# Patient Record
Sex: MALE | Race: BLACK OR AFRICAN AMERICAN | Employment: OTHER | URBAN - METROPOLITAN AREA
[De-identification: names, ages, dates, MRNs, and addresses within clinical notes are randomized per-mention and may not be internally consistent; named-entity substitution may affect disease eponyms.]

---

## 2013-06-10 LAB — LEFT VENTRICULAR EJECTION FRACTION, EXTERNAL: 55

## 2017-01-05 ENCOUNTER — TELEPHONE (OUTPATIENT)
Dept: ORTHOPEDIC SURGERY | Age: 62
End: 2017-01-05

## 2017-01-05 NOTE — TELEPHONE ENCOUNTER
Please see Ceci's message below and advise. The request for pain medication has been made one week early. He received a prescription for Norco on 12/16/2016 that was intended to last 30 days.      Last Visit: 11/18/2016 with MD Aditi Chang    Next Appointment: noted to f/u with Manan Giang regarding surgery

## 2017-01-05 NOTE — TELEPHONE ENCOUNTER
Patient states he fell over the [place] holiday but is supposed to be having hip replacement. He states his foot was swollen and he is having pain. He is requesting pain medication to  at [facility]. Please advise.

## 2017-01-10 DIAGNOSIS — Z01.818 PREOPERATIVE TESTING: Primary | ICD-10-CM

## 2017-01-10 DIAGNOSIS — M16.11 PRIMARY OSTEOARTHRITIS OF RIGHT HIP: ICD-10-CM

## 2017-01-16 ENCOUNTER — HOSPITAL ENCOUNTER (OUTPATIENT)
Dept: GENERAL RADIOLOGY | Age: 62
Discharge: HOME | End: 2017-01-16
Payer: MEDICARE

## 2017-01-16 ENCOUNTER — HOSPITAL ENCOUNTER (OUTPATIENT)
Dept: PREADMISSION TESTING | Age: 62
Discharge: HOME | End: 2017-01-16
Payer: MEDICARE

## 2017-01-16 DIAGNOSIS — Z01.818 PREOPERATIVE TESTING: ICD-10-CM

## 2017-01-16 DIAGNOSIS — M16.11 PRIMARY OSTEOARTHRITIS OF RIGHT HIP: ICD-10-CM

## 2017-01-16 LAB
ALBUMIN SERPL BCP-MCNC: 3.4 G/DL (ref 3.4–5)
ANION GAP BLD CALC-SCNC: 7 MMOL/L (ref 3–18)
APPEARANCE UR: CLEAR
APTT PPP: 24.9 SEC (ref 23–36.4)
ATRIAL RATE: 66 BPM
BASOPHILS # BLD AUTO: 0 K/UL (ref 0–0.06)
BASOPHILS # BLD: 0 % (ref 0–2)
BILIRUB UR QL: NEGATIVE
BUN SERPL-MCNC: 20 MG/DL (ref 7–18)
BUN/CREAT SERPL: 13 (ref 12–20)
CALCIUM SERPL-MCNC: 9.4 MG/DL (ref 8.5–10.1)
CALCULATED P AXIS, ECG09: 63 DEGREES
CALCULATED R AXIS, ECG10: 75 DEGREES
CALCULATED T AXIS, ECG11: 93 DEGREES
CHLORIDE SERPL-SCNC: 106 MMOL/L (ref 100–108)
CO2 SERPL-SCNC: 25 MMOL/L (ref 21–32)
COLOR UR: YELLOW
CREAT SERPL-MCNC: 1.51 MG/DL (ref 0.6–1.3)
DIAGNOSIS, 93000: NORMAL
DIFFERENTIAL METHOD BLD: ABNORMAL
EOSINOPHIL # BLD: 0.1 K/UL (ref 0–0.4)
EOSINOPHIL NFR BLD: 3 % (ref 0–5)
ERYTHROCYTE [DISTWIDTH] IN BLOOD BY AUTOMATED COUNT: 12.4 % (ref 11.6–14.5)
EST. AVERAGE GLUCOSE BLD GHB EST-MCNC: NORMAL MG/DL
GLUCOSE SERPL-MCNC: 71 MG/DL (ref 74–99)
GLUCOSE UR STRIP.AUTO-MCNC: NEGATIVE MG/DL
HBA1C MFR BLD: 4.4 % (ref 4.2–5.6)
HCT VFR BLD AUTO: 41.3 % (ref 36–48)
HGB BLD-MCNC: 13.7 G/DL (ref 13–16)
HGB UR QL STRIP: NEGATIVE
INR PPP: 0.9 (ref 0.8–1.2)
KETONES UR QL STRIP.AUTO: NEGATIVE MG/DL
LEUKOCYTE ESTERASE UR QL STRIP.AUTO: NEGATIVE
LYMPHOCYTES # BLD AUTO: 41 % (ref 21–52)
LYMPHOCYTES # BLD: 1.8 K/UL (ref 0.9–3.6)
MCH RBC QN AUTO: 32.9 PG (ref 24–34)
MCHC RBC AUTO-ENTMCNC: 33.2 G/DL (ref 31–37)
MCV RBC AUTO: 99.3 FL (ref 74–97)
MONOCYTES # BLD: 0.4 K/UL (ref 0.05–1.2)
MONOCYTES NFR BLD AUTO: 8 % (ref 3–10)
NEUTS SEG # BLD: 2.1 K/UL (ref 1.8–8)
NEUTS SEG NFR BLD AUTO: 48 % (ref 40–73)
NITRITE UR QL STRIP.AUTO: NEGATIVE
P-R INTERVAL, ECG05: 190 MS
PH UR STRIP: 5 [PH] (ref 5–8)
PLATELET # BLD AUTO: 206 K/UL (ref 135–420)
PMV BLD AUTO: 10.8 FL (ref 9.2–11.8)
POTASSIUM SERPL-SCNC: 4.3 MMOL/L (ref 3.5–5.5)
PROT UR STRIP-MCNC: NEGATIVE MG/DL
PROTHROMBIN TIME: 12.2 SEC (ref 11.5–15.2)
Q-T INTERVAL, ECG07: 398 MS
QRS DURATION, ECG06: 102 MS
QTC CALCULATION (BEZET), ECG08: 417 MS
RBC # BLD AUTO: 4.16 M/UL (ref 4.7–5.5)
SODIUM SERPL-SCNC: 138 MMOL/L (ref 136–145)
SP GR UR REFRACTOMETRY: 1.01 (ref 1–1.03)
UROBILINOGEN UR QL STRIP.AUTO: 0.2 EU/DL (ref 0.2–1)
VENTRICULAR RATE, ECG03: 66 BPM
WBC # BLD AUTO: 4.3 K/UL (ref 4.6–13.2)

## 2017-01-16 PROCEDURE — 71020 XR CHEST PA LAT: CPT

## 2017-01-16 PROCEDURE — 83036 HEMOGLOBIN GLYCOSYLATED A1C: CPT | Performed by: PHYSICIAN ASSISTANT

## 2017-01-16 PROCEDURE — 82040 ASSAY OF SERUM ALBUMIN: CPT | Performed by: PHYSICIAN ASSISTANT

## 2017-01-16 PROCEDURE — 87086 URINE CULTURE/COLONY COUNT: CPT | Performed by: PHYSICIAN ASSISTANT

## 2017-01-16 PROCEDURE — 36415 COLL VENOUS BLD VENIPUNCTURE: CPT | Performed by: PHYSICIAN ASSISTANT

## 2017-01-16 PROCEDURE — 86900 BLOOD TYPING SEROLOGIC ABO: CPT | Performed by: PHYSICIAN ASSISTANT

## 2017-01-16 PROCEDURE — 80048 BASIC METABOLIC PNL TOTAL CA: CPT | Performed by: PHYSICIAN ASSISTANT

## 2017-01-16 PROCEDURE — 85610 PROTHROMBIN TIME: CPT | Performed by: PHYSICIAN ASSISTANT

## 2017-01-16 PROCEDURE — 93005 ELECTROCARDIOGRAM TRACING: CPT

## 2017-01-16 PROCEDURE — 81003 URINALYSIS AUTO W/O SCOPE: CPT | Performed by: PHYSICIAN ASSISTANT

## 2017-01-16 PROCEDURE — 85730 THROMBOPLASTIN TIME PARTIAL: CPT | Performed by: PHYSICIAN ASSISTANT

## 2017-01-16 PROCEDURE — 85025 COMPLETE CBC W/AUTO DIFF WBC: CPT | Performed by: PHYSICIAN ASSISTANT

## 2017-01-17 LAB
ABO + RH BLD: NORMAL
BACTERIA SPEC CULT: NORMAL
BLOOD GROUP ANTIBODIES SERPL: NORMAL
SERVICE CMNT-IMP: NORMAL
SPECIMEN EXP DATE BLD: NORMAL

## 2017-01-18 ENCOUNTER — TELEPHONE (OUTPATIENT)
Dept: ORTHOPEDIC SURGERY | Facility: CLINIC | Age: 62
End: 2017-01-18

## 2017-01-18 ENCOUNTER — OFFICE VISIT (OUTPATIENT)
Dept: ORTHOPEDIC SURGERY | Facility: CLINIC | Age: 62
End: 2017-01-18

## 2017-01-18 VITALS
HEIGHT: 73 IN | HEART RATE: 73 BPM | SYSTOLIC BLOOD PRESSURE: 114 MMHG | BODY MASS INDEX: 32.87 KG/M2 | TEMPERATURE: 98.7 F | DIASTOLIC BLOOD PRESSURE: 79 MMHG | WEIGHT: 248 LBS

## 2017-01-18 DIAGNOSIS — M16.11 PRIMARY OSTEOARTHRITIS OF RIGHT HIP: Primary | ICD-10-CM

## 2017-01-18 RX ORDER — PREGABALIN 25 MG/1
75 CAPSULE ORAL ONCE
Status: CANCELLED | OUTPATIENT
Start: 2017-01-18 | End: 2017-01-18

## 2017-01-18 RX ORDER — OXYCODONE HCL 10 MG/1
20 TABLET, FILM COATED, EXTENDED RELEASE ORAL EVERY 12 HOURS
Status: CANCELLED | OUTPATIENT
Start: 2017-01-18

## 2017-01-18 RX ORDER — WARFARIN 1 MG/1
10 TABLET ORAL ONCE
Status: CANCELLED | OUTPATIENT
Start: 2017-01-18 | End: 2017-01-18

## 2017-01-18 RX ORDER — CELECOXIB 100 MG/1
400 CAPSULE ORAL ONCE
Status: CANCELLED | OUTPATIENT
Start: 2017-01-18 | End: 2017-01-18

## 2017-01-18 RX ORDER — ACETAMINOPHEN 325 MG/1
1000 TABLET ORAL ONCE
Status: CANCELLED | OUTPATIENT
Start: 2017-01-18 | End: 2017-01-18

## 2017-01-18 NOTE — H&P
[address] [place] [address]  [phone]           HISTORY & PHYSICAL      Patient: Elsa Crowe                MRN: [id number]       SSN: [id number]  YOB: 1955        AGE: 59 y.o. SEX: male  Body mass index is 32.72 kg/(m^2). PCP: Abhijeet Mejia MD  01/18/17      CC: right hip end stage OA  Problem List Items Addressed This Visit     None      Visit Diagnoses     Primary osteoarthritis of right hip    -  Primary    Relevant Orders    AMB POC X-RAY RADEX HIP UNI WITH PELVIS 2-3 VIEWS (Completed)            HPI:  The patient is a pleasant 59 y.o. whom has end stage OA of their Right hip and has failed conservative treatment including but not limited to NSAIDS, cortisone injections, viscosupplementation, PT, and pain medicine. Due to the current findings and affected activities of daily living, surgical intervention is indicated. The alternatives, risks, complications, as well as expected outcome were discussed. These include but are not limited to infection, blood loss, need for blood transfusion, neurovascular damage, DVT, PE,  post-op stiffness and pain, leg length discrepancy, dislocation, anesthetic complications, prothesis longevity, need for more surgery, MI, stroke, and even death. The patient understands and wishes to proceed with surgery. Past Medical History   Diagnosis Date    Arthritis     CAD (coronary artery disease)     Essential hypertension     Low back pain     Lumbar postlaminectomy syndrome     Lumbar spondylosis     Myocardial infarction ([facility] [address]      lad stent for stemi,12/2012    Pancreatic cyst          Current Outpatient Prescriptions:     HYDROcodone-acetaminophen (NORCO)  mg tablet, Take 1 Tab by mouth every twelve (12) hours as needed for Pain.  Max Daily Amount: 2 Tabs., Disp: 60 Tab, Rfl: 0    ammonium lactate (LAC-HYDRIN) 12 % topical cream, Apply  to affected area two (2) times a day. rub in to affected area well, Disp: 280 g, Rfl: 0    HYDROcodone-acetaminophen (NORCO) 7.5-325 mg per tablet, 1-2 po q6h prn pain, Disp: 50 Tab, Rfl: 0    traMADol (ULTRAM) 50 mg tablet, Take 1 Tab by mouth two (2) times daily as needed for Pain. Max Daily Amount: 100 mg., Disp: 60 Tab, Rfl: 1    tadalafil (CIALIS, ADCIRCA) 20 mg tablet, Take 1 Tab by mouth daily as needed. , Disp: 6 Tab, Rfl: 12    gabapentin (NEURONTIN) 300 mg capsule, Take 1 Cap by mouth three (3) times daily. , Disp: 90 Cap, Rfl: 1    doxycycline (ADOXA) 100 mg tablet, Take 1 Tab by mouth two (2) times a day., Disp: 20 Tab, Rfl: 0    gabapentin (NEURONTIN) 300 mg capsule, , Disp: , Rfl:     isoniazid (NYDRAZID) 300 mg tablet, Take 300 mg by mouth daily. , Disp: , Rfl:     nitroglycerin (NITROSTAT) 0.4 mg SL tablet, by SubLINGual route every five (5) minutes as needed. , Disp: , Rfl:     carvedilol (COREG) 25 mg tablet, Take 25 mg by mouth two (2) times daily (with meals). , Disp: , Rfl:     hydrochlorothiazide (HYDRODIURIL) 25 mg tablet, Take 25 mg by mouth daily. , Disp: , Rfl:     pyridoxine 500 mg tablet, Take 500 mg by mouth daily. , Disp: , Rfl:     isosorbide mononitrate ER (IMDUR) 60 mg CR tablet, Take 1 Tab by mouth every morning., Disp: 30 Tab, Rfl: 6    clopidogrel (PLAVIX) 75 mg tablet, Take  by mouth daily. , Disp: , Rfl:     lisinopril (PRINIVIL, ZESTRIL) 20 mg tablet, Take  by mouth daily. , Disp: , Rfl:     aspirin 81 mg tablet, Take 81 mg by mouth., Disp: , Rfl:     atorvastatin (LIPITOR) 80 mg tablet, Take 80 mg by mouth daily. , Disp: , Rfl:     traMADol (ULTRAM) 50 mg tablet, Take 50 mg by mouth every six (6) hours as needed. , Disp: , Rfl:     Allergies   Allergen Reactions    Vicodin [Hydrocodone-Acetaminophen] Other (comments)     denies       Social History     Social History    Marital status: SINGLE     Spouse name: N/A    Number of children: N/A    Years of education: N/A     Occupational History    Not on file. Social History Main Topics    Smoking status: Former Smoker     Packs/day: 0.10     Types: Cigarettes     Quit date: 11/1/2012    Smokeless tobacco: Not on file    Alcohol use Yes      Comment: occasional    Drug use: Yes     Special: Cocaine      Comment: +Cocaine Screen 3/7/16 (see [address] [place] [address])    Sexual activity: Not on file      Comment: stopped using     Other Topics Concern    Not on file     Social History Narrative       Past Surgical History   Procedure Laterality Date    Hx orthopaedic       lumbar spine x 5    Hx lumbar fusion      Hx heart catheterization      Hx ptca         PE:  Visit Vitals    /79 (BP 1 Location: Right arm, BP Patient Position: Sitting)    Pulse 73    Temp 98.7 °F (37.1 °C) (Oral)    Ht 6' 1\" (1.854 m)    Wt 248 lb (112.5 kg)    BMI 32.72 kg/m2     A&O X3, NAD, well develop, well nourished  Heart: S1-S2, rrr  Lungs: CTA bilat  Abd: soft, nt, nt, + bs in all quadrants  Ext:  Pos distal pulses to DP, PT  Leg lengths shows the right LE to be slightly shorter grossly sitting in the chair    X-ray: right hip shows end stage OA    Labs: labs were reviewed and wnl.  ua neg    A:  Right  hip end stage OA    P:  At this point we will move forward with surgery. Again, the alternatives, risks, complications, as well as expected outcome were discussed and the patient wishes to proceed with surgery. Pt has been instructed to stop aspirin, nsaids, rheumatologic medications and blood thinners. They have also been instructed to continue on any heart and bp meds and to take them the morning of surgery with sips of water. Lateral approach.          Cheo Allred

## 2017-01-18 NOTE — PATIENT INSTRUCTIONS
Hip Replacement Surgery: What to Expect at the Hospital  Your Recovery  After hip replacement surgery, you will be taken to the recovery room. In a few hours, you will go to your hospital room. You may see a metal triangle called a trapeze over your bed. You can use this to help move yourself around in bed. You will be very tired and will want to rest. Your nurse may also help turn you as you rest.  You will probably still have a tube that drains urine from your bladder (urinary catheter), and you will probably get fluids through a tube in your vein called an IV. You may also have a drain near the cut (incision) on your hip. You may not feel hungry. You may feel sick to your stomach or constipated for a couple of days. This is normal. Your nurse may give you stool softeners or laxatives to help with constipation. You may have stockings that put pressure on your legs to prevent blood clots. Your nurse may also give you medicines and exercise instructions to help prevent clots. Most people get out of bed with help on the day of surgery or the next day. You will probably spend 2 to 7 days in the hospital after your surgery. Having surgery can be stressful. The information below tells you what to expect. Each person has a different experience and recovers at a different pace. What will happen in the hospital?  Pain and pain medicine  · You will receive medicine to help control pain. Some pain medicines are given through an IV and some are taken by mouth. · Take it as needed, but remember that it is easier to prevent pain before it starts than to stop it once it has started. · If you still have a lot of pain after you take your medicine, tell your nurse. You may need new medicine or to get the medicine in a different form. · You may also have some mild pain in your back. Changing your position in bed may help this. Tell your nurse you want to turn, and he or she will help you.   Other medicines  · Your doctor will probably give you blood thinners to prevent blood clots in your leg. You take this medicine during your hospital stay, and you may also take it after you go home. · Your doctor may give you antibiotics for about 24 hours after surgery. Rehabilitation  · Your rehabilitation (rehab) will probably begin the day after your surgery. Your physical therapist will get you started. It may be painful to exercise at first, but your nurse will give you pain medicine if you need it. · Over the next few days, your physical therapist will help you walk, go up and down stairs, and get in and out of bed and chairs. He or she will help improve the range of motion and strength in your hip. Your physical therapist will teach you positions and motions that will help keep your hip from popping out of the socket (dislocating). This is a very important part of your therapy. · How quickly you regain strength and motion and do things on your own depends on how well you follow your physical therapy. Your physical therapist will teach you the exercises, but you must do them yourself. · An occupational therapist will work with you. He or she will teach you how to bathe, dress, and do daily activities. You may need tools to help with everyday activities. Tools include shower stools, shoehorns, and long-handled sponges. Diet  · You will get liquids at first, but you can begin to eat your normal diet when you feel better. If your stomach is upset, your nurse will probably bring you bland, low-fat foods like plain rice, broiled chicken, toast, and yogurt. · You may have more fiber added to your meals to prevent constipation. Incision care  · You will have a bandage over your incision. Your nurse will care for this. Other instructions  · Your nurse or respiratory therapist will have you do breathing and coughing exercises to prevent problems such as pneumonia.  Breathe in deeply through your nose, and slowly breathe out through your mouth. Do this 3 times, and then cough 2 times. · You may have a device that you suck air through to help keep your lungs healthy (incentive spirometer). Use this as your nurse or therapist tells you to. When should you call for help? · You have trouble breathing. · You have a cough, shortness of breath, or chest pain. · You are sick to your stomach or cannot keep fluids down. · You have signs of a blood clot, such as:  ¨ Pain in your calf, back of the knee, thigh, or groin. ¨ Redness and swelling in your leg or groin. · You are in pain, or your pain does not get better after you take pain medicine. · You have loose stitches, or your incision comes open. · You have signs of infection, such as:  ¨ Increased pain, swelling, warmth, or redness. ¨ Red streaks leading from the incision. ¨ Pus draining from the incision. ¨ A fever. Where can you learn more? Go to http://suzanne-renetta.info/. Enter K179 in the search box to learn more about \"Hip Replacement Surgery: What to Expect at the Hospital.\"  Current as of: May 23, 2016  Content Version: 11.1  © 1164-3332 [employer]. Care instructions adapted under license by [employer] (which disclaims liability or warranty for this information). If you have questions about a medical condition or this instruction, always ask your healthcare professional. [place] [address] any warranty or liability for your use of this information. Hip Replacement: Before Your Surgery  What is hip replacement surgery? Hip replacement surgery uses metal, ceramic, or plastic parts to replace the ball at the top of the thighbone (femur). In a total hip replacement, the doctor also replaces the hip socket. In a partial hip replacement, the socket is not replaced. For traditional surgery, your doctor will make a 6- to 10-inch cut (incision) on the side or the back of your hip.  The surgery can also be done with one or two smaller cuts. This is called minimally invasive surgery. Another type of surgery is done through a small cut on the front (anterior) of the hip. Your doctor will talk with you which type of surgery might be best for you. You will likely stay in the hospital for 1 to 7 days after your surgery. Your physical therapy will start during this time. You may need physical therapy for several weeks after you leave the hospital. At home you'll keep doing the exercises you learned. It usually takes 3 to 6 months to get back to full activity. Your doctor will tell you when you can go back to work. This depends on the kind of surgery and the type of job you have. After you recover, you likely will have much less pain than before the surgery. You should be able to return to most of your normal activities. Your doctor may suggest that you avoid strenuous activities. These include running, tennis, and any type of skiing. You may have to take antibiotics before you have dental work or a medical procedure. This helps reduce the chance that your new hip will become infected. Always tell your caregivers that you have an artificial hip. Follow-up care is a key part of your treatment and safety. Be sure to make and go to all appointments, and call your doctor if you are having problems. It's also a good idea to know your test results and keep a list of the medicines you take. What happens before surgery? Surgery can be stressful. This information will help you understand what you can expect. And it will help you safely prepare for surgery. Preparing for surgery  · Understand exactly what surgery is planned, along with the risks, benefits, and other options. · Tell your doctors ALL the medicines, vitamins, supplements, and herbal remedies you take. Some of these can increase the risk of bleeding or interact with anesthesia. · If you take aspirin or some other blood thinner, be sure to talk to your doctor.  He or she will tell you if you should stop taking it before your surgery. Make sure that you understand exactly what your doctor wants you to do. · Your doctor will tell you which medicines to take or stop before your surgery. You may need to stop taking certain medicines a week or more before surgery. So talk to your doctor as soon as you can. · If you have an advance directive, let your doctor know. It may include a living will and a durable power of  for health care. Bring a copy to the hospital. If you don't have one, you may want to prepare one. It lets your doctor and loved ones know your health care wishes. Doctors advise that everyone prepare these papers before any type of surgery or procedure. What happens on the day of surgery? · Follow the instructions exactly about when to stop eating and drinking. If you don't, your surgery may be canceled. If your doctor told you to take your medicines on the day of surgery, take them with only a sip of water. · Take a bath or shower before you come in for your surgery. Do not apply lotions, perfumes, deodorants, or nail polish. · Do not shave the surgical site yourself. · Take off all jewelry and piercings. And take out contact lenses, if you wear them. At the hospital or surgery center  · Bring a picture ID. · The area for surgery is often marked to make sure there are no errors. · You will get antibiotics through the IV tube before surgery. This lowers the risk of an infection. · You will be kept comfortable and safe by your anesthesia provider. The anesthesia may make you sleep. Or it may just numb the area being worked on. · The surgery usually takes 1 to 3 hours. Going home  · Be sure you have someone to drive you home. Anesthesia and pain medicine make it unsafe for you to drive. · At first, you will need someone who can help you day and night. You can go home from the hospital if you have help at home and your recovery is going well.  You can go to a rehabilitation center if you don't have help at home or if you need extra care. · You will be given more specific instructions about recovering from your surgery. They will cover things like diet, wound care, follow-up care, driving, and getting back to your normal routine. When should you call your doctor? · You have questions or concerns. · You don't understand how to prepare for your surgery. · You become ill before the surgery (such as fever, flu, or a cold). · You need to reschedule or have changed your mind about having the surgery. Where can you learn more? Go to http://suzanne-renetta.info/. Enter 99 [id number] in the search box to learn more about \"Hip Replacement: Before Your Surgery. \"  Current as of: May 23, 2016  Content Version: 11.1  © 6661-9192 [employer]. Care instructions adapted under license by [employer] (which disclaims liability or warranty for this information). If you have questions about a medical condition or this instruction, always ask your healthcare professional. [address] any warranty or liability for your use of this information.

## 2017-01-19 RX ORDER — OXYCODONE AND ACETAMINOPHEN 5; 325 MG/1; MG/1
1 TABLET ORAL
Qty: 40 TAB | Refills: 0 | Status: SHIPPED | OUTPATIENT
Start: 2017-01-19 | End: 2017-01-27

## 2017-01-19 RX ORDER — OXYCODONE AND ACETAMINOPHEN 5; 325 MG/1; MG/1
1 TABLET ORAL
Qty: 40 TAB | Refills: 0 | Status: SHIPPED | OUTPATIENT
Start: 2017-01-19 | End: 2017-01-19

## 2017-01-20 NOTE — PROGRESS NOTES
Omer Anne attended the Joint Replacement Pre-Operative class on 1/16/17. Topics discussed included surgery preparation, what to expect the day of surgery, medications, physical and occupational therapy, and discharge planning. He was given a hip patient education notebook to take home. Opportunity was given to ask questions and phone number of  was given for any questions or concerns that may arise later.

## 2017-01-23 ENCOUNTER — ANESTHESIA EVENT (OUTPATIENT)
Dept: SURGERY | Age: 62
DRG: 470 | End: 2017-01-23
Payer: MEDICARE

## 2017-01-23 ENCOUNTER — HOSPITAL ENCOUNTER (OUTPATIENT)
Dept: PREADMISSION TESTING | Age: 62
Discharge: HOME | DRG: 470 | End: 2017-01-23
Payer: MEDICARE

## 2017-01-23 DIAGNOSIS — F14.10 COCAINE ABUSE, EPISODIC USE (HCC): ICD-10-CM

## 2017-01-23 DIAGNOSIS — Z01.818 PREOPERATIVE TESTING: Primary | ICD-10-CM

## 2017-01-23 DIAGNOSIS — M16.11 PRIMARY OSTEOARTHRITIS OF RIGHT HIP: ICD-10-CM

## 2017-01-23 DIAGNOSIS — Z01.818 PREOPERATIVE TESTING: ICD-10-CM

## 2017-01-23 LAB
AMPHET UR QL SCN: NEGATIVE
BARBITURATES UR QL SCN: NEGATIVE
BENZODIAZ UR QL: NEGATIVE
CANNABINOIDS UR QL SCN: NEGATIVE
COCAINE UR QL SCN: NEGATIVE
HDSCOM,HDSCOM: NORMAL
METHADONE UR QL: NEGATIVE
OPIATES UR QL: NEGATIVE
PCP UR QL: NEGATIVE

## 2017-01-24 ENCOUNTER — HOSPITAL ENCOUNTER (INPATIENT)
Age: 62
LOS: 3 days | Discharge: NURSING, REHAB OR LONG-TERM CARE | DRG: 470 | End: 2017-01-27
Attending: ORTHOPAEDIC SURGERY | Admitting: ORTHOPAEDIC SURGERY
Payer: MEDICARE

## 2017-01-24 ENCOUNTER — ANESTHESIA (OUTPATIENT)
Dept: SURGERY | Age: 62
DRG: 470 | End: 2017-01-24
Payer: MEDICARE

## 2017-01-24 ENCOUNTER — APPOINTMENT (OUTPATIENT)
Dept: GENERAL RADIOLOGY | Age: 62
DRG: 470 | End: 2017-01-24
Attending: PHYSICIAN ASSISTANT
Payer: MEDICARE

## 2017-01-24 ENCOUNTER — SURGERY (OUTPATIENT)
Age: 62
End: 2017-01-24

## 2017-01-24 DIAGNOSIS — M16.10 HIP ARTHRITIS: Primary | ICD-10-CM

## 2017-01-24 PROCEDURE — C9290 INJ, BUPIVACAINE LIPOSOME: HCPCS | Performed by: PHYSICIAN ASSISTANT

## 2017-01-24 PROCEDURE — 74011250636 HC RX REV CODE- 250/636

## 2017-01-24 PROCEDURE — 74011250636 HC RX REV CODE- 250/636: Performed by: ORTHOPAEDIC SURGERY

## 2017-01-24 PROCEDURE — 64445 NJX AA&/STRD SCIATIC NRV IMG: CPT | Performed by: ANESTHESIOLOGY

## 2017-01-24 PROCEDURE — 76060000035 HC ANESTHESIA 2 TO 2.5 HR: Performed by: ORTHOPAEDIC SURGERY

## 2017-01-24 PROCEDURE — 88311 DECALCIFY TISSUE: CPT | Performed by: ORTHOPAEDIC SURGERY

## 2017-01-24 PROCEDURE — 74011250636 HC RX REV CODE- 250/636: Performed by: NURSE ANESTHETIST, CERTIFIED REGISTERED

## 2017-01-24 PROCEDURE — 77030013708 HC HNDPC SUC IRR PULS STRY –B: Performed by: ORTHOPAEDIC SURGERY

## 2017-01-24 PROCEDURE — 77030002933 HC SUT MCRYL J&J -A: Performed by: ORTHOPAEDIC SURGERY

## 2017-01-24 PROCEDURE — 76010000131 HC OR TIME 2 TO 2.5 HR: Performed by: ORTHOPAEDIC SURGERY

## 2017-01-24 PROCEDURE — 76210000016 HC OR PH I REC 1 TO 1.5 HR: Performed by: ORTHOPAEDIC SURGERY

## 2017-01-24 PROCEDURE — 74011250637 HC RX REV CODE- 250/637: Performed by: PHYSICIAN ASSISTANT

## 2017-01-24 PROCEDURE — 74011250636 HC RX REV CODE- 250/636: Performed by: PHYSICIAN ASSISTANT

## 2017-01-24 PROCEDURE — 73502 X-RAY EXAM HIP UNI 2-3 VIEWS: CPT

## 2017-01-24 PROCEDURE — 77030019557 HC ELECTRD VES SEAL MEDT -F: Performed by: ORTHOPAEDIC SURGERY

## 2017-01-24 PROCEDURE — 77030008467 HC STPLR SKN COVD -B: Performed by: ORTHOPAEDIC SURGERY

## 2017-01-24 PROCEDURE — 74011000258 HC RX REV CODE- 258: Performed by: ORTHOPAEDIC SURGERY

## 2017-01-24 PROCEDURE — 74011250637 HC RX REV CODE- 250/637: Performed by: ORTHOPAEDIC SURGERY

## 2017-01-24 PROCEDURE — 77030031139 HC SUT VCRL2 J&J -A: Performed by: ORTHOPAEDIC SURGERY

## 2017-01-24 PROCEDURE — 65270000029 HC RM PRIVATE

## 2017-01-24 PROCEDURE — 77030027138 HC INCENT SPIROMETER -A

## 2017-01-24 PROCEDURE — 77030011265 HC ELECTRD BLD HEX COVD -A: Performed by: ORTHOPAEDIC SURGERY

## 2017-01-24 PROCEDURE — 77030020294 HC ABD PLLW HIP DERY -B: Performed by: ORTHOPAEDIC SURGERY

## 2017-01-24 PROCEDURE — 77030020782 HC GWN BAIR PAWS FLX 3M -B: Performed by: ORTHOPAEDIC SURGERY

## 2017-01-24 PROCEDURE — C1776 JOINT DEVICE (IMPLANTABLE): HCPCS | Performed by: ORTHOPAEDIC SURGERY

## 2017-01-24 PROCEDURE — 74011000258 HC RX REV CODE- 258: Performed by: PHYSICIAN ASSISTANT

## 2017-01-24 PROCEDURE — 77030018836 HC SOL IRR NACL ICUM -A: Performed by: ORTHOPAEDIC SURGERY

## 2017-01-24 PROCEDURE — 74011000250 HC RX REV CODE- 250: Performed by: NURSE ANESTHETIST, CERTIFIED REGISTERED

## 2017-01-24 PROCEDURE — 77030008683 HC TU ET CUF COVD -A: Performed by: ANESTHESIOLOGY

## 2017-01-24 PROCEDURE — 74011250637 HC RX REV CODE- 250/637: Performed by: NURSE ANESTHETIST, CERTIFIED REGISTERED

## 2017-01-24 PROCEDURE — 97161 PT EVAL LOW COMPLEX 20 MIN: CPT

## 2017-01-24 PROCEDURE — 77030003029 HC SUT VCRL J&J -B: Performed by: ORTHOPAEDIC SURGERY

## 2017-01-24 PROCEDURE — 0SR90JZ REPLACEMENT OF RIGHT HIP JOINT WITH SYNTHETIC SUBSTITUTE, OPEN APPROACH: ICD-10-PCS | Performed by: ORTHOPAEDIC SURGERY

## 2017-01-24 PROCEDURE — 77030003602 HC NDL NRV BLK BBMI -B: Performed by: ORTHOPAEDIC SURGERY

## 2017-01-24 PROCEDURE — 77030018835 HC SOL IRR LR ICUM -A: Performed by: ORTHOPAEDIC SURGERY

## 2017-01-24 PROCEDURE — 88304 TISSUE EXAM BY PATHOLOGIST: CPT | Performed by: ORTHOPAEDIC SURGERY

## 2017-01-24 PROCEDURE — 74011000250 HC RX REV CODE- 250: Performed by: ORTHOPAEDIC SURGERY

## 2017-01-24 PROCEDURE — 74011000250 HC RX REV CODE- 250: Performed by: PHYSICIAN ASSISTANT

## 2017-01-24 PROCEDURE — 77030011640 HC PAD GRND REM COVD -A: Performed by: ORTHOPAEDIC SURGERY

## 2017-01-24 PROCEDURE — 74011000250 HC RX REV CODE- 250

## 2017-01-24 PROCEDURE — 77030018883 HC BLD SAW SAG4 STRY -B: Performed by: ORTHOPAEDIC SURGERY

## 2017-01-24 DEVICE — COMPONENT HIP PRSS FT MTL ON CERM POLYETH X3: Type: IMPLANTABLE DEVICE | Site: HIP | Status: FUNCTIONAL

## 2017-01-24 DEVICE — STEM FEM SZ 6 127D 35X111MM -- ACCOLADE II V40: Type: IMPLANTABLE DEVICE | Site: HIP | Status: FUNCTIONAL

## 2017-01-24 DEVICE — SCREW BNE L20MM DIA6.5MM STD CANC HIP TI HMSPHR THRD DRVR: Type: IMPLANTABLE DEVICE | Site: HIP | Status: FUNCTIONAL

## 2017-01-24 DEVICE — LINER ACET SZ F ID36MM THK7.9MM 0DEG HIP X3 LOK RNG FOR: Type: IMPLANTABLE DEVICE | Site: HIP | Status: FUNCTIONAL

## 2017-01-24 DEVICE — SCREW BNE L25MM DIA6.5MM STD CANC HIP TI ST CANN NONLOCKING: Type: IMPLANTABLE DEVICE | Site: HIP | Status: FUNCTIONAL

## 2017-01-24 DEVICE — PLUG BNE BK TI HA HMSPHR SLD FOR TRIDENT ACET SHELL DOME H: Type: IMPLANTABLE DEVICE | Site: HIP | Status: FUNCTIONAL

## 2017-01-24 DEVICE — HEAD FEM DELT V40 -2.5MM 36MM -- V40 BIOLOX: Type: IMPLANTABLE DEVICE | Site: HIP | Status: FUNCTIONAL

## 2017-01-24 DEVICE — SHELL ACET SZ F DIA58MM HIP X3 TRITANIUM HMSPHR CLUS H MOD: Type: IMPLANTABLE DEVICE | Site: HIP | Status: FUNCTIONAL

## 2017-01-24 RX ORDER — CELECOXIB 400 MG/1
400 CAPSULE ORAL ONCE
Status: COMPLETED | OUTPATIENT
Start: 2017-01-24 | End: 2017-01-24

## 2017-01-24 RX ORDER — OXYCODONE AND ACETAMINOPHEN 10; 325 MG/1; MG/1
1-2 TABLET ORAL
Status: DISCONTINUED | OUTPATIENT
Start: 2017-01-24 | End: 2017-01-24

## 2017-01-24 RX ORDER — MIDAZOLAM HYDROCHLORIDE 1 MG/ML
2 INJECTION, SOLUTION INTRAMUSCULAR; INTRAVENOUS ONCE
Status: COMPLETED | OUTPATIENT
Start: 2017-01-24 | End: 2017-01-24

## 2017-01-24 RX ORDER — LANOLIN ALCOHOL/MO/W.PET/CERES
1 CREAM (GRAM) TOPICAL 2 TIMES DAILY WITH MEALS
Status: DISCONTINUED | OUTPATIENT
Start: 2017-01-24 | End: 2017-01-27 | Stop reason: HOSPADM

## 2017-01-24 RX ORDER — ROCURONIUM BROMIDE 10 MG/ML
INJECTION, SOLUTION INTRAVENOUS AS NEEDED
Status: DISCONTINUED | OUTPATIENT
Start: 2017-01-24 | End: 2017-01-24 | Stop reason: HOSPADM

## 2017-01-24 RX ORDER — FENTANYL CITRATE 50 UG/ML
100 INJECTION, SOLUTION INTRAMUSCULAR; INTRAVENOUS ONCE
Status: COMPLETED | OUTPATIENT
Start: 2017-01-24 | End: 2017-01-24

## 2017-01-24 RX ORDER — FENTANYL CITRATE 50 UG/ML
INJECTION, SOLUTION INTRAMUSCULAR; INTRAVENOUS AS NEEDED
Status: DISCONTINUED | OUTPATIENT
Start: 2017-01-24 | End: 2017-01-24 | Stop reason: HOSPADM

## 2017-01-24 RX ORDER — VANCOMYCIN HYDROCHLORIDE 1 G/20ML
INJECTION, POWDER, LYOPHILIZED, FOR SOLUTION INTRAVENOUS AS NEEDED
Status: DISCONTINUED | OUTPATIENT
Start: 2017-01-24 | End: 2017-01-24 | Stop reason: HOSPADM

## 2017-01-24 RX ORDER — CELECOXIB 100 MG/1
200 CAPSULE ORAL EVERY 12 HOURS
Status: DISCONTINUED | OUTPATIENT
Start: 2017-01-24 | End: 2017-01-27 | Stop reason: HOSPADM

## 2017-01-24 RX ORDER — EPINEPHRINE 1 MG/ML
INJECTION, SOLUTION, CONCENTRATE INTRAVENOUS AS NEEDED
Status: DISCONTINUED | OUTPATIENT
Start: 2017-01-24 | End: 2017-01-24 | Stop reason: HOSPADM

## 2017-01-24 RX ORDER — ADHESIVE BANDAGE
30 BANDAGE TOPICAL DAILY PRN
Status: DISCONTINUED | OUTPATIENT
Start: 2017-01-24 | End: 2017-01-27 | Stop reason: HOSPADM

## 2017-01-24 RX ORDER — OXYCODONE HCL 20 MG/1
20 TABLET, FILM COATED, EXTENDED RELEASE ORAL EVERY 12 HOURS
Status: DISCONTINUED | OUTPATIENT
Start: 2017-01-24 | End: 2017-01-27 | Stop reason: HOSPADM

## 2017-01-24 RX ORDER — PROPOFOL 10 MG/ML
INJECTION, EMULSION INTRAVENOUS AS NEEDED
Status: DISCONTINUED | OUTPATIENT
Start: 2017-01-24 | End: 2017-01-24 | Stop reason: HOSPADM

## 2017-01-24 RX ORDER — KETOROLAC TROMETHAMINE 30 MG/ML
INJECTION, SOLUTION INTRAMUSCULAR; INTRAVENOUS AS NEEDED
Status: DISCONTINUED | OUTPATIENT
Start: 2017-01-24 | End: 2017-01-24 | Stop reason: HOSPADM

## 2017-01-24 RX ORDER — NALOXONE HYDROCHLORIDE 0.4 MG/ML
0.4 INJECTION, SOLUTION INTRAMUSCULAR; INTRAVENOUS; SUBCUTANEOUS AS NEEDED
Status: DISCONTINUED | OUTPATIENT
Start: 2017-01-24 | End: 2017-01-27 | Stop reason: HOSPADM

## 2017-01-24 RX ORDER — ROPIVACAINE HYDROCHLORIDE 2 MG/ML
30 INJECTION, SOLUTION EPIDURAL; INFILTRATION; PERINEURAL
Status: COMPLETED | OUTPATIENT
Start: 2017-01-24 | End: 2017-01-24

## 2017-01-24 RX ORDER — EPHEDRINE SULFATE/0.9% NACL/PF 25 MG/5 ML
SYRINGE (ML) INTRAVENOUS AS NEEDED
Status: DISCONTINUED | OUTPATIENT
Start: 2017-01-24 | End: 2017-01-24 | Stop reason: HOSPADM

## 2017-01-24 RX ORDER — LISINOPRIL 20 MG/1
20 TABLET ORAL DAILY
Status: DISCONTINUED | OUTPATIENT
Start: 2017-01-25 | End: 2017-01-27 | Stop reason: HOSPADM

## 2017-01-24 RX ORDER — PREGABALIN 75 MG/1
75 CAPSULE ORAL ONCE
Status: COMPLETED | OUTPATIENT
Start: 2017-01-24 | End: 2017-01-24

## 2017-01-24 RX ORDER — HYDROMORPHONE HYDROCHLORIDE 2 MG/ML
0.5 INJECTION, SOLUTION INTRAMUSCULAR; INTRAVENOUS; SUBCUTANEOUS
Status: COMPLETED | OUTPATIENT
Start: 2017-01-24 | End: 2017-01-24

## 2017-01-24 RX ORDER — SODIUM CHLORIDE 0.9 % (FLUSH) 0.9 %
5-10 SYRINGE (ML) INJECTION EVERY 8 HOURS
Status: DISCONTINUED | OUTPATIENT
Start: 2017-01-24 | End: 2017-01-24 | Stop reason: HOSPADM

## 2017-01-24 RX ORDER — BUPIVACAINE HYDROCHLORIDE 5 MG/ML
INJECTION, SOLUTION EPIDURAL; INTRACAUDAL AS NEEDED
Status: DISCONTINUED | OUTPATIENT
Start: 2017-01-24 | End: 2017-01-24 | Stop reason: HOSPADM

## 2017-01-24 RX ORDER — DIPHENHYDRAMINE HCL 25 MG
25 CAPSULE ORAL
Status: DISCONTINUED | OUTPATIENT
Start: 2017-01-24 | End: 2017-01-27 | Stop reason: HOSPADM

## 2017-01-24 RX ORDER — ACETAMINOPHEN 500 MG
1000 TABLET ORAL ONCE
Status: COMPLETED | OUTPATIENT
Start: 2017-01-24 | End: 2017-01-24

## 2017-01-24 RX ORDER — SODIUM CHLORIDE 0.9 % (FLUSH) 0.9 %
5-10 SYRINGE (ML) INJECTION AS NEEDED
Status: DISCONTINUED | OUTPATIENT
Start: 2017-01-24 | End: 2017-01-27 | Stop reason: HOSPADM

## 2017-01-24 RX ORDER — SODIUM CHLORIDE 0.9 % (FLUSH) 0.9 %
5-10 SYRINGE (ML) INJECTION AS NEEDED
Status: DISCONTINUED | OUTPATIENT
Start: 2017-01-24 | End: 2017-01-24 | Stop reason: HOSPADM

## 2017-01-24 RX ORDER — FAMOTIDINE 20 MG/1
20 TABLET, FILM COATED ORAL ONCE
Status: COMPLETED | OUTPATIENT
Start: 2017-01-24 | End: 2017-01-24

## 2017-01-24 RX ORDER — ISOSORBIDE MONONITRATE 30 MG/1
60 TABLET, EXTENDED RELEASE ORAL
Status: DISCONTINUED | OUTPATIENT
Start: 2017-01-25 | End: 2017-01-27 | Stop reason: HOSPADM

## 2017-01-24 RX ORDER — CARVEDILOL 25 MG/1
25 TABLET ORAL 2 TIMES DAILY WITH MEALS
Status: DISCONTINUED | OUTPATIENT
Start: 2017-01-24 | End: 2017-01-27 | Stop reason: HOSPADM

## 2017-01-24 RX ORDER — WARFARIN 10 MG/1
10 TABLET ORAL ONCE
Status: COMPLETED | OUTPATIENT
Start: 2017-01-24 | End: 2017-01-24

## 2017-01-24 RX ORDER — HYDROMORPHONE HYDROCHLORIDE 2 MG/ML
INJECTION, SOLUTION INTRAMUSCULAR; INTRAVENOUS; SUBCUTANEOUS
Status: COMPLETED
Start: 2017-01-24 | End: 2017-01-24

## 2017-01-24 RX ORDER — DIPHENHYDRAMINE HYDROCHLORIDE 50 MG/ML
25 INJECTION, SOLUTION INTRAMUSCULAR; INTRAVENOUS ONCE
Status: COMPLETED | OUTPATIENT
Start: 2017-01-24 | End: 2017-01-24

## 2017-01-24 RX ORDER — OXYCODONE HCL 20 MG/1
20 TABLET, FILM COATED, EXTENDED RELEASE ORAL ONCE
Status: COMPLETED | OUTPATIENT
Start: 2017-01-24 | End: 2017-01-24

## 2017-01-24 RX ORDER — HYDROMORPHONE HYDROCHLORIDE 2 MG/1
1-2 TABLET ORAL
Status: DISCONTINUED | OUTPATIENT
Start: 2017-01-24 | End: 2017-01-26

## 2017-01-24 RX ORDER — SUCCINYLCHOLINE CHLORIDE 20 MG/ML
INJECTION INTRAMUSCULAR; INTRAVENOUS AS NEEDED
Status: DISCONTINUED | OUTPATIENT
Start: 2017-01-24 | End: 2017-01-24 | Stop reason: HOSPADM

## 2017-01-24 RX ORDER — CEFAZOLIN SODIUM 2 G/50ML
2 SOLUTION INTRAVENOUS
Status: COMPLETED | OUTPATIENT
Start: 2017-01-24 | End: 2017-01-24

## 2017-01-24 RX ORDER — ATORVASTATIN CALCIUM 40 MG/1
80 TABLET, FILM COATED ORAL DAILY
Status: DISCONTINUED | OUTPATIENT
Start: 2017-01-25 | End: 2017-01-27 | Stop reason: HOSPADM

## 2017-01-24 RX ORDER — POLYMYXIN B 500000 [USP'U]/1
INJECTION, POWDER, LYOPHILIZED, FOR SOLUTION INTRAMUSCULAR; INTRATHECAL; INTRAVENOUS; OPHTHALMIC AS NEEDED
Status: DISCONTINUED | OUTPATIENT
Start: 2017-01-24 | End: 2017-01-24 | Stop reason: HOSPADM

## 2017-01-24 RX ORDER — NITROGLYCERIN 0.4 MG/1
0.4 TABLET SUBLINGUAL
Status: DISCONTINUED | OUTPATIENT
Start: 2017-01-24 | End: 2017-01-27 | Stop reason: HOSPADM

## 2017-01-24 RX ORDER — LIDOCAINE HYDROCHLORIDE 10 MG/ML
3 INJECTION, SOLUTION EPIDURAL; INFILTRATION; INTRACAUDAL; PERINEURAL ONCE
Status: COMPLETED | OUTPATIENT
Start: 2017-01-24 | End: 2017-01-24

## 2017-01-24 RX ORDER — DEXTROSE MONOHYDRATE AND SODIUM CHLORIDE 5; .45 G/100ML; G/100ML
125 INJECTION, SOLUTION INTRAVENOUS CONTINUOUS
Status: DISPENSED | OUTPATIENT
Start: 2017-01-24 | End: 2017-01-25

## 2017-01-24 RX ORDER — HYDROCHLOROTHIAZIDE 25 MG/1
25 TABLET ORAL DAILY
Status: DISCONTINUED | OUTPATIENT
Start: 2017-01-25 | End: 2017-01-27 | Stop reason: HOSPADM

## 2017-01-24 RX ORDER — SODIUM CHLORIDE, SODIUM LACTATE, POTASSIUM CHLORIDE, CALCIUM CHLORIDE 600; 310; 30; 20 MG/100ML; MG/100ML; MG/100ML; MG/100ML
75 INJECTION, SOLUTION INTRAVENOUS CONTINUOUS
Status: DISCONTINUED | OUTPATIENT
Start: 2017-01-24 | End: 2017-01-24 | Stop reason: HOSPADM

## 2017-01-24 RX ORDER — LIDOCAINE HYDROCHLORIDE 20 MG/ML
INJECTION, SOLUTION EPIDURAL; INFILTRATION; INTRACAUDAL; PERINEURAL AS NEEDED
Status: DISCONTINUED | OUTPATIENT
Start: 2017-01-24 | End: 2017-01-24 | Stop reason: HOSPADM

## 2017-01-24 RX ORDER — SODIUM CHLORIDE 0.9 % (FLUSH) 0.9 %
5-10 SYRINGE (ML) INJECTION EVERY 8 HOURS
Status: DISCONTINUED | OUTPATIENT
Start: 2017-01-24 | End: 2017-01-27 | Stop reason: HOSPADM

## 2017-01-24 RX ORDER — DIPHENHYDRAMINE HYDROCHLORIDE 50 MG/ML
INJECTION, SOLUTION INTRAMUSCULAR; INTRAVENOUS
Status: COMPLETED
Start: 2017-01-24 | End: 2017-01-24

## 2017-01-24 RX ORDER — PREGABALIN 75 MG/1
75 CAPSULE ORAL EVERY 12 HOURS
Status: DISCONTINUED | OUTPATIENT
Start: 2017-01-24 | End: 2017-01-27 | Stop reason: HOSPADM

## 2017-01-24 RX ORDER — ONDANSETRON 2 MG/ML
4 INJECTION INTRAMUSCULAR; INTRAVENOUS
Status: DISCONTINUED | OUTPATIENT
Start: 2017-01-24 | End: 2017-01-27 | Stop reason: HOSPADM

## 2017-01-24 RX ORDER — ZOLPIDEM TARTRATE 5 MG/1
5 TABLET ORAL
Status: DISCONTINUED | OUTPATIENT
Start: 2017-01-24 | End: 2017-01-27 | Stop reason: HOSPADM

## 2017-01-24 RX ORDER — ONDANSETRON 2 MG/ML
4 INJECTION INTRAMUSCULAR; INTRAVENOUS ONCE
Status: COMPLETED | OUTPATIENT
Start: 2017-01-24 | End: 2017-01-24

## 2017-01-24 RX ADMIN — Medication 10 ML: at 21:46

## 2017-01-24 RX ADMIN — FENTANYL CITRATE 25 MCG: 50 INJECTION, SOLUTION INTRAMUSCULAR; INTRAVENOUS at 13:04

## 2017-01-24 RX ADMIN — HYDROMORPHONE HYDROCHLORIDE 0.5 MG: 2 INJECTION, SOLUTION INTRAMUSCULAR; INTRAVENOUS; SUBCUTANEOUS at 13:21

## 2017-01-24 RX ADMIN — ROPIVACAINE HYDROCHLORIDE 60 MG: 2 INJECTION, SOLUTION EPIDURAL; INFILTRATION at 10:52

## 2017-01-24 RX ADMIN — EPINEPHRINE 0.5 MG: 1 INJECTION, SOLUTION INTRAMUSCULAR; SUBCUTANEOUS at 11:40

## 2017-01-24 RX ADMIN — VANCOMYCIN HYDROCHLORIDE 3 G: 1 INJECTION, POWDER, LYOPHILIZED, FOR SOLUTION INTRAVENOUS at 11:38

## 2017-01-24 RX ADMIN — CEFAZOLIN SODIUM 2 G: 2 SOLUTION INTRAVENOUS at 11:11

## 2017-01-24 RX ADMIN — ONDANSETRON 4 MG: 2 INJECTION INTRAMUSCULAR; INTRAVENOUS at 14:21

## 2017-01-24 RX ADMIN — OXYCODONE HYDROCHLORIDE 20 MG: 20 TABLET, FILM COATED, EXTENDED RELEASE ORAL at 21:46

## 2017-01-24 RX ADMIN — SUCCINYLCHOLINE CHLORIDE 120 MG: 20 INJECTION INTRAMUSCULAR; INTRAVENOUS at 11:05

## 2017-01-24 RX ADMIN — Medication 10 MG: at 12:39

## 2017-01-24 RX ADMIN — DIPHENHYDRAMINE HYDROCHLORIDE 25 MG: 25 CAPSULE ORAL at 17:01

## 2017-01-24 RX ADMIN — PREGABALIN 75 MG: 75 CAPSULE ORAL at 21:46

## 2017-01-24 RX ADMIN — Medication 10 ML: at 16:00

## 2017-01-24 RX ADMIN — TRANEXAMIC ACID 1 G: 100 INJECTION, SOLUTION INTRAVENOUS at 11:15

## 2017-01-24 RX ADMIN — KETOROLAC TROMETHAMINE 30 MG: 30 INJECTION, SOLUTION INTRAMUSCULAR; INTRAVENOUS at 11:41

## 2017-01-24 RX ADMIN — DIPHENHYDRAMINE HYDROCHLORIDE 25 MG: 50 INJECTION, SOLUTION INTRAMUSCULAR; INTRAVENOUS at 14:27

## 2017-01-24 RX ADMIN — BUPIVACAINE 266 MG: 13.3 INJECTION, SUSPENSION, LIPOSOMAL INFILTRATION at 12:06

## 2017-01-24 RX ADMIN — WARFARIN SODIUM 10 MG: 10 TABLET ORAL at 10:22

## 2017-01-24 RX ADMIN — CARVEDILOL 25 MG: 25 TABLET, FILM COATED ORAL at 17:01

## 2017-01-24 RX ADMIN — CELECOXIB 400 MG: 400 CAPSULE ORAL at 10:22

## 2017-01-24 RX ADMIN — ROCURONIUM BROMIDE 5 MG: 10 INJECTION, SOLUTION INTRAVENOUS at 11:05

## 2017-01-24 RX ADMIN — SODIUM CHLORIDE, SODIUM LACTATE, POTASSIUM CHLORIDE, AND CALCIUM CHLORIDE 75 ML/HR: 600; 310; 30; 20 INJECTION, SOLUTION INTRAVENOUS at 10:24

## 2017-01-24 RX ADMIN — ROCURONIUM BROMIDE 35 MG: 10 INJECTION, SOLUTION INTRAVENOUS at 11:11

## 2017-01-24 RX ADMIN — Medication 10 MG: at 12:46

## 2017-01-24 RX ADMIN — Medication 10 MG: at 11:34

## 2017-01-24 RX ADMIN — POLYMYXIN B SULFATE 750000 UNITS: 500000 INJECTION, POWDER, LYOPHILIZED, FOR SOLUTION INTRAMUSCULAR; INTRATHECAL; INTRAVENOUS; OPHTHALMIC at 11:38

## 2017-01-24 RX ADMIN — OXYCODONE HYDROCHLORIDE 20 MG: 20 TABLET, FILM COATED, EXTENDED RELEASE ORAL at 10:22

## 2017-01-24 RX ADMIN — Medication 10 MG: at 11:37

## 2017-01-24 RX ADMIN — Medication 5 MG: at 12:01

## 2017-01-24 RX ADMIN — ROCURONIUM BROMIDE 10 MG: 10 INJECTION, SOLUTION INTRAVENOUS at 11:34

## 2017-01-24 RX ADMIN — HYDROMORPHONE HYDROCHLORIDE 0.5 MG: 2 INJECTION, SOLUTION INTRAMUSCULAR; INTRAVENOUS; SUBCUTANEOUS at 14:01

## 2017-01-24 RX ADMIN — HYDROMORPHONE HYDROCHLORIDE 2 MG: 2 TABLET ORAL at 17:01

## 2017-01-24 RX ADMIN — SODIUM CHLORIDE 250 ML: 9 INJECTION, SOLUTION INTRAVENOUS at 11:42

## 2017-01-24 RX ADMIN — MIDAZOLAM HYDROCHLORIDE 2 MG: 1 INJECTION, SOLUTION INTRAMUSCULAR; INTRAVENOUS at 10:36

## 2017-01-24 RX ADMIN — DEXTROSE MONOHYDRATE AND SODIUM CHLORIDE 125 ML/HR: 5; .45 INJECTION, SOLUTION INTRAVENOUS at 16:00

## 2017-01-24 RX ADMIN — PROPOFOL 150 MG: 10 INJECTION, EMULSION INTRAVENOUS at 11:05

## 2017-01-24 RX ADMIN — LIDOCAINE HYDROCHLORIDE 3 ML: 10 INJECTION, SOLUTION EPIDURAL; INFILTRATION; INTRACAUDAL; PERINEURAL at 10:48

## 2017-01-24 RX ADMIN — HYDROMORPHONE HYDROCHLORIDE 0.5 MG: 2 INJECTION, SOLUTION INTRAMUSCULAR; INTRAVENOUS; SUBCUTANEOUS at 13:41

## 2017-01-24 RX ADMIN — Medication 10 MG: at 11:25

## 2017-01-24 RX ADMIN — ACETAMINOPHEN 1000 MG: 500 TABLET, FILM COATED ORAL at 10:22

## 2017-01-24 RX ADMIN — FENTANYL CITRATE 100 MCG: 50 INJECTION INTRAMUSCULAR; INTRAVENOUS at 10:36

## 2017-01-24 RX ADMIN — BUPIVACAINE HYDROCHLORIDE 25 ML: 5 INJECTION, SOLUTION EPIDURAL; INTRACAUDAL at 11:40

## 2017-01-24 RX ADMIN — LIDOCAINE HYDROCHLORIDE 60 MG: 20 INJECTION, SOLUTION EPIDURAL; INFILTRATION; INTRACAUDAL; PERINEURAL at 11:05

## 2017-01-24 RX ADMIN — DIPHENHYDRAMINE HYDROCHLORIDE 25 MG: 50 INJECTION INTRAMUSCULAR; INTRAVENOUS at 14:27

## 2017-01-24 RX ADMIN — FENTANYL CITRATE 50 MCG: 50 INJECTION, SOLUTION INTRAMUSCULAR; INTRAVENOUS at 11:05

## 2017-01-24 RX ADMIN — PREGABALIN 75 MG: 75 CAPSULE ORAL at 10:22

## 2017-01-24 RX ADMIN — Medication 5 MG: at 11:58

## 2017-01-24 RX ADMIN — SODIUM CHLORIDE 50 ML: 9 INJECTION, SOLUTION INTRAVENOUS at 11:39

## 2017-01-24 RX ADMIN — Medication 10 MG: at 12:49

## 2017-01-24 RX ADMIN — FENTANYL CITRATE 25 MCG: 50 INJECTION, SOLUTION INTRAMUSCULAR; INTRAVENOUS at 11:29

## 2017-01-24 RX ADMIN — CELECOXIB 200 MG: 100 CAPSULE ORAL at 21:45

## 2017-01-24 RX ADMIN — Medication 325 MG: at 17:01

## 2017-01-24 RX ADMIN — Medication 10 MG: at 11:31

## 2017-01-24 RX ADMIN — HYDROMORPHONE HYDROCHLORIDE 0.5 MG: 2 INJECTION, SOLUTION INTRAMUSCULAR; INTRAVENOUS; SUBCUTANEOUS at 13:31

## 2017-01-24 RX ADMIN — FAMOTIDINE 20 MG: 20 TABLET ORAL at 10:22

## 2017-01-24 NOTE — IP AVS SNAPSHOT
Current Discharge Medication List  
  
Take these medications at their scheduled times Dose & Instructions Dispensing Information Comments Morning Noon Evening Bedtime  
 ammonium lactate 12 % topical cream  
Commonly known as:  LAC-HYDRIN Your next dose is: Today, Tomorrow Other:  ____________ Apply  to affected area two (2) times a day. rub in to affected area well Quantity:  280 g Refills:  0  
     
   
   
   
  
 aspirin 325 mg tablet Commonly known as:  ASPIRIN Your next dose is: Today, Tomorrow Other:  ____________ Dose:  325 mg Take 1 Tab by mouth two (2) times a day. Quantity:  60 Tab Refills:  0  
     
   
   
   
  
 carvedilol 25 mg tablet Commonly known as:  Willa Ana Luisa Your next dose is: Today, Tomorrow Other:  ____________ Dose:  25 mg Take 25 mg by mouth two (2) times daily (with meals). Refills:  0  
     
   
   
   
  
 celecoxib 200 mg capsule Commonly known as:  CELEBREX Your next dose is: Today, Tomorrow Other:  ____________ Dose:  200 mg Take 1 Cap by mouth every twelve (12) hours every twelve (12) hours for 90 days. Quantity:  60 Cap Refills:  2  
     
   
   
   
  
 doxycycline 100 mg tablet Commonly known as:  ADOXA Your next dose is: Today, Tomorrow Other:  ____________ Dose:  100 mg Take 1 Tab by mouth two (2) times a day. Quantity:  20 Tab Refills:  0  
     
   
   
   
  
 ferrous sulfate 325 mg (65 mg iron) tablet Your next dose is: Today, Tomorrow Other:  ____________ Dose:  325 mg Take 1 Tab by mouth two (2) times daily (with meals). Quantity:  60 Tab Refills:  2  
     
   
   
   
  
 * gabapentin 300 mg capsule Commonly known as:  NEURONTIN Your next dose is: Today, Tomorrow Other:  ____________  Dose:  300 mg  
 Take 1 Cap by mouth three (3) times daily. Quantity:  90 Cap Refills:  1  
     
   
   
   
  
 hydroCHLOROthiazide 25 mg tablet Commonly known as:  HYDRODIURIL Your next dose is: Today, Tomorrow Other:  ____________ Dose:  25 mg Take 25 mg by mouth daily. Refills:  0  
     
   
   
   
  
 isoniazid 300 mg tablet Commonly known as:  NYDRAZID Your next dose is: Today, Tomorrow Other:  ____________ Dose:  300 mg Take 300 mg by mouth daily. Refills:  0  
     
   
   
   
  
 isosorbide mononitrate ER 60 mg CR tablet Commonly known as:  IMDUR Your next dose is: Today, Tomorrow Other:  ____________ Dose:  60 mg Take 1 Tab by mouth every morning. Quantity:  30 Tab Refills:  6 LIPITOR 80 mg tablet Generic drug:  atorvastatin Your next dose is: Today, Tomorrow Other:  ____________ Dose:  80 mg Take 80 mg by mouth daily. Refills:  0  
     
   
   
   
  
 lisinopril 20 mg tablet Commonly known as:  Delsie Commander Your next dose is: Today, Tomorrow Other:  ____________ Take  by mouth daily. Refills:  0  
     
   
   
   
  
 polyethylene glycol 17 gram packet Commonly known as:  Hermelinda Mati Your next dose is: Today, Tomorrow Other:  ____________ Dose:  17 g Take 1 Packet by mouth daily. Indications: Constipation Quantity:  30 Packet Refills:  1  
     
   
   
   
  
 * Notice: This list has 1 medication(s) that are the same as other medications prescribed for you. Read the directions carefully, and ask your doctor or other care provider to review them with you. Take these medications as needed Dose & Instructions Dispensing Information Comments Morning Noon Evening Bedtime HYDROmorphone 4 mg tablet Commonly known as:  DILAUDID Your next dose is: Today, Tomorrow Other:  ____________ Dose:  4 mg Take 1 Tab by mouth every four (4) hours as needed. Max Daily Amount: 24 mg. Indications: PAIN Quantity:  64 Tab Refills:  0  
     
   
   
   
  
 NITROSTAT 0.4 mg SL tablet Generic drug:  nitroglycerin Your next dose is: Today, Tomorrow Other:  ____________  
   
   
 by SubLINGual route every five (5) minutes as needed. Refills:  0 Take these medications as directed Dose & Instructions Dispensing Information Comments Morning Noon Evening Bedtime * gabapentin 300 mg capsule Commonly known as:  NEURONTIN Your next dose is: Today, Tomorrow Other:  ____________ Refills:  0  
     
   
   
   
  
 * Notice: This list has 1 medication(s) that are the same as other medications prescribed for you. Read the directions carefully, and ask your doctor or other care provider to review them with you. Where to Get Your Medications Information about where to get these medications is not yet available ! Ask your nurse or doctor about these medications  
  aspirin 325 mg tablet  
 celecoxib 200 mg capsule  
 ferrous sulfate 325 mg (65 mg iron) tablet HYDROmorphone 4 mg tablet  
 polyethylene glycol 17 gram packet

## 2017-01-24 NOTE — INTERVAL H&P NOTE
H&P Update:  Chanda Garduno was seen and examined. History and physical has been reviewed. The patient has been examined.  There have been no significant clinical changes since the completion of the originally dated History and Physical.    Signed By: Yehuda Woodson MD     January 24, 2017 10:33 AM

## 2017-01-24 NOTE — PROGRESS NOTES
1520 Received verbal report from [place] [address] [address] Patient arrived to unit. 1536 96.5; 72; 18; 100%; 150/89. Patient rating pain level a 10 on a scale of 0 to 10. Pt using ICS to level 250. Call bell placed within reach.

## 2017-01-24 NOTE — PROGRESS NOTES
Problem: Mobility Impaired (Adult and Pediatric)  Goal: *Acute Goals and Plan of Care (Insert Text)  Physical Therapy Goals  Initiated 1/24/2017 and to be accomplished within 7 day(s)  1. Patient will move from supine to sit and sit to supine in bed with modified independence. 2. Patient will transfer from bed to chair and chair to bed with modified independence using the least restrictive device. 3. Patient will perform sit to stand with modified independence. 4. Patient will ambulate with modified independence for >150 feet with the least restrictive device. 5. Patient will ascend/descend 4 stairs with 1 handrail(s) with modified independence. 6. Patient to perform HEP independently in order to increase strength for carryover into functional tasks. PHYSICAL THERAPY EVALUATION     Patient: Norris Robertson (5 y.o. male)  Date: 1/24/2017  Primary Diagnosis: Osteoarthritis of right hip, unspecified osteoarthritis type [M16.11]  Procedure(s) (LRB):  RIGHT TOTAL HIP ARTHROPLASTY LATERAL APPROACH (Right) Day of Surgery   Precautions: Fall, WBAT R JB         ASSESSMENT :  Pt is 59yo M admitted to hospital for R JB and is WBAT; educated on hip precuations. Pt presents groggy but oriented x4 and was agreeable to therapy. Pt was able to transfer from sup to sit with ModA but was unable to SLR. Pt then sat EOB for several mins waiting for dizziness to janae. Pt block in RLE still active, limiting his ability to ambulate this session. [place] [facility] [place] [address] [place] and succeeded to come to full stand on 2nd attempt. Pt then took several side steps with Marla x1 for RLE and Standby of 2nd person (CAR Gaviria) as pt was unsteady. Pt then transferred stand to sit with Min/ModA and then sit to sup with ModA. ABD Pillow donned and call bell left beside pt. Pt currently demonstrates decreased strength, mobility, and endurance and will benefit from skilled intervention to address the above impairments.   Patients rehabilitation potential is considered to be Fair  Factors which may influence rehabilitation potential include:   [ ]         None noted  [ ]         Mental ability/status  [X]         Medical condition  [X]         Home/family situation and support systems  [X]         Safety awareness  [X]         Pain tolerance/management  [ ]         Other:        PLAN :  Recommendations and Planned Interventions:  [X]           Bed Mobility Training             [X]    Neuromuscular Re-Education  [X]           Transfer Training                   [ ]    Orthotic/Prosthetic Training  [X]           Gait Training                          [ ]    Modalities  [X]           Therapeutic Exercises          [ ]    Edema Management/Control  [X]           Therapeutic Activities            [X]    Patient and Family Training/Education  [ ]           Other (comment):     Frequency/Duration: Patient will be followed by physical therapy 1-2 times per day/4-7 days per week to address goals. Discharge Recommendations: Home Health  Further Equipment Recommendations for Discharge: rolling walker       G-CODES      Mobility  Current  CL= 60-79%   Goal  CI= 1-19%. The severity rating is based on the Level of Assistance required for Functional Mobility and ADLs.            G-CODES      Eval Complexity: History: MEDIUM  Complexity : 1-2 comorbidities / personal factors will impact the outcome/ POC Exam:MEDIUM Complexity : 3 Standardized tests and measures addressing body structure, function, activity limitation and / or participation in recreation  Presentation: LOW Complexity : Stable, uncomplicated  Clinical Decision Making:Low Complexity x Overall Complexity:LOW       SUBJECTIVE:   Patient stated I want to try to stand.       OBJECTIVE DATA SUMMARY:       Past Medical History   Diagnosis Date    Arthritis      CAD (coronary artery disease)      Essential hypertension      Low back pain      Lumbar postlaminectomy syndrome      Lumbar spondylosis  [facility]ocardial infarction [facility])         lad stent for stemi,12/2012    Pancreatic cyst       Past Surgical History   Procedure Laterality Date    Hx orthopaedic           lumbar spine x 5    Hx lumbar fusion        Hx heart catheterization        Hx ptca         Prior Level of Function/Home Situation: Pt lives in 1 story home with 0STE and lives alone. Pt was using SPC for mobility prior to admit and has no other AD/DME. Home Situation  One/Two Story Residence: One story  Living Alone: Yes  Support Systems: Inpatient rehab  Critical Behavior:   Cooperative, groggy, oriented x4   Strength:    Strength: Generally decreased, functional (RLE 2/5 throughout 2/2 nn block)   Tone & Sensation:   Tone: Normal   Sensation: Intact   Range Of Motion:  AROM: Generally decreased, functional (BLE)    Functional Mobility:  Bed Mobility:   Supine to Sit: Moderate assistance  Sit to Supine: Moderate assistance   Transfers:  Sit to Stand: Moderate assistance  Stand to Sit: Moderate assistance      Balance:   Sitting: Impaired; With support  Sitting - Static: Good (unsupported)  Sitting - Dynamic: Fair (occasional)  Standing: Impaired; With support  Standing - Static: Fair  Standing - Dynamic : Poor  Ambulation/Gait Training:   Pt took several side steps towards HOB, maintain precautions and with Marla from PT to advance RLE. Pt groggy and balance was compromised, limiting pt ability to ambulate further this session. Pain:  Pt reports 0/10 pain or discomfort prior to treatment. Pt reports 0/10 pain or discomfort post treatment. Activity Tolerance:   Pt reported minimal dizziness with sitting EOB but sx abated after sitting EOB several minutes. Please refer to the flowsheet for vital signs taken during this treatment.   After treatment:   [ ]         Patient left in no apparent distress sitting up in chair  [X]         Patient left in no apparent distress in bed  [X]         Call bell left within reach  [X] Nursing notified  [ ]         Caregiver present  [ ]         Bed alarm activated      COMMUNICATION/EDUCATION:   [X]         Fall prevention education was provided and the patient/caregiver indicated understanding. [X]         Patient/family have participated as able in goal setting and plan of care. [X]         Patient/family agree to work toward stated goals and plan of care. [ ]         Patient understands intent and goals of therapy, but is neutral about his/her participation. [ ]         Patient is unable to participate in goal setting and plan of care.      Thank you for this referral.  Agnieszka Baptiste, PT   Time Calculation: 29 mins

## 2017-01-24 NOTE — OP NOTES
[address]    Name:  Stacie Camarillo  MR#:  [id number]  :  1955  Account #:  [de-identified]  Date of Adm:  2017  Date of Surgery:  2017      PREOPERATIVE DIAGNOSES:  1. End-stage arthritis, right hip, with severe ankylosis. 2. Also fixed flexion deformities of the hips bilaterally of approximately  25 degrees. 3. End-stage arthritis of the right hip. POSTOPERATIVE DIAGNOSES:  1. End-stage arthritis, right hip, with severe ankylosis. 2. Also fixed flexion deformities of the hips bilaterally of approximately  25 degrees. 3. End-stage arthritis of the right hip. PROCEDURES PERFORMED: Right total hip replacement using the  Accolade 2 system with a size 6 high offset femoral component, a 58  Tritanium cup with neutral liner 36 and screw augmentation, and a 36 -  2.5 ceramic femoral head. COMPLICATIONS: None. ESTIMATED BLOOD LOSS: 300 mL. SPECIMENS REMOVED: Femoral head. SURGEON: Jonna Jones MD.    FIRST ASSISTANT: Edmar Green. SECOND ASSISTANT: Malissa Burgos. ANESTHESIOLOGIST:  .    ANESTHESIA: Preoperative lumbar plexus block with general.    CLINICAL NOTE: The patient has bilateral fixed flexion deformities of  his hips of 25-30 degrees. All risks and benefits were described, and  again repeated with the family present. They understood the leg would be made a little longer in anticipation of  the other hip requiring surgery. All risks, benefits described and the  patient and family elected to proceed. Antibiotics confirmed given. DESCRIPTION OF PROCEDURE: Appropriate time-out was  performed. A lateral approach to the hip performed. His IT band was  quite scarred in and we took a few extra minutes to free this up. IT  band delineated and incised sharply and the sciatic nerve identified,  protected and avoided, and the Charnley retractor introduced. Bursa  released using capsular scissors.  His abductor complex was quite a bit  attenuated due to disuse. Minimus capsule divided directly over femoral neck, carrying back to  the tip of the greater trochanter anteriorly along down the vastus  lateralis. Whole cuff of tissue was taken in one contiguous layer. We  spent a few extra minutes releasing the capsule as well. Placed a pin  in the superior iliac crest for offset leg length measurement and able to  dislocate the hip uneventfully after partial capsulectomy. Using appropriate fingerbreadth above the lesser trochanter, protecting  the soft tissue structures. Femoral neck divided and posterior capsule  reflected using the Arevalo elevator. We instrumented around the  acetabulum, protecting neurovascular structures, identified the medial  wall through the foveal notch and debrided the cup in the usual  fashion. Medialized appropriately in appropriate inclination and  anteversion reamed up to accommodate the cup, trialed this, I was  very pleased with it. Implanted definitive shell into very good bleeding  bone and augmented with a couple of screws. We then in a horseshoe  type pattern removed a very extensive osteophytosis and used the  [place]quam[place] and Exparel cocktail. We initially just placed a trial liner,  but go back and we put the definitive neutral liner in and made sure it  was fully seated, protected with a Ray-Dimple gauze which would later be  accounted for. With the leg in a sterile leg bag, had a little subtroch narrowing. We got  through this very nicely with anatomic alignment and broached our way  up to the size 6 calcar planer and it was between the -5 and -2.5 ball.  I  felt the -2.5 would be perfect for him with excellent restoration of offset  leg lengths and it should be noted that we did place a pin in the  superior iliac crest for offset leg length measurement prior to  dislocation and we referred back to this at this point and we had  perhaps lengthened the leg a mm or 2 in anticipation of the opposite  hip requiring surgery. I was delighted with the results of the case. The  patient awoke and taken to recovery room in stable condition without  complication.         Matthieu Ferguson MD AM / Edson  D:  01/24/2017   13:07  T:  01/24/2017   15:01  Job #:  [id number]

## 2017-01-24 NOTE — PERIOP NOTES
TRANSFER - OUT REPORT:    Verbal report given to [facility] RK RN(name) on Norris Robertson  being transferred to [address] [place] [address](unit) for routine post - op       Report consisted of patients Situation, Background, Assessment and   Recommendations(SBAR). Information from the following report(s) SBAR, OR Summary, Procedure Summary, Intake/Output and MAR was reviewed with the receiving nurse. Lines:   Peripheral IV 01/24/17 Left Hand (Active)   Site Assessment Clean, dry, & intact 1/24/2017  1:14 PM   Phlebitis Assessment 0 1/24/2017  1:14 PM   Infiltration Assessment 0 1/24/2017  1:14 PM   Dressing Status Clean, dry, & intact 1/24/2017  1:14 PM   Dressing Type Transparent;Tape 1/24/2017  1:14 PM   Hub Color/Line Status Pink; Infusing 1/24/2017  1:14 PM        Opportunity for questions and clarification was provided.       Patient transported with:   O2 @ 3 liters  Registered Nurse

## 2017-01-24 NOTE — ANESTHESIA PREPROCEDURE EVALUATION
Anesthetic History   No history of anesthetic complications            Review of Systems / Medical History  Patient summary reviewed and pertinent labs reviewed    Pulmonary  Within defined limits                 Neuro/Psych   Within defined limits           Cardiovascular    Hypertension          Past MI, CAD, CABG and hyperlipidemia    Exercise tolerance: <4 METS  Comments: CABG 2013   GI/Hepatic/Renal  Within defined limits              Endo/Other        Obesity     Other Findings   Comments: Non smoker  Pt refused flu shot           Physical Exam    Airway  Mallampati: II  TM Distance: 4 - 6 cm  Neck ROM: normal range of motion   Mouth opening: Normal     Cardiovascular  Regular rate and rhythm,  S1 and S2 normal,  no murmur, click, rub, or gallop             Dental    Dentition: Poor dentition     Pulmonary  Breath sounds clear to auscultation               Abdominal  GI exam deferred       Other Findings            Anesthetic Plan    ASA: 4  Anesthesia type: general      Post-op pain plan if not by surgeon: peripheral nerve block single    Induction: Intravenous  Anesthetic plan and risks discussed with: Patient

## 2017-01-24 NOTE — H&P (VIEW-ONLY)
[address] [place] [address]  [phone]           HISTORY & PHYSICAL      Patient: Aleksandr Torres                MRN: [id number]       SSN: [id number]  YOB: 1955        AGE: 59 y.o. SEX: male  Body mass index is 32.72 kg/(m^2). PCP: Mehdi Cadena MD  01/18/17      CC: right hip end stage OA  Problem List Items Addressed This Visit     None      Visit Diagnoses     Primary osteoarthritis of right hip    -  Primary    Relevant Orders    AMB POC X-RAY RADEX HIP UNI WITH PELVIS 2-3 VIEWS (Completed)            HPI:  The patient is a pleasant 59 y.o. whom has end stage OA of their Right hip and has failed conservative treatment including but not limited to NSAIDS, cortisone injections, viscosupplementation, PT, and pain medicine. Due to the current findings and affected activities of daily living, surgical intervention is indicated. The alternatives, risks, complications, as well as expected outcome were discussed. These include but are not limited to infection, blood loss, need for blood transfusion, neurovascular damage, DVT, PE,  post-op stiffness and pain, leg length discrepancy, dislocation, anesthetic complications, prothesis longevity, need for more surgery, MI, stroke, and even death. The patient understands and wishes to proceed with surgery. Past Medical History   Diagnosis Date    Arthritis     CAD (coronary artery disease)     Essential hypertension     Low back pain     Lumbar postlaminectomy syndrome     Lumbar spondylosis     Myocardial infarction ([facility] [address]      lad stent for stemi,12/2012    Pancreatic cyst          Current Outpatient Prescriptions:     HYDROcodone-acetaminophen (NORCO)  mg tablet, Take 1 Tab by mouth every twelve (12) hours as needed for Pain.  Max Daily Amount: 2 Tabs., Disp: 60 Tab, Rfl: 0    ammonium lactate (LAC-HYDRIN) 12 % topical cream, Apply  to affected area two (2) times a day. rub in to affected area well, Disp: 280 g, Rfl: 0    HYDROcodone-acetaminophen (NORCO) 7.5-325 mg per tablet, 1-2 po q6h prn pain, Disp: 50 Tab, Rfl: 0    traMADol (ULTRAM) 50 mg tablet, Take 1 Tab by mouth two (2) times daily as needed for Pain. Max Daily Amount: 100 mg., Disp: 60 Tab, Rfl: 1    tadalafil (CIALIS, ADCIRCA) 20 mg tablet, Take 1 Tab by mouth daily as needed. , Disp: 6 Tab, Rfl: 12    gabapentin (NEURONTIN) 300 mg capsule, Take 1 Cap by mouth three (3) times daily. , Disp: 90 Cap, Rfl: 1    doxycycline (ADOXA) 100 mg tablet, Take 1 Tab by mouth two (2) times a day., Disp: 20 Tab, Rfl: 0    gabapentin (NEURONTIN) 300 mg capsule, , Disp: , Rfl:     isoniazid (NYDRAZID) 300 mg tablet, Take 300 mg by mouth daily. , Disp: , Rfl:     nitroglycerin (NITROSTAT) 0.4 mg SL tablet, by SubLINGual route every five (5) minutes as needed. , Disp: , Rfl:     carvedilol (COREG) 25 mg tablet, Take 25 mg by mouth two (2) times daily (with meals). , Disp: , Rfl:     hydrochlorothiazide (HYDRODIURIL) 25 mg tablet, Take 25 mg by mouth daily. , Disp: , Rfl:     pyridoxine 500 mg tablet, Take 500 mg by mouth daily. , Disp: , Rfl:     isosorbide mononitrate ER (IMDUR) 60 mg CR tablet, Take 1 Tab by mouth every morning., Disp: 30 Tab, Rfl: 6    clopidogrel (PLAVIX) 75 mg tablet, Take  by mouth daily. , Disp: , Rfl:     lisinopril (PRINIVIL, ZESTRIL) 20 mg tablet, Take  by mouth daily. , Disp: , Rfl:     aspirin 81 mg tablet, Take 81 mg by mouth., Disp: , Rfl:     atorvastatin (LIPITOR) 80 mg tablet, Take 80 mg by mouth daily. , Disp: , Rfl:     traMADol (ULTRAM) 50 mg tablet, Take 50 mg by mouth every six (6) hours as needed. , Disp: , Rfl:     Allergies   Allergen Reactions    Vicodin [Hydrocodone-Acetaminophen] Other (comments)     denies       Social History     Social History    Marital status: SINGLE     Spouse name: N/A    Number of children: N/A    Years of education: N/A     Occupational History    Not on file. Social History Main Topics    Smoking status: Former Smoker     Packs/day: 0.10     Types: Cigarettes     Quit date: 11/1/2012    Smokeless tobacco: Not on file    Alcohol use Yes      Comment: occasional    Drug use: Yes     Special: Cocaine      Comment: +Cocaine Screen 3/7/16 (see [address] [place] [address])    Sexual activity: Not on file      Comment: stopped using     Other Topics Concern    Not on file     Social History Narrative       Past Surgical History   Procedure Laterality Date    Hx orthopaedic       lumbar spine x 5    Hx lumbar fusion      Hx heart catheterization      Hx ptca         PE:  Visit Vitals    /79 (BP 1 Location: Right arm, BP Patient Position: Sitting)    Pulse 73    Temp 98.7 °F (37.1 °C) (Oral)    Ht 6' 1\" (1.854 m)    Wt 248 lb (112.5 kg)    BMI 32.72 kg/m2     A&O X3, NAD, well develop, well nourished  Heart: S1-S2, rrr  Lungs: CTA bilat  Abd: soft, nt, nt, + bs in all quadrants  Ext:  Pos distal pulses to DP, PT  Leg lengths shows the right LE to be slightly shorter grossly sitting in the chair    X-ray: right hip shows end stage OA    Labs: labs were reviewed and wnl.  ua neg    A:  Right  hip end stage OA    P:  At this point we will move forward with surgery. Again, the alternatives, risks, complications, as well as expected outcome were discussed and the patient wishes to proceed with surgery. Pt has been instructed to stop aspirin, nsaids, rheumatologic medications and blood thinners. They have also been instructed to continue on any heart and bp meds and to take them the morning of surgery with sips of water. Lateral approach.          Lashae Coughlin

## 2017-01-24 NOTE — BRIEF OP NOTE
BRIEF OPERATIVE NOTE    Date of Procedure: 1/24/2017   Preoperative Diagnosis: Osteoarthritis of right hip, unspecified osteoarthritis type [M16.11] with ankylosis  Postoperative Diagnosis: Osteoarthritis of right hip, unspecified osteoarthritis type [M16.11]    Procedure(s):  RIGHT TOTAL HIP ARTHROPLASTY LATERAL APPROACH  Surgeon(s) and Role:     * Mary Donis MD - Primary       Physician Assistant: Chon Loo PA-C    Surgical Staff:  Circ-1: Arnulfo Lees RN  Physician Assistant: Chon Loo PA-C  Scrub Tech-1: Zengracea Lambing  Surg Asst-1: Hector Mckoy  Event Time In   Incision Start 1127   Incision Close      Anesthesia: General   Estimated Blood Loss: 300ml  Specimens:   ID Type Source Tests Collected by Time Destination   1 : right femorl head Preservative Hip  Mary Donis MD 1/24/2017 1133 Pathology      Findings: same   Complications: none  Implants:   Implant Name Type Inv.  Item Serial No.  Lot No. LRB No. Used Action   SHELL TRITAN CLAIRE CLSTR H 62 F --  - [id number]  SHELL TRITAN CLAIRE CLSTR H 62 F --   [employer] HOW [id number] Right 1 Implanted   SCR BNE ACET CANC TRIDENT --  - [id number]  SCR BNE ACET CANC TRIDENT --   [employer] HOW [id number] Right 1 Implanted   PLUG APCL H ACET SHLL --  - [id number]  PLUG APCL H ACET SHLL --   [employer] HOW [id number] Right 1 Implanted   SCR BNE ACET TRIDENT 6.5X20MM --  - [id number]  SCR BNE ACET TRIDENT 6.5X20MM --   [employer] HOW [id number] Right 1 Implanted   STEM FEM SZ 6 127D [id number] -- ACCOLADE II V40 - [id number]  STEM FEM SZ 6 127D [id number] -- ACCOLADE II V40  [employer] HOW [id number] Right 1 Implanted   INSERT ACET 0DEG 36MM F X3 -- TRIDENT - [id number]  INSERT ACET 0DEG 36MM F X3 -- TRIDENT  [employer] HOW [id number] Right 1 Implanted   HEAD FEM DELT V40 -2.5MM 36MM -- V40 BIOLOX - [id number]   HEAD FEM DELT V40 -2.5MM 36MM -- V40 BIOLOX   [employer] HOW [id number] Right 1 Implanted

## 2017-01-24 NOTE — IP AVS SNAPSHOT
[place] 
 
 
 [address] Patient: Nila Hardy MRN: [id number] :1955 You are allergic to the following Allergen Reactions Vicodin (Hydrocodone-Acetaminophen) Other (comments) denies Recent Documentation Height Weight BMI Smoking Status 1.854 m 107.1 kg 31.16 kg/m2 Former Smoker Emergency Contacts Name Discharge Info Relation Home Work Mobile Alonzo Nicole DISCHARGE CAREGIVER [3] Son [22] [phone] Jessica Nicole DISCHARGE CAREGIVER [3] Daughter [21]   [phone] About your hospitalization You were admitted on:  2017 You last received care in the:  [facility] [address] You were discharged on:  2017 Unit phone number:  [phone] Why you were hospitalized Your primary diagnosis was:  Not on File Your diagnoses also included: Hip Arthritis Providers Seen During Your Hospitalizations Provider Role Specialty Primary office phone Baylee Priest MD Attending Provider Orthopedic Surgery [phone] Your Primary Care Physician (PCP) Primary Care Physician Office Phone Office Fax Sean Shah [phone] Follow-up Information Follow up With Details Comments Contact Info Brayan Quinn PA-C On 2017 Appointment at 8:45am [address] [place] [address] [phone] My Parrish MD On 2017 Febuary 2017 @ 2:20 pm with Dr. Clarke More. [address] [facility] [address] [place] [address] 
[phone] Your Appointments   8:45 AM EST  
POST OP with Brayan Quinn PA-C  
[facility] [address] [place] [address] 1 [facility] [address] [phone] Current Discharge Medication List  
  
 START taking these medications Dose & Instructions Dispensing Information Comments Morning Noon Evening Bedtime  
 celecoxib 200 mg capsule Commonly known as:  CELEBREX Your next dose is: Today, Tomorrow Other:  _________ Dose:  200 mg Take 1 Cap by mouth every twelve (12) hours every twelve (12) hours for 90 days. Quantity:  60 Cap Refills:  2  
     
   
   
   
  
 ferrous sulfate 325 mg (65 mg iron) tablet Your next dose is: Today, Tomorrow Other:  _________ Dose:  325 mg Take 1 Tab by mouth two (2) times daily (with meals). Quantity:  60 Tab Refills:  2 HYDROmorphone 4 mg tablet Commonly known as:  DILAUDID Your next dose is: Today, Tomorrow Other:  _________ Dose:  4 mg Take 1 Tab by mouth every four (4) hours as needed. Max Daily Amount: 24 mg. Indications: PAIN Quantity:  64 Tab Refills:  0  
     
   
   
   
  
 polyethylene glycol 17 gram packet Commonly known as:  Duane Portland Your next dose is: Today, Tomorrow Other:  _________ Dose:  17 g Take 1 Packet by mouth daily. Indications: Constipation Quantity:  30 Packet Refills:  1 CONTINUE these medications which have CHANGED Dose & Instructions Dispensing Information Comments Morning Noon Evening Bedtime  
 aspirin 325 mg tablet Commonly known as:  ASPIRIN What changed:   
- medication strength 
- how much to take - when to take this Your next dose is: Today, Tomorrow Other:  _________ Dose:  325 mg Take 1 Tab by mouth two (2) times a day. Quantity:  60 Tab Refills:  0 CONTINUE these medications which have NOT CHANGED Dose & Instructions Dispensing Information Comments  Morning Noon Evening Bedtime  
 ammonium lactate 12 % topical cream  
Commonly known as:  LAC-HYDRIN  
   
 Your next dose is: Today, Tomorrow Other:  _________ Apply  to affected area two (2) times a day. rub in to affected area well Quantity:  280 g Refills:  0  
     
   
   
   
  
 carvedilol 25 mg tablet Commonly known as:  Layman Huerta Your next dose is: Today, Tomorrow Other:  _________ Dose:  25 mg Take 25 mg by mouth two (2) times daily (with meals). Refills:  0  
     
   
   
   
  
 doxycycline 100 mg tablet Commonly known as:  ADOXA Your next dose is: Today, Tomorrow Other:  _________ Dose:  100 mg Take 1 Tab by mouth two (2) times a day. Quantity:  20 Tab Refills:  0  
     
   
   
   
  
 * gabapentin 300 mg capsule Commonly known as:  NEURONTIN Your next dose is: Today, Tomorrow Other:  _________ Refills:  0  
     
   
   
   
  
 * gabapentin 300 mg capsule Commonly known as:  NEURONTIN Your next dose is: Today, Tomorrow Other:  _________ Dose:  300 mg Take 1 Cap by mouth three (3) times daily. Quantity:  90 Cap Refills:  1  
     
   
   
   
  
 hydroCHLOROthiazide 25 mg tablet Commonly known as:  HYDRODIURIL Your next dose is: Today, Tomorrow Other:  _________ Dose:  25 mg Take 25 mg by mouth daily. Refills:  0  
     
   
   
   
  
 isoniazid 300 mg tablet Commonly known as:  NYDRAZID Your next dose is: Today, Tomorrow Other:  _________ Dose:  300 mg Take 300 mg by mouth daily. Refills:  0  
     
   
   
   
  
 isosorbide mononitrate ER 60 mg CR tablet Commonly known as:  IMDUR Your next dose is: Today, Tomorrow Other:  _________ Dose:  60 mg Take 1 Tab by mouth every morning. Quantity:  30 Tab Refills:  6 LIPITOR 80 mg tablet Generic drug:  atorvastatin Your next dose is: Today, Tomorrow Other:  _________ Dose:  80 mg Take 80 mg by mouth daily. Refills:  0  
     
   
   
   
  
 lisinopril 20 mg tablet Commonly known as:  Dianna Needy Your next dose is: Today, Tomorrow Other:  _________ Take  by mouth daily. Refills:  0  
     
   
   
   
  
 NITROSTAT 0.4 mg SL tablet Generic drug:  nitroglycerin Your next dose is: Today, Tomorrow Other:  _________  
   
   
 by SubLINGual route every five (5) minutes as needed. Refills:  0  
     
   
   
   
  
 * Notice: This list has 2 medication(s) that are the same as other medications prescribed for you. Read the directions carefully, and ask your doctor or other care provider to review them with you. STOP taking these medications HYDROcodone-acetaminophen  mg tablet Commonly known as:  Benetta Pock HYDROcodone-acetaminophen 7.5-325 mg per tablet Commonly known as:  NORCO  
   
  
 oxyCODONE-acetaminophen 5-325 mg per tablet Commonly known as:  PERCOCET  
   
  
 PLAVIX 75 mg Tab Generic drug:  clopidogrel  
   
  
 pyridoxine (vitamin B6) 500 mg tablet  
   
  
 tadalafil 20 mg tablet Commonly known as:  CIALIS  
   
  
 tapentadol 75 mg tablet Commonly known as:  NUCYNTA  
   
  
 traMADol 50 mg tablet Commonly known as:  ULTRAM  
   
  
  
  
Where to Get Your Medications Information on where to get these meds will be given to you by the nurse or doctor. ! Ask your nurse or doctor about these medications  
  aspirin 325 mg tablet  
 celecoxib 200 mg capsule  
 ferrous sulfate 325 mg (65 mg iron) tablet HYDROmorphone 4 mg tablet  
 polyethylene glycol 17 gram packet Discharge Instructions DISCHARGE SUMMARY from Nurse The following personal items are in your possession at time of discharge: 
 
Dental Appliances: None Visual Aid: None Home Medications: None Jewelry: None Clothing: Pants, Shirt, Footwear, Socks, Undergarments, Prema 
 Other Valuables: Mariana Bennett PATIENT INSTRUCTIONS: 
 
 
F-face looks uneven A-arms unable to move or move unevenly S-speech slurred or non-existent T-time-call 911 as soon as signs and symptoms begin-DO NOT go Back to bed or wait to see if you get better-TIME IS BRAIN. Warning Signs of HEART ATTACK Call 911 if you have these symptoms: 
? Chest discomfort. Most heart attacks involve discomfort in the center of the chest that lasts more than a few minutes, or that goes away and comes back. It can feel like uncomfortable pressure, squeezing, fullness, or pain. ? Discomfort in other areas of the upper body. Symptoms can include pain or discomfort in one or both arms, the back, neck, jaw, or stomach. ? Shortness of breath with or without chest discomfort. ? Other signs may include breaking out in a cold sweat, nausea, or lightheadedness. Don't wait more than five minutes to call [address]! Fast action can save your life. Calling 911 is almost always the fastest way to get lifesaving treatment. Emergency Medical Services staff can begin treatment when they arrive  up to an hour sooner than if someone gets to the hospital by car. The discharge information has been reviewed with the patient. The patient verbalized understanding. Discharge medications reviewed with the patient and appropriate educational materials and side effects teaching were provided. Patient armband removed and shredded MyChart Activation Thank you for requesting access to [employer]. Please follow the instructions below to securely access and download your online medical record. [employer] allows you to send messages to your doctor, view your test results, renew your prescriptions, schedule appointments, and more. How Do I Sign Up? 1. In your internet browser, go to [email or web address] 
2. Click on the First Time User? Click Here link in the Sign In box. You will be redirect to the New Member Sign Up page. 3. Enter your [employer] Access Code exactly as it appears below. You will not need to use this code after youve completed the sign-up process. If you do not sign up before the expiration date, you must request a new code. [employer] Access Code: [email or web address] Expires: 2017 11:20 AM (This is the date your [employer] access code will ) 4. Enter the last four digits of your Social Security Number (xxxx) and Date of Birth (mm/dd/yyyy) as indicated and click Submit. You will be taken to the next sign-up page. 5. Create a [employer] ID. This will be your [employer] login ID and cannot be changed, so think of one that is secure and easy to remember. 6. Create a [employer] password. You can change your password at any time. 7. Enter your Password Reset Question and Answer. This can be used at a later time if you forget your password. 8. Enter your e-mail address. You will receive e-mail notification when new information is available in [address] 9. Click Sign Up. You can now view and download portions of your medical record. 10. Click the Download Summary menu link to download a portable copy of your medical information. Additional Information If you have questions, please visit the Frequently Asked Questions section of the [employer] website at [email or web address] Remember, [employer] is NOT to be used for urgent needs. For medical emergencies, dial 911. Discharge Orders Procedure Order Date Status Priority Quantity Spec Type Associated Dx WALKER STANDARD 17 Normal Routine 1  Hip arthritis [0389986] ELEVATED TOILET SEAT 17 Normal Routine 1  Hip arthritis [[id number]] COMMODE CHAIR 17 Normal Routine 1  Hip arthritis [[id number]] SHOWER CHAIR 01/25/17 0830 Normal Routine 1  Hip arthritis [[id number]] REFERRAL TO HOME HEALTH 01/25/17 0830 Normal Routine 1  Hip arthritis [[id number]] Comments: Total hip protocol, wbat Aspirin therapy 
aquacel ag dressing pod 7 and prn MyChart Announcement We are excited to announce that we are making your provider's discharge notes available to you in [employer]. You will see these notes when they are completed and signed by the physician that discharged you from your recent hospital stay. If you have any questions or concerns about any information you see in [employer], please call the Health Information Department where you were seen or reach out to your Primary Care Provider for more information about your plan of care. Introducing [facility]! Annie Joy introduces [employer] patient portal. Now you can access parts of your medical record, email your doctor's office, and request medication refills online. 1. In your internet browser, go to [email or web address] 2. Click on the First Time User? Click Here link in the Sign In box. You will see the New Member Sign Up page. 3. Enter your [employer] Access Code exactly as it appears below. You will not need to use this code after youve completed the sign-up process. If you do not sign up before the expiration date, you must request a new code. · [employer] Access Code: [email or web address] Expires: 4/16/2017 11:20 AM 
 
4. Enter the last four digits of your Social Security Number (xxxx) and Date of Birth (mm/dd/yyyy) as indicated and click Submit. You will be taken to the next sign-up page. 5. Create a [employer] ID. This will be your [employer] login ID and cannot be changed, so think of one that is secure and easy to remember. 6. Create a [employer] password. You can change your password at any time. 7. Enter your Password Reset Question and Answer. This can be used at a later time if you forget your password. 8. Enter your e-mail address. You will receive e-mail notification when new information is available in [address] 9. Click Sign Up. You can now view and download portions of your medical record. 10. Click the Download Summary menu link to download a portable copy of your medical information. If you have questions, please visit the Frequently Asked Questions section of the [employer] website. Remember, [employer] is NOT to be used for urgent needs. For medical emergencies, dial 911. Now available from your iPhone and Android! General Information Please provide this summary of care documentation to your next provider. Patient Signature:  ____________________________________________________________ Date:  ____________________________________________________________  
  
Ignacio Arriola Provider Signature:  ____________________________________________________________ Date:  ____________________________________________________________

## 2017-01-24 NOTE — ANESTHESIA PROCEDURE NOTES
Peripheral Block    Start time: 1/24/2017 10:40 AM  End time: 1/24/2017 10:55 AM  Performed by: Shun Bhandari  Authorized by: Shun Bhandari       Pre-procedure: Indications: at surgeon's request, post-op pain management and procedure for pain    Preanesthetic Checklist: patient identified, risks and benefits discussed, site marked, timeout performed, anesthesia consent given and patient being monitored    Timeout Time: 10:40          Block Type:   Block Type:  Lumbar plexus  Laterality:  Right  Monitoring:  Standard ASA monitoring, heart rate, continuous pulse ox, frequent vital sign checks, oxygen and responsive to questions  Injection Technique:  Single shot  Procedures: nerve stimulator    Patient Position: left lateral decubitus  Prep: chlorhexidine    Location:  Sacral area  Needle Type:  Stimuplex  Needle Gauge:  21 G  Needle Localization:  Anatomical landmarks and nerve stimulator  Medication Injected:  0.2%  ropivacaine  Volume (mL):  30  Add'l Medication Injected:  1.0%  lidocaine  Volume (mL):  3    Assessment:  Number of attempts:  3  Injection Assessment:  Incremental injection every 5 mL, negative aspiration for blood, no intravascular symptoms, no paresthesia and negative aspiration for CSF  Patient tolerance:  Patient tolerated the procedure well with no immediate complications  Location:  PREOP HOLDING    Patient given 2 mg IV Versed and 100 mcg IV Fentanyl for sedation.     1/24/2017     10:54 AM     Migue Ontiveros MD

## 2017-01-24 NOTE — ANESTHESIA POSTPROCEDURE EVALUATION
Post-Anesthesia Evaluation and Assessment    Patient: Cheo Gonzalez MRN: [id number]  SSN: [id number]    YOB: 1955  Age: 59 y.o. Sex: male       Cardiovascular Function/Vital Signs  Visit Vitals    /89 (BP 1 Location: Right arm, BP Patient Position: At rest;Supine)    Pulse 72    Temp 35.8 °C (96.5 °F)    Resp 12    Ht 6' 1\" (1.854 m)    Wt 107.1 kg (236 lb 3.2 oz)    SpO2 100%    BMI 31.16 kg/m2       Patient is status post general anesthesia for Procedure(s):  RIGHT TOTAL HIP ARTHROPLASTY LATERAL APPROACH. Nausea/Vomiting: None    Postoperative hydration reviewed and adequate. Pain:  Pain Scale 1: Numeric (0 - 10) (01/24/17 1413)  Pain Intensity 1: 4 (01/24/17 1413)   Managed    Neurological Status:   Neuro (WDL): Within Defined Limits (01/24/17 1314)   At baseline    Mental Status and Level of Consciousness: Arousable    Pulmonary Status:   O2 Device: Nasal cannula (01/24/17 1413)   Adequate oxygenation and airway patent    Complications related to anesthesia: None    Post-anesthesia assessment completed.  No concerns    Signed By: Francoise Osler, MD     January 24, 2017

## 2017-01-24 NOTE — CONSULTS
[facility]  Pulmonary, Critical Care, and Sleep Medicine    Initial Patient Consult    Name: Lisa Washburn MRN: [id number]   : 1955 Hospital: [address] [place] [address] Dr   Date: 2017  Room: [facility] [place]     Subjective: This patient has been seen and evaluated at the request of Dr. Kierra Lo for Medical Management. Patient is a 59 y.o. male with pmhx of end-stage OA, CAD s/p CABG, HTN, BPH who presented for a scheduled right hip arthroplasty due to end-stage osteoarthritis that is resistant to treatment. The surgery went as planned with no complications, 658 ml estimated blood loss. Pt has received several doses of IV dilaudid 0.5 mg and oxycodone ER 20 mg. Pt is sitting in bed and reports post-op pain in his hip area. He states that he is very sleepy, dizzy, and is itching all over. He reports that he has been very itchy after taking Vicodin in the past, but has no other drug allergies. He denies current SOB, wheezing, sputum production, CP, palpitations, peripheral edema, SOB, fevers/chills, n/v/d, rashes, HA, paraesthesias, weakness, or pain elsewhere. Pt denies hx of asthma, COPD, ELEN, and tobacco abuse, current or past. He reports occasional alcohol use, 1-2 beverages per month. Pt has history of cocaine abuse, but says that he has not used since April. Daughter is at bedside.        Past Medical History   Diagnosis Date    Arthritis     CAD (coronary artery disease)     Essential hypertension     Low back pain     Lumbar postlaminectomy syndrome     Lumbar spondylosis     Myocardial infarction ([facility] [address]      lad stent for stemi,2012    Pancreatic cyst       Past Surgical History   Procedure Laterality Date    Hx orthopaedic       lumbar spine x 5    Hx lumbar fusion      Hx heart catheterization      Hx ptca        Current Facility-Administered Medications   Medication Dose Route Frequency    WARFARIN INFORMATION NOTE (COUMADIN)   Other Q24H    [START ON 2017] atorvastatin (LIPITOR) tablet 80 mg  80 mg Oral DAILY    carvedilol (COREG) tablet 25 mg  25 mg Oral BID WITH MEALS    [START ON 1/25/2017] hydroCHLOROthiazide (HYDRODIURIL) tablet 25 mg  25 mg Oral DAILY    [START ON 1/25/2017] isosorbide mononitrate ER (IMDUR) tablet 60 mg  60 mg Oral 7am    [START ON 1/25/2017] lisinopril (PRINIVIL, ZESTRIL) tablet 20 mg  20 mg Oral DAILY    nitroglycerin (NITROSTAT) tablet 0.4 mg  0.4 mg SubLINGual Q5MIN PRN    dextrose 5 % - 0.45% NaCl infusion  125 mL/hr IntraVENous CONTINUOUS    sodium chloride (NS) flush 5-10 mL  5-10 mL IntraVENous Q8H    sodium chloride (NS) flush 5-10 mL  5-10 mL IntraVENous PRN    naloxone (NARCAN) injection 0.4 mg  0.4 mg IntraVENous PRN    ceFAZolin (ANCEF) 3 g in 0.9%  ml IVPB  3 g IntraVENous Q8H    ferrous sulfate tablet 325 mg  1 Tab Oral BID WITH MEALS    diphenhydrAMINE (BENADRYL) capsule 25 mg  25 mg Oral Q6H PRN    zolpidem (AMBIEN) tablet 5 mg  5 mg Oral QHS PRN    celecoxib (CELEBREX) capsule 200 mg  200 mg Oral Q12H    oxyCODONE-acetaminophen (PERCOCET 10)  mg per tablet 1-2 Tab  1-2 Tab Oral Q4H PRN    ondansetron (ZOFRAN) injection 4 mg  4 mg IntraVENous Q4H PRN    magnesium hydroxide (MILK OF MAGNESIA) 400 mg/5 mL oral suspension 30 mL  30 mL Oral DAILY PRN    pregabalin (LYRICA) capsule 75 mg  75 mg Oral Q12H    oxyCODONE ER (OxyCONTIN) tablet 20 mg  20 mg Oral Q12H     Prior to Admission medications    Medication Sig Start Date End Date Taking? Authorizing Provider   HYDROcodone-acetaminophen (NORCO)  mg tablet Take 1 Tab by mouth every twelve (12) hours as needed for Pain. Max Daily Amount: 2 Tabs. 12/16/16  Yes Melecio Roca PA-C   HYDROcodone-acetaminophen [facility]) 7.5-325 mg per tablet 1-2 po q6h prn pain 10/21/16  Yes Gabbi Parnell MD   isoniazid (NYDRAZID) 300 mg tablet Take 300 mg by mouth daily.    Yes Historical Provider   hydrochlorothiazide (HYDRODIURIL) 25 mg tablet Take 25 mg by mouth daily. Yes Historical Provider   isosorbide mononitrate ER (IMDUR) 60 mg CR tablet Take 1 Tab by mouth every morning. 6/10/13  Yes Davide Bucio MD   lisinopril (PRINIVIL, ZESTRIL) 20 mg tablet Take  by mouth daily. Yes Historical Provider   oxyCODONE-acetaminophen (PERCOCET) 5-325 mg per tablet Take 1 Tab by mouth every eight (8) hours as needed for Pain. Max Daily Amount: 3 Tabs. 1/19/17   Wendy Portillo PA-C   tapentadol (NUCYNTA) 75 mg tablet Take 75 mg by mouth every eight (8) hours. Max Daily Amount: 225 mg. 1/18/17   Wendy Portillo PA-C   ammonium lactate (LAC-HYDRIN) 12 % topical cream Apply  to affected area two (2) times a day. rub in to affected area well 10/25/16   Irvin Allen MD   traMADol (ULTRAM) 50 mg tablet Take 1 Tab by mouth two (2) times daily as needed for Pain. Max Daily Amount: 100 mg. 9/28/16   Irvin Allen MD   tadalafil (CIALIS, ADCIRCA) 20 mg tablet Take 1 Tab by mouth daily as needed. 4/12/16   Wilmer Wall MD   gabapentin (NEURONTIN) 300 mg capsule Take 1 Cap by mouth three (3) times daily. 4/1/16   Nitza Mcdermott MD   doxycycline (ADOXA) 100 mg tablet Take 1 Tab by mouth two (2) times a day. 3/30/16   Wilmer Wall MD   gabapentin (NEURONTIN) 300 mg capsule  2/9/16   Historical Provider   nitroglycerin (NITROSTAT) 0.4 mg SL tablet by SubLINGual route every five (5) minutes as needed. Historical Provider   carvedilol (COREG) 25 mg tablet Take 25 mg by mouth two (2) times daily (with meals). Historical Provider   pyridoxine 500 mg tablet Take 500 mg by mouth daily. Historical Provider   clopidogrel (PLAVIX) 75 mg tablet Take  by mouth daily. Historical Provider   aspirin 81 mg tablet Take 81 mg by mouth. Historical Provider   atorvastatin (LIPITOR) 80 mg tablet Take 80 mg by mouth daily. Historical Provider   traMADol (ULTRAM) 50 mg tablet Take 50 mg by mouth every six (6) hours as needed.     Historical Provider     Allergies   Allergen Reactions    Vicodin [Hydrocodone-Acetaminophen] Other (comments)     denies      Social History   Substance Use Topics    Smoking status: Former Smoker     Packs/day: 0.10     Types: Cigarettes     Quit date: 2012    Smokeless tobacco: Not on file    Alcohol use Yes      Comment: occasional      Family History   Problem Relation Age of Onset    Heart Attack Neg Hx     Heart Surgery Neg Hx         Review of Systems:  HEENT: No epistaxis, no nasal drainage, no difficulty in swallowing  Respiratory: No cough, SOB, sputum production   Cardiovascular: no chest pain, no palpitations, no chronic leg edema, no syncope  Gastrointestinal: no abd pain, no vomiting, no diarrhea, no bleeding symptoms  Genitourinary: No urinary symptoms  Integument/breast: +itching all over. Musculoskeletal:  +pain in right hip area  Neurological: No focal weakness, no seizures, no headaches  Behvioral/Psych: No anxiety, no depression   Constitutional: No fever, no chills, no weight loss, no night sweats  SLEEP: No snoring, no witnessed apneas, no daytime somnolence, no morning headaches. Objective:   Vital Signs:    Visit Vitals    /89 (BP 1 Location: Right arm, BP Patient Position: At rest;Supine)    Pulse 72    Temp 96.5 °F (35.8 °C)    Resp 12    Ht 6' 1\" (1.854 m)    Wt 107.1 kg (236 lb 3.2 oz)    SpO2 100%    BMI 31.16 kg/m2       O2 Device: Nasal cannula   O2 Flow Rate (L/min): 3 l/min   Temp (24hrs), Av.2 °F (36.2 °C), Min:96.5 °F (35.8 °C), Max:97.8 °F (36.6 °C)       Intake/Output:     Intake/Output Summary (Last 24 hours) at 17 1610  Last data filed at 17 1250   Gross per 24 hour   Intake             1000 ml   Output                0 ml   Net             1000 ml         Physical Exam:   General: Pt is in and out of sleep, when he awakes he is itching all over and in pain. A&O x 4 in mild distress.    HEENT: OP normal, no thrush, cornea normal, no lacrimal gland enlargement  Neck: No adenopathy or thyroid swelling  CVS: S1S2 no murmurs  RS: Mod AE bilaterally, decreased BS with crackles at bases  Abd: soft, non tender, no hepatosplenomegaly  Neuro: non focal, awake, alert  Extrm: no leg edema, pulses 2+ bilaterally on UE and LE bilaterally. Skin: no rash visible, clean dry, dressing in place on right hip extending down lateral femur. Psych: sleepy mood    Data review:   Labs:  No results found for this or any previous visit (from the past 12 hour(s)). Imaging:  I have personally reviewed the patients radiographs and have reviewed the reports:    Right Hip X-ray 01/24/17 : Frontal and lateral views of the right hip obtained. Interval total  right hip arthroplasty with overlying soft tissue emphysema and surgical  staples. No obvious hardware complication or acute fracture. CXR 01/16/17:   Status post CABG. Normal cardiac size without cardiac decompensation. Clear lungs bilaterally without definable acute processes     IMPRESSION:   · End stage Osteoarthritis s/p right hip arthroplasty on 01/24/17   · Pruritis- may be secondary to percocet, allergy to Vicodin   · HTN- well controlled  · CAD s/p CABG x 2, Echo 03/16 EF normal, with moderate diastolic dysfxn  · Mild Chronic Renal Insufficiency   · Hx of BPH and Epididymitis   · Hx of Cocaine Abuse       RECOMMENDATIONS:   · Start Coreg tonight, restart lisinopril, HCTZ, and Imdur post-op day 1 pending that there is no significant bump it pt's Cr. Start Statin post-op day 1. Hold aspirin and plavix until Okayed by Ortho. PRN Nitro for angina. · Continue Celebrex and Coumadin per surgeon with Daily INR. · Continue ABX, Ancef, per surgeon. · May give PRN PO benadryl for itching, switch PO percocet to PO dilaudid alleviate pruritis. · Check POC Blood Glucose, Continue D51/2 NS @ ml/hr. · Regular Diet   · CBC and BMP in AM.  · DVT(SCDs), GI Prophylaxis not needed.         Discussed with patient, nurse, Dr. Latonia Schroeder, and patient's daughter.    Thank you for the consultation     Mariely Dickey PA-C

## 2017-01-25 LAB
ANION GAP BLD CALC-SCNC: 7 MMOL/L (ref 3–18)
BUN SERPL-MCNC: 19 MG/DL (ref 7–18)
BUN/CREAT SERPL: 10 (ref 12–20)
CALCIUM SERPL-MCNC: 8.2 MG/DL (ref 8.5–10.1)
CHLORIDE SERPL-SCNC: 106 MMOL/L (ref 100–108)
CO2 SERPL-SCNC: 25 MMOL/L (ref 21–32)
CREAT SERPL-MCNC: 1.81 MG/DL (ref 0.6–1.3)
ERYTHROCYTE [DISTWIDTH] IN BLOOD BY AUTOMATED COUNT: 12.4 % (ref 11.6–14.5)
GLUCOSE BLD STRIP.AUTO-MCNC: 146 MG/DL (ref 70–110)
GLUCOSE BLD STRIP.AUTO-MCNC: 90 MG/DL (ref 70–110)
GLUCOSE BLD STRIP.AUTO-MCNC: 98 MG/DL (ref 70–110)
GLUCOSE SERPL-MCNC: 94 MG/DL (ref 74–99)
HCT VFR BLD AUTO: 35.5 % (ref 36–48)
HGB BLD-MCNC: 11.5 G/DL (ref 13–16)
INR PPP: 1.2 (ref 0.8–1.2)
MCH RBC QN AUTO: 32.3 PG (ref 24–34)
MCHC RBC AUTO-ENTMCNC: 32.4 G/DL (ref 31–37)
MCV RBC AUTO: 99.7 FL (ref 74–97)
PLATELET # BLD AUTO: 162 K/UL (ref 135–420)
PMV BLD AUTO: 11.3 FL (ref 9.2–11.8)
POTASSIUM SERPL-SCNC: 4.6 MMOL/L (ref 3.5–5.5)
PROTHROMBIN TIME: 14.5 SEC (ref 11.5–15.2)
RBC # BLD AUTO: 3.56 M/UL (ref 4.7–5.5)
SODIUM SERPL-SCNC: 138 MMOL/L (ref 136–145)
WBC # BLD AUTO: 9.9 K/UL (ref 4.6–13.2)

## 2017-01-25 PROCEDURE — 97116 GAIT TRAINING THERAPY: CPT

## 2017-01-25 PROCEDURE — 51798 US URINE CAPACITY MEASURE: CPT

## 2017-01-25 PROCEDURE — 85610 PROTHROMBIN TIME: CPT | Performed by: ORTHOPAEDIC SURGERY

## 2017-01-25 PROCEDURE — 80048 BASIC METABOLIC PNL TOTAL CA: CPT | Performed by: ORTHOPAEDIC SURGERY

## 2017-01-25 PROCEDURE — 36415 COLL VENOUS BLD VENIPUNCTURE: CPT | Performed by: ORTHOPAEDIC SURGERY

## 2017-01-25 PROCEDURE — 77030005538 HC CATH URETH FOL44 BARD -B

## 2017-01-25 PROCEDURE — 65270000029 HC RM PRIVATE

## 2017-01-25 PROCEDURE — 77030012935 HC DRSG AQUACEL BMS -B

## 2017-01-25 PROCEDURE — 74011250636 HC RX REV CODE- 250/636: Performed by: PHYSICIAN ASSISTANT

## 2017-01-25 PROCEDURE — 74011250637 HC RX REV CODE- 250/637: Performed by: PHYSICIAN ASSISTANT

## 2017-01-25 PROCEDURE — 74011250636 HC RX REV CODE- 250/636: Performed by: ORTHOPAEDIC SURGERY

## 2017-01-25 PROCEDURE — 97165 OT EVAL LOW COMPLEX 30 MIN: CPT

## 2017-01-25 PROCEDURE — 74011000258 HC RX REV CODE- 258: Performed by: ORTHOPAEDIC SURGERY

## 2017-01-25 PROCEDURE — 74011250637 HC RX REV CODE- 250/637: Performed by: ORTHOPAEDIC SURGERY

## 2017-01-25 PROCEDURE — 74011250637 HC RX REV CODE- 250/637: Performed by: INTERNAL MEDICINE

## 2017-01-25 PROCEDURE — 97110 THERAPEUTIC EXERCISES: CPT

## 2017-01-25 PROCEDURE — 85027 COMPLETE CBC AUTOMATED: CPT | Performed by: ORTHOPAEDIC SURGERY

## 2017-01-25 PROCEDURE — 97535 SELF CARE MNGMENT TRAINING: CPT

## 2017-01-25 PROCEDURE — 82962 GLUCOSE BLOOD TEST: CPT

## 2017-01-25 RX ORDER — HYDROMORPHONE HYDROCHLORIDE 2 MG/1
1-2 TABLET ORAL
Qty: 60 TAB | Refills: 0 | Status: SHIPPED | OUTPATIENT
Start: 2017-01-25 | End: 2017-01-27

## 2017-01-25 RX ORDER — TAMSULOSIN HYDROCHLORIDE 0.4 MG/1
0.4 CAPSULE ORAL DAILY
Status: DISCONTINUED | OUTPATIENT
Start: 2017-01-25 | End: 2017-01-27 | Stop reason: HOSPADM

## 2017-01-25 RX ORDER — ASPIRIN 325 MG
325 TABLET ORAL 2 TIMES DAILY
Qty: 60 TAB | Refills: 0 | Status: SHIPPED | OUTPATIENT
Start: 2017-01-25 | End: 2019-05-14

## 2017-01-25 RX ORDER — CELECOXIB 200 MG/1
200 CAPSULE ORAL EVERY 12 HOURS
Qty: 60 CAP | Refills: 2 | Status: SHIPPED | OUTPATIENT
Start: 2017-01-25 | End: 2017-04-25

## 2017-01-25 RX ORDER — LANOLIN ALCOHOL/MO/W.PET/CERES
325 CREAM (GRAM) TOPICAL 2 TIMES DAILY WITH MEALS
Qty: 60 TAB | Refills: 2 | Status: ON HOLD | OUTPATIENT
Start: 2017-01-25 | End: 2019-05-14 | Stop reason: SDUPTHER

## 2017-01-25 RX ORDER — SODIUM CHLORIDE 9 MG/ML
500 INJECTION, SOLUTION INTRAVENOUS ONCE
Status: COMPLETED | OUTPATIENT
Start: 2017-01-25 | End: 2017-01-25

## 2017-01-25 RX ORDER — WARFARIN 7.5 MG/1
7.5 TABLET ORAL EVERY EVENING
Status: COMPLETED | OUTPATIENT
Start: 2017-01-25 | End: 2017-01-25

## 2017-01-25 RX ORDER — HYDROMORPHONE HYDROCHLORIDE 1 MG/ML
1-2 INJECTION, SOLUTION INTRAMUSCULAR; INTRAVENOUS; SUBCUTANEOUS
Status: DISCONTINUED | OUTPATIENT
Start: 2017-01-25 | End: 2017-01-27 | Stop reason: HOSPADM

## 2017-01-25 RX ADMIN — Medication 325 MG: at 09:30

## 2017-01-25 RX ADMIN — Medication 3 G: at 03:19

## 2017-01-25 RX ADMIN — DEXTROSE MONOHYDRATE AND SODIUM CHLORIDE 125 ML/HR: 5; .45 INJECTION, SOLUTION INTRAVENOUS at 05:02

## 2017-01-25 RX ADMIN — ATORVASTATIN CALCIUM 80 MG: 40 TABLET, FILM COATED ORAL at 09:30

## 2017-01-25 RX ADMIN — Medication 10 ML: at 14:00

## 2017-01-25 RX ADMIN — HYDROMORPHONE HYDROCHLORIDE 2 MG: 2 TABLET ORAL at 18:12

## 2017-01-25 RX ADMIN — TAMSULOSIN HYDROCHLORIDE 0.4 MG: 0.4 CAPSULE ORAL at 09:31

## 2017-01-25 RX ADMIN — Medication 10 ML: at 05:04

## 2017-01-25 RX ADMIN — OXYCODONE HYDROCHLORIDE 20 MG: 20 TABLET, FILM COATED, EXTENDED RELEASE ORAL at 11:42

## 2017-01-25 RX ADMIN — WARFARIN SODIUM 7.5 MG: 7.5 TABLET ORAL at 18:12

## 2017-01-25 RX ADMIN — CELECOXIB 200 MG: 100 CAPSULE ORAL at 23:09

## 2017-01-25 RX ADMIN — OXYCODONE HYDROCHLORIDE 20 MG: 20 TABLET, FILM COATED, EXTENDED RELEASE ORAL at 23:09

## 2017-01-25 RX ADMIN — Medication 10 ML: at 05:02

## 2017-01-25 RX ADMIN — Medication 10 ML: at 23:09

## 2017-01-25 RX ADMIN — HYDROMORPHONE HYDROCHLORIDE 2 MG: 2 TABLET ORAL at 03:47

## 2017-01-25 RX ADMIN — SODIUM CHLORIDE 500 ML: 900 INJECTION, SOLUTION INTRAVENOUS at 09:00

## 2017-01-25 RX ADMIN — PREGABALIN 75 MG: 75 CAPSULE ORAL at 09:31

## 2017-01-25 RX ADMIN — CELECOXIB 200 MG: 100 CAPSULE ORAL at 09:01

## 2017-01-25 RX ADMIN — Medication 325 MG: at 18:12

## 2017-01-25 RX ADMIN — Medication 10 ML: at 06:09

## 2017-01-25 RX ADMIN — HYDROMORPHONE HYDROCHLORIDE 1 MG: 2 TABLET ORAL at 23:07

## 2017-01-25 RX ADMIN — Medication 3 G: at 11:59

## 2017-01-25 RX ADMIN — PREGABALIN 75 MG: 75 CAPSULE ORAL at 23:09

## 2017-01-25 RX ADMIN — CARVEDILOL 25 MG: 25 TABLET, FILM COATED ORAL at 18:12

## 2017-01-25 NOTE — PROGRESS NOTES
Ortho    Pt. Seen and evaluated. Doing well, pain well controlled  Denies cp, sob, abd pain    Visit Vitals    /64 (BP 1 Location: Left arm, BP Patient Position: At rest)    Pulse (!) 57    Temp 97 °F (36.1 °C)    Resp 16    Ht 6' 1\" (1.854 m)    Wt 236 lb 3.2 oz (107.1 kg)    SpO2 100%    BMI 31.16 kg/m2       right total hip replacement  right hip Woundclean, dry, no drainage  Sensory intact to LT  Motor intact  nv intact  Neg calf tenderness. Labs. CBC  @  CBC:   Lab Results   Component Value Date/Time    WBC 9.9 01/25/2017 03:29 AM    RBC 3.56 01/25/2017 03:29 AM    HGB 11.5 01/25/2017 03:29 AM    HCT 35.5 01/25/2017 03:29 AM    PLATELET 593 91/90/1413 03:29 AM    and BMP:   Lab Results   Component Value Date/Time    Glucose 94 01/25/2017 03:29 AM    Sodium 138 01/25/2017 03:29 AM    Potassium 4.6 01/25/2017 03:29 AM    Chloride 106 01/25/2017 03:29 AM    CO2 25 01/25/2017 03:29 AM    BUN 19 01/25/2017 03:29 AM    Creatinine 1.81 01/25/2017 03:29 AM    Calcium 8.2 01/25/2017 03:29 AM   @  Coagulation  Lab Results   Component Value Date    INR 1.2 01/25/2017    APTT 24.9 01/16/2017      Basic Metabolic Profile  Lab Results   Component Value Date     01/25/2017    CO2 25 01/25/2017    BUN 19 (H) 01/25/2017         Assesment:right Orthopedic / Rheumatologic: Total Hip Replacement    Plan:  Coumadin, PT, flomax, IV fluids, if unable to void insert soria cath. Plan for dc tomorrow.

## 2017-01-25 NOTE — PROGRESS NOTES
Problem: Mobility Impaired (Adult and Pediatric)  Goal: *Acute Goals and Plan of Care (Insert Text)  Physical Therapy Goals  Initiated 1/24/2017 and to be accomplished within 7 day(s)  1. Patient will move from supine to sit and sit to supine in bed with modified independence. 2. Patient will transfer from bed to chair and chair to bed with modified independence using the least restrictive device. 3. Patient will perform sit to stand with modified independence. 4. Patient will ambulate with modified independence for >150 feet with the least restrictive device. 5. Patient will ascend/descend 4 stairs with 1 handrail(s) with modified independence. 6. Patient to perform HEP independently in order to increase strength for carryover into functional tasks. Outcome: Progressing Towards Goal  PHYSICAL THERAPY TREATMENT     Patient: Kay Vargas (23 y.o. male)  Date: 1/25/2017  Diagnosis: Osteoarthritis of right hip, unspecified osteoarthritis type [M16.11] <principal problem not specified>  Procedure(s) (LRB):  RIGHT TOTAL HIP ARTHROPLASTY LATERAL APPROACH (Right) 1 Day Post-Op  Precautions:   total hip  Chart, physical therapy assessment, plan of care and goals were reviewed. ASSESSMENT:  Pt limited by pain this am, educated on safe use of RW with verbal cues and visual demonstrations, pt verbalized understanding. Pt requires frequent cues for compliance with precautions during transfers, gait and bed mobility. Instructed in exercises for improved functional mobility. Progression toward goals:  [X]      Improving appropriately and progressing toward goals  [ ]      Improving slowly and progressing toward goals  [ ]      Not making progress toward goals and plan of care will be adjusted       PLAN:  Patient continues to benefit from skilled intervention to address the above impairments. Continue treatment per established plan of care. Discharge Recommendations:   To Be Determined  Further Equipment Recommendations for Discharge:  rolling walker       SUBJECTIVE:   Patient stated I'm ready to get into bed.       OBJECTIVE DATA SUMMARY:   Critical Behavior:  Neurologic State: Alert  Orientation Level: Appropriate for age  Cognition: Appropriate decision making  Safety/Judgement: Fall prevention  Functional Mobility Training:  Bed Mobility:  Rolling: Minimum assistance  Supine to Sit: Minimum assistance  Sit to Supine: Minimum assistance (assist with R LE into bed)  Scooting: Contact guard assistance  Transfers:  Sit to Stand: Supervision  Stand to Sit: Supervision  Balance:  Sitting: Impaired  Sitting - Static: Good (unsupported)  Sitting - Dynamic: Fair (occasional)  Standing: Impaired  Standing - Static: Fair  Standing - Dynamic : Fair  Ambulation/Gait Training:  Distance (ft): 18 Feet (ft)  Assistive Device: Walker, rolling  Ambulation - Level of Assistance: Contact guard assistance  Gait Abnormalities: Antalgic;Decreased step clearance; Step to gait  Speed/Marian: Slow  Step Length: Left shortened;Right shortened  Therapeutic Exercises: Ankle pumps, quad set, glute squeeze, hip abd  Pain:  Pt pain was reported as  10 pre-treatment. Pt pain was reported as 9 post-treatment. Intervention: ice pack     Activity Tolerance:   Fair   Please refer to the flowsheet for vital signs taken during this treatment.   After treatment:   [ ] Patient left in no apparent distress sitting up in chair  [X] Patient left in no apparent distress in bed  [X] Call bell left within reach  [ ] Nursing notified  [ ] Caregiver present  [ ] Bed alarm activated      Darrell Alvarez PTA   Time Calculation: 23 mins

## 2017-01-25 NOTE — PROGRESS NOTES
Care Management Interventions  Transition of Care Consult (CM Consult): SNF (wants short term SNF for rehab before returning home )  Discharge Durable Medical Equipment: No  Physical Therapy Consult: Yes  Occupational Therapy Consult: Yes  Speech Therapy Consult: No  Current Support Network: Lives Alone  Confirm Follow Up Transport:  (TBD)  Plan discussed with Pt/Family/Caregiver: Yes (KAI pt who appears AAO x3 )  Discharge Location  Discharge Placement: Skilled nursing facility (wants [facility] or [facility] for rehab short term)   POA - none   DME at home - walker   Son - Jodi  - [phone]  Daughter Javed Maier  [phone]    Reviewed chart . Met with pt who is AAOx3. States he lives alone & his children work . He wants short term rehab after dc . States usually PTA he is independent & ambulatory with walker. He gave consent to fax his info out to [facility] ( where he states his daughter works) and [facility] . Consent placed in chart . Sent SNF short form in [place] to NP & [facility] to look for a short term bed. Pt expressed concern that \"I am not going anywhere until I can pee\". CM will follow .      Time spent est = 25 min

## 2017-01-25 NOTE — PROGRESS NOTES
Progress Note  Pulmonary, Critical Care, and Sleep Medicine    Name: Kenneth Bond MRN: [id number]   : 1955 Hospital: [facility]   Date: 2017        IMPRESSION:   · End stage Osteoarthritis s/p right hip arthroplasty on 17   · Pruritis- may be secondary to percocet, allergy to Vicodin   · HTN- well controlled  · CAD s/p CABG x 2, Echo  EF normal, with moderate diastolic dysfxn, on appropriate medical therapy  · Mild Chronic Renal Insufficiency   · Hx of BPH and Epididymitis  · Urinary retention due to above  · Hypotension multifactorial   · Hx of Cocaine Abuse        PLAN:   · Anti HTN medications held for hypotension  · Agree with IVF bolus but would also hold diuretic today  · D/c Tadalafil for concerns re: interaction with nitrates  · Mobilize per PT  · Discussed with nursing     Subjective/Interval History:   I have reviewed the flowsheet and previous days notes. Reviewed interval history. Discussed management with nursing staff. Complains of post op pain. Hypotension noted      ROS:Pertinent items are noted in HPI. Orders reviewed including medications. Changes made if indicated. Telemetry monitor reviewed at the bedside. Objective:   Vital Signs:    Visit Vitals    /62 (BP 1 Location: Right arm, BP Patient Position: At rest)    Pulse 60    Temp 98.3 °F (36.8 °C)    Resp 18    Ht 6' 1\" (1.854 m)    Wt 107.1 kg (236 lb 3.2 oz)    SpO2 100%    BMI 31.16 kg/m2       O2 Device: Nasal cannula   O2 Flow Rate (L/min): 3 l/min   Temp (24hrs), Av.6 °F (36.4 °C), Min:96.5 °F (35.8 °C), Max:98.5 °F (36.9 °C)       Intake/Output:   Last shift:       07 - 1900  In: 480 [P.O.:480]  Out: -   Last 3 shifts: 1901 -  0700  In: 2115 [P.O.:240;  I.V.:1875]  Out: 1375 [Urine:1375]    Intake/Output Summary (Last 24 hours) at 17 1142  Last data filed at 17 0951   Gross per 24 hour   Intake             1995 ml   Output             1375 ml Net              620 ml        Physical Exam:    General:  Alert, cooperative, in no distress, appears stated age. Head:  Normocephalic, without obvious abnormality, atraumatic. Eyes:  ANicteric, PERRL,   Nose: Nares normal.  Mucosa normal. No drainage or sinus tenderness. Throat: Lips, mucosa, and tongue normal. NO Thrush   Neck: Supple, symmetrical, trachea midline, no adenopathy, thyroid: no enlargment/tenderness/nodules    Back:   Symmetric    Lungs:   Bilateral auscultation no rales or wheezes   Chest wall:  No tenderness or deformity. NO intercostal retractions   Heart:  Regular rate and rhythm, S1, S2 normal, no murmur, click, rub or gallop. Abdomen:   Soft, non-tender. Bowel sounds normal. No masses,  No organomegaly. NO paradoxical motion   Extremities: Right hip post op, atraumatic, no cyanosis or edema. Pulses: 2+ and symmetric all extremities. Skin: Skin color, texture, turgor normal. No rashes or lesions. NO clubbing   Lymph nodes: Cervical  nodes normal.   Neurologic: Grossly nonfocal      :        Devices:             Drips:    DATA:  Labs:  Recent Labs      01/25/17   0329   WBC  9.9   HGB  11.5*   HCT  35.5*   PLT  162     Recent Labs      01/25/17   0329   NA  138   K  4.6   CL  106   CO2  25   GLU  94   BUN  19*   CREA  1.81*   CA  8.2*   INR  1.2     No results for input(s): PH, PCO2, PO2, HCO3, FIO2 in the last 72 hours. Imaging:  []        I have personally reviewed the patients radiographs and reports  []         []        No change from prior, tubes and lines in adequate position  []        Improved   []        Worsening  High complexity decision making was performed during this consultation and evaluation.       Alberto Hagan MD

## 2017-01-25 NOTE — PROGRESS NOTES
Spoke with MD Urias concerning patients inability to produce urine; orders received to straight cath to relieve bladder then remove catheter. Bladder scan revealed >439ml urine. Straight care done clamped after 600ml linda urine expelled. After 30 minuetes 350 more expelled after unclamping. Tolerated well. Cathter removed per order.

## 2017-01-25 NOTE — PROGRESS NOTES
Problem: Self Care Deficits Care Plan (Adult)  Goal: *Acute Goals and Plan of Care (Insert Text)  Occupational Therapy Goals  Initiated 1/25/2017 within 7 day(s). 1. Patient will perform grooming with supervision/set-up   2. Patient will perform lower body dressing with supervision/set-up with AE  3. Patient will perform lower body bathing with supervision/set-up with AE  4. Patient will perform toilet transfers with supervision/set-up. 5. Patient will perform all aspects of toileting with supervision/set-up. Outcome: Progressing Towards Goal  OCCUPATIONAL THERAPY EVALUATION     Patient: Jeanna Parker (13 y.o. male)  Date: 1/25/2017  Primary Diagnosis: Osteoarthritis of right hip, unspecified osteoarthritis type [M16.11]  Procedure(s) (LRB):  RIGHT TOTAL HIP ARTHROPLASTY LATERAL APPROACH (Right) 1 Day Post-Op   Precautions:   Fall, WBAT, Total hip      ASSESSMENT :  Based on the objective data described below, the patient needed min A during bed mobility. Educated patient on hip precautions as they relate to self care tasks; patient needed min A to implement them during functional tasks. Patient has WFL BUE AROM and decreased but functional strength. Patient needed min A during toilet transfer with rolling walker with additional time. Patient has one loss of balance noted with R knee buckling. Patient was not able to urinate at this time. Patient assisted back to bed via wheelchair post toilet transfer. Patients' nurse, Franco Rubio, notified of loss of balance and lack of urination. Patient will benefit from skilled intervention to address the above impairments.   Patients rehabilitation potential is considered to be Good  Factors which may influence rehabilitation potential include:   [ ]             None noted  [ ]             Mental ability/status  [X]             Medical condition  [ ]             Home/family situation and support systems  [ ]             Safety awareness  [ ]             Pain tolerance/management  [ ]             Other:        PLAN :  Recommendations and Planned Interventions:  [X]               Self Care Training                  [X]        Therapeutic Activities  [X]               Functional Mobility Training    [ ]        Cognitive Retraining  [X]               Therapeutic Exercises           [X]        Endurance Activities  [X]               Balance Training                   [ ]        Neuromuscular Re-Education  [ ]               Visual/Perceptual Training     [X]   Home Safety Training  [X]               Patient Education                 [ ]        Family Training/Education  [ ]               Other (comment):     Frequency/Duration: Patient will be followed by occupational therapy 1-2 times per day/4-7 days per week to address goals. Discharge Recommendations: Eris Siddiqi  Further Equipment Recommendations for Discharge: shower chair       PATIENT COMPLEXITY      Eval Complexity: History: LOW Complexity : Brief history review ; Examination: MEDIUM Complexity : 3-5 performance deficits relating to physical, cognitive , or psychosocial skils that result in activity limitations and / or participation restrictions; Decision Making:MEDIUM Complexity : Patient may present with comorbidities that affect occupational performnce. Miniml to moderate modification of tasks or assistance (eg, physical or verbal ) with assesment(s) is necessary to enable patient to complete evaluation  Assessment: Low Complexity       G-CODES:      Self Care  Current  CK= 40-59%   Goal  CI= 1-19%. The severity rating is based on the Level of Assistance required for Functional Mobility and ADLs. SUBJECTIVE:   Patient stated I want to try to go into the bathroom to try and go.       OBJECTIVE DATA SUMMARY:       Past Medical History   Diagnosis Date    Arthritis      CAD (coronary artery disease)      Essential hypertension      Low back pain      Lumbar postlaminectomy syndrome      Lumbar spondylosis      Myocardial infarction [facility])         lad stent for stemi,12/2012    Pancreatic cyst       Past Surgical History   Procedure Laterality Date    Hx orthopaedic           lumbar spine x 5    Hx lumbar fusion        Hx heart catheterization        Hx ptca         Prior Level of Function/Home Situation: Patient reported he was modified independent in basic self care tasks and functional mobility PTA. Home Situation  Home Environment: Apartment  # Steps to Enter: 2  One/Two Story Residence: One story  Living Alone: Yes  Support Systems: Child(nelly)  Patient Expects to be Discharged to[de-identified] Rehabilitation facility  Current DME Used/Available at Home: Walker, rolling, Cane, straight  Tub or Shower Type: Tub/Shower combination  [X]  Right hand dominant          [ ]  Left hand dominant  Cognitive/Behavioral Status:  Neurologic State: Alert  Orientation Level: Oriented X4  Cognition: Follows commands  Safety/Judgement: Fall prevention      Skin: No skin changes noted     Edema: No edema noted     Vision/Perceptual:       Acuity: Within Defined Limits       Coordination:  Coordination: Within functional limits (BUEs)     Balance:  Sitting: Intact  Standing: Impaired; With support (rolling walker )  Standing - Static: Fair  Standing - Dynamic : Fair      Strength:  Strength: Generally decreased, functional (BUEs 4/5)      Tone & Sensation:  Tone: Normal (BUEs)  Sensation: Intact (BUEs)      Range of Motion:  AROM: Within functional limits (BUEs)      Functional Mobility and Transfers for ADLs:  Bed Mobility:  Rolling: Minimum assistance  Supine to Sit: Minimum assistance  Sit to Supine: Minimum assistance  Scooting: Minimum assistance  Transfers:  Sit to Stand: Contact guard assistance              Toilet Transfer : Minimum assistance                 ADL Assessment:(clincial judgement based on functional mobility)  Feeding: Independent     Oral Facial Hygiene/Grooming: Contact guard assistance     Bathing: Maximum assistance (LE's not assessed, secondary to hip precautions)     Upper Body Dressing: Supervision     Lower Body Dressing: Maximum assistance (LE's not assessed, secondary to hip precautions)     Toileting: Contact guard assistance     Pain:  Pain Scale 1: Numeric (0 - 10)  Pain Intensity 1: 10      Activity Tolerance:   Fair     Please refer to the flowsheet for vital signs taken during this treatment. After treatment:   [ ] Patient left in no apparent distress sitting up in chair  [X] Patient left in no apparent distress in bed  [X] Call bell left within reach  [X] Nursing notified  [ ] Caregiver present  [ ] Bed alarm activated      COMMUNICATION/EDUCATION:   [ ] Home safety education was provided and the patient/caregiver indicated understanding. [X] Patient/family have participated as able in goal setting and plan of care. [X] Patient/family agree to work toward stated goals and plan of care. [ ] Patient understands intent and goals of therapy, but is neutral about his/her participation. [ ] Patient is unable to participate in goal setting and plan of care.      Thank you for this referral.  De Rojo OTR/L  Time Calculation: 40 mins

## 2017-01-25 NOTE — PROGRESS NOTES
7937  Received report. No c/o pain or discomfort. Abduction pillow in place between legs. Dressing to right hip clean,dry and intact. Positive PPP present. Call light within reach. 0800  Vitals;temp 98.1,P 62,R 20,B/P 90/58. No acute distress noted. MD aware. Orders given. 0900  ml bolus given,B/P meds and diuretics held per MD order. 0958  Vitals; temp 97,P 64,R 20,98% on 3L NC,B/P 101/60.    1120  Patient OOB in recliner with wheels locked and call light within reach. 1500  No acute distress noted. Call light within reach. Bedside and Verbal shift change report given to CAR BRITT (oncoming nurse) by Sha Zapata (offgoing nurse). Report included the following information SBAR and Kardex.

## 2017-01-25 NOTE — PROGRESS NOTES
Problem: Mobility Impaired (Adult and Pediatric)  Goal: *Acute Goals and Plan of Care (Insert Text)  Physical Therapy Goals  Initiated 1/24/2017 and to be accomplished within 7 day(s)  1. Patient will move from supine to sit and sit to supine in bed with modified independence. 2. Patient will transfer from bed to chair and chair to bed with modified independence using the least restrictive device. 3. Patient will perform sit to stand with modified independence. 4. Patient will ambulate with modified independence for >150 feet with the least restrictive device. 5. Patient will ascend/descend 4 stairs with 1 handrail(s) with modified independence. 6. Patient to perform HEP independently in order to increase strength for carryover into functional tasks. Outcome: Progressing Towards Goal  PHYSICAL THERAPY TREATMENT     Patient: Linwood Young (23 y.o. male)  Date: 1/25/2017  Diagnosis: Osteoarthritis of right hip, unspecified osteoarthritis type [M16.11] <principal problem not specified>  Procedure(s) (LRB):  RIGHT TOTAL HIP ARTHROPLASTY LATERAL APPROACH (Right) 1 Day Post-Op  Precautions:  total hip  Chart, physical therapy assessment, plan of care and goals were reviewed. ASSESSMENT:  Pt limited by pain, making slow but steady progress with am, required min a for R foot advancement during gait training with RW, frequent verbal and tactile cues for safety and sequencing. Progression toward goals:  [X]      Improving appropriately and progressing toward goals  [ ]      Improving slowly and progressing toward goals  [ ]      Not making progress toward goals and plan of care will be adjusted       PLAN:  Patient continues to benefit from skilled intervention to address the above impairments. Continue treatment per established plan of care. Discharge Recommendations:   To Be Determined  Further Equipment Recommendations for Discharge:  rolling walker       SUBJECTIVE:   Patient stated She's thumpin' now.  Mobility [id number] Current  CJ= 20-39%   Goal  CI= 1-19%. The severity rating is based on the Other Level of assist required for functional mobility      OBJECTIVE DATA SUMMARY:   Critical Behavior:  Neurologic State: Alert  Orientation Level: Appropriate for age  Cognition: Appropriate decision making  Safety/Judgement: Fall prevention  Functional Mobility Training:  Bed Mobility:  Rolling: Minimum assistance  Supine to Sit: Minimum assistance  Scooting: Contact guard assistance  Transfers:  Sit to Stand: Contact guard assistance  Stand to Sit: Contact guard assistance  Balance:  Sitting: Impaired  Sitting - Static: Good (unsupported)  Sitting - Dynamic: Fair (occasional)  Standing: Impaired  Standing - Static: Fair  Standing - Dynamic : Fair  Ambulation/Gait Training:  Distance (ft): 18 Feet (ft)  Assistive Device: Walker, rolling  Ambulation - Level of Assistance: Contact guard assistance  Gait Abnormalities: Antalgic;Decreased step clearance; Step to gait  Speed/Marian: Slow  Step Length: Left shortened;Right shortened  Therapeutic Exercises: Ankle pumps, quad set, glute squeeze  Pain:  Pt pain was reported as  10 pre-treatment. Pt pain was reported as 10 post-treatment. Intervention: ice pack, nurse notified     Activity Tolerance:   Fair   Please refer to the flowsheet for vital signs taken during this treatment.   After treatment:   [X] Patient left in no apparent distress sitting up in chair  [ ] Patient left in no apparent distress in bed  [X] Call bell left within reach  [X] Nursing notified  [ ] Caregiver present  [ ] Bed alarm activated      Osker Mail, PTA   Time Calculation: 26 mins

## 2017-01-25 NOTE — PROGRESS NOTES
Right hip dressing changed. Staples intact. No drainage or redness noted. Aquacel dressing applied. Tolerated well by patient.

## 2017-01-25 NOTE — DISCHARGE SUMMARY
1/24/2017  9:38 AM    1/26/2017, 8:30 AM    Primary Dx:right Orthopedic / Rheumatologic: Total Hip Replacement  Secondary Dx: Etiological Diagnoses: urinary retention    HPI:  Pt has end stage OA and had failed conservative treatment. Due to the current findings and affected activity of daily living surgical intervention is indicated.   The alternatives, risks, complications as well as expected outcome were discussed, the patient understands and wishes to proceed with surgery    Past Medical History   Diagnosis Date    Arthritis     CAD (coronary artery disease)     Essential hypertension     Low back pain     Lumbar postlaminectomy syndrome     Lumbar spondylosis     Myocardial infarction ([facility] [address]      lad stent for stemi,12/2012    Pancreatic cyst          Current Facility-Administered Medications:     warfarin (COUMADIN) tablet 7.5 mg, 7.5 mg, Oral, QPM, Yenni Parks PA-C    tamsulosin (FLOMAX) capsule 0.4 mg, 0.4 mg, Oral, DAILY, Yenni Parks PA-C    0.9% sodium chloride infusion 500 mL, 500 mL, IntraVENous, ONCE, Yenni Parks PA-C    WARFARIN INFORMATION NOTE (COUMADIN), , Other, Q24H, Yenni Parks PA-C    atorvastatin (LIPITOR) tablet 80 mg, 80 mg, Oral, DAILY, Arnulfo Tavarez MD    carvedilol (COREG) tablet 25 mg, 25 mg, Oral, BID WITH MEALS, Arnulfo Tavarez MD, 25 mg at 01/24/17 1701    hydroCHLOROthiazide (HYDRODIURIL) tablet 25 mg, 25 mg, Oral, DAILY, Arnulfo Tavarez MD    isosorbide mononitrate ER (IMDUR) tablet 60 mg, 60 mg, Oral, 7am, Arnulfo Tavarez MD    lisinopril (PRINIVIL, ZESTRIL) tablet 20 mg, 20 mg, Oral, DAILY, Arnulfo Tavarez MD    nitroglycerin (NITROSTAT) tablet 0.4 mg, 0.4 mg, SubLINGual, Q5MIN PRN, Arnulfo Tavarez MD    dextrose 5 % - 0.45% NaCl infusion, 125 mL/hr, IntraVENous, CONTINUOUS, Arnulfo Tavarez MD, Last Rate: 125 mL/hr at 01/25/17 0502, 125 mL/hr at 01/25/17 0502    sodium chloride (NS) flush 5-10 mL, 5-10 mL, IntraVENous, Q8H, Arnulfo Tavarez MD, 10 mL at 01/25/17 0609    sodium chloride (NS) flush 5-10 mL, 5-10 mL, IntraVENous, PRN, Shae Brooks MD    naloxone [facility]) injection 0.4 mg, 0.4 mg, IntraVENous, PRN, Shae Brooks MD    ceFAZolin (ANCEF) 3 g in 0.9%  ml IVPB, 3 g, IntraVENous, Q8H, Shae Brooks MD, 3 g at 01/25/17 0319    ferrous sulfate tablet 325 mg, 1 Tab, Oral, BID WITH MEALS, Shae Brooks MD, 325 mg at 01/24/17 1701    diphenhydrAMINE (BENADRYL) capsule 25 mg, 25 mg, Oral, Q6H PRN, Shae Brooks MD, 25 mg at 01/24/17 1701    zolpidem (AMBIEN) tablet 5 mg, 5 mg, Oral, QHS PRN, Shae Brooks MD    celecoxib (CELEBREX) capsule 200 mg, 200 mg, Oral, Q12H, Shae Brooks MD, 200 mg at 01/24/17 2145    ondansetron (ZOFRAN) injection 4 mg, 4 mg, IntraVENous, Q4H PRN, Shae Brooks MD    magnesium hydroxide (MILK OF MAGNESIA) 400 mg/5 mL oral suspension 30 mL, 30 mL, Oral, DAILY PRN, Shae Brooks MD    pregabalin (LYRICA) capsule 75 mg, 75 mg, Oral, Q12H, Shae Brooks MD, 75 mg at 01/24/17 2146    oxyCODONE ER (OxyCONTIN) tablet 20 mg, 20 mg, Oral, Q12H, Shae Brooks MD, 20 mg at 01/24/17 2146    HYDROmorphone (DILAUDID) tablet 1-2 mg, 1-2 mg, Oral, Q4H PRN, Valentin Cruz PA-C, 2 mg at 01/25/17 0347      Vicodin [hydrocodone-acetaminophen]    Physical Exam:  General A&O x3 NAD, well developed, well nourished, normal affect  Heart: S1-S2, RRR  Lungs: CTA Bilat  Abd: soft NT, ND  Ext: n/v intact    Hospital Course:    Pt. Had rightOrthopedic / Rheumatologic: Total Hip Replacement    Post -op Course: The patient tolerated the procedure well. They were followed by internal medicine for help with medical management. Pt. Was place on Abx pre and post-op for prophylaxis against infection as well as coumadin pre and post-op for prophylaxis against DVT. Vitals signs remained stable, remained af. The wound wasclean, dry, no drainage. Pain was well controlled.   Pt. Had negative calf tenderness or swelling, no evidence for DVT. Patient had PT/OT consult for evaluation and treatment. CBC  Lab Results   Component Value Date/Time    WBC 9.9 01/25/2017 03:29 AM    RBC 3.56 (L) 01/25/2017 03:29 AM    HCT 35.5 (L) 01/25/2017 03:29 AM    MCV 99.7 (H) 01/25/2017 03:29 AM    MCH 32.3 01/25/2017 03:29 AM    MCHC 32.4 01/25/2017 03:29 AM    RDW 12.4 01/25/2017 03:29 AM     Coagulation  Lab Results   Component Value Date    INR 1.2 01/25/2017    APTT 24.9 01/16/2017      Basic Metabolic Profile  Lab Results   Component Value Date     01/25/2017    CO2 25 01/25/2017    BUN 19 (H) 01/25/2017       Discharge Plan:  The patient will be d/c'd to home, total hip protocol, }WBAT. [facility] and nursing. Total joint protocol. Pt safe for homebound transfer, sp Total joint replacement. A walker, bedside commode, and shower chair will be utilized for ADL's. Follow up with Dr. Bijal Swan in 10-12 days. Call with any questions or concerns.

## 2017-01-25 NOTE — PERIOP NOTES
Name:  Chanda May  1/25/2017  1:59 PM                                       Anesthesia Peripheral Nerve Block- Single Shot  Post-Op Rounds Note       Patient status post Procedure(s):  RIGHT TOTAL HIP ARTHROPLASTY LATERAL APPROACH on 1/24/2017    POST OP Day # 1    Visit Vitals    /62 (BP 1 Location: Right arm, BP Patient Position: At rest)    Pulse 60    Temp 36.8 °C (98.3 °F)    Resp 18    Ht 6' 1\" (1.854 m)    Wt 107.1 kg (236 lb 3.2 oz)    SpO2 100%    BMI 31.16 kg/m2       Patient rates pain 2 at rest and 3 with movement. Pain is subjectively rated by patient as mild. Adequate analgesia until block worn off.   No complications    Sharyle Jock, MD  [unfilled]  .1:59 PM

## 2017-01-25 NOTE — PROGRESS NOTES
conducted an initial consultation and Spiritual Assessment for Kay Vargas, who is a 59 y.o.,male. Patients Primary Language is: [place]. According to the patients EMR Sabianist Affiliation is: [facility].     The reason the Patient came to the hospital is:   Patient Active Problem List    Diagnosis Date Noted    Hip arthritis 01/24/2017    Lumbar spinal stenosis 04/01/2016    Lumbar neuritis 04/01/2016    Postlaminectomy syndrome, lumbar region 04/01/2016    Coronary atherosclerosis of native coronary artery 04/01/2013    Old myocardial infarction 04/01/2013    Essential hypertension, benign 04/01/2013    Other and unspecified hyperlipidemia 04/01/2013        The  provided the following Interventions:  Initiated a relationship of care and support. Explored issues of rex, belief, spirituality and Scientologist/ritual needs while hospitalized. Listened empathically. Provided chaplaincy education. Provided information about Spiritual Care Services. Offered prayer and assurance of continued prayers on patient's behalf. Chart reviewed. The following outcomes where achieved:  Patient shared limited information about both their medical narrative and spiritual journey/beliefs.  confirmed Patient's Sabianist Affiliation. Patient processed feeling about current hospitalization. Patient expressed gratitude for 's visit. Assessment:  Patient does not have any Scientologist/cultural needs that will affect patients preferences in health care. There are no spiritual or Scientologist issues which require intervention at this time. Plan:  Chaplains will continue to follow and will provide pastoral care on an as needed/requested basis.  recommends bedside caregivers page  on duty if patient shows signs of acute spiritual or emotional distress. Chaplain Jossie STYLES  [address] [place] [address]  [phone]

## 2017-01-25 NOTE — PROGRESS NOTES
Received report from Ollie at [phone], patient is alert and oriented x 4, denies any acute distress, no apparent distress noted, able to wiggle toes on command, offered fluids, pedal pulses present and palpable, patient has not voided yet, will bladder scan. At 1700 attempt  Voiding trial with patient, patient voided 380 ml, call bell within reach, will continue to monitor.

## 2017-01-25 NOTE — PROGRESS NOTES
Bedside and Verbal shift change report given to [facility] RAVINDRA [facility] (oncoming nurse) by Jason Cardenas RN (offgoing nurse). Report included the following information SBAR, Kardex, MAR and Recent Results.     SITUATION:    Code Status: No Order   Reason for Admission: Osteoarthritis of right hip, unspecified osteoarthritis type [address] day: 0   Problem List:       Hospital Problems  Date Reviewed: 1/23/2017          Codes Class Noted POA    Hip arthritis ICD-10-CM: M19.90  ICD-9-CM: 716.95  1/24/2017 Unknown              BACKGROUND:    Past Medical History:   Past Medical History   Diagnosis Date    Arthritis     CAD (coronary artery disease)     Essential hypertension     Low back pain     Lumbar postlaminectomy syndrome     Lumbar spondylosis     Myocardial infarction ([facility] [address]      lad stent for stemi,12/2012    Pancreatic cyst          Patient taking anticoagulants no     ASSESSMENT:    Changes in Assessment Throughout Shift:      Patient has Central Line: no Reasons if yes:    Patient has [facility] Cath: no Reasons if yes:       Last Vitals:     Vitals:    01/24/17 1401 01/24/17 1409 01/24/17 1419 01/24/17 1536   BP: 119/75 132/79 135/79 150/89   Pulse: 68 69 69 72   Resp: 13 14 12 18   Temp:    96.5 °F (35.8 °C)   SpO2:  98% 97% 100%   Weight:       Height:            IV and DRAINS (will only show if present)   Peripheral IV 01/24/17 Left Hand-Site Assessment: Clean, dry, & intact     WOUND (if present)   Wound Type:  surgical   Dressing present  yes   Wound Concerns/Notes:  none     PAIN    Pain Assessment    Pain Intensity 1: 0 (01/24/17 1830)    Pain Location 1: Hip    Pain Intervention(s) 1: Medication (see MAR)    Patient Stated Pain Goal: 0  o Interventions for Pain:  percocet  o Intervention effective: yes  o Time of last intervention: 1900   o Reassessment Completed: yes      Last 3 Weights:  Last 3 Recorded Weights in this Encounter    01/16/17 1103 01/24/17 1017   Weight: 112.5 kg (248 lb) 107.1 kg (236 lb 3.2 oz)     Weight change:      INTAKE/OUPUT    Current Shift:      Last three shifts: 01/23 0701 - 01/24 1900  In: 1000 [I.V.:1000]  Out: -      LAB RESULTS   No results for input(s): WBC, HGB, HCT, PLT, HGBEXT, HCTEXT, PLTEXT in the last 72 hours. No results for input(s): NA, K, GLU, BUN, CREA, CA, MG, INR in the last 72 hours. No lab exists for component: PT, PTT, INREXT    RECOMMENDATIONS AND DISCHARGE PLANNING     1. Pending tests/procedures/ Plan of Care or Other Needs:      2. Discharge plan for patient and Needs/Barriers:     3. Estimated Discharge Date: TBD Posted on Whiteboard in Patients Room: no      4. The patient's care plan was reviewed with the oncoming nurse. \"HEALS\" SAFETY CHECK      Fall Risk    Total Score: 3    Safety Measures: Safety Measures: Bed/Chair-Wheels locked, Bed in low position, Call light within reach, Fall prevention (comment), Family at bedside, Gripper socks    A safety check occurred in the patient's room between off going nurse and oncoming nurse listed above. The safety check included the below items  Area Items   H  High Alert Medications - Verify all high alert medication drips (heparin, PCA, etc.)   E  Equipment - Suction is set up for ALL patients (with anuker)  - Red plugs utilized for all equipment (IV pumps, etc.)  - WOWs wiped down at end of shift.  - Room stocked with oxygen, suction, and other unit-specific supplies   A  Alarms - Bed alarm is set for fall risk patients  - Ensure chair alarm is in place and activated if patient is up in a chair   L  Lines - Check IV for any infiltration  - [facility] bag is empty if patient has a [facility]   - Tubing and IV bags are labeled   S  Safety   - Room is clean, patient is clean, and equipment is clean. - Hallways are clear from equipment besides carts.    - Fall bracelet on for fall risk patients  - Ensure room is clear and free of clutter  - Suction is set up for ALL patients (with jade)  - Hallways are clear from equipment besides carts.    - Isolation precautions followed, supplies available outside room, sign posted     Apple Ortiz RN

## 2017-01-26 LAB
ANION GAP BLD CALC-SCNC: 9 MMOL/L (ref 3–18)
BUN SERPL-MCNC: 21 MG/DL (ref 7–18)
BUN/CREAT SERPL: 13 (ref 12–20)
CALCIUM SERPL-MCNC: 8.3 MG/DL (ref 8.5–10.1)
CHLORIDE SERPL-SCNC: 104 MMOL/L (ref 100–108)
CO2 SERPL-SCNC: 24 MMOL/L (ref 21–32)
CREAT SERPL-MCNC: 1.65 MG/DL (ref 0.6–1.3)
ERYTHROCYTE [DISTWIDTH] IN BLOOD BY AUTOMATED COUNT: 12.3 % (ref 11.6–14.5)
GLUCOSE SERPL-MCNC: 91 MG/DL (ref 74–99)
HCT VFR BLD AUTO: 35.3 % (ref 36–48)
HGB BLD-MCNC: 11.6 G/DL (ref 13–16)
INR PPP: 1.2 (ref 0.8–1.2)
MCH RBC QN AUTO: 32.7 PG (ref 24–34)
MCHC RBC AUTO-ENTMCNC: 32.9 G/DL (ref 31–37)
MCV RBC AUTO: 99.4 FL (ref 74–97)
PLATELET # BLD AUTO: 152 K/UL (ref 135–420)
PMV BLD AUTO: 11.6 FL (ref 9.2–11.8)
POTASSIUM SERPL-SCNC: 4.5 MMOL/L (ref 3.5–5.5)
PROTHROMBIN TIME: 14.8 SEC (ref 11.5–15.2)
RBC # BLD AUTO: 3.55 M/UL (ref 4.7–5.5)
SODIUM SERPL-SCNC: 137 MMOL/L (ref 136–145)
WBC # BLD AUTO: 7.6 K/UL (ref 4.6–13.2)

## 2017-01-26 PROCEDURE — 74011250637 HC RX REV CODE- 250/637: Performed by: INTERNAL MEDICINE

## 2017-01-26 PROCEDURE — 80048 BASIC METABOLIC PNL TOTAL CA: CPT | Performed by: ORTHOPAEDIC SURGERY

## 2017-01-26 PROCEDURE — 85027 COMPLETE CBC AUTOMATED: CPT | Performed by: ORTHOPAEDIC SURGERY

## 2017-01-26 PROCEDURE — 74011250637 HC RX REV CODE- 250/637: Performed by: PHYSICIAN ASSISTANT

## 2017-01-26 PROCEDURE — 74011250637 HC RX REV CODE- 250/637: Performed by: ORTHOPAEDIC SURGERY

## 2017-01-26 PROCEDURE — 97116 GAIT TRAINING THERAPY: CPT

## 2017-01-26 PROCEDURE — 77030012935 HC DRSG AQUACEL BMS -B

## 2017-01-26 PROCEDURE — 36415 COLL VENOUS BLD VENIPUNCTURE: CPT | Performed by: ORTHOPAEDIC SURGERY

## 2017-01-26 PROCEDURE — 85610 PROTHROMBIN TIME: CPT | Performed by: ORTHOPAEDIC SURGERY

## 2017-01-26 PROCEDURE — 65270000029 HC RM PRIVATE

## 2017-01-26 PROCEDURE — 97535 SELF CARE MNGMENT TRAINING: CPT

## 2017-01-26 PROCEDURE — 74011250636 HC RX REV CODE- 250/636: Performed by: ORTHOPAEDIC SURGERY

## 2017-01-26 PROCEDURE — 97110 THERAPEUTIC EXERCISES: CPT

## 2017-01-26 RX ORDER — HYDROMORPHONE HYDROCHLORIDE 4 MG/1
4 TABLET ORAL
Qty: 64 TAB | Refills: 0 | Status: SHIPPED | OUTPATIENT
Start: 2017-01-26 | End: 2019-05-14

## 2017-01-26 RX ORDER — POLYETHYLENE GLYCOL 3350 17 G/17G
17 POWDER, FOR SOLUTION ORAL DAILY
Qty: 30 PACKET | Refills: 1 | Status: SHIPPED | OUTPATIENT
Start: 2017-01-26 | End: 2020-01-20 | Stop reason: SDUPTHER

## 2017-01-26 RX ORDER — HYDROMORPHONE HYDROCHLORIDE 4 MG/1
4 TABLET ORAL
Status: DISCONTINUED | OUTPATIENT
Start: 2017-01-26 | End: 2017-01-27 | Stop reason: HOSPADM

## 2017-01-26 RX ADMIN — HYDROMORPHONE HYDROCHLORIDE 2 MG: 2 TABLET ORAL at 04:43

## 2017-01-26 RX ADMIN — CARVEDILOL 25 MG: 25 TABLET, FILM COATED ORAL at 17:22

## 2017-01-26 RX ADMIN — Medication 325 MG: at 10:15

## 2017-01-26 RX ADMIN — PREGABALIN 75 MG: 75 CAPSULE ORAL at 10:15

## 2017-01-26 RX ADMIN — ISOSORBIDE MONONITRATE 60 MG: 30 TABLET ORAL at 06:00

## 2017-01-26 RX ADMIN — OXYCODONE HYDROCHLORIDE 20 MG: 20 TABLET, FILM COATED, EXTENDED RELEASE ORAL at 10:14

## 2017-01-26 RX ADMIN — OXYCODONE HYDROCHLORIDE 20 MG: 20 TABLET, FILM COATED, EXTENDED RELEASE ORAL at 21:47

## 2017-01-26 RX ADMIN — PREGABALIN 75 MG: 75 CAPSULE ORAL at 21:47

## 2017-01-26 RX ADMIN — TAMSULOSIN HYDROCHLORIDE 0.4 MG: 0.4 CAPSULE ORAL at 10:15

## 2017-01-26 RX ADMIN — HYDROCHLOROTHIAZIDE 25 MG: 25 TABLET ORAL at 10:15

## 2017-01-26 RX ADMIN — CELECOXIB 200 MG: 100 CAPSULE ORAL at 10:14

## 2017-01-26 RX ADMIN — ATORVASTATIN CALCIUM 80 MG: 40 TABLET, FILM COATED ORAL at 10:14

## 2017-01-26 RX ADMIN — HYDROMORPHONE HYDROCHLORIDE 4 MG: 4 TABLET ORAL at 13:38

## 2017-01-26 RX ADMIN — CELECOXIB 200 MG: 100 CAPSULE ORAL at 21:47

## 2017-01-26 RX ADMIN — WARFARIN SODIUM 9 MG: 5 TABLET ORAL at 17:22

## 2017-01-26 RX ADMIN — Medication 325 MG: at 17:22

## 2017-01-26 RX ADMIN — HYDROMORPHONE HYDROCHLORIDE 1 MG: 1 INJECTION, SOLUTION INTRAMUSCULAR; INTRAVENOUS; SUBCUTANEOUS at 02:43

## 2017-01-26 NOTE — PROGRESS NOTES
Problem: Self Care Deficits Care Plan (Adult)  Goal: *Acute Goals and Plan of Care (Insert Text)  Occupational Therapy Goals  Initiated 1/25/2017 within 7 day(s). 1. Patient will perform grooming with supervision/set-up   2. Patient will perform lower body dressing with supervision/set-up with AE  3. Patient will perform lower body bathing with supervision/set-up with AE  4. Patient will perform toilet transfers with supervision/set-up. 5. Patient will perform all aspects of toileting with supervision/set-up. OCCUPATIONAL THERAPY TREATMENT     Patient: Chanda Garduno (88 y.o. male)  Date: 1/26/2017  Diagnosis: Osteoarthritis of right hip, unspecified osteoarthritis type [M16.11] <principal problem not specified>  Procedure(s) (LRB):  RIGHT TOTAL HIP ARTHROPLASTY LATERAL APPROACH (Right) 2 Days Post-Op  Precautions: Fall, WBAT, Total hip  Chart, occupational therapy assessment, plan of care, and goals were reviewed. ASSESSMENT:  Pt progressing toward goals, however, pt is grossly mod A for LB dressing using AE. See below for details. Progression toward goals:  [X]          Improving appropriately and progressing toward goals  [ ]          Improving slowly and progressing toward goals  [ ]          Not making progress toward goals and plan of care will be adjusted       PLAN:  Patient continues to benefit from skilled intervention to address the above impairments. Continue treatment per established plan of care. Discharge Recommendations:  SNF  Further Equipment Recommendations for Discharge: TBD       SUBJECTIVE:   Patient stated I can't .       OBJECTIVE DATA SUMMARY:   Cognitive/Behavioral Status:  Functional Mobility and Transfers for ADLs:                          Transfers:  Sit to stand : CGA with verbal and manual cues for anterior and up wt shift    Balance:  Sitting: Intact  Standing: Impaired; With support  Standing - Static: Fair  Standing - Dynamic : Fair  ADL Intervention: Grooming  Grooming Assistance: Supervision/set up (seated edge of chair )  Lower Body Dressing Assistance  Dressing Assistance: Moderate assistance  Pants With Elastic Waist: Moderate assistance (assist with AE to thread R LE & to pull up over hips )  Socks: Moderate assistance (seated edge of chair with AE)  Position Performed: Standing;Seated in chair  Adaptive Equipment Used: Walker;Sock aid;Reacher   Activity Tolerance:    Fair      Please refer to the flowsheet for vital signs taken during this treatment.   After treatment:   [X]  Patient left in no apparent distress sitting up in chair  [ ]  Patient left in no apparent distress in bed  [X]  Call bell left within reach  [ ]  Nursing notified  [ ]  Caregiver present  [ ]  Bed alarm activated     YOU Chen  Time Calculation: 23 mins

## 2017-01-26 NOTE — PROGRESS NOTES
Problem: Mobility Impaired (Adult and Pediatric)  Goal: *Acute Goals and Plan of Care (Insert Text)  Physical Therapy Goals  Initiated 1/24/2017 and to be accomplished within 7 day(s)  1. Patient will move from supine to sit and sit to supine in bed with modified independence. 2. Patient will transfer from bed to chair and chair to bed with modified independence using the least restrictive device. 3. Patient will perform sit to stand with modified independence. 4. Patient will ambulate with modified independence for >150 feet with the least restrictive device. 5. Patient will ascend/descend 4 stairs with 1 handrail(s) with modified independence. 6. Patient to perform HEP independently in order to increase strength for carryover into functional tasks. Outcome: Progressing Towards Goal  PHYSICAL THERAPY TREATMENT     Patient: Juan Couch (36 y.o. male)  Date: 1/26/2017  Diagnosis: Osteoarthritis of right hip, unspecified osteoarthritis type [M16.11] <principal problem not specified>  Procedure(s) (LRB):  RIGHT TOTAL HIP ARTHROPLASTY LATERAL APPROACH (Right) 2 Days Post-Op  Precautions: Fall, WBAT, Total hip  Chart, physical therapy assessment, plan of care and goals were reviewed. ASSESSMENT:  Pt unable to recite hip precautions, re-educated on precautions and importance of compliance, required frequent verbal and tactile cues throughout to maintain compliance. Pt minimally weight bearing on R with knee flexed and flexed posture; instructed with verbal and tactile cues for posture and quad contraction/ knee extension. Progression toward goals:  [ ]      Improving appropriately and progressing toward goals  [X]      Improving slowly and progressing toward goals  [ ]      Not making progress toward goals and plan of care will be adjusted       PLAN:  Patient continues to benefit from skilled intervention to address the above impairments. Continue treatment per established plan of care.   Discharge Recommendations:  Home Health and [place]  Further Equipment Recommendations for Discharge:  rolling walker       SUBJECTIVE:   Patient stated Priyank Zavala is it still swollen? Krystyna Car  Mobility [id number] Current  CJ= 20-39%  [id number] Goal  CI= 1-19%. The severity rating is based on the Other Level of assist required for functional mobility      OBJECTIVE DATA SUMMARY:   Critical Behavior:  Neurologic State: Alert  Orientation Level: Oriented X4  Cognition: Follows commands, Appropriate decision making  Safety/Judgement: Fall prevention  Functional Mobility Training:  Bed Mobility:  Rolling: Minimum assistance  Supine to Sit: Minimum assistance  Scooting: Stand-by asssistance  Transfers:  Sit to Stand: Stand-by asssistance  Stand to Sit: Stand-by asssistance  Balance:  Sitting: Intact  Standing: Impaired; With support  Standing - Static: Fair  Standing - Dynamic : Fair  Ambulation/Gait Training:  Distance (ft): 23 Feet (ft)  Assistive Device: Walker, rolling  Ambulation - Level of Assistance: Stand-by asssistance  Gait Abnormalities: Antalgic;Decreased step clearance;Shuffling gait; Step to gait  Speed/Marian: Pace decreased (<100 feet/min)  Step Length: Right shortened;Left shortened  Therapeutic Exercises: Ankle pumps, quad set, glute squeeze,  Pain:  Pt pain was reported as  8 pre-treatment. Pt pain was reported as 8 post-treatment. Intervention: ice pack     Activity Tolerance:   Fair   Please refer to the flowsheet for vital signs taken during this treatment.   After treatment:   [X] Patient left in no apparent distress sitting up in chair  [ ] Patient left in no apparent distress in bed  [X] Call bell left within reach  [ ] Nursing notified  [ ] Caregiver present  [ ] Bed alarm activated      Lisa Wooten PTA   Time Calculation: 23 mins

## 2017-01-26 NOTE — PROGRESS NOTES
Progress Note  Pulmonary, Critical Care, and Sleep Medicine    Name: Harleen Granado MRN: [id number]   : 1955 Hospital: [facility]   Date: 2017        IMPRESSION:   · End stage Osteoarthritis s/p right hip arthroplasty on 17   · Pruritis- may be secondary to percocet, allergy to Vicodin   · HTN  · CAD s/p CABG x 2, Echo  EF normal, with moderate diastolic dysfxn, on appropriate medical therapy  · Mild Chronic Renal Insufficiency   · Hx of BPH and Epididymitis  · Urinary retention due to above  · Hypotension multifactorial   · Hx of Cocaine Abuse        PLAN:   · Anti HTN medications held for hypotension- which is better now. Continue to hold for now. · D/c Tadalafil for concerns re: interaction with nitrates. Patient is not sure why he is on Tadalafil ?!  · Mobilize per PT  · Discussed with nursing  · DVT prophylaxis per othro   · D/c planning per ortho     Subjective/Interval History:   I have reviewed the flowsheet and previous days notes. Reviewed interval history. Discussed management with nursing staff. Complains of post op pain. No more hypotension  On 3 lit NC  Afebrile   No other overnight issues      ROS:Pertinent items are noted in HPI.     Current Facility-Administered Medications   Medication Dose Route Frequency Provider Last Rate Last Dose    tamsulosin (FLOMAX) capsule 0.4 mg  0.4 mg Oral DAILY Audrey De Oliveira PA-C   0.4 mg at 17 1015    HYDROmorphone (PF) (DILAUDID) injection 1-2 mg  1-2 mg IntraVENous Q2H PRN Jaylin Sepulveda MD   1 mg at 17 0243    WARFARIN INFORMATION NOTE (COUMADIN)   Other Q24H Audrey De Oliveira PA-C        atorvastatin (LIPITOR) tablet 80 mg  80 mg Oral DAILY Denis Strickland MD   80 mg at 17 1014    carvedilol (COREG) tablet 25 mg  25 mg Oral BID WITH MEALS Mirta Hernandez MD   Stopped at 17 1016    hydroCHLOROthiazide (HYDRODIURIL) tablet 25 mg  25 mg Oral DAILY Mirta Hernandez MD   25 mg at 17 1015  isosorbide mononitrate ER (IMDUR) tablet 60 mg  60 mg Oral 7am Praveen Vo MD   60 mg at 17 0600    lisinopril (PRINIVIL, ZESTRIL) tablet 20 mg  20 mg Oral DAILY Praveen Vo MD   Stopped at 17 0900    nitroglycerin (NITROSTAT) tablet 0.4 mg  0.4 mg SubLINGual Q5MIN PRN Gabbi Parnell MD        sodium chloride (NS) flush 5-10 mL  5-10 mL IntraVENous Q8H Gabbi Parnell MD   10 mL at 17 2309    sodium chloride (NS) flush 5-10 mL  5-10 mL IntraVENous PRN Gabbi Parnell MD        naloxone [facility]) injection 0.4 mg  0.4 mg IntraVENous PRN Gabbi Parnell MD        ferrous sulfate tablet 325 mg  1 Tab Oral BID WITH MEALS Gabbi Parnell MD   325 mg at 17 1015    diphenhydrAMINE (BENADRYL) capsule 25 mg  25 mg Oral Q6H PRN Gabbi Parnell MD   25 mg at 17 1701    zolpidem (AMBIEN) tablet 5 mg  5 mg Oral QHS PRN Gabbi Parnell MD        celecoxib (CELEBREX) capsule 200 mg  200 mg Oral Q12H Gabbi Parnell MD   200 mg at 17 1014    ondansetron (ZOFRAN) injection 4 mg  4 mg IntraVENous Q4H PRN Gabbi Parnell MD        magnesium hydroxide (MILK OF MAGNESIA) 400 mg/5 mL oral suspension 30 mL  30 mL Oral DAILY PRN Gabbi Parnell MD        pregabalin (LYRICA) capsule 75 mg  75 mg Oral Q12H Gabbi Parnell MD   75 mg at 17 1015    oxyCODONE ER (OxyCONTIN) tablet 20 mg  20 mg Oral Q12H Gabbi Parnell MD   20 mg at 17 1014    HYDROmorphone (DILAUDID) tablet 1-2 mg  1-2 mg Oral Q4H PRN Fany Elmore PA-C   2 mg at 17 0443       Objective:   Vital Signs:    Visit Vitals    /59 (BP 1 Location: Left arm, BP Patient Position: At rest)    Pulse 72    Temp 98.7 °F (37.1 °C)    Resp 18    Ht 6' 1\" (1.854 m)    Wt 107.1 kg (236 lb 3.2 oz)    SpO2 94%    BMI 31.16 kg/m2       O2 Device: Nasal cannula   O2 Flow Rate (L/min): 3 l/min   Temp (24hrs), Av.1 °F (36.7 °C), Min:97 °F (36.1 °C), Max:98.8 °F (37.1 °C)       Intake/Output:   Last shift:         Last 3 shifts: 01/24 1901 - 01/26 0700  In: 2035 [P.O.:1160; I.V.:875]  Out: 2975 [Urine:2975]    Intake/Output Summary (Last 24 hours) at 01/26/17 0930  Last data filed at 01/26/17 0348   Gross per 24 hour   Intake              920 ml   Output             1600 ml   Net             -680 ml        Physical Exam:    General:  Alert, cooperative, in no distress, appears stated age. Head:  Normocephalic, without obvious abnormality, atraumatic. Eyes:  ANicteric   Nose: Nares normal.  Mucosa normal. No drainage    Throat: Lips, mucosa, and tongue normal. NO Thrush   Neck: Supple, symmetrical, trachea midline, no adenopathy   Back:   Symmetric    Lungs:   Bilateral auscultation no rales or wheezes   Chest wall:  No tenderness or deformity. NO intercostal retractions   Heart:  Regular rate and rhythm, S1, S2 normal, no murmur   Abdomen:   Soft, non-tender. Bowel sounds normal.    Extremities: Right hip post op, atraumatic, no cyanosis or edema. Pulses: 2+ and symmetric all extremities. Skin: Skin color, texture, turgor normal. No rashes or lesions. NO clubbing       Neurologic: Grossly nonfocal     DATA:  Labs:  Recent Labs      01/26/17   0449  01/25/17   0329   WBC  7.6  9.9   HGB  11.6*  11.5*   HCT  35.3*  35.5*   PLT  152  162     Recent Labs      01/26/17   0449  01/25/17   0329   NA  137  138   K  4.5  4.6   CL  104  106   CO2  24  25   GLU  91  94   BUN  21*  19*   CREA  1.65*  1.81*   CA  8.3*  8.2*   INR  1.2  1.2     No results for input(s): PH, PCO2, PO2, HCO3, FIO2 in the last 72 hours. Imaging:  []        I have personally reviewed the patients radiographs and reports  []         []        No change from prior, tubes and lines in adequate position  []        Improved   []        Worsening  High complexity decision making was performed during this consultation and evaluation.       River Potter MD  1/26/2017

## 2017-01-26 NOTE — PROGRESS NOTES
Ortho    Pt. Seen and evaluated sitting up in chair  Doing well, pain poorly  controlled  Denies cp, sob, abd pain    Visit Vitals    /59 (BP 1 Location: Left arm, BP Patient Position: At rest)    Pulse 72    Temp 98.7 °F (37.1 °C)    Resp 18    Ht 6' 1\" (1.854 m)    Wt 236 lb 3.2 oz (107.1 kg)    SpO2 94%    BMI 31.16 kg/m2       right total hip replacement  right hip sx site intact covered with AquaCel  Sensory intact to LT Femoral nerve (+)  Motor intact Quad set  nv intact  Neg calf tenderness. Labs. CBC  @  CBC:   Lab Results   Component Value Date/Time    WBC 7.6 01/26/2017 04:49 AM    RBC 3.55 01/26/2017 04:49 AM    HGB 11.6 01/26/2017 04:49 AM    HCT 35.3 01/26/2017 04:49 AM    PLATELET 833 87/15/0912 04:49 AM    and BMP:   Lab Results   Component Value Date/Time    Glucose 91 01/26/2017 04:49 AM    Sodium 137 01/26/2017 04:49 AM    Potassium 4.5 01/26/2017 04:49 AM    Chloride 104 01/26/2017 04:49 AM    CO2 24 01/26/2017 04:49 AM    BUN 21 01/26/2017 04:49 AM    Creatinine 1.65 01/26/2017 04:49 AM    Calcium 8.3 01/26/2017 04:49 AM   @  Coagulation  Lab Results   Component Value Date    INR 1.2 01/26/2017    APTT 24.9 01/16/2017      Basic Metabolic Profile  Lab Results   Component Value Date     01/26/2017    CO2 24 01/26/2017    BUN 21 (H) 01/26/2017         Assesment:right Orthopedic / Rheumatologic: Total Hip Replacement    Plan:  Coumadin, PT, flomax, IV fluids, if unable to void insert soria cath. Plan for dc tomorrow.

## 2017-01-26 NOTE — PROGRESS NOTES
Problem: Mobility Impaired (Adult and Pediatric)  Goal: *Acute Goals and Plan of Care (Insert Text)  Physical Therapy Goals  Initiated 1/24/2017 and to be accomplished within 7 day(s)  1. Patient will move from supine to sit and sit to supine in bed with modified independence. 2. Patient will transfer from bed to chair and chair to bed with modified independence using the least restrictive device. 3. Patient will perform sit to stand with modified independence. 4. Patient will ambulate with modified independence for >150 feet with the least restrictive device. 5. Patient will ascend/descend 4 stairs with 1 handrail(s) with modified independence. 6. Patient to perform HEP independently in order to increase strength for carryover into functional tasks. Outcome: Progressing Towards Goal  PHYSICAL THERAPY TREATMENT     Patient: Omer Anne (70 y.o. male)  Date: 1/26/2017  Diagnosis: Osteoarthritis of right hip, unspecified osteoarthritis type [M16.11] <principal problem not specified>  Procedure(s) (LRB):  RIGHT TOTAL HIP ARTHROPLASTY LATERAL APPROACH (Right) 2 Days Post-Op  Precautions: Fall, WBAT, Total hip  Chart, physical therapy assessment, plan of care and goals were reviewed. ASSESSMENT:  Pt progressing with gait training this afternoon, continues to require frequent verbal cues for posture, walker management and sequencing. Progression toward goals:  [X]      Improving appropriately and progressing toward goals  [ ]      Improving slowly and progressing toward goals  [ ]      Not making progress toward goals and plan of care will be adjusted       PLAN:  Patient continues to benefit from skilled intervention to address the above impairments. Continue treatment per established plan of care.   Discharge Recommendations:  Home Health and [place]  Further Equipment Recommendations for Discharge:  rolling walker       SUBJECTIVE:   Patient stated The cloudy weather makes me feel bed.      OBJECTIVE DATA SUMMARY:   Critical Behavior:  Neurologic State: Alert  Orientation Level: Oriented X4  Cognition: Follows commands, Appropriate decision making  Safety/Judgement: Fall prevention  Functional Mobility Training:  Bed Mobility:  Rolling: Minimum assistance  Supine to Sit: Minimum assistance  Scooting: Stand-by asssistance  Transfers:  Sit to Stand: Stand-by asssistance  Stand to Sit: Stand-by asssistance  Balance:  Sitting: Intact  Standing: Impaired; With support  Standing - Static: Fair  Standing - Dynamic : Fair  Ambulation/Gait Training:  Distance (ft): 52 Feet (ft)  Assistive Device: Walker, rolling  Ambulation - Level of Assistance: Stand-by asssistance  Gait Abnormalities: Antalgic;Decreased step clearance;Shuffling gait; Step to gait  Speed/Marian: Pace decreased (<100 feet/min)  Step Length: Right shortened;Left shortened  Therapeutic Exercises: Ankle pumps, quad set, glute squeeze  Pain:  Pt pain was reported as  8 pre-treatment. Pt pain was reported as 9 post-treatment. Intervention: ice pack  Activity Tolerance:   Fair   Please refer to the flowsheet for vital signs taken during this treatment.   After treatment:   [X] Patient left in no apparent distress sitting up in chair  [ ] Patient left in no apparent distress in bed  [X] Call bell left within reach  [ ] Nursing notified  [ ] Caregiver present  [ ] Bed alarm activated      Ange Kapoor PTA   Time Calculation: 12 mins

## 2017-01-26 NOTE — PROGRESS NOTES
Assessment completed. Patient alert and oriented x 4. Patient resting with eyes open. Patient denies SOB and chest pain. No respiratory distress noted. Bowel sounds present, but hypoactive in all four quadrants. Incision to right hip. Dressing is clean, dry and intact. Call bell and phone within reach.   No further concerns at this time

## 2017-01-26 NOTE — PROGRESS NOTES
Care Management Interventions  Transition of Care Consult (CM Consult): SNF (wants short term SNF for rehab before returning home )  Discharge Durable Medical Equipment: No  Physical Therapy Consult: Yes  Occupational Therapy Consult: Yes  Speech Therapy Consult: No  Current Support Network: Lives Alone  Confirm Follow Up Transport:  (TBD)  Plan discussed with Pt/Family/Caregiver: Yes (KAI pt who appears AAO x3 )  Freedom of Choice Offered: Yes (For SNF)  Discharge Location  Discharge Placement: Skilled nursing facility (wants [place] or [facility] short term)     CM met in room with pt who is a 59year old male admitted for osteoarthritis of right hip. Pt is alert and oriented and alone in room. Pt confirms his preference of going to a SNF vs home with [facility]. Pt chose [facility]. Pt's nurse is aware of pt's plan.

## 2017-01-26 NOTE — PROGRESS NOTES
Bedside and Verbal shift change report given to [address] [place] [address] (oncoming nurse) by Castillo No RN (offgoing nurse). Report included the following information SBAR, Kardex, MAR and Recent Results.     SITUATION:    Code Status: No Order   Reason for Admission: Osteoarthritis of right hip, unspecified osteoarthritis type [address] [facility] day: 1   Problem List:       Hospital Problems  Date Reviewed: 1/23/2017          Codes Class Noted POA    Hip arthritis ICD-10-CM: M19.90  ICD-9-CM: 716.95  1/24/2017 Unknown              BACKGROUND:    Past Medical History:   Past Medical History   Diagnosis Date    Arthritis     CAD (coronary artery disease)     Essential hypertension     Low back pain     Lumbar postlaminectomy syndrome     Lumbar spondylosis     Myocardial infarction ([facility] [address]      lad stent for stemi,12/2012    Pancreatic cyst          Patient taking anticoagulants yes     ASSESSMENT:    Changes in Assessment Throughout Shift: NO     Patient has Central Line: no Reasons if yes: NO   Patient has [facility] Cath: no Reasons if yes: NO      Last Vitals:     Vitals:    01/25/17 1137 01/25/17 1555 01/25/17 1812 01/25/17 2006   BP: 100/62 112/62 112/62 111/66   Pulse: 60 63 63 67   Resp: 18 18  18   Temp: 98.3 °F (36.8 °C) 98.4 °F (36.9 °C)  97.2 °F (36.2 °C)   SpO2: 100% 98%  99%   Weight:       Height:            IV and DRAINS (will only show if present)   Peripheral IV 01/24/17 Left Hand-Site Assessment: Clean, dry, & intact     WOUND (if present)   Wound Type:  none, SURGICAL INCISION   Dressing present     Wound Concerns/Notes:  none     PAIN    Pain Assessment    Pain Intensity 1: 10 (01/25/17 1816)    Pain Location 1: Hip    Pain Intervention(s) 1:  (medication given.)    Patient Stated Pain Goal: 0  o Interventions for Pain:  none, MEDS  o Intervention effective: yes  o Time of last intervention: SEE MAR   o Reassessment Completed: yes      Last 3 Weights:  Last 3 Recorded Weights in this Encounter    01/16/17 1103 01/24/17 1017   Weight: 112.5 kg (248 lb) 107.1 kg (236 lb 3.2 oz)     Weight change:      INTAKE/OUPUT    Current Shift: 01/25 1901 - 01/26 0700  In: -   Out: 320 [Urine:320]    Last three shifts: 01/24 0701 - 01/25 1900  In: 4139 [P.O.:1160; I.V.:1875]  Out: 1755 [Urine:1755]     LAB RESULTS     Recent Labs      01/25/17   0329   WBC  9.9   HGB  11.5*   HCT  35.5*   PLT  162        Recent Labs      01/25/17   0329   NA  138   K  4.6   GLU  94   BUN  19*   CREA  1.81*   CA  8.2*   INR  1.2       RECOMMENDATIONS AND DISCHARGE PLANNING     1. Pending tests/procedures/ Plan of Care or Other Needs: LABS     2. Discharge plan for patient and Needs/Barriers: [address]    3. Estimated Discharge Date: 1/27/17 Posted on White[facility] in [facility]: yes      4. The patient's care plan was reviewed with the oncoming nurse. \"HEALS\" SAFETY CHECK      Fall Risk    Total Score: 3    Safety Measures: Safety Measures: Bed/Chair alarm on, Bed/Chair-Wheels locked, Bed in low position, Call light within reach, Side rails X 3    A safety check occurred in the patient's room between off going nurse and oncoming nurse listed above. The safety check included the below items  Area Items   H  High Alert Medications - Verify all high alert medication drips (heparin, PCA, etc.)   E  Equipment - Suction is set up for ALL patients (with yanker)  - Red plugs utilized for all equipment (IV pumps, etc.)  - WOWs wiped down at end of shift.  - Room stocked with oxygen, suction, and other unit-specific supplies   A  Alarms - Bed alarm is set for fall risk patients  - Ensure chair alarm is in place and activated if patient is up in a chair   L  Lines - Check IV for any infiltration  - [facility] bag is empty if patient has a [facility]   - Tubing and IV bags are labeled   S  Safety   - Room is clean, patient is clean, and equipment is clean. - Hallways are clear from equipment besides carts.    - Fall bracelet on for fall risk patients  - Ensure room is clear and free of clutter  - Suction is set up for ALL patients (with jade)  - Hallways are clear from equipment besides carts.    - Isolation precautions followed, supplies available outside room, sign posted     Tiana Moser RN

## 2017-01-26 NOTE — PROGRESS NOTES
Mobility Intervention:       [x] Pt dangled at edge of bed    [] Pt assisted OOB to bedside commode    [] Pt assisted OOB to chair    [] Pt ambulated to bathroom    [] Patient was ambulated in room/hallway    Assistive Device Utilized (check all that apply):       [] Rolling walker   [] Crutches   [] Straight Cane   [] Knee immobilizer   [] IV pole    After Mobilization:     [] Patient left in no apparent distress sitting up in chair  [x] Patient left in no apparent distress in bed  [] Call bell left within reach  Assistive Device:        [] RN notified  [] Caregiver present  [] Bed alarm activated    Comments:

## 2017-01-26 NOTE — PROGRESS NOTES
Bedside and Verbal shift change report given to Geoff Gruber RN(oncoming nurse) by Drew Rios (offgoing nurse). Report included the following information SBAR, Kardex, MAR and Recent Results. SITUATION:    Code Status: No Order   Reason for Admission: Osteoarthritis of right hip, unspecified osteoarthritis type [address] [facility] day: 2   Problem List:       Hospital Problems  Date Reviewed: 1/23/2017          Codes Class Noted POA    Hip arthritis ICD-10-CM: M19.90  ICD-9-CM: 716.95  1/24/2017 Unknown              BACKGROUND:    Past Medical History:   Past Medical History   Diagnosis Date    Arthritis     CAD (coronary artery disease)     Essential hypertension     Low back pain     Lumbar postlaminectomy syndrome     Lumbar spondylosis     Myocardial infarction ([facility] [address]      lad stent for stemi,12/2012    Pancreatic cyst          Patient taking anticoagulants yes     ASSESSMENT:    Changes in Assessment Throughout Shift: no     Patient has Central Line: no Reasons if yes: .  Patient has [facility] Cath: no Reasons if yes: .       Last Vitals:     Vitals:    01/25/17 2006 01/26/17 0400 01/26/17 0758 01/26/17 1606   BP: 111/66 130/66 109/59 123/69   Pulse: 67 72 72 80   Resp: 18 19 18 18   Temp: 97.2 °F (36.2 °C) 98.8 °F (37.1 °C) 98.7 °F (37.1 °C) 98.2 °F (36.8 °C)   SpO2: 99% 95% 94% 99%   Weight:       Height:            IV and DRAINS (will only show if present)   Peripheral IV 01/24/17 Left Hand-Site Assessment: Clean, dry, & intact     WOUND (if present)   Wound Type:  none   Dressing present     Wound Concerns/Notes:  none     PAIN    Pain Assessment    Pain Intensity 1: 0 (01/26/17 1405)    Pain Location 1: Hip    Pain Intervention(s) 1: Medication (see MAR)    Patient Stated Pain Goal: 0  o Interventions for Pain:  See MAR  o Intervention effective: yes  o Time of last intervention: See MAR   o Reassessment Completed: yes      Last 3 Weights:  Last 3 Recorded Weights in this Encounter 01/16/17 1103 01/24/17 1017   Weight: 112.5 kg (248 lb) 107.1 kg (236 lb 3.2 oz)     Weight change:      INTAKE/OUPUT    Current Shift: 01/26 0701 - 01/26 1900  In: 480 [P.O.:480]  Out: 300 [Urine:300]    Last three shifts: 01/24 1901 - 01/26 0700  In: 2035 [P.O.:1160; I.V.:875]  Out: 2975 [Urine:2975]     LAB RESULTS     Recent Labs      01/26/17   0449  01/25/17   0329   WBC  7.6  9.9   HGB  11.6*  11.5*   HCT  35.3*  35.5*   PLT  152  162        Recent Labs      01/26/17 0449 01/25/17 0329   NA  137  138   K  4.5  4.6   GLU  91  94   BUN  21*  19*   CREA  1.65*  1.81*   CA  8.3*  8.2*   INR  1.2  1.2       RECOMMENDATIONS AND DISCHARGE PLANNING     1. Pending tests/procedures/ Plan of Care or Other Needs: none     2. Discharge plan for patient and Needs/Barriers: home    3. Estimated Discharge Date: 1-27-17 Posted on Whiteboard in [facility] [place]: yes      4. The patient's care plan was reviewed with the oncoming nurse. \"HEALS\" SAFETY CHECK      Fall Risk    Total Score: 3    Safety Measures: Safety Measures: Bed/Chair-Wheels locked, Bed in low position, Call light within reach    A safety check occurred in the patient's room between off going nurse and oncoming nurse listed above. The safety check included the below items  Area Items   H  High Alert Medications - Verify all high alert medication drips (heparin, PCA, etc.)   E  Equipment - Suction is set up for ALL patients (with yanker)  - Red plugs utilized for all equipment (IV pumps, etc.)  - WOWs wiped down at end of shift.  - Room stocked with oxygen, suction, and other unit-specific supplies   A  Alarms - Bed alarm is set for fall risk patients  - Ensure chair alarm is in place and activated if patient is up in a chair   L  Lines - Check IV for any infiltration  - Kaiser bag is empty if patient has a Kaiser   - Tubing and IV bags are labeled   S  Safety   - Room is clean, patient is clean, and equipment is clean.   - Hallways are clear from equipment besides carts. - Fall bracelet on for fall risk patients  - Ensure room is clear and free of clutter  - Suction is set up for ALL patients (with jade)  - Hallways are clear from equipment besides carts.    - Isolation precautions followed, supplies available outside room, sign posted     Leonarda Brittle

## 2017-01-27 VITALS
TEMPERATURE: 99.1 F | BODY MASS INDEX: 31.3 KG/M2 | HEART RATE: 91 BPM | OXYGEN SATURATION: 95 % | WEIGHT: 236.2 LBS | DIASTOLIC BLOOD PRESSURE: 61 MMHG | RESPIRATION RATE: 16 BRPM | HEIGHT: 73 IN | SYSTOLIC BLOOD PRESSURE: 122 MMHG

## 2017-01-27 LAB
ANION GAP BLD CALC-SCNC: 9 MMOL/L (ref 3–18)
BUN SERPL-MCNC: 25 MG/DL (ref 7–18)
BUN/CREAT SERPL: 14 (ref 12–20)
CALCIUM SERPL-MCNC: 8.7 MG/DL (ref 8.5–10.1)
CHLORIDE SERPL-SCNC: 102 MMOL/L (ref 100–108)
CO2 SERPL-SCNC: 24 MMOL/L (ref 21–32)
CREAT SERPL-MCNC: 1.76 MG/DL (ref 0.6–1.3)
ERYTHROCYTE [DISTWIDTH] IN BLOOD BY AUTOMATED COUNT: 12.2 % (ref 11.6–14.5)
GLUCOSE SERPL-MCNC: 99 MG/DL (ref 74–99)
HCT VFR BLD AUTO: 31.9 % (ref 36–48)
HGB BLD-MCNC: 10.5 G/DL (ref 13–16)
INR PPP: 1.3 (ref 0.8–1.2)
MCH RBC QN AUTO: 32.3 PG (ref 24–34)
MCHC RBC AUTO-ENTMCNC: 32.9 G/DL (ref 31–37)
MCV RBC AUTO: 98.2 FL (ref 74–97)
PLATELET # BLD AUTO: 147 K/UL (ref 135–420)
PMV BLD AUTO: 11.8 FL (ref 9.2–11.8)
POTASSIUM SERPL-SCNC: 4.3 MMOL/L (ref 3.5–5.5)
PROTHROMBIN TIME: 15.7 SEC (ref 11.5–15.2)
RBC # BLD AUTO: 3.25 M/UL (ref 4.7–5.5)
SODIUM SERPL-SCNC: 135 MMOL/L (ref 136–145)
WBC # BLD AUTO: 9.6 K/UL (ref 4.6–13.2)

## 2017-01-27 PROCEDURE — 85027 COMPLETE CBC AUTOMATED: CPT | Performed by: ORTHOPAEDIC SURGERY

## 2017-01-27 PROCEDURE — 97116 GAIT TRAINING THERAPY: CPT

## 2017-01-27 PROCEDURE — 97110 THERAPEUTIC EXERCISES: CPT

## 2017-01-27 PROCEDURE — 80048 BASIC METABOLIC PNL TOTAL CA: CPT | Performed by: ORTHOPAEDIC SURGERY

## 2017-01-27 PROCEDURE — 85610 PROTHROMBIN TIME: CPT | Performed by: ORTHOPAEDIC SURGERY

## 2017-01-27 PROCEDURE — 36415 COLL VENOUS BLD VENIPUNCTURE: CPT | Performed by: ORTHOPAEDIC SURGERY

## 2017-01-27 PROCEDURE — 74011250637 HC RX REV CODE- 250/637: Performed by: PHYSICIAN ASSISTANT

## 2017-01-27 PROCEDURE — 74011250637 HC RX REV CODE- 250/637: Performed by: INTERNAL MEDICINE

## 2017-01-27 PROCEDURE — 74011250637 HC RX REV CODE- 250/637: Performed by: ORTHOPAEDIC SURGERY

## 2017-01-27 RX ADMIN — HYDROMORPHONE HYDROCHLORIDE 4 MG: 4 TABLET ORAL at 06:07

## 2017-01-27 RX ADMIN — Medication 10 ML: at 09:21

## 2017-01-27 RX ADMIN — LISINOPRIL 20 MG: 20 TABLET ORAL at 09:14

## 2017-01-27 RX ADMIN — Medication 325 MG: at 09:14

## 2017-01-27 RX ADMIN — HYDROMORPHONE HYDROCHLORIDE 4 MG: 4 TABLET ORAL at 01:03

## 2017-01-27 RX ADMIN — HYDROCHLOROTHIAZIDE 25 MG: 25 TABLET ORAL at 09:14

## 2017-01-27 RX ADMIN — PREGABALIN 75 MG: 75 CAPSULE ORAL at 09:17

## 2017-01-27 RX ADMIN — ISOSORBIDE MONONITRATE 60 MG: 30 TABLET ORAL at 09:14

## 2017-01-27 RX ADMIN — OXYCODONE HYDROCHLORIDE 20 MG: 20 TABLET, FILM COATED, EXTENDED RELEASE ORAL at 09:17

## 2017-01-27 RX ADMIN — CELECOXIB 200 MG: 100 CAPSULE ORAL at 09:16

## 2017-01-27 RX ADMIN — ATORVASTATIN CALCIUM 80 MG: 40 TABLET, FILM COATED ORAL at 09:14

## 2017-01-27 RX ADMIN — HYDROMORPHONE HYDROCHLORIDE 4 MG: 4 TABLET ORAL at 13:47

## 2017-01-27 RX ADMIN — CARVEDILOL 25 MG: 25 TABLET, FILM COATED ORAL at 09:14

## 2017-01-27 RX ADMIN — TAMSULOSIN HYDROCHLORIDE 0.4 MG: 0.4 CAPSULE ORAL at 09:14

## 2017-01-27 NOTE — PROGRESS NOTES
Patient is alert and oriented x 4; spoke with patient re: snf plans for therapy; patient stated that he selected [facility] for therapy and requested medical transportation from [facility] to [facility]; Doe Dickens will provide transportation; pick-up time is 1:30 pm; patient, unit nurse, and JUHI (NP) [phone] made aware of d/c plans; verbalized understanding; patient stated that he will inform his daughters of discharge plans.

## 2017-01-27 NOTE — PROGRESS NOTES
Progress Note  Pulmonary, Critical Care, and Sleep Medicine    Name: Claude Wolfe MRN: [id number]   : 1955 Hospital: [facility]   Date: 2017        IMPRESSION:   · End stage Osteoarthritis s/p right hip arthroplasty on 17   · Pruritis- may be secondary to percocet, allergy to Vicodin   · HTN  · CAD s/p CABG x 2, Echo  EF normal, with moderate diastolic dysfxn, on appropriate medical therapy  · Mild Chronic Renal Insufficiency   · Hx of BPH and Epididymitis  · Urinary retention due to above  · Hypotension multifactorial , resolved  · Hx of Cocaine Abuse        PLAN:   · Anti HTN medications restarted with controled BP  · D/c'ed Tadalafil for concerns re: interaction with nitrates. Patient is not sure why he is on Tadalafil ?!  · Mobilize per PT  · Discussed with nursing  · DVT prophylaxis per othro   · D/c planning per ortho- rehab today     Subjective/Interval History:   I have reviewed the flowsheet and previous days notes. Reviewed interval history. Discussed management with nursing staff. Complains of post op pain. No more hypotension  On 3 lit NC  Afebrile   No other overnight issues      ROS:Pertinent items are noted in HPI.     Current Facility-Administered Medications   Medication Dose Route Frequency Provider Last Rate Last Dose    HYDROmorphone (DILAUDID) tablet 4 mg  4 mg Oral Q4H PRN Teodoro Onofre PA-C   4 mg at 17 6215    tamsulosin (FLOMAX) capsule 0.4 mg  0.4 mg Oral DAILY Corinna Deras PA-C   0.4 mg at 17 4044    HYDROmorphone (PF) (DILAUDID) injection 1-2 mg  1-2 mg IntraVENous Q2H PRN Miky Glass MD   1 mg at 17 0243    WARFARIN INFORMATION NOTE (COUMADIN)   Other Q24H Corinna Deras PA-C        atorvastatin (LIPITOR) tablet 80 mg  80 mg Oral DAILY Jeff Mckenzie MD   80 mg at 17 0914    carvedilol (COREG) tablet 25 mg  25 mg Oral BID WITH MEALS Antonia Peñaloza MD   25 mg at 17 0914    hydroCHLOROthiazide (HYDRODIURIL) tablet 25 mg  25 mg Oral DAILY Mark Dennis MD   25 mg at 17 0914    isosorbide mononitrate ER (IMDUR) tablet 60 mg  60 mg Oral 7am Mark Dennis MD   60 mg at 17 0914    lisinopril (PRINIVIL, ZESTRIL) tablet 20 mg  20 mg Oral DAILY Mark Dennis MD   20 mg at 17 0914    nitroglycerin (NITROSTAT) tablet 0.4 mg  0.4 mg SubLINGual Q5MIN PRN Aruna Recinos MD        sodium chloride (NS) flush 5-10 mL  5-10 mL IntraVENous Q8H Aruna Recinos MD   10 mL at 17 0921    sodium chloride (NS) flush 5-10 mL  5-10 mL IntraVENous PRN Aruna Recinos MD        naloxone [facility]) injection 0.4 mg  0.4 mg IntraVENous PRN Aruna Recinos MD        ferrous sulfate tablet 325 mg  1 Tab Oral BID WITH MEALS Aruna Recinos MD   325 mg at 17 0914    diphenhydrAMINE (BENADRYL) capsule 25 mg  25 mg Oral Q6H PRN Aruna Recinos MD   25 mg at 17 1701    zolpidem (AMBIEN) tablet 5 mg  5 mg Oral QHS PRN Aruna Recinos MD        celecoxib (CELEBREX) capsule 200 mg  200 mg Oral Q12H Aruna Recinos MD   200 mg at 17 0916    ondansetron (ZOFRAN) injection 4 mg  4 mg IntraVENous Q4H PRN Aruna Recinos MD        magnesium hydroxide (MILK OF MAGNESIA) 400 mg/5 mL oral suspension 30 mL  30 mL Oral DAILY PRN Aruna Recinos MD        pregabalin (LYRICA) capsule 75 mg  75 mg Oral Q12H Aruna Recinos MD   75 mg at 17 1606    oxyCODONE ER (OxyCONTIN) tablet 20 mg  20 mg Oral Q12H Aruna Recinos MD   20 mg at 17 9422       Objective:   Vital Signs:    Visit Vitals    /61 (BP 1 Location: Right arm, BP Patient Position: Sitting)    Pulse 91    Temp 99.1 °F (37.3 °C)    Resp 16    Ht 6' 1\" (1.854 m)    Wt 107.1 kg (236 lb 3.2 oz)    SpO2 95%    BMI 31.16 kg/m2       O2 Device: Nasal cannula   O2 Flow Rate (L/min): 3 l/min   Temp (24hrs), Av.6 °F (37 °C), Min:97.9 °F (36.6 °C), Max:99.3 °F (37.4 °C)       Intake/Output:   Last shift:       0701 - 01/27 1900  In: 240 [P.O.:240]  Out: -   Last 3 shifts: 01/25 1901 - 01/27 0700  In: 480 [P.O.:480]  Out: 1520 [Urine:1520]    Intake/Output Summary (Last 24 hours) at 01/27/17 1013  Last data filed at 01/27/17 1354   Gross per 24 hour   Intake              720 ml   Output              300 ml   Net              420 ml        Physical Exam:    General:  Alert, cooperative, in no distress, appears stated age. Head:  Normocephalic, without obvious abnormality, atraumatic. Eyes:  ANicteric   Nose: Nares normal.  Mucosa normal. No drainage    Throat: Lips, mucosa, and tongue normal. NO Thrush   Neck: Supple, symmetrical, trachea midline, no adenopathy   Back:   Symmetric    Lungs:   Bilateral auscultation no rales or wheezes   Chest wall:  No tenderness or deformity. NO intercostal retractions   Heart:  Regular rate and rhythm, S1, S2 normal, no murmur   Abdomen:   Soft, non-tender. Bowel sounds normal.    Extremities: Right hip post op, atraumatic, no cyanosis or edema. Pulses: 2+ and symmetric all extremities. Skin: Skin color, texture, turgor normal. No rashes or lesions. NO clubbing       Neurologic: Grossly nonfocal     DATA:  Labs:  Recent Labs      01/27/17   0328  01/26/17   0449  01/25/17 0329   WBC  9.6  7.6  9.9   HGB  10.5*  11.6*  11.5*   HCT  31.9*  35.3*  35.5*   PLT  147  152  162     Recent Labs      01/27/17 0328  01/26/17 0449  01/25/17   0329   NA  135*  137  138   K  4.3  4.5  4.6   CL  102  104  106   CO2  24  24  25   GLU  99  91  94   BUN  25*  21*  19*   CREA  1.76*  1.65*  1.81*   CA  8.7  8.3*  8.2*   INR  1.3*  1.2  1.2     No results for input(s): PH, PCO2, PO2, HCO3, FIO2 in the last 72 hours.     Imaging:  []        I have personally reviewed the patients radiographs and reports  []         []        No change from prior, tubes and lines in adequate position  []        Improved   []        Worsening  High complexity decision making was performed during this consultation and evaluation.       Denisse Workman MD  1/27/2017

## 2017-01-27 NOTE — PROGRESS NOTES
Pt alert and oriented times four. Vitals stable and lung clears. Pt denies any numbness or tingling to RLE. Pulses are present. Drsg is dry, clean and intact. Pt is stable.

## 2017-01-27 NOTE — PROGRESS NOTES
Problem: Mobility Impaired (Adult and Pediatric)  Goal: *Acute Goals and Plan of Care (Insert Text)  Physical Therapy Goals  Initiated 1/24/2017 and to be accomplished within 7 day(s)  1. Patient will move from supine to sit and sit to supine in bed with modified independence. 2. Patient will transfer from bed to chair and chair to bed with modified independence using the least restrictive device. 3. Patient will perform sit to stand with modified independence. 4. Patient will ambulate with modified independence for >150 feet with the least restrictive device. 5. Patient will ascend/descend 4 stairs with 1 handrail(s) with modified independence. 6. Patient to perform HEP independently in order to increase strength for carryover into functional tasks. Outcome: Progressing Towards Goal  PHYSICAL THERAPY TREATMENT     Patient: Yulia Walters (62 y.o. male)  Date: 1/27/2017  Diagnosis: Osteoarthritis of right hip, unspecified osteoarthritis type [M16.11] <principal problem not specified>  Procedure(s) (LRB):  RIGHT TOTAL HIP ARTHROPLASTY LATERAL APPROACH (Right) 3 Days Post-Op  Precautions: Fall, WBAT, Total hip  Chart, physical therapy assessment, plan of care and goals were reviewed. ASSESSMENT:  Pt continues to require frequent verbal cues for compliance with hip precautions throughout treatment. Pt progressing slowly with gait training, amb with trunk and knee flexion, instructed with verbal and tactile cues for posture correction, walker management, sequencing and quad contraction for full knee extension on stance leg. Progression toward goals:  [ ]      Improving appropriately and progressing toward goals  [X]      Improving slowly and progressing toward goals  [ ]      Not making progress toward goals and plan of care will be adjusted       PLAN:  Patient continues to benefit from skilled intervention to address the above impairments. Continue treatment per established plan of care.   Discharge Recommendations:  Skilled Nursing Facility  Further Equipment Recommendations for Discharge:  TBD       SUBJECTIVE:   Patient stated Its hard to get my leg going.       OBJECTIVE DATA SUMMARY:   Critical Behavior:  Neurologic State: Alert  Orientation Level: Oriented X4  Cognition: Appropriate decision making  Safety/Judgement: Fall prevention  Functional Mobility Training:  Transfers:  Sit to Stand: Stand-by asssistance  Stand to Sit: Supervision  Balance:  Sitting: Intact  Standing: Impaired; With support  Standing - Static: Fair  Standing - Dynamic : Fair  Ambulation/Gait Training:  Distance (ft): 58 Feet (ft)  Assistive Device: Walker, rolling  Ambulation - Level of Assistance: Stand-by asssistance  Gait Abnormalities: Antalgic;Decreased step clearance; Step to gait  Speed/Marian: Pace decreased (<100 feet/min)  Step Length: Left shortened;Right shortened  Therapeutic Exercises: Ankle pumps, quad set, glute squeeze  Pain:  Pt pain was reported as  9 pre-treatment. Pt pain was reported as 8 post-treatment. Intervention: ice packs     Activity Tolerance:   Fair   Please refer to the flowsheet for vital signs taken during this treatment.   After treatment:   [X] Patient left in no apparent distress sitting up in chair  [ ] Patient left in no apparent distress in bed  [X] Call bell left within reach  [ ] Nursing notified  [ ] Caregiver present  [ ] Bed alarm activated      Norma Riddle PTA   Time Calculation: 23 mins

## 2017-01-27 NOTE — PROGRESS NOTES
Patient seen and examined, found naked sitting in chair attempting to dress. Assisted Flex Brice in helping patient dress. Right hip sx site intact covered with AquaCel    Pain managed better with Dilaudid 4mg,     No CP, No SOB, voiding ok, (+) flatus, No stool    Femoral nerve and quad function full. Distal NVI fully RLE. No evidence DVT    Per Ortho: OK to transfer to SNF, WBAT RLE, with 4 post rolling walker.

## 2017-01-27 NOTE — PROGRESS NOTES
Bedside and Verbal shift change report given to Vik palmer RN(oncoming nurse) by Quintin Bermudez RN (offgoing nurse). Report included the following information SBAR, Kardex, MAR and Recent Results. SITUATION:    Code Status: No Order   Reason for Admission: Osteoarthritis of right hip, unspecified osteoarthritis type [address] day: 3   Problem List:       Hospital Problems  Date Reviewed: 1/23/2017          Codes Class Noted POA    Hip arthritis ICD-10-CM: M19.90  ICD-9-CM: 716.95  1/24/2017 Unknown              BACKGROUND:    Past Medical History:   Past Medical History   Diagnosis Date    Arthritis     CAD (coronary artery disease)     Essential hypertension     Low back pain     Lumbar postlaminectomy syndrome     Lumbar spondylosis     Myocardial infarction ([facility] [address]      lad stent for stemi,12/2012    Pancreatic cyst          Patient taking anticoagulants yes     ASSESSMENT:    Changes in Assessment Throughout Shift: no     Patient has Central Line: no Reasons if yes: .  Patient has [facility] Cath: no Reasons if yes: .       Last Vitals:     Vitals:    01/26/17 0758 01/26/17 1606 01/26/17 2010 01/27/17 0406   BP: 109/59 123/69 105/55 104/63   Pulse: 72 80 73 81   Resp: 18 18 16 16   Temp: 98.7 °F (37.1 °C) 98.2 °F (36.8 °C) 97.9 °F (36.6 °C) 99.3 °F (37.4 °C)   SpO2: 94% 99% 97% 95%   Weight:       Height:            IV and DRAINS (will only show if present)   Peripheral IV 01/24/17 Left Hand-Site Assessment: Clean, dry, & intact     WOUND (if present)   Wound Type:  none   Dressing present     Wound Concerns/Notes:  none     PAIN    Pain Assessment    Pain Intensity 1: 0 (01/26/17 1405)    Pain Location 1: Hip    Pain Intervention(s) 1: Medication (see MAR)    Patient Stated Pain Goal: 0  o Interventions for Pain:  See MAR  o Intervention effective: yes  o Time of last intervention: See MAR   o Reassessment Completed: yes      Last 3 Weights:  Last 3 Recorded Weights in this Encounter 01/16/17 1103 01/24/17 1017   Weight: 112.5 kg (248 lb) 107.1 kg (236 lb 3.2 oz)     Weight change:      INTAKE/OUPUT    Current Shift:      Last three shifts: 01/25 0701 - 01/26 1900  In: 1400 [P.O.:1400]  Out: 1900 [Urine:1900]     LAB RESULTS     Recent Labs      01/27/17   0328  01/26/17   0449  01/25/17   0329   WBC  9.6  7.6  9.9   HGB  10.5*  11.6*  11.5*   HCT  31.9*  35.3*  35.5*   PLT  147  152  162        Recent Labs      01/26/17   0449  01/25/17   0329   NA  137  138   K  4.5  4.6   GLU  91  94   BUN  21*  19*   CREA  1.65*  1.81*   CA  8.3*  8.2*   INR  1.2  1.2       RECOMMENDATIONS AND DISCHARGE PLANNING     1. Pending tests/procedures/ Plan of Care or Other Needs: none     2. Discharge plan for patient and Needs/Barriers: home    3. Estimated Discharge Date: 1-27-17 Posted on Whiteboard in [facility]: yes      4. The patient's care plan was reviewed with the oncoming nurse. \"HEALS\" SAFETY CHECK      Fall Risk    Total Score: 3    Safety Measures: Safety Measures: Bed/Chair-Wheels locked, Bed in low position, Call light within reach    A safety check occurred in the patient's room between off going nurse and oncoming nurse listed above. The safety check included the below items  Area Items   H  High Alert Medications - Verify all high alert medication drips (heparin, PCA, etc.)   E  Equipment - Suction is set up for ALL patients (with yanker)  - Red plugs utilized for all equipment (IV pumps, etc.)  - WOWs wiped down at end of shift.  - Room stocked with oxygen, suction, and other unit-specific supplies   A  Alarms - Bed alarm is set for fall risk patients  - Ensure chair alarm is in place and activated if patient is up in a chair   L  Lines - Check IV for any infiltration  - [facility] bag is empty if patient has a [facility]   - Tubing and IV bags are labeled   S  Safety   - Room is clean, patient is clean, and equipment is clean. - Hallways are clear from equipment besides carts.    - Fall bracelet on for fall risk patients  - Ensure room is clear and free of clutter  - Suction is set up for ALL patients (with jade)  - Hallways are clear from equipment besides carts.    - Isolation precautions followed, supplies available outside room, sign posted     Edmon Severe, RN

## 2017-01-27 NOTE — DISCHARGE INSTRUCTIONS
DISCHARGE SUMMARY from Nurse    The following personal items are in your possession at time of discharge:    Dental Appliances: None  Visual Aid: None     Home Medications: None  Jewelry: None  Clothing: Pants, Shirt, Footwear, Socks, Undergarments, Hat  Other Valuables: Cane, None             PATIENT INSTRUCTIONS:    After general anesthesia or intravenous sedation, for 24 hours or while taking prescription Narcotics:  · Limit your activities  · Do not drive and operate hazardous machinery  · Do not make important personal or business decisions  · Do  not drink alcoholic beverages  · If you have not urinated within 8 hours after discharge, please contact your surgeon on call. Report the following to your surgeon:  · Excessive pain, swelling, redness or odor of or around the surgical area  · Temperature over 100.5  · Nausea and vomiting lasting longer than 4 hours or if unable to take medications  · Any signs of decreased circulation or nerve impairment to extremity: change in color, persistent  numbness, tingling, coldness or increase pain  · Any questions        What to do at Home:  Recommended activity: Activity as tolerated        *  Please give a list of your current medications to your Primary Care Provider. *  Please update this list whenever your medications are discontinued, doses are      changed, or new medications (including over-the-counter products) are added. *  Please carry medication information at all times in case of emergency situations. These are general instructions for a healthy lifestyle:    No smoking/ No tobacco products/ Avoid exposure to second hand smoke    Surgeon General's Warning:  Quitting smoking now greatly reduces serious risk to your health.     Obesity, smoking, and sedentary lifestyle greatly increases your risk for illness    A healthy diet, regular physical exercise & weight monitoring are important for maintaining a healthy lifestyle    You may be retaining fluid if you have a history of heart failure or if you experience any of the following symptoms:  Weight gain of 3 pounds or more overnight or 5 pounds in a week, increased swelling in our hands or feet or shortness of breath while lying flat in bed. Please call your doctor as soon as you notice any of these symptoms; do not wait until your next office visit. Recognize signs and symptoms of STROKE:    F-face looks uneven    A-arms unable to move or move unevenly    S-speech slurred or non-existent    T-time-call 911 as soon as signs and symptoms begin-DO NOT go       Back to bed or wait to see if you get better-TIME IS BRAIN. Warning Signs of HEART ATTACK     Call 911 if you have these symptoms:   Chest discomfort. Most heart attacks involve discomfort in the center of the chest that lasts more than a few minutes, or that goes away and comes back. It can feel like uncomfortable pressure, squeezing, fullness, or pain.  Discomfort in other areas of the upper body. Symptoms can include pain or discomfort in one or both arms, the back, neck, jaw, or stomach.  Shortness of breath with or without chest discomfort.  Other signs may include breaking out in a cold sweat, nausea, or lightheadedness. Don't wait more than five minutes to call 911 - MINUTES MATTER! Fast action can save your life. Calling 911 is almost always the fastest way to get lifesaving treatment. Emergency Medical Services staff can begin treatment when they arrive -- up to an hour sooner than if someone gets to the hospital by car. The discharge information has been reviewed with the patient. The patient verbalized understanding. Discharge medications reviewed with the patient and appropriate educational materials and side effects teaching were provided. Patient armband removed and shredded  MyChart Activation    Thank you for requesting access to [employer].  Please follow the instructions below to securely access and download your online medical record. [facility] allows you to send messages to your doctor, view your test results, renew your prescriptions, schedule appointments, and more. How Do I Sign Up? 1. In your internet browser, go to [email or web address]  2. Click on the First Time User? Click Here link in the Sign In box. You will be redirect to the New Member Sign Up page. 3. Enter your [employer] Access Code exactly as it appears below. You will not need to use this code after youve completed the sign-up process. If you do not sign up before the expiration date, you must request a new code. [employer] Access Code: [email or web address]  Expires: 2017 11:20 AM (This is the date your [employer] access code will )    4. Enter the last four digits of your Social Security Number (xxxx) and Date of Birth (mm/dd/yyyy) as indicated and click Submit. You will be taken to the next sign-up page. 5. Create a [facility] ID. This will be your [facility] login ID and cannot be changed, so think of one that is secure and easy to remember. 6. Create a [employer] password. You can change your password at any time. 7. Enter your Password Reset Question and Answer. This can be used at a later time if you forget your password. 8. Enter your e-mail address. You will receive e-mail notification when new information is available in [address] 9. Click Sign Up. You can now view and download portions of your medical record. 10. Click the Download Summary menu link to download a portable copy of your medical information. Additional Information    If you have questions, please visit the Frequently Asked Questions section of the [employer] website at [email or web address] Remember, [employer] is NOT to be used for urgent needs. For medical emergencies, dial 911.

## 2017-01-27 NOTE — PROGRESS NOTES
Bedside and Verbal shift change report given to Vik palmer RN(oncoming nurse) by Martha Allred RN (offgoing nurse). Report included the following information SBAR, Kardex, MAR and Recent Results. SITUATION:    Code Status: No Order   Reason for Admission: Osteoarthritis of right hip, unspecified osteoarthritis type [address] [facility] day: 3   Problem List:       Hospital Problems  Date Reviewed: 1/23/2017          Codes Class Noted POA    Hip arthritis ICD-10-CM: M19.90  ICD-9-CM: 716.95  1/24/2017 Unknown              BACKGROUND:    Past Medical History:   Past Medical History   Diagnosis Date    Arthritis     CAD (coronary artery disease)     Essential hypertension     Low back pain     Lumbar postlaminectomy syndrome     Lumbar spondylosis     Myocardial infarction ([facility] [address]      lad stent for stemi,12/2012    Pancreatic cyst          Patient taking anticoagulants yes     ASSESSMENT:    Changes in Assessment Throughout Shift: no     Patient has Central Line: no Reasons if yes: .  Patient has [facility] Cath: no Reasons if yes: .       Last Vitals:     Vitals:    01/26/17 0758 01/26/17 1606 01/26/17 2010 01/27/17 0406   BP: 109/59 123/69 105/55 104/63   Pulse: 72 80 73 81   Resp: 18 18 16 16   Temp: 98.7 °F (37.1 °C) 98.2 °F (36.8 °C) 97.9 °F (36.6 °C) 99.3 °F (37.4 °C)   SpO2: 94% 99% 97% 95%   Weight:       Height:            IV and DRAINS (will only show if present)   Peripheral IV 01/24/17 Left Hand-Site Assessment: Clean, dry, & intact     WOUND (if present)   Wound Type:  none   Dressing present     Wound Concerns/Notes:  none     PAIN    Pain Assessment    Pain Intensity 1: 0 (01/26/17 1405)    Pain Location 1: Hip    Pain Intervention(s) 1: Medication (see MAR)    Patient Stated Pain Goal: 0  o Interventions for Pain:  See MAR  o Intervention effective: yes  o Time of last intervention: See MAR   o Reassessment Completed: yes      Last 3 Weights:  Last 3 Recorded Weights in this Encounter 01/16/17 1103 01/24/17 1017   Weight: 112.5 kg (248 lb) 107.1 kg (236 lb 3.2 oz)     Weight change:      INTAKE/OUPUT    Current Shift:      Last three shifts: 01/25 0701 - 01/26 1900  In: 1400 [P.O.:1400]  Out: 1900 [Urine:1900]     LAB RESULTS     Recent Labs      01/27/17   0328  01/26/17   0449  01/25/17   0329   WBC  9.6  7.6  9.9   HGB  10.5*  11.6*  11.5*   HCT  31.9*  35.3*  35.5*   PLT  147  152  162        Recent Labs      01/26/17   0449  01/25/17   0329   NA  137  138   K  4.5  4.6   GLU  91  94   BUN  21*  19*   CREA  1.65*  1.81*   CA  8.3*  8.2*   INR  1.2  1.2       RECOMMENDATIONS AND DISCHARGE PLANNING     1. Pending tests/procedures/ Plan of Care or Other Needs: none     2. Discharge plan for patient and Needs/Barriers: home    3. Estimated Discharge Date: 1-27-17 Posted on Whiteboard in [facility]: yes      4. The patient's care plan was reviewed with the oncoming nurse. \"HEALS\" SAFETY CHECK      Fall Risk    Total Score: 3    Safety Measures: Safety Measures: Bed/Chair-Wheels locked, Bed in low position, Call light within reach    A safety check occurred in the patient's room between off going nurse and oncoming nurse listed above. The safety check included the below items  Area Items   H  High Alert Medications - Verify all high alert medication drips (heparin, PCA, etc.)   E  Equipment - Suction is set up for ALL patients (with yanker)  - Red plugs utilized for all equipment (IV pumps, etc.)  - WOWs wiped down at end of shift.  - Room stocked with oxygen, suction, and other unit-specific supplies   A  Alarms - Bed alarm is set for fall risk patients  - Ensure chair alarm is in place and activated if patient is up in a chair   L  Lines - Check IV for any infiltration  - Kaiser bag is empty if patient has a Kaiser   - Tubing and IV bags are labeled   S  Safety   - Room is clean, patient is clean, and equipment is clean. - Hallways are clear from equipment besides carts.    - Fall bracelet on for fall risk patients  - Ensure room is clear and free of clutter  - Suction is set up for ALL patients (with jade)  - Hallways are clear from equipment besides carts.    - Isolation precautions followed, supplies available outside room, sign posted     Jesse Peter RN

## 2017-01-31 ENCOUNTER — OFFICE VISIT (OUTPATIENT)
Dept: ORTHOPEDIC SURGERY | Facility: CLINIC | Age: 62
End: 2017-01-31

## 2017-01-31 ENCOUNTER — HOSPITAL ENCOUNTER (OUTPATIENT)
Dept: VASCULAR SURGERY | Age: 62
Discharge: HOME | End: 2017-01-31
Attending: ORTHOPAEDIC SURGERY
Payer: MEDICARE

## 2017-01-31 ENCOUNTER — TELEPHONE (OUTPATIENT)
Dept: ORTHOPEDIC SURGERY | Facility: CLINIC | Age: 62
End: 2017-01-31

## 2017-01-31 ENCOUNTER — DOCUMENTATION ONLY (OUTPATIENT)
Dept: ORTHOPEDIC SURGERY | Age: 62
End: 2017-01-31

## 2017-01-31 VITALS
WEIGHT: 236 LBS | SYSTOLIC BLOOD PRESSURE: 89 MMHG | BODY MASS INDEX: 31.28 KG/M2 | TEMPERATURE: 97.2 F | HEIGHT: 73 IN | HEART RATE: 65 BPM | DIASTOLIC BLOOD PRESSURE: 35 MMHG

## 2017-01-31 DIAGNOSIS — M79.89 SWELLING OF LOWER EXTREMITY: ICD-10-CM

## 2017-01-31 DIAGNOSIS — Z96.641 AFTERCARE FOLLOWING RIGHT HIP JOINT REPLACEMENT SURGERY: ICD-10-CM

## 2017-01-31 DIAGNOSIS — M16.11 PRIMARY OSTEOARTHRITIS OF RIGHT HIP: Primary | ICD-10-CM

## 2017-01-31 DIAGNOSIS — Z47.1 AFTERCARE FOLLOWING RIGHT HIP JOINT REPLACEMENT SURGERY: ICD-10-CM

## 2017-01-31 PROCEDURE — 93971 EXTREMITY STUDY: CPT

## 2017-01-31 RX ORDER — HYDROMORPHONE HYDROCHLORIDE 4 MG/1
4 TABLET ORAL
Qty: 60 TAB | Refills: 0 | Status: SHIPPED | OUTPATIENT
Start: 2017-01-31 | End: 2018-03-20 | Stop reason: SDUPTHER

## 2017-01-31 NOTE — PROGRESS NOTES
Angela Christina daughter of Mr. Zheng Guardian called ref Mr. Zheng Guardian rt hip and leg very swollen and warm to touch. Rema Munoz nurse spoke with Jacklyn Cartwright and ask to have the nurse call Dr. Diez Newsoms office asap.

## 2017-01-31 NOTE — PROCEDURES
[facility]str[facility]  *** FINAL REPORT ***    Name: Josseline Murrell  MRN: [id number]    Outpatient  : 19 May 1955  HIS Order #: [id number]  [address] [facility] Visit #: [id number]  Date: 2017    TYPE OF TEST: Peripheral Venous Testing    REASON FOR TEST  Limb swelling    Right Leg:-  Deep venous thrombosis:           No  Superficial venous thrombosis:    No  Deep venous insufficiency:        Not examined  Superficial venous insufficiency: Not examined      INTERPRETATION/FINDINGS  Duplex images were obtained using 2-D gray scale, color flow, and  spectral Doppler analysis. Right leg :  1. No evidence of deep vein thrombosis from the right common femoral  to popliteal vein segments and left common femoral vein. 2. Unable to perform adequate compression analysis of the right calf  vessels secondary to peripheral edema. Patency of vessels demonstrated  by color flow and Doppler signal.Therefore  Cannot rule out non-occlusive thrombus in the right calf veins. ADDITIONAL COMMENTS    I have personally reviewed the data relevant to the interpretation of  this  study.     TECHNOLOGIST: Romario Hitchcock, [facility], RVT/  Signed: 2017 03:03 PM    PHYSICIAN: Domenico Whitt MD  Signed: 2017 03:26 PM

## 2017-01-31 NOTE — TELEPHONE ENCOUNTER
Patient states that he had surgery 1/24/2017, is still in a lot of pain, and that home health is not doing much to help him. Patient is requesting to speak with someone asap, and can be reached at the number on file.

## 2017-01-31 NOTE — TELEPHONE ENCOUNTER
Spoke with patient and he states that he is receiving only one half pill q 7 hours at his facility. He is going to come see Dexter Chang PA-C. Per Robbie's request, spoke to Bentley (assistant director of nursing at [facility] [employer]) [phone] and she is going to fax over a MAR and some notes. The patient will be seen today at 1 pm with Rodolfo Haq for pain in the hip s/p JB by Dr. Kirk Done on 1/24/17.

## 2017-02-09 ENCOUNTER — OFFICE VISIT (OUTPATIENT)
Dept: ORTHOPEDIC SURGERY | Facility: CLINIC | Age: 62
End: 2017-02-09

## 2017-02-09 VITALS
SYSTOLIC BLOOD PRESSURE: 112 MMHG | TEMPERATURE: 98 F | HEART RATE: 60 BPM | BODY MASS INDEX: 31.28 KG/M2 | WEIGHT: 236 LBS | DIASTOLIC BLOOD PRESSURE: 56 MMHG | HEIGHT: 73 IN

## 2017-02-09 DIAGNOSIS — Z96.641 S/P HIP REPLACEMENT, RIGHT: Primary | ICD-10-CM

## 2017-02-09 RX ORDER — OXYCODONE HYDROCHLORIDE 15 MG/1
15-30 TABLET ORAL
Qty: 60 TAB | Refills: 0 | Status: SHIPPED | OUTPATIENT
Start: 2017-02-09 | End: 2017-03-08 | Stop reason: SDUPTHER

## 2017-02-09 NOTE — PROGRESS NOTES
[address], [address] [place] [address]  [phone]           Patient: Anabel Calvo                MRN: [id number]       SSN: [id number]  YOB: 1955        AGE: 59 y.o. SEX: male  Body mass index is 31.14 kg/(m^2). PCP: Ja Bonilla MD  02/09/17      This office note has been dictated. REVIEW OF SYSTEMS:  Constitutional: Negative for fever, chills, weight loss and malaise/fatigue. HENT: Negative. Eyes: Negative. Respiratory: Negative. Cardiovascular: Negative. Gastrointestinal: No bowel incontinence or constipation. Genitourinary: No bladder incontinence or saddle anesthesia. Skin: Negative. Neurological: Negative. Endo/Heme/Allergies: Negative. Psychiatric/Behavioral: Negative. Musculoskeletal: As per HPI above. Past Medical History   Diagnosis Date    Arthritis     CAD (coronary artery disease)     Essential hypertension     Low back pain     Lumbar postlaminectomy syndrome     Lumbar spondylosis     Myocardial infarction ([facility] [address]      lad stent for stemi,12/2012    Pancreatic cyst          Current Outpatient Prescriptions:     HYDROmorphone (DILAUDID) 4 mg tablet, Take 1 Tab by mouth every four (4) hours as needed for Pain. Max Daily Amount: 24 mg. Indications: PAIN, Disp: 60 Tab, Rfl: 0    HYDROmorphone (DILAUDID) 4 mg tablet, Take 1 Tab by mouth every four (4) hours as needed. Max Daily Amount: 24 mg. Indications: PAIN, Disp: 64 Tab, Rfl: 0    polyethylene glycol (MIRALAX) 17 gram packet, Take 1 Packet by mouth daily. Indications: Constipation, Disp: 30 Packet, Rfl: 1    celecoxib (CELEBREX) 200 mg capsule, Take 1 Cap by mouth every twelve (12) hours every twelve (12) hours for 90 days. , Disp: 60 Cap, Rfl: 2    ferrous sulfate 325 mg (65 mg iron) tablet, Take 1 Tab by mouth two (2) times daily (with meals). , Disp: 60 Tab, Rfl: 2    aspirin (ASPIRIN) 325 mg tablet, Take 1 Tab by mouth two (2) times a day., Disp: 60 Tab, Rfl: 0    ammonium lactate (LAC-HYDRIN) 12 % topical cream, Apply  to affected area two (2) times a day. rub in to affected area well, Disp: 280 g, Rfl: 0    gabapentin (NEURONTIN) 300 mg capsule, Take 1 Cap by mouth three (3) times daily. , Disp: 90 Cap, Rfl: 1    doxycycline (ADOXA) 100 mg tablet, Take 1 Tab by mouth two (2) times a day., Disp: 20 Tab, Rfl: 0    gabapentin (NEURONTIN) 300 mg capsule, , Disp: , Rfl:     isoniazid (NYDRAZID) 300 mg tablet, Take 300 mg by mouth daily. , Disp: , Rfl:     nitroglycerin (NITROSTAT) 0.4 mg SL tablet, by SubLINGual route every five (5) minutes as needed. , Disp: , Rfl:     carvedilol (COREG) 25 mg tablet, Take 25 mg by mouth two (2) times daily (with meals). , Disp: , Rfl:     hydrochlorothiazide (HYDRODIURIL) 25 mg tablet, Take 25 mg by mouth daily. , Disp: , Rfl:     isosorbide mononitrate ER (IMDUR) 60 mg CR tablet, Take 1 Tab by mouth every morning., Disp: 30 Tab, Rfl: 6    lisinopril (PRINIVIL, ZESTRIL) 20 mg tablet, Take  by mouth daily. , Disp: , Rfl:     atorvastatin (LIPITOR) 80 mg tablet, Take 80 mg by mouth daily. , Disp: , Rfl:     Allergies   Allergen Reactions    Vicodin [Hydrocodone-Acetaminophen] Other (comments)     denies       Social History     Social History    Marital status: SINGLE     Spouse name: N/A    Number of children: N/A    Years of education: N/A     Occupational History    Not on file.      Social History Main Topics    Smoking status: Former Smoker     Packs/day: 0.10     Types: Cigarettes     Quit date: 11/1/2012    Smokeless tobacco: Not on file    Alcohol use Yes      Comment: occasional    Drug use: Yes     Special: Cocaine      Comment: +Cocaine Screen 3/7/16 (see [address] [place] [address])    Sexual activity: Not on file      Comment: stopped using     Other Topics Concern    Not on file     Social History Narrative       Past Surgical History   Procedure Laterality Date    Hx orthopaedic       lumbar spine x 5    Hx lumbar fusion      Hx heart catheterization      Hx ptca               We did see Mr. Angélica Mix for follow-up in regards to his right hip replacement. The patient is now 16 days status post surgery and progressing well. His pain is moderately controlled, but still with discomfort after receiving his pain medicine. He is at a skilled nursing facility. He has had no troubles with the wound. There have been no recent fevers, chills, systemic changes, or injuries to report. He was seen by Mr. Julia Hagen last week. An ultrasound was ordered, which was negative. The swelling has improved. He denies chest pain or shortness of breath. PHYSICAL EXAMINATION: In general the patient is alert and oriented x 3 and is in no acute distress. The patient is well-developed and well-nourished with a normal affect. The patient is afebrile. Examination of the right hip reveals the skin to be intact. The surgical wound is healing nicely with staples in place. There is no erythema or ecchymosis. There is no warmth or signs for infection or cellulitis. There is minimal swelling. There is no pain with rotation of the hip. Neurovascular status is intact. There is mild edema without calf tenderness. There is negative Homans. There is no evidence of DVT noted. ASSESSMENT: Status post right lateral approach hip replacement. PLAN:  At this point we will modify his medicines. He will continue with physical therapy and occupational therapy for total hip protocol weightbearing as tolerated. The staples were removed today and replaced with Steri-Strips today in the office without complications. We will see him back in the office in two weeks time for reevaluation or sooner if necessary.                   JR Sean COKER, SARA, ATC

## 2017-02-09 NOTE — PATIENT INSTRUCTIONS
Hip Replacement Surgery: What to Expect at Home  Your Recovery  Hip replacement surgery replaces the worn parts of your hip joint. When you leave the hospital, you will probably be walking with crutches or a walker. You may be able to climb a few stairs and get in and out of bed and chairs. But you will need someone to help you at home for the next few weeks or until you have more energy and can move around better. If there is no one to help you at home, you may go to a rehabilitation center or long-term care center. You will go home with a bandage and stitches or staples. You can remove the bandage when your doctor tells you to. Your doctor will remove your stitches or staples 10 days to 3 weeks after your surgery. You may still have some mild pain, and the area may be swollen for 3 to 4 months after surgery. Your doctor will give you medicine for the pain. You will continue the rehabilitation program (rehab) you started in the hospital. The better you do with your rehab exercises, the sooner you will get your strength and movement back. Most people are able to return to work 4 weeks to 4 months after surgery. This care sheet gives you a general idea about how long it will take for you to recover. But each person recovers at a different pace. Follow the steps below to get better as quickly as possible. How can you care for yourself at home? Activity  · Your doctor may not want your affected leg to cross the center of your body toward the other leg. If so, your therapist may suggest these ideas:  ¨ Do not cross your legs. ¨ Be very careful as you get in or out of bed or a car, so your leg does not cross that imaginary line in the middle of your body. · Rest when you feel tired. You may take a nap, but do not stay in bed all day. · Work with your physical therapist to learn the best way to exercise. You may be able to take frequent, short walks using crutches or a walker.  You will probably have to use crutches or a walker for at least 4 to 6 weeks. · Your doctor may advise you to stay away from activities that put stress on the joint. This includes sports such as tennis, football, and jogging. · Do not sit for longer than 30 to 45 minutes at a time. When you sit, use chairs with arms, and do not sit in low chairs. · Do not bend over more than 90 degrees (like the angle in a letter \"L\"). · Sleep on your back with your legs slightly apart or on your side with a pillow between your knees for about 6 weeks or as your doctor tells you. Do not sleep on your stomach or affected leg. · You may need to take sponge baths until your stitches or staples have been removed. You will probably be able to shower 24 hours after they are removed. · Ask your doctor when you can drive again. · Most people are able to return to work 4 weeks to 4 months after surgery. · Ask your doctor when it is okay for you to have sex. Diet  · By the time you leave the hospital, you will probably be eating your normal diet. If your stomach is upset, try bland, low-fat foods like plain rice, broiled chicken, toast, and yogurt. Your doctor may recommend that you take iron and vitamin supplements. · Drink plenty of fluids (unless your doctor tells you not to). · Eat healthy foods, and watch your portion sizes. Try to stay at your ideal weight. Too much weight puts more stress on your new hip joint. · You may notice that your bowel movements are not regular right after your surgery. This is common. Try to avoid constipation and straining with bowel movements. You may want to take a fiber supplement every day. If you have not had a bowel movement after a couple of days, ask your doctor about taking a mild laxative. Medicines  · Your doctor will tell you if and when you can restart your medicines. He or she will also give you instructions about taking any new medicines.   · If you take blood thinners, such as warfarin (Coumadin), clopidogrel (Plavix), or aspirin, be sure to talk to your doctor. He or she will tell you if and when to start taking those medicines again. Make sure that you understand exactly what your doctor wants you to do. · Your doctor may give you a blood-thinning medicine to prevent blood clots. If you take a blood thinner, be sure you get instructions about how to take your medicine safely. Blood thinners can cause serious bleeding problems. This medicine could be in pill form or as a shot (injection). If a shot is necessary, your doctor will tell you how to do this. · Be safe with medicines. Take pain medicines exactly as directed. ¨ If the doctor gave you a prescription medicine for pain, take it as prescribed. ¨ If you are not taking a prescription pain medicine, ask your doctor if you can take an over-the-counter medicine. · If you think your pain medicine is making you sick to your stomach:  ¨ Take your medicine after meals (unless your doctor has told you not to). ¨ Ask your doctor for a different pain medicine. · If your doctor prescribed antibiotics, take them as directed. Do not stop taking them just because you feel better. You need to take the full course of antibiotics. Incision care  · You will have a bandage over the cut (incision) and staples or stitches. Follow your doctor's instructions on when to take the bandage off. Giving the incision air will help it heal.  · Your doctor will remove the staples or stitches 10 days to 3 weeks after the surgery and replace them with strips of tape. Leave the strips on for a week or until they fall off. Exercise  · Your rehab program will include a number of exercises to do. Always do them as your therapist tells you. Ice and elevation  · For pain, put ice or a cold pack on the area for 10 to 20 minutes at a time. Put a thin cloth between the ice and your skin. · Your ankle may swell for about 3 months. Prop up your ankle when you ice it or anytime you sit or lie down. Try to keep it above the level of your heart. This will help reduce swelling. Other instructions  · Continue to wear your support stockings as your doctor says. These help to prevent blood clots. The length of time that you will have to wear them depends on your activity level and the amount of swelling you have. Most people wear these stockings for 4 to 6 weeks after surgery. · Wear medical alert jewelry that says you may need antibiotics before any procedure, including dental work. You can buy this at most [facility]. Preventing falls is also very important. To prevent falls:  · Arrange furniture so that you will not trip on it. · Get rid of throw rugs, and move electrical cords out of the way. · Walk only in areas with plenty of light. · Put grab bars in showers and bathtubs. · Avoid icy or snowy sidewalks. · Wear shoes with sturdy, flat soles. Follow-up care is a key part of your treatment and safety. Be sure to make and go to all appointments, and call your doctor if you are having problems. It's also a good idea to know your test results and keep a list of the medicines you take. When should you call for help? Call 911 anytime you think you may need emergency care. For example, call if:  · You passed out (lost consciousness). · You have severe trouble breathing. · You have sudden chest pain and shortness of breath, or you cough up blood. Call your doctor now or seek immediate medical care if:  · You have signs that your hip may be dislocated, including:  ¨ Severe pain and not being able to stand. ¨ A crooked leg that looks like your hip is out of position. ¨ Not being able to bend or straighten your leg. · Your leg or foot is cool or pale or changes color. · You cannot feel or move your leg. · You have signs of a blood clot, such as:  ¨ Pain in your calf, back of the knee, thigh, or groin. ¨ Redness and swelling in your leg or groin.   · Your incision comes open and begins to bleed, or the bleeding increases. · You feel like your heart is racing or beating irregularly. · You have signs of infection, such as:  ¨ Increased pain, swelling, warmth, or redness. ¨ Red streaks leading from the incision. ¨ Pus draining from the incision. ¨ A fever. Watch closely for changes in your health, and be sure to contact your doctor if:  · You do not have a bowel movement after taking a laxative. · You do not get better as expected. Where can you learn more? Go to http://suzanne-renetta.info/. Enter U052 in the search box to learn more about \"Hip Replacement Surgery: What to Expect at Home. \"  Current as of: May 23, 2016  Content Version: 11.1  © 3455-4845 [employer]. Care instructions adapted under license by [employer] (which disclaims liability or warranty for this information). If you have questions about a medical condition or this instruction, always ask your healthcare professional. [place] [address] any warranty or liability for your use of this information.

## 2017-02-10 ENCOUNTER — APPOINTMENT (OUTPATIENT)
Dept: PHYSICAL THERAPY | Age: 62
End: 2017-02-10
Payer: MEDICARE

## 2017-02-14 ENCOUNTER — TELEPHONE (OUTPATIENT)
Dept: ORTHOPEDIC SURGERY | Facility: CLINIC | Age: 62
End: 2017-02-14

## 2017-02-14 NOTE — TELEPHONE ENCOUNTER
Contacted [facility] to have a prior auth form for Roxicodone faxed to me. Once received, the form will be completed and faxed to  for JR's signature.

## 2017-02-14 NOTE — TELEPHONE ENCOUNTER
Prior Velora Bushy form has been completed and faxed to the [employer] for [facility]. Once signed, please fax form to [phone]. Thanks.

## 2017-02-17 ENCOUNTER — HOSPITAL ENCOUNTER (OUTPATIENT)
Dept: PHYSICAL THERAPY | Age: 62
Discharge: HOME | End: 2017-02-17
Payer: MEDICARE

## 2017-02-17 PROCEDURE — 97140 MANUAL THERAPY 1/> REGIONS: CPT

## 2017-02-17 PROCEDURE — 97162 PT EVAL MOD COMPLEX 30 MIN: CPT

## 2017-02-17 PROCEDURE — 97110 THERAPEUTIC EXERCISES: CPT

## 2017-02-17 PROCEDURE — G8979 MOBILITY GOAL STATUS: HCPCS

## 2017-02-17 PROCEDURE — G8978 MOBILITY CURRENT STATUS: HCPCS

## 2017-02-17 NOTE — PROGRESS NOTES
PT DAILY TREATMENT NOTE - [facility]     Patient Name: Dilshad Messing  Date:2017  : 1955  [x]  Patient  Verified  [place]or: [place] MED[place]ARE / Plan: [place] MEDICARE PART A & B / Product Type: Medicare /    In time:11:10  Out time:11:50  Total Treatment Time (min): 40  Total Timed Codes (min): 23  1:1 Treatment Time ( only): 40   Visit #: 1 of 18    Treatment Area: Presence of right artificial hip joint [Z96.641]    SUBJECTIVE  Pain Level (0-10 scale): 10  Any medication changes, allergies to medications, adverse drug reactions, diagnosis change, or new procedure performed?: [x] No    [] Yes (see summary sheet for update)  Subjective functional status/changes:   [] No changes reported  Pt is a 60 y/o male who c/o R hip pain s/p R THR (post-lateral approach) on 2017. Pt denies any post op complications. Pt reports he had been dealing with hip issues for about 3 years, general wear and tear from Arthritis. Pt also reports he has a plate in his back and LLD which effects the way he walks. Pt reports he is having severe pain in front of hip that feels deep within jt and also has pain that radiates anterior thigh. Pt reports he was D/C from IPR yesterday. Pt reports he does not feel like he should be hurting this bad.      OBJECTIVE    Modality rationale:  to improve the patients ability to    Min Type Additional Details    [] Estim:  []Unatt       []IFC  []Premod                        []Other:  []w/ice   []w/heat  Position:  Location:    [] Estim: []Att    []TENS instruct  []NMES                    []Other:  []w/US   []w/ice   []w/heat  Position:  Location:    []  Traction: [] Cervical       []Lumbar                       [] Prone          []Supine                       []Intermittent   []Continuous Lbs:  [] before manual  [] after manual    []  Ultrasound: []Continuous   [] Pulsed                           []1MHz   []3MHz W/cm2:  Location:    []  Iontophoresis with dexamethasone         Location: [] Take home patch   [] In clinic    []  Ice     []  heat  []  Ice massage  []  Laser   []  Anodyne Position:  Location:    []  Laser with stim  []  Other:  Position:  Location:    []  Vasopneumatic Device Pressure:       [] lo [] med [] hi   Temperature: [] lo [] med [] hi   [] Skin assessment post-treatment:  []intact []redness- no adverse reaction    []redness - adverse reaction:     17 min [x]Eval                  []Re-Eval       13 min Therapeutic Exercise:  [x] See flow sheet : Instructed, demonstrated, and performed HEP   Rationale: increase ROM and increase strength to improve the patients ability to decrease pain and perform ADL's     min Therapeutic Activity:  []  See flow sheet :   Rationale:   to improve the patients ability to       min Neuromuscular Re-education:  []  See flow sheet :   Rationale:   to improve the patients ability to     10 min Manual Therapy:  PROM R hip flex and abd. Circumduction. Rationale: decrease pain, increase ROM and increase tissue extensibility to increase ease with ambulation     min Gait Training:  ___ feet with ___ device on level surfaces with ___ level of assist   Rationale: With   [] TE   [] TA   [] neuro   [] other: Patient Education: [x] Review HEP    [] Progressed/Changed HEP based on:   [] positioning   [] body mechanics   [] transfers   [] heat/ice application    [] other:      Other Objective/Functional Measures: Pt ambulates into clinic with FWW and antalgic gait on R. Pt stands with forward flexed posture at trunk. Pt requires use of UE for sit-to-stand. Pt requires assistance to move R leg on/off bed. Pt's incision is CDI. Pt's R hip AROM flex 75 deg with pain, abd 10 deg with pain. Pt's R LE MMT: hip flex NT, hip abd 3-/5, hip ext NT, knee ext 3-/5, knee flex 3+/5. Pt has an increase in R ant hip pain with knee ext phase of heel slides.      Pain Level (0-10 scale) post treatment: 8    ASSESSMENT/Changes in Function: see POC    Patient will continue to benefit from skilled PT services to modify and progress therapeutic interventions, address functional mobility deficits, address ROM deficits, address strength deficits, analyze and address soft tissue restrictions, analyze and cue movement patterns, assess and modify postural abnormalities, address imbalance/dizziness and instruct in home and community integration to attain remaining goals. [x]  See Plan of Care  []  See progress note/recertification  []  See Discharge Summary         Progress towards goals / Updated goals:  Short term goals: to be accomplished in 2 weeks  1) Pt will report (I) and compliance with HEP for home management of symptoms. At eval: Instructed, demonstrated, and performed HEP  2) Pt will be able to perform heel slide through full range w/o an increase in pain for ease with ambulation. At eval: pt has increase with ant hip pain during knee ext phase of heel slides  Long term goals: to be accomplished in 8 weeks  1) Pt's FOTO score will improve > or = 43 indicating improvements in function. At eval: FOTO = 21  2) Pt will improve R LE MMT > or = 4-/5 for functional strength during ADL's. At eval:  hip flex NT, hip abd 3-/5, hip ext NT, knee ext 3-/5, knee flex 3+/5  3) Pt will improve R hip AROM flex to 90 deg and abd to 30 deg for ease with ambulation. At eval: flex 75 deg with pain, abd 10 deg with pain  4) Pt will perform SLR x10 on R with good form indicating good quad strength. At eval: unable to perform  5) Pt will demo SLS on R > or = 10 sec to decrease risk for falls. At eval: unable to perform  6) Pt will ambulate 250 ft with LRAD and normal gait to improve community ambulation.   At eval: pt ambulates with FWW, antalgic gait on R and forward flexed trunk    PLAN  []  Upgrade activities as tolerated     []  Continue plan of care  []  Update interventions per flow sheet       []  Discharge due to:_  [x]  Other: 3x/week for 6 weeks      Vi Rooney, PT 2/17/2017  12:22 PM    Future Appointments  Date Time Provider Cara Malissa   2/20/2017 12:00 PM Raynald Bienville, [place] [facility]   2/21/2017 10:00 AM Raynald Bienville, PTA [facility]   2/24/2017 8:00 AM Diamond E Laws, PTA [facility]   2/27/2017 2:30 PM Raynald Bienville, PTA [facility]   2/28/2017 12:00 PM Raynald Bien[place], PTA [facility]   3/2/2017 10:30 AM Diamond E Laws, PTA [facility]   3/6/2017 11:30 AM Diamond E Laws, [facility]   3/7/2017 3:00 PM Diamond E Laws, PTA [facility]   3/9/2017 12:30 PM Diamond E Laws, PTA [facility]   3/13/2017 10:30 AM Yoni Green, PT [facility]   3/15/2017 11:00 AM Diamond E Laws, PTA [facility]   3/17/2017 11:00 AM Diamond E Laws, PTA [facility]   3/20/2017 11:00 AM Diamond E Laws, PTA [facility]   3/22/2017 11:00 AM Diamond E Laws, PTA [facility]   3/24/2017 12:00 PM Yoni Green, PT [facility]   3/27/2017 11:00 AM Diamond E Laws, PTA [facility]   3/29/2017 11:00 AM Diamond E Laws, PTA [facility]   3/31/2017 11:00 AM PJ Tao [facility]

## 2017-02-17 NOTE — PROGRESS NOTES
[facility]              [address]        [place], [address]   [phone] fax    Plan of Care/ Statement of Necessity for Physical Therapy Services    Patient name: Johann Ramos Start of Care: 2017   Referral source: Taj Garvin MD : 1955    Medical Diagnosis: Presence of right artificial hip joint [Z96.641]   Onset Date:DOS: 2017    Treatment Diagnosis: s/p R THR   Prior Hospitalization: see medical history Provider#: [id number]   Medications: Verified on Patient summary List    Comorbidities: Arthritis, Back pain, BMI over 30, CHF, HTN, Prior sx, Visual impairment   Prior Level of Function: Pt was walking with SPC prior to sx. Pt was not employed; lives along and (I) with ADL's      The Plan of Care and following information is based on the information from the initial evaluation. Assessment/ key information: Pt is a 60 y/o male who c/o R hip pain s/p R THR (post-lateral approach) on 2017. Pt denies any post op complications. Pt reports he had been dealing with hip issues for about 3 years, general wear and tear from Arthritis. Pt also reports he has a plate in his back and LLD which effects the way he walks. Pt reports he is having severe pain in front of hip that feels deep within jt and also has pain that radiates anterior thigh. Pt reports he was D/C from IPR yesterday. Pt reports he does not feel like he should be hurting this bad. Pt ambulates into clinic with FWW and antalgic gait on R. Pt stands with forward flexed posture at trunk. Pt requires use of UE for sit-to-stand. Pt requires assistance to move R leg on/off bed. Pt's incision is CDI. Pt's R hip AROM flex 75 deg with pain, abd 10 deg with pain. Pt's R LE MMT: hip flex NT, hip abd 3-/5, hip ext NT, knee ext 3-/5, knee flex 3+/5. Pt has an increase in R ant hip pain with knee ext phase of heel slides.  Patient will benefit from skilled PT services to modify and progress therapeutic interventions, address functional mobility deficits, address ROM deficits, address strength deficits, analyze and address soft tissue restrictions, analyze and cue movement patterns, assess and modify postural abnormalities, address imbalance/dizziness and instruct in home and community integration to attain remaining goals. Evaluation Complexity History MEDIUM  Complexity : 1-2 comorbidities / personal factors will impact the outcome/ POC ; Examination MEDIUM Complexity : 3 Standardized tests and measures addressing body structure, function, activity limitation and / or participation in recreation  ;Presentation MEDIUM Complexity : Evolving with changing characteristics  ; Clinical Decision Making HIGH Complexity : FOTO score of 1- 25   Overall Complexity Rating: MEDIUM  Problem List: pain affecting function, decrease ROM, decrease strength, impaired gait/ balance, decrease ADL/ functional abilitiies, decrease activity tolerance, decrease flexibility/ joint mobility and decrease transfer abilities   Treatment Plan may include any combination of the following: Therapeutic exercise, Therapeutic activities, Neuromuscular re-education, Physical agent/modality, Gait/balance training, Manual therapy and Patient education  Patient / Family readiness to learn indicated by: asking questions, trying to perform skills and interest  Persons(s) to be included in education: patient (P)  Barriers to Learning/Limitations: None  Patient Goal (s): Gain more mobility  Patient Self Reported Health Status: fair  Rehabilitation Potential: fair    Short Term Goals: To be accomplished in 2 weeks:  1) Pt will report (I) and compliance with HEP for home management of symptoms. 2) Pt will be able to perform heel slide through full range w/o an increase in pain for ease with ambulation. Long Term Goals:  To be accomplished in 6 weeks:  1) Pt's FOTO score will improve > or = 43 indicating improvements in function. 2) Pt will improve R LE MMT > or = 4-/5 for functional strength during ADL's.  3) Pt will improve R hip AROM flex to 90 deg and abd to 30 deg for ease with ambulation. 4) Pt will perform SLR x10 on R with good form indicating good quad strength. 5) Pt will demo SLS on R > or = 10 sec to decrease risk for falls. 6) Pt will ambulate 250 ft with LRAD and normal gait to improve community ambulation. Frequency / Duration: Patient to be seen 3 times per week for 6 weeks. Patient/ Caregiver education and instruction: Diagnosis, prognosis, exercises   [x]  Plan of care has been reviewed with PTA    G-Codes (GP)  Mobility   Current  CL= 60-79%   Goal  CK= 40-59%    The severity rating is based on clinical judgment and the FOTO score. Certification Period: 2/17/2017-5/15/2017  Vi Rooney, PT 2/17/2017 12:23 PM  ________________________________________________________________________    I certify that the above Therapy Services are being furnished while the patient is under my care. I agree with the treatment plan and certify that this therapy is necessary.     [de-identified] Signature:____________________  Date:____________Time: _________    Please sign and return to [facility] - [facility]              [address], [address]   [phone] fax

## 2017-02-20 ENCOUNTER — HOSPITAL ENCOUNTER (OUTPATIENT)
Dept: PHYSICAL THERAPY | Age: 62
Discharge: HOME | End: 2017-02-20
Payer: MEDICARE

## 2017-02-20 PROCEDURE — 97140 MANUAL THERAPY 1/> REGIONS: CPT

## 2017-02-20 PROCEDURE — 97110 THERAPEUTIC EXERCISES: CPT

## 2017-02-20 NOTE — PROGRESS NOTES
PT DAILY TREATMENT NOTE - [facility]     Patient Name: Cheo Gonzalez  Date:2017  : 1955  [x]  Patient  Verified  Payor: [place] MEDICARE / Plan: [place] MEDICARE PART A & B / Product Type: Medicare /    In time:11:59  Out time:12:55  Total Treatment Time (min): 56  Total Timed Codes (min): 56  1:1 Treatment Time ( [address] only): 64   Visit #: 2 of 18    Treatment Area: Presence of right artificial hip joint [Z96.641]    SUBJECTIVE  Pain Level (0-10 scale): 7  Any medication changes, allergies to medications, adverse drug reactions, diagnosis change, or new procedure performed?: [x] No    [] Yes (see summary sheet for update)  Subjective functional status/changes:   [] No changes reported  Pt reports he has been doing his home exercises and that is why his hip is feeling much better.      OBJECTIVE        Min Type Additional Details    [] Estim:  []Unatt       []IFC  []Premod                        []Other:  []w/ice   []w/heat  Position:  Location:    [] Estim: []Att    []TENS instruct  []NMES                    []Other:  []w/US   []w/ice   []w/heat  Position:  Location:    []  Traction: [] Cervical       []Lumbar                       [] Prone          []Supine                       []Intermittent   []Continuous Lbs:  [] before manual  [] after manual    []  Ultrasound: []Continuous   [] Pulsed                           []1MHz   []3MHz W/cm2:  Location:    []  Iontophoresis with dexamethasone         Location: [] Take home patch   [] In clinic    []  Ice     []  heat  []  Ice massage  []  Laser   []  Anodyne Position:  Location:    []  Laser with stim  []  Other:  Position:  Location:    []  Vasopneumatic Device Pressure:       [] lo [] med [] hi   Temperature: [] lo [] med [] hi   [] Skin assessment post-treatment:  []intact []redness- no adverse reaction    []redness - adverse reaction:       46 min Therapeutic Exercise:  [x] See flow sheet :   Rationale: increase ROM and increase strength to improve the patients ability to increase ease with ADLs. 10 min Manual Therapy:  Per flow sheet   Rationale: decrease pain, increase ROM, increase tissue extensibility and decrease trigger points to increase tolerance to activities. With   [] TE   [] TA   [] neuro   [] other: Patient Education: [x] Review HEP    [] Progressed/Changed HEP based on:   [] positioning   [] body mechanics   [] transfers   [] heat/ice application    [] other:      Other Objective/Functional Measures: Pt reports performing HEP. Pain Level (0-10 scale) post treatment: 5    ASSESSMENT/Changes in Function: Continue per POC. Patient will continue to benefit from skilled PT services to modify and progress therapeutic interventions, address functional mobility deficits, address ROM deficits, address strength deficits and analyze and address soft tissue restrictions to attain remaining goals. []  See Plan of Care  []  See progress note/recertification  []  See Discharge Summary         Progress towards goals / Updated goals:  Short term goals: to be accomplished in 2 weeks  1) Pt will report (I) and compliance with HEP for home management of symptoms. At eval: Instructed, demonstrated, and performed HEP  Current: Goal Met:Pt reports performing HEP. 2/20/17  2) Pt will be able to perform heel slide through full range w/o an increase in pain for ease with ambulation. At eval: pt has increase with ant hip pain during knee ext phase of heel slides  Long term goals: to be accomplished in 8 weeks  1) Pt's FOTO score will improve > or = 43 indicating improvements in function. At eval: FOTO = 21  2) Pt will improve R LE MMT > or = 4-/5 for functional strength during ADL's. At eval: hip flex NT, hip abd 3-/5, hip ext NT, knee ext 3-/5, knee flex 3+/5  3) Pt will improve R hip AROM flex to 90 deg and abd to 30 deg for ease with ambulation.   At eval: flex 75 deg with pain, abd 10 deg with pain  4) Pt will perform SLR x10 on R with good form indicating good quad strength. At eval: unable to perform  5) Pt will demo SLS on R > or = 10 sec to decrease risk for falls. At eval: unable to perform  6) Pt will ambulate 250 ft with LRAD and normal gait to improve community ambulation.   At eval: pt ambulates with FWW, antalgic gait on R and forward flexed trunk    PLAN  []  Upgrade activities as tolerated     [x]  Continue plan of care  []  Update interventions per flow sheet       []  Discharge due to:_  []  Other:_      Breana Paniagua PTA 2/20/2017  12:40 PM    Future Appointments  Date Time Provider Cara Malissa   2/21/2017 10:00 AM Breana Paniagua PTA [facility]   2/24/2017 8:00 AM Diamond E Laws, PTA [facility]   2/27/2017 2:30 PM Breana Paniagua, PTA [facility]   2/28/2017 12:00 PM Breana Paniagua, PTA [facility]   3/2/2017 10:30 AM Diamond E Laws, PTA [facility]   3/6/2017 11:30 AM Diamond E Laws, PTA [facility]   3/7/2017 3:00 PM Diamond E Laws, PTA [facility]   3/9/2017 12:30 PM Diamond E Laws, PTA [facility]   3/13/2017 10:30 AM Roseanna Dan, PT [facility]   3/15/2017 11:00 AM Diamond E Laws, PTA [facility]   3/17/2017 11:00 AM Diamond E Laws, PTA [facility]   3/20/2017 11:00 AM Diamond E Laws, PTA [facility]   3/22/2017 11:00 AM Diamond E Laws, PTA [facility]   3/24/2017 12:00 PM Roseanna Dan, PT [facility]   3/27/2017 11:00 AM Diamond E Laws, PTA [facility]   3/29/2017 11:00 AM Diamond E Laws, PTA [facility]   3/31/2017 11:00 AM Diamond E Laws, PTA [facility]

## 2017-02-21 ENCOUNTER — HOSPITAL ENCOUNTER (OUTPATIENT)
Dept: PHYSICAL THERAPY | Age: 62
End: 2017-02-21
Payer: MEDICARE

## 2017-02-24 ENCOUNTER — APPOINTMENT (OUTPATIENT)
Dept: PHYSICAL THERAPY | Age: 62
End: 2017-02-24
Payer: MEDICARE

## 2017-02-27 ENCOUNTER — HOSPITAL ENCOUNTER (OUTPATIENT)
Dept: PHYSICAL THERAPY | Age: 62
Discharge: HOME | End: 2017-02-27
Payer: MEDICARE

## 2017-02-27 PROCEDURE — 97110 THERAPEUTIC EXERCISES: CPT

## 2017-02-27 PROCEDURE — 97140 MANUAL THERAPY 1/> REGIONS: CPT

## 2017-02-27 NOTE — PROGRESS NOTES
PT DAILY TREATMENT NOTE - [facility] 3-16    Patient Name: Estela Alegre  Date:2017  : 1955  [x]  Patient  Verified  Payor: VA MEDICARE / Plan: VA MEDICARE PART A & B / Product Type: Medicare /    In time:2:20  Out time:3:15  Total Treatment Time (min): 55  Total Timed Codes (min): 55  1:1 Treatment Time ( [address] only): 45   Visit #: 3 of 18    Treatment Area: Presence of right artificial hip joint [Z96.641]    SUBJECTIVE  Pain Level (0-10 scale): 7  Any medication changes, allergies to medications, adverse drug reactions, diagnosis change, or new procedure performed?: [x] No    [] Yes (see summary sheet for update)  Subjective functional status/changes:   [] No changes reported  Pt reports he is feeling very sore. OBJECTIVE        Min Type Additional Details    [] Estim:  []Unatt       []IFC  []Premod                        []Other:  []w/ice   []w/heat  Position:  Location:    [] Estim: []Att    []TENS instruct  []NMES                    []Other:  []w/US   []w/ice   []w/heat  Position:  Location:    []  Traction: [] Cervical       []Lumbar                       [] Prone          []Supine                       []Intermittent   []Continuous Lbs:  [] before manual  [] after manual    []  Ultrasound: []Continuous   [] Pulsed                           []1MHz   []3MHz Location:  W/cm2:    []  Iontophoresis with dexamethasone         Location: [] Take home patch   [] In clinic    []  Ice     []  heat  []  Ice massage  []  Laser   []  Anodyne Position:  Location:    []  Laser with stim  []  Other: Position:  Location:    []  Vasopneumatic Device Pressure:       [] lo [] med [] hi   Temperature: [] lo [] med [] hi   [] Skin assessment post-treatment:  []intact []redness- no adverse reaction    []redness - adverse reaction:       45 min Therapeutic Exercise:  [x] See flow sheet :   Rationale: increase ROM and increase strength to improve the patients ability to increase tolerance to activities.      10 min Manual Therapy:  Per flow sheet   Rationale: decrease pain, increase ROM, increase tissue extensibility and decrease trigger points to increase ease with ADLs. With   [] TE   [] TA   [] neuro   [] other: Patient Education: [x] Review HEP    [] Progressed/Changed HEP based on:   [] positioning   [] body mechanics   [] transfers   [] heat/ice application    [] other:      Other Objective/Functional Measures: pt has minimal increase in pain with heel slides. Pain Level (0-10 scale) post treatment: 0    ASSESSMENT/Changes in Function: BP was taken before treatment: 142/80. Patient will continue to benefit from skilled PT services to modify and progress therapeutic interventions, address functional mobility deficits, address ROM deficits, address strength deficits and analyze and address soft tissue restrictions to attain remaining goals. []  See Plan of Care  []  See progress note/recertification  []  See Discharge Summary         Progress towards goals / Updated goals:  Short term goals: to be accomplished in 2 weeks  1) Pt will report (I) and compliance with HEP for home management of symptoms. At eval: Instructed, demonstrated, and performed HEP  Current: Goal Met:Pt reports performing HEP. 2/20/17  2) Pt will be able to perform heel slide through full range w/o an increase in pain for ease with ambulation. At eval: pt has increase with ant hip pain during knee ext phase of heel slides  Current: Progressing: pt has minimal increase in pain with heel slides. 2/27/17  Long term goals: to be accomplished in 8 weeks  1) Pt's FOTO score will improve > or = 43 indicating improvements in function. At eval: FOTO = 21  2) Pt will improve R LE MMT > or = 4-/5 for functional strength during ADL's. At eval: hip flex NT, hip abd 3-/5, hip ext NT, knee ext 3-/5, knee flex 3+/5  3) Pt will improve R hip AROM flex to 90 deg and abd to 30 deg for ease with ambulation.   At eval: flex 75 deg with pain, abd 10 deg with pain  4) Pt will perform SLR x10 on R with good form indicating good quad strength. At eval: unable to perform  5) Pt will demo SLS on R > or = 10 sec to decrease risk for falls. At eval: unable to perform  6) Pt will ambulate 250 ft with LRAD and normal gait to improve community ambulation.   At eval: pt ambulates with FWW, antalgic gait on R and forward flexed trunk    PLAN  []  Upgrade activities as tolerated     [x]  Continue plan of care  []  Update interventions per flow sheet       []  Discharge due to:_  []  Other:_      Gilbert Kumar, PJ 2/27/2017  2:26 PM

## 2017-03-02 ENCOUNTER — HOSPITAL ENCOUNTER (OUTPATIENT)
Dept: PHYSICAL THERAPY | Age: 62
Discharge: HOME | End: 2017-03-02
Payer: MEDICARE

## 2017-03-02 PROCEDURE — 97140 MANUAL THERAPY 1/> REGIONS: CPT

## 2017-03-02 PROCEDURE — 97110 THERAPEUTIC EXERCISES: CPT

## 2017-03-02 NOTE — PROGRESS NOTES
PT DAILY TREATMENT NOTE - [facility]     Patient Name: Johann Ramos  Date:3/2/2017  : 1955  [x]  Patient  Verified  Payor: VA MEDICARE / Plan: VA MEDICARE PART A & B / Product Type: Medicare /    In time:10:06 Out time: 11:08  Total Treatment Time (min): 62  Total Timed Codes (min): 62  1:1 Treatment Time ( [address] only): 45   Visit #: 4 of 12    Treatment Area: Presence of right artificial hip joint [Z96.641]    SUBJECTIVE  Pain Level (0-10 scale): 7/10  Any medication changes, allergies to medications, adverse drug reactions, diagnosis change, or new procedure performed?: [x] No    [] Yes (see summary sheet for update)  Subjective functional status/changes:   [] No changes reported  Pt states he still has a lot of swelling around in his hip.     OBJECTIVE    Modality rationale:     Min Type Additional Details    [] Estim:  []Unatt       []IFC  []Premod                        []Other:  []w/ice   []w/heat  Position:  Location:    [] Estim: []Att    []TENS instruct  []NMES                    []Other:  []w/US   []w/ice   []w/heat  Position:  Location:    []  Traction: [] Cervical       []Lumbar                       [] Prone          []Supine                       []Intermittent   []Continuous Lbs:  [] before manual  [] after manual    []  Ultrasound: []Continuous   [] Pulsed                           []1MHz   []3MHz W/cm2:  Location:    []  Iontophoresis with dexamethasone         Location: [] Take home patch   [] In clinic    []  Ice     []  heat  []  Ice massage  []  Laser   []  Anodyne Position:  Location:    []  Laser with stim  []  Other:  Position:  Location:    []  Vasopneumatic Device Pressure:       [] lo [] med [] hi   Temperature: [] lo [] med [] hi   [] Skin assessment post-treatment:  []intact []redness- no adverse reaction    []redness - adverse reaction:     52 min Therapeutic Exercise:  [x] See flow sheet :   Rationale: increase ROM and increase strength to improve the patients ability to perform ADLs.     10 min Manual Therapy:  Per flow sheet   Rationale: decrease pain, increase ROM and increase tissue extensibility to increase ease of ADLs. With   [] TE   [] TA   [] neuro   [] other: Patient Education: [x] Review HEP    [] Progressed/Changed HEP based on:   [] positioning   [] body mechanics   [] transfers   [] heat/ice application    [] other:      Other Objective/Functional Measures: Pt requires Min A during heel slides to maintain proper form. Pain Level (0-10 scale) post treatment: 5/10    ASSESSMENT/Changes in Function: Cont per POC. Patient will continue to benefit from skilled PT services to modify and progress therapeutic interventions, address functional mobility deficits, address ROM deficits and address strength deficits to attain remaining goals. []  See Plan of Care  []  See progress note/recertification  []  See Discharge Summary         Progress towards goals / Updated goals:  Short term goals: to be accomplished in 2 weeks  1) Pt will report (I) and compliance with HEP for home management of symptoms. At eval: Instructed, demonstrated, and performed HEP  Current: Goal Met:Pt reports performing HEP. 2/20/17  2) Pt will be able to perform heel slide through full range w/o an increase in pain for ease with ambulation. At eval: pt has increase with ant hip pain during knee ext phase of heel slides  Current: Not met, pt requires Min A to maintain proper form, increased pain present. 3/2/17  Long term goals: to be accomplished in 8 weeks  1) Pt's FOTO score will improve > or = 43 indicating improvements in function. At eval: FOTO = 21  2) Pt will improve R LE MMT > or = 4-/5 for functional strength during ADL's. At eval: hip flex NT, hip abd 3-/5, hip ext NT, knee ext 3-/5, knee flex 3+/5  3) Pt will improve R hip AROM flex to 90 deg and abd to 30 deg for ease with ambulation.   At eval: flex 75 deg with pain, abd 10 deg with pain  4) Pt will perform SLR x10 on R with good form indicating good quad strength. At eval: unable to perform  5) Pt will demo SLS on R > or = 10 sec to decrease risk for falls. At eval: unable to perform  6) Pt will ambulate 250 ft with LRAD and normal gait to improve community ambulation.   At eval: pt ambulates with FWW, antalgic gait on R and forward flexed trunk    PLAN  []  Upgrade activities as tolerated     [x]  Continue plan of care  []  Update interventions per flow sheet       []  Discharge due to:_  []  Other:_      Diamond E Laws, PTA 3/2/2017  10:37 AM    Future Appointments  Date Time Provider Cara Malissa   3/6/2017 11:30 AM Diamond E Laws, PTA [facility]   3/7/2017 3:00 PM Diamond E Laws, PTA [facility]   3/9/2017 12:30 PM Diamond E Laws, PTA [facility]   3/13/2017 10:30 AM Petty Jarvisng, PT [facility]   3/15/2017 11:00 AM Diamond E Laws, PTA [facility]   3/17/2017 11:00 AM Diamond E Laws, PTA [facility]   3/20/2017 11:00 AM Diamond E Laws, PTA [facility]   3/22/2017 11:00 AM Diamond E Laws, PTA [facility]   3/24/2017 12:00 PM Leata Pleva, PTA [facility]   3/27/2017 11:00 AM Diamond E Laws, PTA [facility]   3/29/2017 11:00 AM Diamond E Laws, PTA [facility]   3/31/2017 11:00 AM Diamond E Laws, PTA [facility]

## 2017-03-06 ENCOUNTER — HOSPITAL ENCOUNTER (OUTPATIENT)
Dept: PHYSICAL THERAPY | Age: 62
Discharge: HOME | End: 2017-03-06
Payer: MEDICARE

## 2017-03-06 PROCEDURE — 97140 MANUAL THERAPY 1/> REGIONS: CPT

## 2017-03-06 PROCEDURE — 97110 THERAPEUTIC EXERCISES: CPT

## 2017-03-06 NOTE — PROGRESS NOTES
PT DAILY TREATMENT NOTE - [facility]     Patient Name: Anabel Calvo  Date:3/6/2017  : 1955  [x]  Patient  Verified  Payor: VA MEDICARE / Plan: VA MEDICARE PART A & B / Product Type: Medicare /    In time: 11:29  Out time:11:25  Total Treatment Time (min): 56  Total Timed Codes (min): 56  1:1 Treatment Time ( [address] only): 64   Visit #: 5 of 12    Treatment Area: Presence of right artificial hip joint [Z96.641]    SUBJECTIVE  Pain Level (0-10 scale): 10/10  Any medication changes, allergies to medications, adverse drug reactions, diagnosis change, or new procedure performed?: [x] No    [] Yes (see summary sheet for update)  Subjective functional status/changes:   [] No changes reported  Pt states he has a lot of pain in his hip and back. He states he feels like his leg will give out on him.      OBJECTIVE    Modality rationale:    Min Type Additional Details    [] Estim:  []Unatt       []IFC  []Premod                        []Other:  []w/ice   []w/heat  Position:  Location:    [] Estim: []Att    []TENS instruct  []NMES                    []Other:  []w/US   []w/ice   []w/heat  Position:  Location:    []  Traction: [] Cervical       []Lumbar                       [] Prone          []Supine                       []Intermittent   []Continuous Lbs:  [] before manual  [] after manual    []  Ultrasound: []Continuous   [] Pulsed                           []1MHz   []3MHz W/cm2:  Location:    []  Iontophoresis with dexamethasone         Location: [] Take home patch   [] In clinic    []  Ice     []  heat  []  Ice massage  []  Laser   []  Anodyne Position:  Location:    []  Laser with stim  []  Other:  Position:  Location:    []  Vasopneumatic Device Pressure:       [] lo [] med [] hi   Temperature: [] lo [] med [] hi   [] Skin assessment post-treatment:  []intact []redness- no adverse reaction    []redness - adverse reaction:     46 min Therapeutic Exercise:  [x] See flow sheet :   Rationale: increase ROM and increase strength to improve the patients ability to perform ADLs. 10 min Manual Therapy:  Per flow sheet   Rationale: decrease pain, increase ROM and increase tissue extensibility to increase ease of ADLs. With   [] TE   [] TA   [] neuro   [] other: Patient Education: [x] Review HEP    [] Progressed/Changed HEP based on:   [] positioning   [] body mechanics   [] transfers   [] heat/ice application    [] other:      Other Objective/Functional Measures: Pt unable to actively flex his leg in supine, abd 8 degrees. Pain Level (0-10 scale) post treatment: 10/10    ASSESSMENT/Changes in Function: Pt reported 10/10 pain in hip and back. Held standing TherEx per flow sheet. Patient will continue to benefit from skilled PT services to modify and progress therapeutic interventions, address functional mobility deficits, address ROM deficits and address strength deficits to attain remaining goals. []  See Plan of Care  []  See progress note/recertification  []  See Discharge Summary         Progress towards goals / Updated goals:  Short term goals: to be accomplished in 2 weeks  1) Pt will report (I) and compliance with HEP for home management of symptoms. At [facility]: Instructed, demonstrated, and performed HEP  Current: Goal Met:Pt reports performing HEP. 2/20/17  2) Pt will be able to perform heel slide through full range w/o an increase in pain for ease with ambulation. At [facility]: pt has increase with ant hip pain during knee ext phase of heel slides  Current: Not met, pt requires Min A to maintain proper form, increased pain present. 3/2/17  Long term goals: to be accomplished in 8 weeks  1) Pt's FOTO score will improve > or = 43 indicating improvements in function. At eval: FOTO = 21  2) Pt will improve R LE MMT > or = 4-/5 for functional strength during ADL's.   At eval: hip flex NT, hip abd 3-/5, hip ext NT, knee ext 3-/5, knee flex 3+/5  3) Pt will improve R hip AROM flex to 90 deg and abd to 30 deg for ease with ambulation. At eval: flex 75 deg with pain, abd 10 deg with pain   Current: Not met, pt unable to actively flex his leg in supine, abd 8 degrees. 3/6/17  4) Pt will perform SLR x10 on R with good form indicating good quad strength. At eval: unable to perform  5) Pt will demo SLS on R > or = 10 sec to decrease risk for falls. At eval: unable to perform  6) Pt will ambulate 250 ft with LRAD and normal gait to improve community ambulation.   At eval: pt ambulates with FWW, antalgic gait on R and forward flexed trunk    PLAN  []  Upgrade activities as tolerated     [x]  Continue plan of care  []  Update interventions per flow sheet       []  Discharge due to:_  []  Other:_      Diamondbrooks Bui, PTA 3/6/2017  12:47 PM    Future Appointments  Date Time Provider Cara Leonardo   3/7/2017 3:00 PM Diamond E Laws, PTA [facility]   3/8/2017 9:30 AM [facility]   3/9/2017 12:30 PM Diamond E Laws, PTA [facility]   3/13/2017 10:30 AM Fabby Palomino, PT [facility]   3/15/2017 11:00 AM [facility] E Laws[facility]   3/17/2017 11:00 AM Diamond E Laws, PTA [facility]   3/20/2017 11:00 AM Diamond E Laws, PTA [facility]   3/22/2017 11:00 AM Diamond E Laws, PTA [facility]   3/24/2017 12:00 PM Katie Bui, PTA [facility]   3/27/2017 11:00 AM Diamond E Laws, PTA [facility]   3/29/2017 11:00 AM Diamond E Laws, PTA [facility]   3/31/2017 11:00 AM Diamond Bui PTA [facility]

## 2017-03-07 ENCOUNTER — APPOINTMENT (OUTPATIENT)
Dept: PHYSICAL THERAPY | Age: 62
End: 2017-03-07
Payer: MEDICARE

## 2017-03-08 ENCOUNTER — OFFICE VISIT (OUTPATIENT)
Dept: ORTHOPEDIC SURGERY | Facility: CLINIC | Age: 62
End: 2017-03-08

## 2017-03-08 VITALS
BODY MASS INDEX: 31.81 KG/M2 | SYSTOLIC BLOOD PRESSURE: 148 MMHG | DIASTOLIC BLOOD PRESSURE: 85 MMHG | HEART RATE: 91 BPM | WEIGHT: 240 LBS | HEIGHT: 73 IN

## 2017-03-08 DIAGNOSIS — M48.061 LUMBAR SPINAL STENOSIS: ICD-10-CM

## 2017-03-08 DIAGNOSIS — Z96.641 STATUS POST RIGHT HIP REPLACEMENT: Primary | ICD-10-CM

## 2017-03-08 RX ORDER — CYCLOBENZAPRINE HCL 10 MG
10 TABLET ORAL
Qty: 90 TAB | Refills: 0 | Status: SHIPPED | OUTPATIENT
Start: 2017-03-08 | End: 2019-05-14

## 2017-03-08 RX ORDER — OXYCODONE HYDROCHLORIDE 15 MG/1
15-30 TABLET ORAL
Qty: 60 TAB | Refills: 0 | Status: SHIPPED | OUTPATIENT
Start: 2017-03-08

## 2017-03-08 NOTE — PATIENT INSTRUCTIONS
Hip Arthritis: Exercises  Your Care Instructions  Here are some examples of exercises for hip arthritis. Start each exercise slowly. Ease off the exercise if you start to have pain. Your doctor or physical therapist will tell you when you can start these exercises and which ones will work best for you. How to do the exercises  Straight-leg raises to the outside    1. Lie on your side, with your affected hip on top. 2. Tighten the front thigh muscles of your top leg to keep your knee straight. 3. Keep your hip and your leg straight in line with the rest of your body, and keep your knee pointing forward. Do not drop your hip back. 4. Lift your top leg straight up toward the ceiling, about 12 inches off the floor. Hold for about 6 seconds, then slowly lower your leg. 5. Repeat 8 to 12 times. 6. Switch legs and repeat steps 1 through 5, even if only one hip is sore. 1. If you are not steady on your feet, hold on to a chair, counter, or wall. You can also lie on your stomach or your side to do this exercise. 2. Bend the knee of the leg you want to stretch, and reach behind you to grab the front of your foot or ankle with the hand on the same side. For example, if you are stretching your right leg, use your right hand. 3. Keeping your knees next to each other, pull your foot toward your buttock until you feel a gentle stretch across the front of your hip and down the front of your thigh. Your knee should be pointed directly to the ground, and not out to the side. 4. Hold the stretch for at least 15 to 30 seconds. 5. Repeat 2 to 4 times. 6. Switch legs and repeat steps 1 through 5, even if only one hip is sore. 1. h 6, even if only one hip is sore. 2.   Follow-up care is a key part of your treatment and safety. Be sure to make and go to all appointments, and call your doctor if you are having problems. It's also a good idea to know your test results and keep a list of the medicines you take.   Where can you learn more? Go to http://suzanne-renetta.info/. Enter O611 in the search box to learn more about \"Hip Arthritis: Exercises. \"  Current as of: May 23, 2016  Content Version: 11.1  © 8817-8104 [employer]. Care instructions adapted under license by [employer] (which disclaims liability or warranty for this information). If you have questions about a medical condition or this instruction, always ask your healthcare professional. [place] [address] any warranty or liability for your use of this information.

## 2017-03-08 NOTE — PROGRESS NOTES
[address], [address] [place] [address]  [phone]           Patient: Andres Christensen                MRN: [id number]       SSN: [id number]  YOB: 1955        AGE: 59 y.o. SEX: male  Body mass index is 31.66 kg/(m^2). PCP: Ce Burgess MD  03/08/17      This office note has been dictated. REVIEW OF SYSTEMS:  Constitutional: Negative for fever, chills, weight loss and malaise/fatigue. HENT: Negative. Eyes: Negative. Respiratory: Negative. Cardiovascular: Negative. Gastrointestinal: No bowel incontinence or constipation. Genitourinary: No bladder incontinence or saddle anesthesia. Skin: Negative. Neurological: Negative. Endo/Heme/Allergies: Negative. Psychiatric/Behavioral: Negative. Musculoskeletal: As per HPI above. Past Medical History:   Diagnosis Date    Arthritis     CAD (coronary artery disease)     Essential hypertension     Low back pain     Lumbar postlaminectomy syndrome     Lumbar spondylosis     Myocardial infarction ([facility] [address]     lad stent for stemi,12/2012    Pancreatic cyst          Current Outpatient Prescriptions:     oxyCODONE IR (ROXICODONE) 15 mg immediate release tablet, Take 1-2 Tabs by mouth every four (4) hours as needed. Max Daily Amount: 180 mg., Disp: 60 Tab, Rfl: 0    celecoxib (CELEBREX) 200 mg capsule, Take 1 Cap by mouth every twelve (12) hours every twelve (12) hours for 90 days. , Disp: 60 Cap, Rfl: 2    aspirin (ASPIRIN) 325 mg tablet, Take 1 Tab by mouth two (2) times a day., Disp: 60 Tab, Rfl: 0    gabapentin (NEURONTIN) 300 mg capsule, Take 1 Cap by mouth three (3) times daily. , Disp: 90 Cap, Rfl: 1    gabapentin (NEURONTIN) 300 mg capsule, , Disp: , Rfl:     nitroglycerin (NITROSTAT) 0.4 mg SL tablet, by SubLINGual route every five (5) minutes as needed. , Disp: , Rfl:     carvedilol (COREG) 25 mg tablet, Take 25 mg by mouth two (2) times daily (with meals). , Disp: , Rfl:     hydrochlorothiazide (HYDRODIURIL) 25 mg tablet, Take 25 mg by mouth daily. , Disp: , Rfl:     lisinopril (PRINIVIL, ZESTRIL) 20 mg tablet, Take  by mouth daily. , Disp: , Rfl:     atorvastatin (LIPITOR) 80 mg tablet, Take 80 mg by mouth daily. , Disp: , Rfl:     HYDROmorphone (DILAUDID) 4 mg tablet, Take 1 Tab by mouth every four (4) hours as needed for Pain. Max Daily Amount: 24 mg. Indications: PAIN, Disp: 60 Tab, Rfl: 0    HYDROmorphone (DILAUDID) 4 mg tablet, Take 1 Tab by mouth every four (4) hours as needed. Max Daily Amount: 24 mg. Indications: PAIN, Disp: 64 Tab, Rfl: 0    polyethylene glycol (MIRALAX) 17 gram packet, Take 1 Packet by mouth daily. Indications: Constipation, Disp: 30 Packet, Rfl: 1    ferrous sulfate 325 mg (65 mg iron) tablet, Take 1 Tab by mouth two (2) times daily (with meals). , Disp: 60 Tab, Rfl: 2    ammonium lactate (LAC-HYDRIN) 12 % topical cream, Apply  to affected area two (2) times a day. rub in to affected area well, Disp: 280 g, Rfl: 0    doxycycline (ADOXA) 100 mg tablet, Take 1 Tab by mouth two (2) times a day., Disp: 20 Tab, Rfl: 0    isoniazid (NYDRAZID) 300 mg tablet, Take 300 mg by mouth daily. , Disp: , Rfl:     isosorbide mononitrate ER (IMDUR) 60 mg CR tablet, Take 1 Tab by mouth every morning., Disp: 30 Tab, Rfl: 6    Allergies   Allergen Reactions    Vicodin [Hydrocodone-Acetaminophen] Other (comments)     denies       Social History     Social History    Marital status: SINGLE     Spouse name: N/A    Number of children: N/A    Years of education: N/A     Occupational History    Not on file.      Social History Main Topics    Smoking status: Former Smoker     Packs/day: 0.10     Types: Cigarettes     Quit date: 11/1/2012    Smokeless tobacco: Not on file    Alcohol use Yes      Comment: occasional    Drug use: Yes     Special: Cocaine      Comment: +Cocaine Screen 3/7/16 (see [address] [place] [address])    Sexual activity: Not on file Comment: stopped using     Other Topics Concern    Not on file     Social History Narrative       Past Surgical History:   Procedure Laterality Date    HX HEART CATHETERIZATION      HX LUMBAR FUSION      HX ORTHOPAEDIC      lumbar spine x 5    HX PTCA                         JR Sean COKER, PA-C, ATC                We did see Mr. Bob Richardson for follow-up in regards to his right lateral approach hip replacement. The patient is now approximately six weeks status post surgery. He states that he is having some discomfort in his lower extremity. This is worse with weather changes. He has lateral stiffness. He does have a history of back problems. He had surgery. It has been probably approximately 15 years ago or so since he has surgery on his back. He denies any change in bowel or bladder habits. He has had no recent fevers, chills, systemic changes, or injuries to report. He denies chest pain or shortness of breath. PHYSICAL EXAMINATION: In general the patient is alert and oriented x 3 and is in no acute distress. The patient is well-developed and well-nourished with a normal affect. The patient is afebrile. HEENT:  Head is normocephalic and atraumatic. Pupils are equally round and reactive to light and accommodation. Extraocular eye movements are intact. Neck is supple. Trachea is midline. No JVD is present. Breathing is nonlabored. Examination of the back reveals the skin to be intact. There is no erythema or ecchymosis. There is no warmth or signs for infection or cellulitis. He does have some discomfort with palpation to the lower lumbar spine, as well as paraspinally. There are some muscular spasms present. There is no pain midline. The pelvis is stable. The surgical wound on the right hip has healed up nicely. There is no erythema or ecchymosis. There is no warmth or signs for infection or cellulitis. There is no pain with range of motion of the hip. There is negative straight leg raise. There is negative calf tenderness and swelling. There is negative Homans. There is no evidence of DVT noted. He does have some evidence for atrophy of the musculature of the right lower extremity compared to the left. Sensory is slightly diminished to L4-L5 in the right lower extremity. RADIOGRAPHS:  Review of radiographs including AP pelvis, AP right hip, and crosstable lateral of the right hip reveals the total hip components to be well fixed and in excellent position without evidence for loosening or fracture noted. ASSESSMENT:     1. Lumbar radiculopathy. 2. Right hip replacement. PLAN:  At this point we are going to move forward with a CT scan of the lumbar spine for further evaluation. I am going to start him on Flexeril. He is given a refill of his pain medicine. He will continue with physical therapy with total hip protocol. We will see him back after the CT scan for further evaluation of his lumbar spine. He will call with any questions or concerns that shall arise.

## 2017-03-09 ENCOUNTER — APPOINTMENT (OUTPATIENT)
Dept: PHYSICAL THERAPY | Age: 62
End: 2017-03-09
Payer: MEDICARE

## 2017-03-14 ENCOUNTER — HOSPITAL ENCOUNTER (OUTPATIENT)
Dept: CT IMAGING | Age: 62
Discharge: HOME | End: 2017-03-14
Attending: PHYSICIAN ASSISTANT
Payer: MEDICARE

## 2017-03-14 DIAGNOSIS — M48.061 LUMBAR SPINAL STENOSIS: ICD-10-CM

## 2017-03-14 PROCEDURE — 72131 CT LUMBAR SPINE W/O DYE: CPT

## 2017-03-17 ENCOUNTER — APPOINTMENT (OUTPATIENT)
Dept: PHYSICAL THERAPY | Age: 62
End: 2017-03-17
Payer: MEDICARE

## 2017-03-20 ENCOUNTER — HOSPITAL ENCOUNTER (OUTPATIENT)
Dept: PHYSICAL THERAPY | Age: 62
Discharge: HOME | End: 2017-03-20
Payer: MEDICARE

## 2017-03-20 PROCEDURE — G8979 MOBILITY GOAL STATUS: HCPCS

## 2017-03-20 PROCEDURE — G8980 MOBILITY D/C STATUS: HCPCS

## 2017-03-20 PROCEDURE — G8978 MOBILITY CURRENT STATUS: HCPCS

## 2017-03-20 PROCEDURE — 97110 THERAPEUTIC EXERCISES: CPT

## 2017-03-20 PROCEDURE — 97140 MANUAL THERAPY 1/> REGIONS: CPT

## 2017-03-20 NOTE — PROGRESS NOTES
PT DAILY TREATMENT NOTE - [facility]     Patient Name: Shireen Stauffer  Date:3/20/2017  : 1955  [x]  Patient  Verified  Payor: [place] MEDICARE / Plan: [place] MEDICARE PART A & B / Product Type: Medicare /    In time: 10:55  Out time: 11:56  Total Treatment Time (min): 61  Total Timed Codes (min): 61  1:1 Treatment Time ( [address] only): 38   Visit #: 6 of 12    Treatment Area: Presence of right artificial hip joint [Z96.641]    SUBJECTIVE  Pain Level (0-10 scale): 9/10  Any medication changes, allergies to medications, adverse drug reactions, diagnosis change, or new procedure performed?: [x] No    [] Yes (see summary sheet for update)  Subjective functional status/changes:   [] No changes reported  Pt states his R foot has really been bothering him, he may need surgery.      OBJECTIVE    Modality rationale:    Min Type Additional Details    [] Estim:  []Unatt       []IFC  []Premod                        []Other:  []w/ice   []w/heat  Position:  Location:    [] Estim: []Att    []TENS instruct  []NMES                    []Other:  []w/US   []w/ice   []w/heat  Position:  Location:    []  Traction: [] Cervical       []Lumbar                       [] Prone          []Supine                       []Intermittent   []Continuous Lbs:  [] before manual  [] after manual    []  Ultrasound: []Continuous   [] Pulsed                           []1MHz   []3MHz W/cm2:  Location:    []  Iontophoresis with dexamethasone         Location: [] Take home patch   [] In clinic    []  Ice     []  heat  []  Ice massage  []  Laser   []  Anodyne Position:  Location:    []  Laser with stim  []  Other:  Position:  Location:    []  Vasopneumatic Device Pressure:       [] lo [] med [] hi   Temperature: [] lo [] med [] hi   [] Skin assessment post-treatment:  []intact []redness- no adverse reaction    []redness - adverse reaction:     51 min Therapeutic Exercise:  [x] See flow sheet :   Rationale: increase ROM and increase strength to improve the patients ability to perform ADLs. 10 min Manual Therapy:  Per flow sheet   Rationale: decrease pain, increase ROM and increase tissue extensibility to increase ease of ADLs. With   [] TE   [] TA   [] neuro   [] other: Patient Education: [x] Review HEP    [] Progressed/Changed HEP based on:   [] positioning   [] body mechanics   [] transfers   [] heat/ice application    [] other:      Other Objective/Functional Measures: See goals. Pain Level (0-10 scale) post treatment: 7/10    ASSESSMENT/Changes in Function: Pt ambulating with  FWW and forward trunk posture. LE strength limited. MMT as follows: Hip: flex 3/5, abd 3-/5, ext 2/5. Knee flex and ext 3+/5. Pt achieved 90 degrees of active hip flexion. Pt is able to perform one SLR, minimal AROM and pain. Patient will continue to benefit from skilled PT services to modify and progress therapeutic interventions, address functional mobility deficits, address ROM deficits and address strength deficits to attain remaining goals. []  See Plan of Care  [x]  See progress note/recertification  []  See Discharge Summary         Progress towards goals / Updated goals:  Short term goals: to be accomplished in 2 weeks  1) Pt will report (I) and compliance with HEP for home management of symptoms. At [facility]: Instructed, demonstrated, and performed HEP  Current: Goal Met:Pt reports performing HEP. 2/20/17  2) Pt will be able to perform heel slide through full range w/o an increase in pain for ease with ambulation. At [facility]: pt has increase with ant hip pain during knee ext phase of heel slides  Current: Not met, pt requires Min A to maintain proper form, increased pain present. 3/2/17  Long term goals: to be accomplished in 8 weeks  1) Pt's FOTO score will improve > or = 43 indicating improvements in function. At [facility]: FOTO = 21  Current: Pt has an MD appt and was unable to stay and complete FOTO.   2) Pt will improve R LE MMT > or = 4-/5 for functional strength during ADL's. At eval: hip flex NT, hip abd 3-/5, hip ext NT, knee ext 3-/5, knee flex 3+/5  Current: Not met, Hip: flex 3/5, abd 3-/5, ext 2/5. Knee flex and ext 3+/5.  3/20/17  3) Pt will improve R hip AROM flex to 90 deg and abd to 30 deg for ease with ambulation. At eval: flex 75 deg with pain, abd 10 deg with pain   Current: Met, 90 degrees. 3/20/17  4) Pt will perform SLR x10 on R with good form indicating good quad strength. At eval: unable to perform  Current: Not met, Pt performs 1 SLR with minimal ROM and pain. 3/20/17  5) Pt will demo SLS on R > or = 10 sec to decrease risk for falls. At eval: unable to perform  Current: Not met, unable. 3/20/17  6) Pt will ambulate 250 ft with LRAD and normal gait to improve community ambulation. At eval: pt ambulates with FWW, antalgic gait on R and forward flexed trunk  Current: Not met, pt continues to ambulate with FWW, antalgic gait and forward flexed trunk.   3/20/17    PLAN  []  Upgrade activities as tolerated     [x]  Continue plan of care  []  Update interventions per flow sheet       []  Discharge due to:_  []  Other:_      Diamond Bui PTA 3/20/2017  11:31 AM    Future Appointments  Date Time Provider Cara Leonardo   3/22/2017 11:00 AM Diamond Bui PTA [facility]   3/24/2017 12:00 PM Shanda Brittle, PTA [facility]   3/27/2017 11:00 AM Diamond Bui, PTA [facility]   3/29/2017 11:00 AM Diamond Bui, PTA [facility]   3/31/2017 11:00 AM Diamond Bui, PTA [facility]   4/6/2017 9:45 AM SARA Salcedo

## 2017-03-21 NOTE — PROGRESS NOTES
[facility]              [address] [facility]        [place], [address]   [phone] fax    Medicare Progress Report    Patient name: Lisa Washburn Start of Care: 2017   Referral source: Jamila Wood MD : 1955   Medical/Treatment Diagnosis: Presence of right artificial hip joint [Z96.641] Onset Date: DOS: 2017     Prior Hospitalization: see medical history Provider#: [id number]   Medications: Verified on Patient Summary List    Comorbidities: Arthritis, Back pain, BMI over 30, CHF, HTN, Prior sx, Visual impairment  Prior Level of Function: Pt was walking with SPC prior to sx. Pt was not employed; lives along and (I) with ADL's  Visits from Start of Care: 6    Missed Visits: 6    Reporting Period: 2017 to 3/20/2017  30 day PN delayed due to pt CXL on 3/17/2017    Subjective Reports: Pt reports he has seen improvements since [facility] but still moving slow and also having issues with R foot. Key functional changes:    Short term goals: to be accomplished in 2 weeks  1) Pt will report (I) and compliance with HEP for home management of symptoms. At eval: Instructed, demonstrated, and performed HEP  Current: Goal Met:Pt reports performing HEP. 2) Pt will be able to perform heel slide through full range w/o an increase in pain for ease with ambulation. At eval: pt has increase with ant hip pain during knee ext phase of heel slides  Current: Not met, pt requires Min A to maintain proper form, increased pain present. Long term goals: to be accomplished in 8 weeks  1) Pt's FOTO score will improve > or = 43 indicating improvements in function. At eval: FOTO = 21  Current: Pt has an MD appt and was unable to stay and complete FOTO. 2) Pt will improve R LE MMT > or = 4-/5 for functional strength during ADL's. At eval: hip flex NT, hip abd 3-/5, hip ext NT, knee ext 3-/5, knee flex 3+/5  Current: Progressing- Hip: flex 3/5, abd 3-/5, ext 2/5. Knee flex and ext 3+/5.   3) Pt will improve R hip AROM flex to 90 deg and abd to 30 deg for ease with ambulation. At eval: flex 75 deg with pain, abd 10 deg with pain   Current: Met, 90 degrees. 4) Pt will perform SLR x10 on R with good form indicating good quad strength. At eval: unable to perform  Current: Not met, Pt performs 1 SLR with minimal ROM and pain. 5) Pt will demo SLS on R > or = 10 sec to decrease risk for falls. At eval: unable to perform  Current: Not met, unable. 6) Pt will ambulate 250 ft with LRAD and normal gait to improve community ambulation. At eval: pt ambulates with FWW, antalgic gait on R and forward flexed trunk  Current: Not met, pt continues to ambulate with FWW, antalgic gait and forward flexed trunk. Pt is demonstrating slow but steady progress with PT. Pt ambulating with FWW and forward trunk posture. LE strength limited. MMT as follows: Hip: flex 3/5, abd 3-/5, ext 2/5. Knee flex and ext 3+/5. Pt achieved 90 degrees of active hip flexion. Pt is able to perform one SLR, minimal AROM and pain. Problems/ barriers to goal attainment: frequent NS/CXL rate     Assessment / Recommendations: Patient will continue to benefit from skilled PT services to modify and progress therapeutic interventions, address functional mobility deficits, address ROM deficits and address strength deficits to attain remaining goals.     Problem List: pain affecting function, decrease ROM, decrease strength, impaired gait/ balance, decrease ADL/ functional abilitiies, decrease activity tolerance and decrease flexibility/ joint mobility   Treatment Plan: Therapeutic exercise, Therapeutic activities, Neuromuscular re-education, Physical agent/modality, Gait/balance training, Manual therapy and Patient education     Updated Goals to be accomplished in 4 weeks:  Continue with above unmet goals    Frequency / Duration: Patient to be seen 2 times per week for 5 weeks: Decrease frequency due to frequent NS/CXL rate    G-Codes (GP)  Mobility  [id number] Current  CL= 60-79%   Goal  CK= 40-59%    The severity rating is based on clinical judgment and the FOTO score.       Roseanna Dan, PT 3/21/2017 11:54 AM

## 2017-03-22 ENCOUNTER — APPOINTMENT (OUTPATIENT)
Dept: PHYSICAL THERAPY | Age: 62
End: 2017-03-22
Payer: MEDICARE

## 2017-03-24 ENCOUNTER — APPOINTMENT (OUTPATIENT)
Dept: PHYSICAL THERAPY | Age: 62
End: 2017-03-24
Payer: MEDICARE

## 2017-03-27 ENCOUNTER — APPOINTMENT (OUTPATIENT)
Dept: PHYSICAL THERAPY | Age: 62
End: 2017-03-27
Payer: MEDICARE

## 2017-03-29 ENCOUNTER — APPOINTMENT (OUTPATIENT)
Dept: PHYSICAL THERAPY | Age: 62
End: 2017-03-29
Payer: MEDICARE

## 2017-03-31 ENCOUNTER — APPOINTMENT (OUTPATIENT)
Dept: PHYSICAL THERAPY | Age: 62
End: 2017-03-31
Payer: MEDICARE

## 2017-04-04 NOTE — PROGRESS NOTES
[facility] - [facility]              [address]        [place], [address] [place]   [phone] fax      Discharge Summary  Patient name: Charisse Hayes Start of Care: 2017   Referral source: Og Roberto MD : 1955   Medical/Treatment Diagnosis: Presence of right artificial hip joint [Z96.641] Onset Date:DOS: 2017     Prior Hospitalization: see medical history Provider#: [id number]   Medications: Verified on Patient Summary List    Comorbidities: Arthritis, Back pain, BMI over 30, CHF, HTN, Prior sx, Visual impairment  Prior Level of Function: Pt was walking with SPC prior to sx. Pt was not employed; lives along and (I) with ADL's  Visits from Start of Care: 6    Missed Visits: 7  Reporting Period : 2017 to 3/20/2017      Summary of Care:  Key functional changes:   Short term goals: to be accomplished in 2 weeks  1) Pt will report (I) and compliance with HEP for home management of symptoms. At eval: Instructed, demonstrated, and performed HEP  Current: Goal Met:Pt reports performing HEP. 2) Pt will be able to perform heel slide through full range w/o an increase in pain for ease with ambulation. At eval: pt has increase with ant hip pain during knee ext phase of heel slides  Current: Not met, pt requires Min A to maintain proper form, increased pain present. Long term goals: to be accomplished in 8 weeks  1) Pt's FOTO score will improve > or = 43 indicating improvements in function. At eval: FOTO = 21  Current: Pt has an MD appt and was unable to stay and complete FOTO. 2) Pt will improve R LE MMT > or = 4-/5 for functional strength during ADL's. At eval: hip flex NT, hip abd 3-/5, hip ext NT, knee ext 3-/5, knee flex 3+/5  Current: Progressing- Hip: flex 3/5, abd 3-/5, ext 2/5. Knee flex and ext 3+/5.   3) Pt will improve R hip AROM flex to 90 deg and abd to 30 deg for ease with ambulation.   At eval: flex 75 deg with pain, abd 10 deg with pain   Current: Met, 90 degrees. 4) Pt will perform SLR x10 on R with good form indicating good quad strength. At eval: unable to perform  Current: Not met, Pt performs 1 SLR with minimal ROM and pain. 5) Pt will demo SLS on R > or = 10 sec to decrease risk for falls. At eval: unable to perform  Current: Not met, unable. 6) Pt will ambulate 250 ft with LRAD and normal gait to improve community ambulation. At eval: pt ambulates with FWW, antalgic gait on R and forward flexed trunk  Current: Not met, pt continues to ambulate with FWW, antalgic gait and forward flexed trunk.     Pt is demonstrating slow but steady progress with PT. Pt ambulating with FWW and forward trunk posture. LE strength limited. MMT as follows: Hip: flex 3/5, abd 3-/5, ext 2/5. Knee flex and ext 3+/5. Pt achieved 90 degrees of active hip flexion. Pt is able to perform one SLR, minimal AROM and pain. Pt has had issues with transportation/attendance. Pt called to cxl last visit 5' after appt time, pt to be D/C at this time due to cxl/NS policy. G-Codes (GP)  Mobility   D/C  CL= 60-79%    The severity rating is based on clinical judgment and the FOTO Score score.     ASSESSMENT/RECOMMENDATIONS:  [x]Discontinue therapy: []Patient has reached or is progressing toward set goals      [x]Patient is non-compliant or has abdicated      []Due to lack of appreciable progress towards set 132[address] Teresita Cota, PT 4/4/2017 3:09 PM

## 2017-04-05 ENCOUNTER — OFFICE VISIT (OUTPATIENT)
Dept: ORTHOPEDIC SURGERY | Facility: CLINIC | Age: 62
End: 2017-04-05

## 2017-04-05 VITALS
HEART RATE: 84 BPM | TEMPERATURE: 96.5 F | DIASTOLIC BLOOD PRESSURE: 79 MMHG | BODY MASS INDEX: 31.94 KG/M2 | HEIGHT: 73 IN | WEIGHT: 241 LBS | SYSTOLIC BLOOD PRESSURE: 128 MMHG

## 2017-04-05 DIAGNOSIS — G89.29 CHRONIC RIGHT-SIDED LOW BACK PAIN, WITH SCIATICA PRESENCE UNSPECIFIED: ICD-10-CM

## 2017-04-05 DIAGNOSIS — Z96.641 STATUS POST RIGHT HIP REPLACEMENT: Primary | ICD-10-CM

## 2017-04-05 DIAGNOSIS — M54.5 CHRONIC RIGHT-SIDED LOW BACK PAIN, WITH SCIATICA PRESENCE UNSPECIFIED: ICD-10-CM

## 2017-04-05 RX ORDER — HYDROCODONE BITARTRATE AND ACETAMINOPHEN 10; 325 MG/1; MG/1
1 TABLET ORAL
Qty: 60 TAB | Refills: 0 | Status: SHIPPED | OUTPATIENT
Start: 2017-04-05 | End: 2019-05-14

## 2017-04-05 NOTE — PROGRESS NOTES
[address] [place] [address]  [phone]           Patient: Carl Elliott                MRN: [id number]       SSN: [id number]  YOB: 1955        AGE: 59 y.o. SEX: male  Body mass index is 31.8 kg/(m^2). PCP: Cece Peraza MD  04/05/17      This office note has been dictated. REVIEW OF SYSTEMS:  Constitutional: Negative for fever, chills, weight loss and malaise/fatigue. HENT: Negative. Eyes: Negative. Respiratory: Negative. Cardiovascular: Negative. Gastrointestinal: No bowel incontinence or constipation. Genitourinary: No bladder incontinence or saddle anesthesia. Skin: Negative. Neurological: Negative. Endo/Heme/Allergies: Negative. Psychiatric/Behavioral: Negative. Musculoskeletal: As per HPI above. Past Medical History:   Diagnosis Date    Arthritis     CAD (coronary artery disease)     Essential hypertension     Low back pain     Lumbar postlaminectomy syndrome     Lumbar spondylosis     Myocardial infarction ([facility] [address]     lad stent for stemi,12/2012    Pancreatic cyst          Current Outpatient Prescriptions:     cyclobenzaprine (FLEXERIL) 10 mg tablet, Take 1 Tab by mouth three (3) times daily as needed for Muscle Spasm(s). , Disp: 90 Tab, Rfl: 0    oxyCODONE IR (ROXICODONE) 15 mg immediate release tablet, Take 1-2 Tabs by mouth every eight (8) hours as needed. Max Daily Amount: 90 mg., Disp: 60 Tab, Rfl: 0    HYDROmorphone (DILAUDID) 4 mg tablet, Take 1 Tab by mouth every four (4) hours as needed for Pain. Max Daily Amount: 24 mg. Indications: PAIN, Disp: 60 Tab, Rfl: 0    HYDROmorphone (DILAUDID) 4 mg tablet, Take 1 Tab by mouth every four (4) hours as needed. Max Daily Amount: 24 mg. Indications: PAIN, Disp: 64 Tab, Rfl: 0    polyethylene glycol (MIRALAX) 17 gram packet, Take 1 Packet by mouth daily.  Indications: Constipation, Disp: 30 Packet, Rfl: 1    celecoxib (CELEBREX) 200 mg capsule, Take 1 Cap by mouth every twelve (12) hours every twelve (12) hours for 90 days. , Disp: 60 Cap, Rfl: 2    ferrous sulfate 325 mg (65 mg iron) tablet, Take 1 Tab by mouth two (2) times daily (with meals). , Disp: 60 Tab, Rfl: 2    aspirin (ASPIRIN) 325 mg tablet, Take 1 Tab by mouth two (2) times a day., Disp: 60 Tab, Rfl: 0    ammonium lactate (LAC-HYDRIN) 12 % topical cream, Apply  to affected area two (2) times a day. rub in to affected area well, Disp: 280 g, Rfl: 0    gabapentin (NEURONTIN) 300 mg capsule, Take 1 Cap by mouth three (3) times daily. , Disp: 90 Cap, Rfl: 1    doxycycline (ADOXA) 100 mg tablet, Take 1 Tab by mouth two (2) times a day., Disp: 20 Tab, Rfl: 0    gabapentin (NEURONTIN) 300 mg capsule, , Disp: , Rfl:     isoniazid (NYDRAZID) 300 mg tablet, Take 300 mg by mouth daily. , Disp: , Rfl:     nitroglycerin (NITROSTAT) 0.4 mg SL tablet, by SubLINGual route every five (5) minutes as needed. , Disp: , Rfl:     carvedilol (COREG) 25 mg tablet, Take 25 mg by mouth two (2) times daily (with meals). , Disp: , Rfl:     hydrochlorothiazide (HYDRODIURIL) 25 mg tablet, Take 25 mg by mouth daily. , Disp: , Rfl:     isosorbide mononitrate ER (IMDUR) 60 mg CR tablet, Take 1 Tab by mouth every morning., Disp: 30 Tab, Rfl: 6    lisinopril (PRINIVIL, ZESTRIL) 20 mg tablet, Take  by mouth daily. , Disp: , Rfl:     atorvastatin (LIPITOR) 80 mg tablet, Take 80 mg by mouth daily. , Disp: , Rfl:     Allergies   Allergen Reactions    Vicodin [Hydrocodone-Acetaminophen] Other (comments)     denies       Social History     Social History    Marital status: SINGLE     Spouse name: N/A    Number of children: N/A    Years of education: N/A     Occupational History    Not on file.      Social History Main Topics    Smoking status: Former Smoker     Packs/day: 0.10     Types: Cigarettes     Quit date: 11/1/2012    Smokeless tobacco: Never Used    Alcohol use Yes      Comment: occasional    Drug use: Yes     Special: Cocaine      Comment: +Cocaine Screen 3/7/16 (see [facility])    Sexual activity: Not on file      Comment: stopped using     Other Topics Concern    Not on file     Social History Narrative       Past Surgical History:   Procedure Laterality Date    HX HEART CATHETERIZATION      HX LUMBAR FUSION      HX ORTHOPAEDIC      lumbar spine x 5    HX PTCA               We did see Jasmeet Sargent for followup in regards to his right knee replacement. The patient is now approximately three months status post surgery. In regards to the hip, he is doing quite well. He has had a lot of back problems. HE does have a previous fusion of his lumbar spine. We sent him for a CT scan for further evaluation. The patient denies any change in his bowel or bladder habits. He does have some pain radiating to his upper thigh and groin. It is worse when he is up and ambulating. He has a little bit of discomfort with lying down. He has not been doing his physical therapy due to transportation issues apparently. He is requesting a refill of his pain medicine. PHYSICAL EXAMINATION: In general, the patient is alert and oriented x 3 and is in no acute distress. The patient is well-developed and well-nourished with a normal affect. The patient is afebrile. HEENT:  Head is normocephalic and atraumatic. Pupils are equally round and reactive to light and accommodation. Extraocular eye movements are intact. Neck is supple. Trachea is midline. No JVD is present. Breathing is nonlabored. Examination of his back reveals the skin is intact. There is no ecchymosis, no warmth, and no signs of infection. He does have some discomfort with palpation of the lower lumbar spine on the right side with some mild paraspinal muscle spasm present. Straight leg raise is equivocal.  He does have slight decrease of sensation to L2-3 and L3-4 to the right lower extremity.   There is no pain with rotation of the hips. There is mild discomfort to palpation at the trochanteric bursa. The surgical wound is healed nicely. There is no surrounding erythema or underlying fluctuance. There are no signs of infection. RADIOGRAPHS:  I reviewed the CT scan, which shows multilevel degenerative changes most significant at L2-3, pronounced central canal stenosis. ASSESSMENT:      1. Status post right knee replacement. 2. Multilevel degenerative changes of the lumbar spine at L2-3. PLAN:  At this point, the patient will be referred to The [address] [place] [address] for further evaluation and treatment. He is given a refill of his pain medicine. He has Flexeril at home. He will be set up with outpatient physical therapy. We will see him back in four weeks for reevaluation.                  JR Sean COKER, PA-C, ATC

## 2017-05-04 ENCOUNTER — OFFICE VISIT (OUTPATIENT)
Dept: ORTHOPEDIC SURGERY | Facility: CLINIC | Age: 62
End: 2017-05-04

## 2017-05-04 VITALS
WEIGHT: 241 LBS | BODY MASS INDEX: 31.8 KG/M2 | DIASTOLIC BLOOD PRESSURE: 68 MMHG | HEART RATE: 70 BPM | TEMPERATURE: 98 F | SYSTOLIC BLOOD PRESSURE: 127 MMHG

## 2017-05-04 DIAGNOSIS — Z96.641 HISTORY OF RIGHT HIP REPLACEMENT: Primary | ICD-10-CM

## 2017-05-04 RX ORDER — OXYCODONE AND ACETAMINOPHEN 7.5; 325 MG/1; MG/1
1 TABLET ORAL
Qty: 60 TAB | Refills: 0 | Status: SHIPPED | OUTPATIENT
Start: 2017-05-04 | End: 2019-05-14

## 2017-05-04 NOTE — PROGRESS NOTES
[address] [place] [address]  [phone]           Patient: Jed Barthel                MRN: [id number]       SSN: [id number]  YOB: 1955        AGE: 59 y.o. SEX: male  Body mass index is 31.8 kg/(m^2). PCP: Sen Palacios MD  05/04/17      This office note has been dictated. REVIEW OF SYSTEMS:  Constitutional: Negative for fever, chills, weight loss and malaise/fatigue. HENT: Negative. Eyes: Negative. Respiratory: Negative. Cardiovascular: Negative. Gastrointestinal: No bowel incontinence or constipation. Genitourinary: No bladder incontinence or saddle anesthesia. Skin: Negative. Neurological: Negative. Endo/Heme/Allergies: Negative. Psychiatric/Behavioral: Negative. Musculoskeletal: As per HPI above. Past Medical History:   Diagnosis Date    Arthritis     CAD (coronary artery disease)     Essential hypertension     Low back pain     Lumbar postlaminectomy syndrome     Lumbar spondylosis     Myocardial infarction ([facility] [address]     lad stent for stemi,12/2012    Pancreatic cyst          Current Outpatient Prescriptions:     HYDROcodone-acetaminophen (NORCO)  mg tablet, Take 1 Tab by mouth every eight (8) hours as needed for Pain. Max Daily Amount: 3 Tabs., Disp: 60 Tab, Rfl: 0    cyclobenzaprine (FLEXERIL) 10 mg tablet, Take 1 Tab by mouth three (3) times daily as needed for Muscle Spasm(s). , Disp: 90 Tab, Rfl: 0    oxyCODONE IR (ROXICODONE) 15 mg immediate release tablet, Take 1-2 Tabs by mouth every eight (8) hours as needed. Max Daily Amount: 90 mg., Disp: 60 Tab, Rfl: 0    HYDROmorphone (DILAUDID) 4 mg tablet, Take 1 Tab by mouth every four (4) hours as needed for Pain. Max Daily Amount: 24 mg. Indications: PAIN, Disp: 60 Tab, Rfl: 0    HYDROmorphone (DILAUDID) 4 mg tablet, Take 1 Tab by mouth every four (4) hours as needed. Max Daily Amount: 24 mg.  Indications: PAIN, Disp: 64 Tab, Rfl: 0    polyethylene glycol (MIRALAX) 17 gram packet, Take 1 Packet by mouth daily. Indications: Constipation, Disp: 30 Packet, Rfl: 1    ferrous sulfate 325 mg (65 mg iron) tablet, Take 1 Tab by mouth two (2) times daily (with meals). , Disp: 60 Tab, Rfl: 2    aspirin (ASPIRIN) 325 mg tablet, Take 1 Tab by mouth two (2) times a day., Disp: 60 Tab, Rfl: 0    ammonium lactate (LAC-HYDRIN) 12 % topical cream, Apply  to affected area two (2) times a day. rub in to affected area well, Disp: 280 g, Rfl: 0    gabapentin (NEURONTIN) 300 mg capsule, Take 1 Cap by mouth three (3) times daily. , Disp: 90 Cap, Rfl: 1    doxycycline (ADOXA) 100 mg tablet, Take 1 Tab by mouth two (2) times a day., Disp: 20 Tab, Rfl: 0    gabapentin (NEURONTIN) 300 mg capsule, , Disp: , Rfl:     isoniazid (NYDRAZID) 300 mg tablet, Take 300 mg by mouth daily. , Disp: , Rfl:     nitroglycerin (NITROSTAT) 0.4 mg SL tablet, by SubLINGual route every five (5) minutes as needed. , Disp: , Rfl:     carvedilol (COREG) 25 mg tablet, Take 25 mg by mouth two (2) times daily (with meals). , Disp: , Rfl:     hydrochlorothiazide (HYDRODIURIL) 25 mg tablet, Take 25 mg by mouth daily. , Disp: , Rfl:     isosorbide mononitrate ER (IMDUR) 60 mg CR tablet, Take 1 Tab by mouth every morning., Disp: 30 Tab, Rfl: 6    lisinopril (PRINIVIL, ZESTRIL) 20 mg tablet, Take  by mouth daily. , Disp: , Rfl:     atorvastatin (LIPITOR) 80 mg tablet, Take 80 mg by mouth daily. , Disp: , Rfl:     Allergies   Allergen Reactions    Vicodin [Hydrocodone-Acetaminophen] Other (comments)     denies       Social History     Social History    Marital status: SINGLE     Spouse name: N/A    Number of children: N/A    Years of education: N/A     Occupational History    Not on file.      Social History Main Topics    Smoking status: Former Smoker     Packs/day: 0.10     Types: Cigarettes     Quit date: 11/1/2012    Smokeless tobacco: Never Used    Alcohol use Yes Comment: occasional    Drug use: Yes     Special: Cocaine      Comment: +Cocaine Screen 3/7/16 (see [facility])    Sexual activity: Not on file      Comment: stopped using     Other Topics Concern    Not on file     Social History Narrative       Past Surgical History:   Procedure Laterality Date    HX HEART CATHETERIZATION      HX LUMBAR FUSION      HX ORTHOPAEDIC      lumbar spine x 5    HX PTCA                 We did see Mr. Edis Tiwari for followup in regards to his right hip replacement. The patient is now three months status post hip replacement surgery. He has done quite well with the hip. He is really having minimal discomfort, just a little laterally based discomfort at times. He is having trouble with his low back, which is radiating down his lower extremities, especially on the right side. He did have a CT scan, which showed significant degenerative changes most significant at L2-3 with pronounced central canal stenosis. He has an appointment with The [address] [place] [address] next week. He has had no change in his bowel or bladder habits and no fevers, chills, systemic changes, and no injuries to report. PHYSICAL EXAMINATION: In general, the patient is alert and oriented x 3 and is in no acute distress. The patient is well-developed and well-nourished with a normal affect. The patient is afebrile. HEENT:  Head is normocephalic and atraumatic. Pupils are equally round and reactive to light and accommodation. Extraocular eye movements are intact. Neck is supple. Trachea is midline. No JVD is present. Breathing is nonlabored. Examination of the back reveals the skin is intact. There is no ecchymosis and no warmth. There are no signs of infection or cellulitis present. There is no pain with rotation of the hip. The surgical wound to the right hip is healed nicely. There is some slight discomfort with palpation of the trochanteric bursa. There is no pain with rotation of the leg.   There is a negative straight leg raise, negative calf tenderness, and negative Tenas sign. There are no signs of DVT present. He does have slight decreased sensation to L3-4 and L4-5. Right lower extremity strength is intact. RADIOGRAPHS:  Again, we reviewed the CT scan, which shows multilevel degenerative changes most significant at L2-3 with pronounced central canal stenosis. ASSESSMENT:      1. Lumbar stenosis. 2. Status post right hip replacement. PLAN:  At this point, the hip itself is doing well. His back is really bothering him quite a bit. His Norco did not help him. I am going to put him on one more prescription for Percocet. He understands this is the last prescription of it. He will be followed at [address] [place] [address] for further treatment. We will see him back in regards to his hip in three months time.            JR Sean COKER, PA-C, ATC

## 2017-05-11 ENCOUNTER — OFFICE VISIT (OUTPATIENT)
Dept: ORTHOPEDIC SURGERY | Age: 62
End: 2017-05-11

## 2017-05-11 VITALS
HEIGHT: 73 IN | DIASTOLIC BLOOD PRESSURE: 82 MMHG | HEART RATE: 77 BPM | RESPIRATION RATE: 16 BRPM | SYSTOLIC BLOOD PRESSURE: 130 MMHG | OXYGEN SATURATION: 98 % | TEMPERATURE: 98.2 F

## 2017-05-11 DIAGNOSIS — R20.0 BILATERAL HAND NUMBNESS: ICD-10-CM

## 2017-05-11 DIAGNOSIS — M47.816 SPONDYLOSIS OF LUMBAR REGION WITHOUT MYELOPATHY OR RADICULOPATHY: ICD-10-CM

## 2017-05-11 DIAGNOSIS — M47.899 FACET SYNDROME: Primary | ICD-10-CM

## 2017-05-11 DIAGNOSIS — G89.29 CHRONIC BILATERAL LOW BACK PAIN WITHOUT SCIATICA: ICD-10-CM

## 2017-05-11 DIAGNOSIS — M54.50 CHRONIC BILATERAL LOW BACK PAIN WITHOUT SCIATICA: ICD-10-CM

## 2017-05-11 RX ORDER — CLOPIDOGREL BISULFATE 75 MG/1
75 TABLET ORAL
COMMUNITY
Start: 2017-05-09 | End: 2019-05-14

## 2017-05-11 NOTE — PROGRESS NOTES
[address]  [address], [address]  [place], [facility]  Phone: [phone]  Fax: [phone]        Mal Perales  : 1955  PCP: Tan Dejesus MD  2017    NEW PATIENT      ASSESSMENT AND PLAN     Keagan Ramos comes in to the office today c/o 10/10 lumbar pain x 18 years. He previously saw Dr. Jennie Weinstein and PA Maryanna Lanes for a right hip replacement. He was referred here because he began to experience worsening lumbar pain afterwards. Pt also has complaints of bilateral hand numbness. He has had a lumbar CT which shows an intact fusion from L3-4 to L5-S1. There is also moderate central stenosis and severe facet hypertrophy at L2-3. Pt notes he previously had injections which provided good relief for several weeks. This may have been the right L2-3 facet joint injections he had with Dr. Keaton Admaes in . His pain is likely due to facet syndrome. He was referred for bilateral L2-3 facet joint injections. Pt was also referred for a BUE EMG for further evaluation of his bilateral hand numbness. He was referred to pain management to manage his chronic pain. Pt will f/u in 3 weeks or prn. Emelia Soto was seen today for back pain and new patient. Diagnoses and all orders for this visit:    Facet syndrome  -     REFERRAL TO PAIN MANAGEMENT  -     SCHEDULE SURGERY    Bilateral hand numbness  -     EMG TWO EXTREMITIES UPPER; Future    Chronic bilateral low back pain without sciatica  -     REFERRAL TO PAIN MANAGEMENT  -     SCHEDULE SURGERY         Follow-up Disposition:  Return in about 3 weeks (around 2017), or if symptoms worsen or fail to improve. CHIEF COMPLAINT  Keagan Ramos is seen today in consultation at the request of Tan Dejesus MD for complaints of lumbar pain. HISTORY OF PRESENT ILLNESS  Keagan Ramos is a 59 y.o. male c/o 10/10 lumbar pain x 18 years. He previously saw Dr. Jennie Weinstein and PA Maryanna Lanes for a right hip replacement.  He was referred here because he began to experience worsening lumbar pain afterwards. Pt also has complaints of bilateral hand numbness. He has had a lumbar CT which shows an intact fusion from L3-4 to L5-S1. There is also moderate central stenosis and severe facet hypertrophy at L2-3. Pt notes he previously had injections which provided good relief for several weeks. This may have been the right L2-3 facet joint injections he had with Dr. Michelle Parrish in 2011. Sitting, standing, lying down, walking, and lifting exacerbate his pain. Pt denies any fevers, chills, nausea, vomiting. Pt denies any chest pain and shortness of breath. Pt denies any ear, nose, and throat problems. Pt denies any fecal or urinary incontinence. While his paperwork states he smokes, pt verbally states he does not smoke. PAST MEDICAL HISTORY   Past Medical History:   Diagnosis Date    Arthritis     CAD (coronary artery disease)     Essential hypertension     Low back pain     Lumbar postlaminectomy syndrome     Lumbar spondylosis     Myocardial infarction (HCC)     lad stent for stemi,12/2012    Pancreatic cyst        Past Surgical History:   Procedure Laterality Date    HX HEART CATHETERIZATION      HX LUMBAR FUSION      HX ORTHOPAEDIC      lumbar spine x 5    HX PTCA         MEDICATIONS      Current Outpatient Prescriptions   Medication Sig Dispense Refill    cyclobenzaprine (FLEXERIL) 10 mg tablet Take 1 Tab by mouth three (3) times daily as needed for Muscle Spasm(s). 90 Tab 0    oxyCODONE IR (ROXICODONE) 15 mg immediate release tablet Take 1-2 Tabs by mouth every eight (8) hours as needed. Max Daily Amount: 90 mg. 60 Tab 0    ferrous sulfate 325 mg (65 mg iron) tablet Take 1 Tab by mouth two (2) times daily (with meals). 60 Tab 2    gabapentin (NEURONTIN) 300 mg capsule Take 1 Cap by mouth three (3) times daily. 90 Cap 1    doxycycline (ADOXA) 100 mg tablet Take 1 Tab by mouth two (2) times a day.  20 Tab 0    gabapentin (NEURONTIN) 300 mg capsule       isoniazid (NYDRAZID) 300 mg tablet Take 300 mg by mouth daily.  nitroglycerin (NITROSTAT) 0.4 mg SL tablet by SubLINGual route every five (5) minutes as needed.  carvedilol (COREG) 25 mg tablet Take 25 mg by mouth two (2) times daily (with meals).  hydrochlorothiazide (HYDRODIURIL) 25 mg tablet Take 25 mg by mouth daily.  isosorbide mononitrate ER (IMDUR) 60 mg CR tablet Take 1 Tab by mouth every morning. 30 Tab 6    lisinopril (PRINIVIL, ZESTRIL) 20 mg tablet Take  by mouth daily.  atorvastatin (LIPITOR) 80 mg tablet Take 80 mg by mouth daily.  oxyCODONE-acetaminophen (PERCOCET) 7.5-325 mg per tablet Take 1 Tab by mouth every eight (8) hours as needed. Max Daily Amount: 3 Tabs. 60 Tab 0    HYDROcodone-acetaminophen (NORCO)  mg tablet Take 1 Tab by mouth every eight (8) hours as needed for Pain. Max Daily Amount: 3 Tabs. 60 Tab 0    HYDROmorphone (DILAUDID) 4 mg tablet Take 1 Tab by mouth every four (4) hours as needed for Pain. Max Daily Amount: 24 mg. Indications: PAIN 60 Tab 0    HYDROmorphone (DILAUDID) 4 mg tablet Take 1 Tab by mouth every four (4) hours as needed. Max Daily Amount: 24 mg. Indications: PAIN 64 Tab 0    polyethylene glycol (MIRALAX) 17 gram packet Take 1 Packet by mouth daily. Indications: Constipation 30 Packet 1    aspirin (ASPIRIN) 325 mg tablet Take 1 Tab by mouth two (2) times a day. 60 Tab 0    ammonium lactate (LAC-HYDRIN) 12 % topical cream Apply  to affected area two (2) times a day.  rub in to affected area well 280 g 0       ALLERGIES    Allergies   Allergen Reactions    Vicodin [Hydrocodone-Acetaminophen] Other (comments)     denies          SOCIAL HISTORY    Social History     Social History    Marital status: SINGLE     Spouse name: N/A    Number of children: N/A    Years of education: N/A     Social History Main Topics    Smoking status: Former Smoker     Packs/day: 0.10     Types: Cigarettes     Quit date: 11/1/2012    Smokeless tobacco: Never Used    Alcohol use Yes      Comment: occasional    Drug use: Yes     Special: Cocaine      Comment: +Cocaine Screen 3/7/16 (see [address] [place] [address])    Sexual activity: Not Asked      Comment: stopped using     Other Topics Concern    None     Social History Narrative     Social History Narrative      Problem Relation Age of Onset    Heart Attack Neg Hx     Heart Surgery Neg Hx          REVIEW OF SYSTEMS  Review of Systems   Constitutional: Negative for chills, diaphoresis, fever, malaise/fatigue and weight loss. Respiratory: Negative for shortness of breath. Cardiovascular: Negative for chest pain and leg swelling. Gastrointestinal: Negative for constipation, nausea and vomiting. Neurological: Negative for dizziness, tingling, seizures, loss of consciousness and headaches. Psychiatric/Behavioral: The patient does not have insomnia. PHYSICAL EXAMINATION  Visit Vitals    /82    Pulse 77    Temp 98.2 °F (36.8 °C)    Resp 16    Ht 6' 1\" (1.854 m)    SpO2 98%         Pain Assessment  5/4/2017   Location of Pain Hip   Location Modifiers Right   Severity of Pain 10   Quality of Pain Aching;Dull   Duration of Pain -   Frequency of Pain -   Aggravating Factors -   Limiting Behavior -   Relieving Factors -   Result of Injury -         Constitutional:  Well developed, well nourished, in no acute distress. Psychiatric: Affect and mood are appropriate. HEENT: Normocephalic, atraumatic. Extraocular movements intact. Integumentary: No rashes or abrasions noted on exposed areas. Cardiovascular: Regular rate and rhythm. Pulmonary: Clear to auscultation bilaterally. SPINE/MUSCULOSKELETAL EXAM    Cervical spine:  Neck is midline. Normal muscle tone. No focal atrophy is noted. ROM pain free. Shoulder ROM intact. Mild tenderness to palpation. Negative Spurling's sign. Negative Tinel's sign. Negative Canseco's sign. Sensation in the bilateral arms grossly intact to light touch. Lumbar spine:  No rash, ecchymosis, or gross obliquity. No fasciculations. No focal atrophy is noted. No pain with hip ROM. Full range of motion. Mild tenderness to palpation . No tenderness to palpation at the sciatic notch. SI joints non-tender. Trochanters non tender. Pain with back extension. Sensation in the bilateral legs grossly intact to light touch. MOTOR:      Biceps  Triceps Deltoids Wrist Ext Wrist Flex Hand Intrin   Right 5/5 5/5 5/5 5/5 5/5 5/5   Left 5/5 5/5 5/5 5/5 5/5 5/5             Hip Flex  Quads Hamstrings Ankle DF EHL Ankle PF   Right 5/5 5/5 5/5 5/5 5/5 5/5   Left 5/5 5/5 5/5 5/5 5/5 5/5     DTRs are 2+ biceps, triceps, brachioradialis, patella, and Achilles. Negative Straight Leg raise. Squat not tested. No difficulty with tandem gait. Ambulation with walker. FWB. RADIOGRAPHS  Lumbar CT images taken on 3/14/2017 personally reviewed with patient:  FINDINGS:     There are only 4 non-rib-bearing vertebral bodies in the lumbar region. This  may be related to supernumerary ribs, i.e. 13 pairs of ribs. True 4 lumbar  vertebrae would be rarer than supernumerary ribs. Attempts to confirm 13 pairs  of ribs on the chest x-ray is not successful due to incomplete coverage of the  area of interest on the 1/16/17 study. For the purpose of labeling the  vertebrae consistently, the most caudal lumbar region vertebral body with ribs  is designated arbitrarily as L1 in this report.     No convincing evidence of acute fracture is detected. Mild to moderate decrease  in disc height is identified at L2-3 with vacuum disc phenomenon and prominent  endplate osteophytes. The superior endplate of L3 is irregular, with increased  sclerotic appearance. At L5-S1, minimal grade 1 spondylolisthesis is observed,  with offset of about 0.2-0.3 cm.     T11-12: Mild bilobed disc bulge.  Bilateral moderate facet hypertrophy. Bilateral foraminal narrowing. Tiny focus of vacuum disc in the  anterior-inferior aspect of the disc space.     T12-L1: Subtle tiny vacuum disc focus. No convincing evidence for disc bulge  or protrusion. Bilateral moderate facet hypertrophy. Subtle foraminal  narrowing on the left side. Minimal foraminal narrowing on the right side.     L1-2: Broad-based posterior central aspect calcified or ossified density  outlining the posterior longitudinal ligament ossification. Mild central canal  stenosis. No definite disc protrusion or herniation. Moderate to severe right  facet hypertrophy. Moderate left facet hypertrophy. Mild right foraminal  narrowing. No significant left foraminal narrowing.     L2-3: Broad-based disc-osteophyte. Severe bilateral facet hypertrophy. The  combination of these processes produces moderate central canal stenosis. Moderate bilateral foraminal narrowing, more pronounced on the right side than  the left.     L3-4: Fused level. Intact fusion hardware components. The osseous fusion  across the facets appear complete. Status post laminectomy. The beam scatter  artifact from the hardware components makes it difficult to assess the disc  space and spinal canal confidently. No convincing evidence for critical  foraminal stenosis.     L4-5: Fused level. Intact fusion hardware components. The osseous fusion  across the facets appear complete. Status post laminectomy. The fusion  hardware components produce significant scatter artifact making it difficult to  assess the disc space and the foramina. There is suggestion of mild right  foraminal narrowing.     L5-S1: Fused level. Intact fusion hardware components. The osseous fusion  across the facets appear complete. Status post laminectomy. Similar to the  above levels, the beam scatter artifact from the fusion hardware components  makes it difficult to assess the neural foramina confidently.  There is  suggestion of mild to moderate foraminal narrowing on the left side. Minimal to  mild foraminal narrowing may be present on the right side.     The incidentally visualized portions of the retroperitoneal structures are  unremarkable.     IMPRESSION  IMPRESSION:     1. There are only 4 non-rib-bearing lumbar type vertebrae in the lumbar region. No evidence of sacralization of L5. The most likely explanation is  supernumerary ribs, i.e. 13th pair of ribs at L1. Cannot prove the presence of  supernumerary ribs with current set of exams. Thoracic spine series and lumbar  spine series could be helpful to confirm.     2. Disc-osteophyte at L2-3 producing central canal stenosis.     3. Multilevel neural foraminal narrowing involving both the thoracic and lumbar  regions.     4. Facet arthropathy in the thoracic and lumbar spine.  reviewed    Mr. Bernard Garcia has a reminder for a \"due or due soon\" health maintenance. I have asked that he contact his primary care provider for follow-up on this health maintenance. This plan was reviewed with the patient and patient agrees. All questions were answered. More than half of this visit today was spent on counseling. Written by Nestor Solis, as dictated by Dr. Laurence Rodrigues. I, Dr. Laurence Rodrigues, confirm that all documentation is accurate.

## 2017-05-11 NOTE — PATIENT INSTRUCTIONS
Learning About a Facet Joint Injection  What is a facet joint injection? A facet joint injection is a shot of medicine to help with pain from arthritis or other causes. The injection goes into your neck or back, depending on where your pain is. Facet joints connect your vertebrae to each other. Problems in these joints can cause long-term (chronic) pain in the neck or back, or sometimes in the shoulders, arms, buttocks, or legs. The injection contains a numbing medicine, which works right away for a short time. After the numbing medicine works, the doctor usually will add a steroid medicine to the injection. Steroids reduce swelling and pain, but they don't always work. How is a facet joint injection done? You may get medicine to help you relax. The doctor will use a tiny needle to numb the skin in the area where you are getting the facet joint injection. After the skin is numb, your doctor will use a larger needle for the actual facet joint injection. He or she will use X-rays to help guide the needle into the facet joint. You may feel some pressure. But you should not feel pain. The procedure takes 10 to 30 minutes. You will probably go home about an hour after your injection. What can you expect after a facet joint injection? You may have numbness for a few hours. The numbness could be in your neck or back, or your arm or leg, depending on where you got the shot. You will need someone to drive you home. Your pain may be gone right away. But it may return after a few hours or days. This is because the steroid medicine has not started to work yet. Steroids don't always work. And when they do, it takes a few days. But when they work, the pain relief can last for several days to a few months or longer. You may want to do less than normal for a few days. But you may also be able to return to your daily routine. It's usually best to increase your activities slowly over time.  Follow your doctor's instructions carefully. Follow-up care is a key part of your treatment and safety. Be sure to make and go to all appointments, and call your doctor if you are having problems. It's also a good idea to know your test results and keep a list of the medicines you take. Where can you learn more? Go to http://suzanne-renetta.info/. Enter B481 in the search box to learn more about \"Learning About a Facet Joint Injection. \"  Current as of: May 23, 2016  Content Version: 11.2  © 5846-8286 [employer]. Care instructions adapted under license by [employer] (which disclaims liability or warranty for this information). If you have questions about a medical condition or this instruction, always ask your healthcare professional. [address] any warranty or liability for your use of this information.

## 2017-05-11 NOTE — MR AVS SNAPSHOT
Visit Information Date & Time Provider Department Dept. Phone Encounter #  
 5/11/2017  1:15 PM Yuri Webb MD [facility] [phone] [id number] Follow-up Instructions Return in about 3 weeks (around 6/1/2017), or if symptoms worsen or fail to improve. Your Appointments 6/8/2017  1:00 PM  
Follow Up with Yuri Webb MD  
[facility]) Appt Note: 3WK BLOCK FU; Ok Sang 5/23/17  
 [address] [place] [address]  
[phone]  
  
   
 [address]  
  
    
 8/3/2017 10:00 AM  
Follow Up with Caty Anne PA-C  
[facility]) Appt Note: 3 M FU RT HIP  
 [address] [facility], [address] [facility] [id number] [phone]  
  
   
 [address] [facility], [address] [place] [id number] Upcoming Health Maintenance Date Due Hepatitis C Screening 1955 DTaP/Tdap/Td series (1 - Tdap) 5/19/1974 FOBT Q 1 YEAR AGE 50-75 5/19/2003 ZOSTER VACCINE AGE 60> 5/19/2013 INFLUENZA AGE 9 TO ADULT 8/1/2017 Allergies as of 5/11/2017  Review Complete On: 5/11/2017 By: Mauro Medeiros Severity Noted Reaction Type Reactions Vicodin [Hydrocodone-acetaminophen]    Other (comments) denies Current Immunizations  Never Reviewed No immunizations on file. Not reviewed this visit You Were Diagnosed With   
  
 Codes Comments Facet syndrome    -  Primary ICD-10-CM: M12.88 ICD-9-CM: 724.8 Bilateral hand numbness     ICD-10-CM: R20.0 ICD-9-CM: 193. 0 Chronic bilateral low back pain without sciatica     ICD-10-CM: M54.5, G89.29 ICD-9-CM: 724.2, 338.29 Vitals BP Pulse Temp Resp Height(growth percentile) SpO2  
 130/82 77 98.2 °F (36.8 °C) 16 6' 1\" (1.854 m) 98% Smoking Status Former Smoker Vitals History Preferred Pharmacy Pharmacy Name Phone [facility] [address], [address] [facility] Your Updated Medication List  
  
   
This list is accurate as of: 5/11/17  3:31 PM.  Always use your most recent med list.  
  
  
  
  
 ammonium lactate 12 % topical cream  
Commonly known as:  LAC-HYDRIN Apply  to affected area two (2) times a day. rub in to affected area well  
  
 aspirin 325 mg tablet Commonly known as:  ASPIRIN Take 1 Tab by mouth two (2) times a day. carvedilol 25 mg tablet Commonly known as:  Antonio Reef Take 25 mg by mouth two (2) times daily (with meals). cyclobenzaprine 10 mg tablet Commonly known as:  FLEXERIL Take 1 Tab by mouth three (3) times daily as needed for Muscle Spasm(s). doxycycline 100 mg tablet Commonly known as:  ADOXA Take 1 Tab by mouth two (2) times a day. ferrous sulfate 325 mg (65 mg iron) tablet Take 1 Tab by mouth two (2) times daily (with meals). * gabapentin 300 mg capsule Commonly known as:  NEURONTIN  
  
 * gabapentin 300 mg capsule Commonly known as:  NEURONTIN Take 1 Cap by mouth three (3) times daily. hydroCHLOROthiazide 25 mg tablet Commonly known as:  HYDRODIURIL Take 25 mg by mouth daily. HYDROcodone-acetaminophen  mg tablet Commonly known as:  Azucena Holiday Take 1 Tab by mouth every eight (8) hours as needed for Pain. Max Daily Amount: 3 Tabs. * HYDROmorphone 4 mg tablet Commonly known as:  DILAUDID Take 1 Tab by mouth every four (4) hours as needed. Max Daily Amount: 24 mg. Indications: PAIN  
  
 * HYDROmorphone 4 mg tablet Commonly known as:  DILAUDID Take 1 Tab by mouth every four (4) hours as needed for Pain. Max Daily Amount: 24 mg. Indications: PAIN  
  
 isoniazid 300 mg tablet Commonly known as:  NYDRAZID Take 300 mg by mouth daily. isosorbide mononitrate ER 60 mg CR tablet Commonly known as:  IMDUR  
 Take 1 Tab by mouth every morning. LIPITOR 80 mg tablet Generic drug:  atorvastatin Take 80 mg by mouth daily. lisinopril 20 mg tablet Commonly known as:  Roxianne Daughters Take  by mouth daily. NITROSTAT 0.4 mg SL tablet Generic drug:  nitroglycerin  
by SubLINGual route every five (5) minutes as needed. oxyCODONE IR 15 mg immediate release tablet Commonly known as:  Sobeida Benders Take 1-2 Tabs by mouth every eight (8) hours as needed. Max Daily Amount: 90 mg.  
  
 oxyCODONE-acetaminophen 7.5-325 mg per tablet Commonly known as:  PERCOCET Take 1 Tab by mouth every eight (8) hours as needed. Max Daily Amount: 3 Tabs. polyethylene glycol 17 gram packet Commonly known as:  Bonnita Amass Take 1 Packet by mouth daily. Indications: Constipation * Notice: This list has 4 medication(s) that are the same as other medications prescribed for you. Read the directions carefully, and ask your doctor or other care provider to review them with you. We Performed the Following REFERRAL TO PAIN MANAGEMENT [FMI781 Custom] Comments:  
 Manage chronic pain Follow-up Instructions Return in about 3 weeks (around 6/1/2017), or if symptoms worsen or fail to improve. To-Do List   
 05/18/2017 Neurology:  [facility] TWO EXTREMITIES UPPER Referral Information Referral ID Referred By Referred To  
  
 [id number] [facility] Not Available Visits Status Start Date End Date 1 New Request 5/11/17 5/11/18 If your referral has a status of pending review or denied, additional information will be sent to support the outcome of this decision. Patient Instructions Learning About a Facet Joint Injection What is a facet joint injection? A facet joint injection is a shot of medicine to help with pain from arthritis or other causes. The injection goes into your neck or back, depending on where your pain is. Facet joints connect your vertebrae to each other. Problems in these joints can cause long-term (chronic) pain in the neck or back, or sometimes in the shoulders, arms, buttocks, or legs. The injection contains a numbing medicine, which works right away for a short time. After the numbing medicine works, the doctor usually will add a steroid medicine to the injection. Steroids reduce swelling and pain, but they don't always work. How is a facet joint injection done? You may get medicine to help you relax. The doctor will use a tiny needle to numb the skin in the area where you are getting the facet joint injection. After the skin is numb, your doctor will use a larger needle for the actual facet joint injection. He or she will use X-rays to help guide the needle into the facet joint. You may feel some pressure. But you should not feel pain. The procedure takes 10 to 30 minutes. You will probably go home about an hour after your injection. What can you expect after a facet joint injection? You may have numbness for a few hours. The numbness could be in your neck or back, or your arm or leg, depending on where you got the shot. You will need someone to drive you home. Your pain may be gone right away. But it may return after a few hours or days. This is because the steroid medicine has not started to work yet. Steroids don't always work. And when they do, it takes a few days. But when they work, the pain relief can last for several days to a few months or longer. You may want to do less than normal for a few days. But you may also be able to return to your daily routine. It's usually best to increase your activities slowly over time. Follow your doctor's instructions carefully. Follow-up care is a key part of your treatment and safety. Be sure to make and go to all appointments, and call your doctor if you are having problems.  It's also a good idea to know your test results and keep a list of the medicines you take. Where can you learn more? Go to http://suzanne-renetta.info/. Enter B481 in the search box to learn more about \"Learning About a Facet Joint Injection. \" Current as of: May 23, 2016 Content Version: 11.2 © 5044-8884 [employer]. Care instructions adapted under license by [employer] (which disclaims liability or warranty for this information). If you have questions about a medical condition or this instruction, always ask your healthcare professional. [address] any warranty or liability for your use of this information. Introducing [facility]! Deanna Aaron introduces [employer] patient portal. Now you can access parts of your medical record, email your doctor's office, and request medication refills online. 1. In your internet browser, go to [email or web address] 2. Click on the First Time User? Click Here link in the Sign In box. You will see the New Member Sign Up page. 3. Enter your [employer] Access Code exactly as it appears below. You will not need to use this code after youve completed the sign-up process. If you do not sign up before the expiration date, you must request a new code. · [employer] Access Code: [facility] Expires: 7/16/2017 11:19 AM 
 
4. Enter the last four digits of your Social Security Number (xxxx) and Date of Birth (mm/dd/yyyy) as indicated and click Submit. You will be taken to the next sign-up page. 5. Create a [employer] ID. This will be your [employer] login ID and cannot be changed, so think of one that is secure and easy to remember. 6. Create a [employer] password. You can change your password at any time. 7. Enter your Password Reset Question and Answer. This can be used at a later time if you forget your password. 8. Enter your e-mail address. You will receive e-mail notification when new information is available in [address] 9. Click Sign Up. You can now view and download portions of your medical record. 10. Click the Download Summary menu link to download a portable copy of your medical information. If you have questions, please visit the Frequently Asked Questions section of the "[employer] website. Remember, "[employer] is NOT to be used for urgent needs. For medical emergencies, dial 911. Now available from your iPhone and Android! Please provide this summary of care documentation to your next provider. Your primary care clinician is listed as [facility]. If you have any questions after today's visit, please call [phone].

## 2017-05-15 ENCOUNTER — TELEPHONE (OUTPATIENT)
Dept: ORTHOPEDIC SURGERY | Age: 62
End: 2017-05-15

## 2017-05-23 ENCOUNTER — APPOINTMENT (OUTPATIENT)
Dept: GENERAL RADIOLOGY | Age: 62
End: 2017-05-23
Attending: PHYSICAL MEDICINE & REHABILITATION
Payer: MEDICARE

## 2017-05-23 ENCOUNTER — HOSPITAL ENCOUNTER (OUTPATIENT)
Age: 62
Setting detail: OUTPATIENT SURGERY
Discharge: HOME | End: 2017-05-23
Attending: PHYSICAL MEDICINE & REHABILITATION | Admitting: PHYSICAL MEDICINE & REHABILITATION
Payer: MEDICARE

## 2017-05-23 VITALS
BODY MASS INDEX: 31.14 KG/M2 | OXYGEN SATURATION: 97 % | DIASTOLIC BLOOD PRESSURE: 98 MMHG | RESPIRATION RATE: 18 BRPM | SYSTOLIC BLOOD PRESSURE: 141 MMHG | WEIGHT: 235 LBS | TEMPERATURE: 98.6 F | HEART RATE: 85 BPM | HEIGHT: 73 IN

## 2017-05-23 PROCEDURE — 74011000250 HC RX REV CODE- 250

## 2017-05-23 PROCEDURE — 74011636320 HC RX REV CODE- 636/320

## 2017-05-23 PROCEDURE — 76010000009 HC PAIN MGT 0 TO 30 MIN PROC: Performed by: PHYSICAL MEDICINE & REHABILITATION

## 2017-05-23 PROCEDURE — 74011250636 HC RX REV CODE- 250/636

## 2017-05-23 RX ORDER — DEXAMETHASONE SODIUM PHOSPHATE 100 MG/10ML
INJECTION INTRAMUSCULAR; INTRAVENOUS AS NEEDED
Status: DISCONTINUED | OUTPATIENT
Start: 2017-05-23 | End: 2017-05-23 | Stop reason: HOSPADM

## 2017-05-23 RX ORDER — LIDOCAINE HYDROCHLORIDE 10 MG/ML
INJECTION, SOLUTION EPIDURAL; INFILTRATION; INTRACAUDAL; PERINEURAL AS NEEDED
Status: DISCONTINUED | OUTPATIENT
Start: 2017-05-23 | End: 2017-05-23 | Stop reason: HOSPADM

## 2017-05-23 RX ORDER — DIAZEPAM 5 MG/1
2.5-1 TABLET ORAL ONCE
Status: DISCONTINUED | OUTPATIENT
Start: 2017-05-23 | End: 2017-05-23 | Stop reason: HOSPADM

## 2017-05-23 NOTE — PROCEDURES
Facet Joint Block Procedure Note    Patient Name: Alissa Car    Date of Procedure: May 23, 2017    Preoperative Diagnosis: Osteoarthritis of lumbar spine, unspecified spinal osteoarthritis complication status [X35.394]    Chronic low back pain without sciatica, unspecified back pain laterality [M54.5, G89.29]      Post Operative Diagnosis:Osteoarthritis of lumbar spine, unspecified spinal osteoarthritis complication status [J53.695]    Chronic low back pain without sciatica, unspecified back pain laterality [M54.5, G89.29]      Procedure:  bilateral  L2-L3 Facet Joint Block        Consent: Informed consent was obtained prior to the procedure. The patient was given the opportunity to ask questions regarding the procedure and its associated risks. In addition to the potential risks associated with the procedure itself, the patient was informed both verbally and in writing of potential side effects of the use of glucocorticoids. The patient appeared to comprehend the informed consent and desired to have the procedure performed. Procedure: The patient was placed in the prone position on the fluoroscopy table and the back was prepped and draped in the usual sterile manner. At each blocked level, the exact location of the facet joint was identified with flouroscopy, and after local Lidocaine 1% injection, a #22 gauge spinal needle was then advanced toward the joint. Yes a small amount of Isovue was used to confirm placement, no vascular uptake was identified. A total of 30 mg of preservative free Dexamethasone and 5 cc of 1% Lidocaine was injected in and around all of the sites. The patient tolerated the procedure well. The injection area was cleaned and bandaids applied. No excessive bleeding was noted. Patient dressed and was discharged to home with instructions.        Lynn Mcfadden MD  May 23, 2017

## 2017-05-23 NOTE — DISCHARGE INSTRUCTIONS
[facility]   ([facility])  Dr. Mauor Sher, Dr. Sundar Liu, Dr. Rodriguez Cover not drive a car, operate heavy machinery or dangerous equipment for 24 hours. * Activity as tolerated; rest for the remainder of the day. * Resume pre-block medications including those for your family doctor. * Do not drink alcoholic beverages for 24 hours. Alcohol and the medications you have received may interact and cause an adverse reaction. * You may feel better this evening and worse tomorrow, as the numbing medications wears off and the steroid has yet to begin to work. After 48 hrs the steroid should begin to release bringing you relief. * You may shower this evening and remove any bandages. * Avoid hot tubs and heating pads for 24 hours. You may use cold packs on the procedure site as tolerated for the first 24 hours. * If a headache develops, drink plenty of fluids and rest.  Take over the counter medications for headache if needed. If the headache continues longer than 24 hours, call MD at the [address] [place] [address]. [phone]    * Continue taking pain medications as needed. * You may resume your regular diet if tolerated. Otherwise, start with sips of water and advance slowly. * If Diabetic: check your blood sugar three times a day for the next 3 days. If your sugar is greater than 300 call your family doctor. If your sugar is greater than 400, have someone transport you to the nearest Emergency Room. * If you experience any of the following problems, Please Call the [address] [place] [address] at [phone].         * Shortness of Breath    * Fever of 101 or higher    * Nausea / Vomiting    * Severe Headache    * Weakness or numbness in arms or legs that is not      resolving    * Prolonged increase in pain greater than 4 days      DISCHARGE SUMMARY from Nurse      PATIENT INSTRUCTIONS:    After oral sedation, for 24 hours or while taking prescription Narcotics:  · Limit your activities  · Do not drive and operate hazardous machinery  · Do not make important personal or business decisions  · Do  not drink alcoholic beverages  · If you have not urinated within 8 hours after discharge, please contact your surgeon on call. Report the following to your surgeon:  · Excessive pain, swelling, redness or odor of or around the surgical area  · Temperature over 101  · Nausea and vomiting lasting longer than 4 hours or if unable to take medications  · Any signs of decreased circulation or nerve impairment to extremity: change in color, persistent  numbness, tingling, coldness or increase pain  · Any questions            What to do at Home:  Recommended activity: Activity as tolerated, NO DRIVING FOR 12 Hours post injection          *  Please give a list of your current medications to your Primary Care Provider. *  Please update this list whenever your medications are discontinued, doses are      changed, or new medications (including over-the-counter products) are added. *  Please carry medication information at all times in case of emergency situations. These are general instructions for a healthy lifestyle:    No smoking/ No tobacco products/ Avoid exposure to second hand smoke    Surgeon General's Warning:  Quitting smoking now greatly reduces serious risk to your health. Obesity, smoking, and sedentary lifestyle greatly increases your risk for illness    A healthy diet, regular physical exercise & weight monitoring are important for maintaining a healthy lifestyle    You may be retaining fluid if you have a history of heart failure or if you experience any of the following symptoms:  Weight gain of 3 pounds or more overnight or 5 pounds in a week, increased swelling in our hands or feet or shortness of breath while lying flat in bed.   Please call your doctor as soon as you notice any of these symptoms; do not wait until your next office visit. Recognize signs and symptoms of STROKE:    F-face looks uneven    A-arms unable to move or move unevenly    S-speech slurred or non-existent    T-time-call 911 as soon as signs and symptoms begin-DO NOT go       Back to bed or wait to see if you get better-TIME IS BRAIN.

## 2017-05-23 NOTE — INTERVAL H&P NOTE
H&P Update:  Natalie Almendarez was seen and briefly examined. History and physical has been reviewed.   There have been no significant clinical changes since the completion of the originally dated History and Physical.    Signed By: Li Bower MD     May 23, 2017 1:40 PM

## 2017-05-23 NOTE — H&P (VIEW-ONLY)
[address]  [address]  [place], [address] [place] [address] Street  Phone: [phone]  Fax: [phone]        James Torres  : 1955  PCP: Jose F Asencio MD  2017    NEW PATIENT      ASSESSMENT AND PLAN     Suzette Mcwilliams comes in to the office today c/o 10/10 lumbar pain x 18 years. He previously saw Dr. Earl Manzano and KT Ochoa for a right hip replacement. He was referred here because he began to experience worsening lumbar pain afterwards. Pt also has complaints of bilateral hand numbness. He has had a lumbar CT which shows an intact fusion from L3-4 to L5-S1. There is also moderate central stenosis and severe facet hypertrophy at L2-3. Pt notes he previously had injections which provided good relief for several weeks. This may have been the right L2-3 facet joint injections he had with Dr. Guru Javier in . His pain is likely due to facet syndrome. He was referred for bilateral L2-3 facet joint injections. Pt was also referred for a BUE EMG for further evaluation of his bilateral hand numbness. He was referred to pain management to manage his chronic pain. Pt will f/u in 3 weeks or prn. [facility] was seen today for back pain and new patient. Diagnoses and all orders for this visit:    Facet syndrome  -     REFERRAL TO PAIN MANAGEMENT  -     SCHEDULE SURGERY    Bilateral hand numbness  -     EMG TWO EXTREMITIES UPPER; Future    Chronic bilateral low back pain without sciatica  -     REFERRAL TO PAIN MANAGEMENT  -     SCHEDULE SURGERY         Follow-up Disposition:  Return in about 3 weeks (around 2017), or if symptoms worsen or fail to improve. CHIEF COMPLAINT  Suzette Mcwilliams is seen today in consultation at the request of Jose F Asencio MD for complaints of lumbar pain. HISTORY OF PRESENT ILLNESS  Suzette Mcwilliams is a 59 y.o. male c/o 10/10 lumbar pain x 18 years. He previously saw Dr. Earl Manzano and KT Ochoa for a right hip replacement.  He was referred here because he began to experience worsening lumbar pain afterwards. Pt also has complaints of bilateral hand numbness. He has had a lumbar CT which shows an intact fusion from L3-4 to L5-S1. There is also moderate central stenosis and severe facet hypertrophy at L2-3. Pt notes he previously had injections which provided good relief for several weeks. This may have been the right L2-3 facet joint injections he had with Dr. Devaughn Conner in 2011. Sitting, standing, lying down, walking, and lifting exacerbate his pain. Pt denies any fevers, chills, nausea, vomiting. Pt denies any chest pain and shortness of breath. Pt denies any ear, nose, and throat problems. Pt denies any fecal or urinary incontinence. While his paperwork states he smokes, pt verbally states he does not smoke. PAST MEDICAL HISTORY   Past Medical History:   Diagnosis Date    Arthritis     CAD (coronary artery disease)     Essential hypertension     Low back pain     Lumbar postlaminectomy syndrome     Lumbar spondylosis     Myocardial infarction (HCC)     lad stent for stemi,12/2012    Pancreatic cyst        Past Surgical History:   Procedure Laterality Date    HX HEART CATHETERIZATION      HX LUMBAR FUSION      HX ORTHOPAEDIC      lumbar spine x 5    HX PTCA         MEDICATIONS      Current Outpatient Prescriptions   Medication Sig Dispense Refill    cyclobenzaprine (FLEXERIL) 10 mg tablet Take 1 Tab by mouth three (3) times daily as needed for Muscle Spasm(s). 90 Tab 0    oxyCODONE IR (ROXICODONE) 15 mg immediate release tablet Take 1-2 Tabs by mouth every eight (8) hours as needed. Max Daily Amount: 90 mg. 60 Tab 0    ferrous sulfate 325 mg (65 mg iron) tablet Take 1 Tab by mouth two (2) times daily (with meals). 60 Tab 2    gabapentin (NEURONTIN) 300 mg capsule Take 1 Cap by mouth three (3) times daily. 90 Cap 1    doxycycline (ADOXA) 100 mg tablet Take 1 Tab by mouth two (2) times a day.  20 Tab 0    gabapentin (NEURONTIN) 300 mg capsule       isoniazid (NYDRAZID) 300 mg tablet Take 300 mg by mouth daily.  nitroglycerin (NITROSTAT) 0.4 mg SL tablet by SubLINGual route every five (5) minutes as needed.  carvedilol (COREG) 25 mg tablet Take 25 mg by mouth two (2) times daily (with meals).  hydrochlorothiazide (HYDRODIURIL) 25 mg tablet Take 25 mg by mouth daily.  isosorbide mononitrate ER (IMDUR) 60 mg CR tablet Take 1 Tab by mouth every morning. 30 Tab 6    lisinopril (PRINIVIL, ZESTRIL) 20 mg tablet Take  by mouth daily.  atorvastatin (LIPITOR) 80 mg tablet Take 80 mg by mouth daily.  oxyCODONE-acetaminophen (PERCOCET) 7.5-325 mg per tablet Take 1 Tab by mouth every eight (8) hours as needed. Max Daily Amount: 3 Tabs. 60 Tab 0    HYDROcodone-acetaminophen (NORCO)  mg tablet Take 1 Tab by mouth every eight (8) hours as needed for Pain. Max Daily Amount: 3 Tabs. 60 Tab 0    HYDROmorphone (DILAUDID) 4 mg tablet Take 1 Tab by mouth every four (4) hours as needed for Pain. Max Daily Amount: 24 mg. Indications: PAIN 60 Tab 0    HYDROmorphone (DILAUDID) 4 mg tablet Take 1 Tab by mouth every four (4) hours as needed. Max Daily Amount: 24 mg. Indications: PAIN 64 Tab 0    polyethylene glycol (MIRALAX) 17 gram packet Take 1 Packet by mouth daily. Indications: Constipation 30 Packet 1    aspirin (ASPIRIN) 325 mg tablet Take 1 Tab by mouth two (2) times a day. 60 Tab 0    ammonium lactate (LAC-HYDRIN) 12 % topical cream Apply  to affected area two (2) times a day.  rub in to affected area well 280 g 0       ALLERGIES    Allergies   Allergen Reactions    Vicodin [Hydrocodone-Acetaminophen] Other (comments)     denies          SOCIAL HISTORY    Social History     Social History    Marital status: SINGLE     Spouse name: N/A    Number of children: N/A    Years of education: N/A     Social History Main Topics    Smoking status: Former Smoker     Packs/day: 0.10     Types: Cigarettes     Quit date: 11/1/2012    Smokeless tobacco: Never Used    Alcohol use Yes      Comment: occasional    Drug use: Yes     Special: Cocaine      Comment: +Cocaine Screen 3/7/16 (see [address] [place] [address])    Sexual activity: Not Asked      Comment: stopped using     Other Topics Concern    None     Social History Narrative     Social History Narrative      Problem Relation Age of Onset    Heart Attack Neg Hx     Heart Surgery Neg Hx          REVIEW OF SYSTEMS  Review of Systems   Constitutional: Negative for chills, diaphoresis, fever, malaise/fatigue and weight loss. Respiratory: Negative for shortness of breath. Cardiovascular: Negative for chest pain and leg swelling. Gastrointestinal: Negative for constipation, nausea and vomiting. Neurological: Negative for dizziness, tingling, seizures, loss of consciousness and headaches. Psychiatric/Behavioral: The patient does not have insomnia. PHYSICAL EXAMINATION  Visit Vitals    /82    Pulse 77    Temp 98.2 °F (36.8 °C)    Resp 16    Ht 6' 1\" (1.854 m)    SpO2 98%         Pain Assessment  5/4/2017   Location of Pain Hip   Location Modifiers Right   Severity of Pain 10   Quality of Pain Aching;Dull   Duration of Pain -   Frequency of Pain -   Aggravating Factors -   Limiting Behavior -   Relieving Factors -   Result of Injury -         Constitutional:  Well developed, well nourished, in no acute distress. Psychiatric: Affect and mood are appropriate. HEENT: Normocephalic, atraumatic. Extraocular movements intact. Integumentary: No rashes or abrasions noted on exposed areas. Cardiovascular: Regular rate and rhythm. Pulmonary: Clear to auscultation bilaterally. SPINE/MUSCULOSKELETAL EXAM    Cervical spine:  Neck is midline. Normal muscle tone. No focal atrophy is noted. ROM pain free. Shoulder ROM intact. Mild tenderness to palpation. Negative Spurling's sign. Negative Tinel's sign. Negative Canseco's sign. Sensation in the bilateral arms grossly intact to light touch. Lumbar spine:  No rash, ecchymosis, or gross obliquity. No fasciculations. No focal atrophy is noted. No pain with hip ROM. Full range of motion. Mild tenderness to palpation . No tenderness to palpation at the sciatic notch. SI joints non-tender. Trochanters non tender. Pain with back extension. Sensation in the bilateral legs grossly intact to light touch. MOTOR:      Biceps  Triceps Deltoids Wrist Ext Wrist Flex Hand Intrin   Right 5/5 5/5 5/5 5/5 5/5 5/5   Left 5/5 5/5 5/5 5/5 5/5 5/5             Hip Flex  Quads Hamstrings Ankle DF EHL Ankle PF   Right 5/5 5/5 5/5 5/5 5/5 5/5   Left 5/5 5/5 5/5 5/5 5/5 5/5     DTRs are 2+ biceps, triceps, brachioradialis, patella, and Achilles. Negative Straight Leg raise. Squat not tested. No difficulty with tandem gait. Ambulation with walker. FWB. RADIOGRAPHS  Lumbar CT images taken on 3/14/2017 personally reviewed with patient:  FINDINGS:     There are only 4 non-rib-bearing vertebral bodies in the lumbar region. This  may be related to supernumerary ribs, i.e. 13 pairs of ribs. True 4 lumbar  vertebrae would be rarer than supernumerary ribs. Attempts to confirm 13 pairs  of ribs on the chest x-ray is not successful due to incomplete coverage of the  area of interest on the 1/16/17 study. For the purpose of labeling the  vertebrae consistently, the most caudal lumbar region vertebral body with ribs  is designated arbitrarily as L1 in this report.     No convincing evidence of acute fracture is detected. Mild to moderate decrease  in disc height is identified at L2-3 with vacuum disc phenomenon and prominent  endplate osteophytes. The superior endplate of L3 is irregular, with increased  sclerotic appearance. At L5-S1, minimal grade 1 spondylolisthesis is observed,  with offset of about 0.2-0.3 cm.     T11-12: Mild bilobed disc bulge.  Bilateral moderate facet hypertrophy. Bilateral foraminal narrowing. Tiny focus of vacuum disc in the  anterior-inferior aspect of the disc space.     T12-L1: Subtle tiny vacuum disc focus. No convincing evidence for disc bulge  or protrusion. Bilateral moderate facet hypertrophy. Subtle foraminal  narrowing on the left side. Minimal foraminal narrowing on the right side.     L1-2: Broad-based posterior central aspect calcified or ossified density  outlining the posterior longitudinal ligament ossification. Mild central canal  stenosis. No definite disc protrusion or herniation. Moderate to severe right  facet hypertrophy. Moderate left facet hypertrophy. Mild right foraminal  narrowing. No significant left foraminal narrowing.     L2-3: Broad-based disc-osteophyte. Severe bilateral facet hypertrophy. The  combination of these processes produces moderate central canal stenosis. Moderate bilateral foraminal narrowing, more pronounced on the right side than  the left.     L3-4: Fused level. Intact fusion hardware components. The osseous fusion  across the facets appear complete. Status post laminectomy. The beam scatter  artifact from the hardware components makes it difficult to assess the disc  space and spinal canal confidently. No convincing evidence for critical  foraminal stenosis.     L4-5: Fused level. Intact fusion hardware components. The osseous fusion  across the facets appear complete. Status post laminectomy. The fusion  hardware components produce significant scatter artifact making it difficult to  assess the disc space and the foramina. There is suggestion of mild right  foraminal narrowing.     L5-S1: Fused level. Intact fusion hardware components. The osseous fusion  across the facets appear complete. Status post laminectomy. Similar to the  above levels, the beam scatter artifact from the fusion hardware components  makes it difficult to assess the neural foramina confidently.  There is  suggestion of mild to moderate foraminal narrowing on the left side. Minimal to  mild foraminal narrowing may be present on the right side.     The incidentally visualized portions of the retroperitoneal structures are  unremarkable.     IMPRESSION  IMPRESSION:     1. There are only 4 non-rib-bearing lumbar type vertebrae in the lumbar region. No evidence of sacralization of L5. The most likely explanation is  supernumerary ribs, i.e. 13th pair of ribs at L1. Cannot prove the presence of  supernumerary ribs with current set of exams. Thoracic spine series and lumbar  spine series could be helpful to confirm.     2. Disc-osteophyte at L2-3 producing central canal stenosis.     3. Multilevel neural foraminal narrowing involving both the thoracic and lumbar  regions.     4. Facet arthropathy in the thoracic and lumbar spine.  reviewed    Mr. Ildefonso Gurrola has a reminder for a \"due or due soon\" health maintenance. I have asked that he contact his primary care provider for follow-up on this health maintenance. This plan was reviewed with the patient and patient agrees. All questions were answered. More than half of this visit today was spent on counseling. Written by Yuniel Jeffries, as dictated by Dr. Arslan Malcolm. I, Dr. Arslan Malcolm, confirm that all documentation is accurate.

## 2017-05-31 DIAGNOSIS — Z96.641 HISTORY OF RIGHT HIP REPLACEMENT: ICD-10-CM

## 2017-05-31 RX ORDER — OXYCODONE AND ACETAMINOPHEN 7.5; 325 MG/1; MG/1
1 TABLET ORAL
Qty: 60 TAB | Refills: 0 | Status: CANCELLED | OUTPATIENT
Start: 2017-05-31

## 2017-05-31 NOTE — TELEPHONE ENCOUNTER
PATIENT CALLED AGAIN AND SAID HE WOULD LIKE TO  THE PERCOCET MEDICATION FROM THE [facility] VIEW LOCATION. PATIENT TEL. [phone].

## 2017-05-31 NOTE — TELEPHONE ENCOUNTER
Last Visit: 05/04/2017 with KT Matamoros  Date of Surgery: 01/24/2017 right hip replacement      Next Appointment: 08/03/2017 with KT Matamoros   Previous Refill Encounters: 05/04/2017 per KT Garcia Sample #60     Requested Prescriptions     Pending Prescriptions Disp Refills    oxyCODONE-acetaminophen (PERCOCET) 7.5-325 mg per tablet 60 Tab 0     Sig: Take 1 Tab by mouth every eight (8) hours as needed. Max Daily Amount: 3 Tabs.

## 2017-06-01 RX ORDER — HYDROCODONE BITARTRATE AND ACETAMINOPHEN 7.5; 325 MG/1; MG/1
1 TABLET ORAL
Qty: 60 TAB | Refills: 0 | Status: SHIPPED | OUTPATIENT
Start: 2017-06-01 | End: 2019-02-01 | Stop reason: SDUPTHER

## 2017-06-06 ENCOUNTER — OFFICE VISIT (OUTPATIENT)
Dept: ORTHOPEDIC SURGERY | Age: 62
End: 2017-06-06

## 2017-06-06 VITALS
SYSTOLIC BLOOD PRESSURE: 144 MMHG | HEART RATE: 75 BPM | WEIGHT: 231.4 LBS | BODY MASS INDEX: 30.67 KG/M2 | HEIGHT: 73 IN | TEMPERATURE: 97.7 F | DIASTOLIC BLOOD PRESSURE: 80 MMHG

## 2017-06-06 DIAGNOSIS — L84 FOOT CALLUS: ICD-10-CM

## 2017-06-06 DIAGNOSIS — M21.611 BUNION OF GREAT TOE OF RIGHT FOOT: ICD-10-CM

## 2017-06-06 DIAGNOSIS — M77.41 METATARSALGIA OF RIGHT FOOT: Primary | ICD-10-CM

## 2017-06-06 DIAGNOSIS — M19.071 PRIMARY OSTEOARTHRITIS OF RIGHT FOOT: ICD-10-CM

## 2017-06-06 DIAGNOSIS — L84 CORN OF FOOT: ICD-10-CM

## 2017-06-06 DIAGNOSIS — L84 SKIN CALLUS: ICD-10-CM

## 2017-06-06 DIAGNOSIS — M79.671 RIGHT FOOT PAIN: ICD-10-CM

## 2017-06-06 RX ORDER — AMMONIUM LACTATE 12 G/100G
CREAM TOPICAL 2 TIMES DAILY
Qty: 280 G | Refills: 0 | Status: SHIPPED | OUTPATIENT
Start: 2017-06-06 | End: 2020-01-20

## 2017-06-06 NOTE — PROGRESS NOTES
AMBULATORY PROGRESS NOTE      Patient: Amber Manzano             MRN: [id number]     SSN: [id number] Body mass index is 30.53 kg/(m^2). YOB: 1955     AGE: 57 y.o. EX: male    PCP: Ruth Ann Quintanilla MD       IMPRESSION/DIAGNOSIS AND TREATMENT PLAN      DIAGNOSES  1. Metatarsalgia of right foot    2. Foot callus    3. Right foot pain    4. Primary osteoarthritis of right foot    5. Bunion of great toe of right foot    6. Skin callus    7. Corn of foot        Orders Placed This Encounter    Generic Supply Order    [92553] Foot Min 3V    DC TRIM HYPERKERATOTIC SKIN LESION, ONE    ammonium lactate (LAC-HYDRIN) 12 % topical cream      Amber Manzano understands his diagnoses and the proposed plan. PLAN //  HPI AND EXAMINATION      Amber Manzano IS A 57 y.o. male who presents to my outpatient office for evaluation of: This patient has metatarsal and has a callus to the plantar lateral portion of his foot corresponding to the fifth metatarsal.  Consent was obtained for a shaving procedure. This is listed as below. I reiterated to him that he does, in my opinion, require a laterally posted trilaminar orthotic to offload his fourth and fifth metatarsal regions. He has a focal, intractable callus that keeps forming in this area. His x-rays today do not show a significant spur to the number four five metatarsal heads plantar kaur. It does show some metatarsus adductus and does show some arterial calcifications of the dorsalis pedis and posterior tibial arteries. Additional plans are for anti-keratotic cream, five week followup, July 11, 2017, to followup. OBJECTIVE EXAMINATION     Visit Vitals    /80    Pulse 75    Temp 97.7 °F (36.5 °C) (Oral)    Ht 6' 1\" (1.854 m)    Wt 231 lb 6.4 oz (105 kg)    BMI 30.53 kg/m2       Appearance: Alert, well appearing and pleasant patient who is in no distress, oriented to person, place/time, and who follows commands.   Psychiatric: Affect and mood are appropriate. Cardiovascular/Peripheral Vascular: Normal Pulses to each hand and foot  Integumentary: No rashes, skin patches, wounds, or abrasions to the right or left legs       Warm and normal color. No regions of expressible drainage. Musculoskeletal:  LOCATION Right foot      Gait: slow       Hypertrophic Toe Nail plates are present:   no pus, or bacterial infection to toes. Tenderness: to 5th met head  Region plantar region      Motor/Strength/Tone Exam: Normal       Sensory Exam:   Intact Normal Sensation to ankle/foot      Stability Testing: No anterolateral or varus instability of the Ankle or Subtalar Joints               No peroneal tendon instability noted      ROM: Normal ROM noted to ankle/foot,         Contractures: No Achilles or Gastrocnemius Contractures      Calf tenderness: Absent for calf or gastrocnemius muscle regions       Soft, supple, non tender, non taut lower extremity compartments       Alignment: Neutral Hindfoot  Wounds/Abrasions:   None present  Extremities:   No embolic phenomena to the toes or hands         No significant edema to the foot and or toes. Lower extremities are warm and appear well perfused    DVT: No evidence of DVT seen on examination at this time     No calf swelling, no tenderness to calf muscles  Lymphatic:  No Evidence of Lymphedema  Vascular: Medial Border of Tibia Region: Edema is not present        Pulses: Dorsalis Pedis &  Posterior Tibial Pulses : Palpable yes        Varicosities Lower Limbs :  None    Neuro: Negative bilateral Straight leg raise (seated position)    See Musculoskeletal section for pertinent individual extremity examination    No abnormal hand/wrist, foot/ankle, or facial/neck tremors. PROCEDURE: CALLOUS SHAVE NOTE  TOE NAIL PLATE TRIMMING       Vlad Marrow provided verbal consent for a callous shaving procedure for 6/6/2017.    The procedure was explained to the patient and possible adverse reactions were discussed. Risks and Benefits explained to the patient:included, but not limited to: bleeding, infection, local skin irritation. Patient and/or family questions answered    * Procedure site verified and marked as necessary  * Patient was positioned for comfort  * Verbal Informed Consent Verified by myself and my office staff. * TIME OUT performed immediately prior to start of procedure:    Callous ( 2 cm X 2 cm)  was carefully removed and trimmed with a sharp 15 blade from the:  Right foot -foot - right  without any complications. Raysa Hollis tolerated the procedure well and was advised on the signs of infection and instructed to go to the ER or call the office if Raysa Hollis becomes concerned about the area being infected. Patient received ANA (After visit instructions). CHART REVIEW      Past Medical History:   Diagnosis Date    Arthritis     CAD (coronary artery disease)     Essential hypertension     Low back pain     Lumbar postlaminectomy syndrome     Lumbar spondylosis     Myocardial infarction (HCC)     lad stent for stemi,12/2012    Pancreatic cyst      Current Outpatient Prescriptions   Medication Sig    ammonium lactate (LAC-HYDRIN) 12 % topical cream Apply  to affected area two (2) times a day. rub in to affected area well    clopidogrel (PLAVIX) 75 mg tab 75 mg.    oxyCODONE-acetaminophen (PERCOCET) 7.5-325 mg per tablet Take 1 Tab by mouth every eight (8) hours as needed. Max Daily Amount: 3 Tabs.  HYDROcodone-acetaminophen (NORCO)  mg tablet Take 1 Tab by mouth every eight (8) hours as needed for Pain. Max Daily Amount: 3 Tabs.  cyclobenzaprine (FLEXERIL) 10 mg tablet Take 1 Tab by mouth three (3) times daily as needed for Muscle Spasm(s).  HYDROmorphone (DILAUDID) 4 mg tablet Take 1 Tab by mouth every four (4) hours as needed. Max Daily Amount: 24 mg. Indications: PAIN    polyethylene glycol (MIRALAX) 17 gram packet Take 1 Packet by mouth daily.  Indications: Constipation    ferrous sulfate 325 mg (65 mg iron) tablet Take 1 Tab by mouth two (2) times daily (with meals).  aspirin (ASPIRIN) 325 mg tablet Take 1 Tab by mouth two (2) times a day.  gabapentin (NEURONTIN) 300 mg capsule Take 1 Cap by mouth three (3) times daily.  doxycycline (ADOXA) 100 mg tablet Take 1 Tab by mouth two (2) times a day.  gabapentin (NEURONTIN) 300 mg capsule     isoniazid (NYDRAZID) 300 mg tablet Take 300 mg by mouth daily.  nitroglycerin (NITROSTAT) 0.4 mg SL tablet by SubLINGual route every five (5) minutes as needed.  carvedilol (COREG) 25 mg tablet Take 25 mg by mouth two (2) times daily (with meals).  hydrochlorothiazide (HYDRODIURIL) 25 mg tablet Take 25 mg by mouth daily.  isosorbide mononitrate ER (IMDUR) 60 mg CR tablet Take 1 Tab by mouth every morning.  lisinopril (PRINIVIL, ZESTRIL) 20 mg tablet Take  by mouth daily.  atorvastatin (LIPITOR) 80 mg tablet Take 80 mg by mouth daily.  HYDROcodone-acetaminophen (NORCO) 7.5-325 mg per tablet Take 1 Tab by mouth every eight (8) hours as needed for Pain. Max Daily Amount: 3 Tabs.  oxyCODONE IR (ROXICODONE) 15 mg immediate release tablet Take 1-2 Tabs by mouth every eight (8) hours as needed. Max Daily Amount: 90 mg.    HYDROmorphone (DILAUDID) 4 mg tablet Take 1 Tab by mouth every four (4) hours as needed for Pain. Max Daily Amount: 24 mg. Indications: PAIN     No current facility-administered medications for this visit. Allergies   Allergen Reactions    Vicodin [Hydrocodone-Acetaminophen] Other (comments)     denies     Past Surgical History:   Procedure Laterality Date    HX HEART CATHETERIZATION      HX LUMBAR FUSION      HX ORTHOPAEDIC      lumbar spine x 5    HX ORTHOPAEDIC      Right hip replacement    HX PTCA       Social History     Occupational History    Not on file.      Social History Main Topics    Smoking status: Former Smoker     Packs/day: 0.10     Types: Cigarettes Quit date: 11/1/2012    Smokeless tobacco: Never Used    Alcohol use Yes      Comment: occasional    Drug use: Yes     Special: Cocaine      Comment: +Cocaine Screen 3/7/16 (see [address] [place] [address])    Sexual activity: Not on file      Comment: stopped using     Family History   Problem Relation Age of Onset    Heart Attack Neg Hx     Heart Surgery Neg Hx         REVIEW OF SYSTEMS : 6/6/2017  ALL BELOW ARE Negative except : SEE HPI      Constitutional: Negative for fever, chills and weight loss. Neg Weight Loss  Cardiovascular: Negative for chest pain, claudication and leg swelling. SOB, MALONE   Gastrointestinal/Urological: Negative for pain, N/V/D/C, Blood in stool or urine,dysuria,  Hematuria, Incontinence  Endocrine: See HPI  Skin: see HPI  Musculoskeletal: see HPI. Neurological: Negative for dizziness and weakness, headaches,Visual Changes or   Confusion, or Seizures,   Psychiatric/Behavioral: Negative for depression, memory loss and substance abuse. Extremities:  Negative for hair changes, rash or skin lesion changes. Hematologic: Negative for Bleeding problems, bruising, pallor or swollen lymph nodes. Peripheral Vascular: No calf pain, vascular vein tenderness to calf pain              No calf throbbing, posterior knee throbbing pain     DIAGNOSTIC IMAGING        Dictation on: 06/06/2017 11:29 AM by: Marta Roblero [[id number]]          Please see above section of this report. I have personally reviewed the results of the above study. The interpretation of this study is my professional opinion.       Janis Bryan MD  6/6/2017  11:43 AM

## 2017-06-06 NOTE — PATIENT INSTRUCTIONS
You should follow up in 5 weeks. If your condition worsens, contact our office. Patient's blood pressure was elevated at today's office visit. Patient instructed to contact  primary care physician for treatment. The provider has ordered a custom brace/orthotic for you. This will be customized and made for you by an outside facility. Please contact the orthotist at one of the below offices for your custom fitting and follow up in the office with the doctor or his physicians assistant 3 weeks after you have been wearing the brace. Nallely Norris at [facility]:     [address] [place] [address]   Phone: [phone]     Corns and Calluses: Care Instructions  Your Care Instructions  Corns and calluses are areas of thick, hard, dead skin. They form to protect your skin from injury. Corns usually form where toes rub together. Calluses often form on the hands or feet. They may form wherever the skin rubs against something, such as shoes. In most cases, you can take steps at home to care for a corn or callus. Follow-up care is a key part of your treatment and safety. Be sure to make and go to all appointments, and call your doctor if you are having problems. It's also a good idea to know your test results and keep a list of the medicines you take. How can you care for yourself at home? · Wear shoes and other footwear that fit correctly and are roomy. This will reduce rubbing and give corns or calluses time to heal.  · Use protective pads, such as moleskin, to cushion the callus or corn. · Soften the corn or callus and remove the dead skin by using over-the-counter products such as salicylic acid. If you have a condition that causes problems with blood flow (such as coronary artery disease) or loss of feeling in your feet (such as diabetes), talk to your doctor before you try any home treatment.   · Soak your corn or callus in warm water, and then use a pumice stone to rub dead skin away.  · Wash your feet regularly, and rub lotion into your feet while they are still moist. Dry skin can cause a callus to crack and bleed. · Never cut the corn or callus yourself, especially if you have problems with blood flow to your legs or feet. When should you call for help? Call your doctor now or seek immediate medical care if:  · You have signs of infection, such as:  ¨ Increased pain, swelling, warmth, or redness around the corn or callus. ¨ Red streaks leading from the corn or callus. ¨ Pus draining from the corn or callus. ¨ A fever. Watch closely for changes in your health, and be sure to contact your doctor if:  · You have a blood flow problem, such as diabetes, and your corn or callus is bothering you. · You do not get better as expected. Where can you learn more? Go to http://suzanne-renetta.info/. Enter R244 in the search box to learn more about \"Corns and Calluses: Care Instructions. \"  Current as of: October 13, 2016  Content Version: 11.2  © 8233-3093 [employer]. Care instructions adapted under license by [employer] (which disclaims liability or warranty for this information). If you have questions about a medical condition or this instruction, always ask your healthcare professional. [address] any warranty or liability for your use of this information.

## 2017-06-06 NOTE — MR AVS SNAPSHOT
Visit Information Date & Time Provider Department Dept. Phone Encounter #  
 6/6/2017 11:10 AM Yamile Zee MD [facility] [phone] [id number] Your Appointments 6/8/2017  1:00 PM  
Follow Up with Janes Ferrer MD  
[facility] [address]) Appt Note: 3WK BLOCK FU; Fabiola Homme 5/23/17  
 [address] [facility] [address]  
[address] [facility],[address]  
  
    
 6/9/2017 10:40 AM  
EMG with Jeanne Marquez MD  
[facility] [address]) Appt Note: Dr. Blake Right; upper extremities; order in cc; new pt pkt mld. .. Marina Sprung Marina Sprung Marina Sprung ywp  
 [facility] [address] [facility] [id number] [phone]  
  
   
 [facility] [id number]  
  
    
 8/3/2017 10:00 AM  
Follow Up with Franklin Ochoa PA-C  
[facility] [address]) Appt Note: 3 M FU RT HIP  
 [address] [facility], [address] [facility] [address] [phone]  
  
   
 [address] [facility], [address] [place] [id number] Upcoming Health Maintenance Date Due Hepatitis C Screening 1955 DTaP/Tdap/Td series (1 - Tdap) 5/19/1974 FOBT Q 1 YEAR AGE 50-75 5/19/2003 ZOSTER VACCINE AGE 60> 5/19/2013 INFLUENZA AGE 9 TO ADULT 8/1/2017 Allergies as of 6/6/2017  Review Complete On: 6/6/2017 By: Natalie Rod Severity Noted Reaction Type Reactions Vicodin [Hydrocodone-acetaminophen]    Other (comments) denies Current Immunizations  Never Reviewed No immunizations on file. Not reviewed this visit You Were Diagnosed With   
  
 Codes Comments Right foot pain    -  Primary ICD-10-CM: K04.414 ICD-9-CM: 729.5 Foot callus     ICD-10-CM: L84 
ICD-9-CM: 829 Primary osteoarthritis of right foot     ICD-10-CM: M19.071 ICD-9-CM: 715.17   
 Bunion of great toe of right foot     ICD-10-CM: M21.611 ICD-9-CM: 727.1 Skin callus     ICD-10-CM: L84 
ICD-9-CM: 810 Corn of foot     ICD-10-CM: L84 
ICD-9-CM: 770 Vitals BP Pulse Temp Height(growth percentile) Weight(growth percentile) BMI  
 144/80 75 97.7 °F (36.5 °C) (Oral) 6' 1\" (1.854 m) 231 lb 6.4 oz (105 kg) 30.53 kg/m2 Smoking Status Former Smoker BMI and BSA Data Body Mass Index Body Surface Area 30.53 kg/m 2 2.33 m 2 Preferred Pharmacy Pharmacy Name Phone [facility] [address], [address] [facility] Your Updated Medication List  
  
   
This list is accurate as of: 6/6/17 11:40 AM.  Always use your most recent med list.  
  
  
  
  
 ammonium lactate 12 % topical cream  
Commonly known as:  LAC-HYDRIN Apply  to affected area two (2) times a day. rub in to affected area well  
  
 aspirin 325 mg tablet Commonly known as:  ASPIRIN Take 1 Tab by mouth two (2) times a day. carvedilol 25 mg tablet Commonly known as:  Render Blonder Take 25 mg by mouth two (2) times daily (with meals). clopidogrel 75 mg Tab Commonly known as:  PLAVIX 75 mg.  
  
 cyclobenzaprine 10 mg tablet Commonly known as:  FLEXERIL Take 1 Tab by mouth three (3) times daily as needed for Muscle Spasm(s). doxycycline 100 mg tablet Commonly known as:  ADOXA Take 1 Tab by mouth two (2) times a day. ferrous sulfate 325 mg (65 mg iron) tablet Take 1 Tab by mouth two (2) times daily (with meals). * gabapentin 300 mg capsule Commonly known as:  NEURONTIN  
  
 * gabapentin 300 mg capsule Commonly known as:  NEURONTIN Take 1 Cap by mouth three (3) times daily. hydroCHLOROthiazide 25 mg tablet Commonly known as:  HYDRODIURIL Take 25 mg by mouth daily. * HYDROcodone-acetaminophen  mg tablet Commonly known as:  Viral Eid  
 Take 1 Tab by mouth every eight (8) hours as needed for Pain. Max Daily Amount: 3 Tabs. * HYDROcodone-acetaminophen 7.5-325 mg per tablet Commonly known as:  Fredick Sly Take 1 Tab by mouth every eight (8) hours as needed for Pain. Max Daily Amount: 3 Tabs. * HYDROmorphone 4 mg tablet Commonly known as:  DILAUDID Take 1 Tab by mouth every four (4) hours as needed. Max Daily Amount: 24 mg. Indications: PAIN  
  
 * HYDROmorphone 4 mg tablet Commonly known as:  DILAUDID Take 1 Tab by mouth every four (4) hours as needed for Pain. Max Daily Amount: 24 mg. Indications: PAIN  
  
 isoniazid 300 mg tablet Commonly known as:  NYDRAZID Take 300 mg by mouth daily. isosorbide mononitrate ER 60 mg CR tablet Commonly known as:  IMDUR Take 1 Tab by mouth every morning. LIPITOR 80 mg tablet Generic drug:  atorvastatin Take 80 mg by mouth daily. lisinopril 20 mg tablet Commonly known as:  Manueliot Dance Take  by mouth daily. NITROSTAT 0.4 mg SL tablet Generic drug:  nitroglycerin  
by SubLINGual route every five (5) minutes as needed. oxyCODONE IR 15 mg immediate release tablet Commonly known as:  Geena Beath Take 1-2 Tabs by mouth every eight (8) hours as needed. Max Daily Amount: 90 mg.  
  
 oxyCODONE-acetaminophen 7.5-325 mg per tablet Commonly known as:  PERCOCET Take 1 Tab by mouth every eight (8) hours as needed. Max Daily Amount: 3 Tabs. polyethylene glycol 17 gram packet Commonly known as:  Curtis Spotted Take 1 Packet by mouth daily. Indications: Constipation * Notice: This list has 6 medication(s) that are the same as other medications prescribed for you. Read the directions carefully, and ask your doctor or other care provider to review them with you. We Performed the Following AMB POC XRAY, FOOT; COMPLETE, 3+ VIEW [51030 CPT(R)] Patient Instructions You should follow up in 5 weeks. If your condition worsens, contact our office. Patient's blood pressure was elevated at today's office visit. Patient instructed to contact  primary care physician for treatment. The provider has ordered a custom brace/orthotic for you. This will be customized and made for you by an outside facility. Please contact the orthotist at one of the below offices for your custom fitting and follow up in the office with the doctor or his physicians assistant 3 weeks after you have been wearing the brace. Chelsea Velasquez at [facility]:  
 
[address] [facility], Luana Beltran Phone: [phone] Corns and Calluses: Care Instructions Your Care Instructions Corns and calluses are areas of thick, hard, dead skin. They form to protect your skin from injury. Corns usually form where toes rub together. Calluses often form on the hands or feet. They may form wherever the skin rubs against something, such as shoes. In most cases, you can take steps at home to care for a corn or callus. Follow-up care is a key part of your treatment and safety. Be sure to make and go to all appointments, and call your doctor if you are having problems. It's also a good idea to know your test results and keep a list of the medicines you take. How can you care for yourself at home? · Wear shoes and other footwear that fit correctly and are roomy. This will reduce rubbing and give corns or calluses time to heal. 
· Use protective pads, such as moleskin, to cushion the callus or corn. · Soften the corn or callus and remove the dead skin by using over-the-counter products such as salicylic acid. If you have a condition that causes problems with blood flow (such as coronary artery disease) or loss of feeling in your feet (such as diabetes), talk to your doctor before you try any home treatment. · Soak your corn or callus in warm water, and then use a pumice stone to rub dead skin away. · Wash your feet regularly, and rub lotion into your feet while they are still moist. Dry skin can cause a callus to crack and bleed. · Never cut the corn or callus yourself, especially if you have problems with blood flow to your legs or feet. When should you call for help? Call your doctor now or seek immediate medical care if: 
· You have signs of infection, such as: 
¨ Increased pain, swelling, warmth, or redness around the corn or callus. ¨ Red streaks leading from the corn or callus. ¨ Pus draining from the corn or callus. ¨ A fever. Watch closely for changes in your health, and be sure to contact your doctor if: 
· You have a blood flow problem, such as diabetes, and your corn or callus is bothering you. · You do not get better as expected. Where can you learn more? Go to http://suzanne-renetta.info/. Enter R244 in the search box to learn more about \"Corns and Calluses: Care Instructions. \" Current as of: October 13, 2016 Content Version: 11.2 © 0769-7021 [employer]. Care instructions adapted under license by [employer] (which disclaims liability or warranty for this information). If you have questions about a medical condition or this instruction, always ask your healthcare professional. [address] any warranty or liability for your use of this information. Introducing [facility]! Carmina Zhou introduces [facility] patient portal. Now you can access parts of your medical record, email your doctor's office, and request medication refills online. 1. In your internet browser, go to [email or web address] 2. Click on the First Time User? Click Here link in the Sign In box. You will see the New Member Sign Up page. 3. Enter your [facility] Access Code exactly as it appears below. You will not need to use this code after youve completed the sign-up process.  If you do not sign up before the expiration date, you must request a new code. · [employer]! Access Code: [facility] Expires: 7/16/2017 11:19 AM 
 
4. Enter the last four digits of your Social Security Number (xxxx) and Date of Birth (mm/dd/yyyy) as indicated and click Submit. You will be taken to the next sign-up page. 5. Create a Go World! ID. This will be your [employer]! login ID and cannot be changed, so think of one that is secure and easy to remember. 6. Create a [employer]! password. You can change your password at any time. 7. Enter your Password Reset Question and Answer. This can be used at a later time if you forget your password. 8. Enter your e-mail address. You will receive e-mail notification when new information is available in [address] 9. Click Sign Up. You can now view and download portions of your medical record. 10. Click the Download Summary menu link to download a portable copy of your medical information. If you have questions, please visit the Frequently Asked Questions section of the [employer] website. Remember, [employer]! is NOT to be used for urgent needs. For medical emergencies, dial 911. Now available from your iPhone and Android! Please provide this summary of care documentation to your next provider. Your primary care clinician is listed as [facility]. If you have any questions after today's visit, please call [phone].

## 2017-06-06 NOTE — PROCEDURES
DIAGNOSTIC STUDIES: X-rays of the right foot, three views:     1. There is calcification of the dorsalis pedis artery and the dorsalis pedis artery. There is some degenerative spurring at the dorsal part of the right navicular cuneiform with some spurring to the Achilles tendon at the insertion point, large. 2. 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 3. There is some bone thickening to the lateral part of the fifth metatarsal.    4. Otherwise, there appears to be some degenerative changes across the navicular cuneiform, number one navicular cuneiform region at the calcaneal cuboid joint or the transverse tarsal joint number one, and appears to be some intercuneiform joint OA. 5. Otherwise, I see no osteolytic or osteoblastic lesions are seen. No fracture, subluxation, or dislocation.

## 2017-06-08 ENCOUNTER — OFFICE VISIT (OUTPATIENT)
Dept: ORTHOPEDIC SURGERY | Age: 62
End: 2017-06-08

## 2017-06-08 VITALS
BODY MASS INDEX: 30.35 KG/M2 | HEART RATE: 88 BPM | SYSTOLIC BLOOD PRESSURE: 140 MMHG | WEIGHT: 229 LBS | HEIGHT: 73 IN | RESPIRATION RATE: 16 BRPM | OXYGEN SATURATION: 99 % | DIASTOLIC BLOOD PRESSURE: 78 MMHG | TEMPERATURE: 97.7 F

## 2017-06-08 DIAGNOSIS — M47.899 FACET SYNDROME: Primary | ICD-10-CM

## 2017-06-08 DIAGNOSIS — M79.18 MYOFASCIAL PAIN SYNDROME: ICD-10-CM

## 2017-06-08 RX ORDER — CREAM BASE NO.171
240 CREAM (GRAM) MISCELLANEOUS 4 TIMES DAILY
Qty: 240 G | Refills: 3 | Status: SHIPPED | OUTPATIENT
Start: 2017-06-08 | End: 2020-01-20

## 2017-06-08 NOTE — PROGRESS NOTES
[address]  [address]  [place] [address]  Phone: [phone]  Fax: [phone]        Charleen Gunter  : 1955  PCP: Leola Pickering MD  2017    PROGRESS NOTE      ASSESSMENT AND PLAN    Yoav Escobedo comes in to the office today for bilateral L2-3 facet joint injections. The injections did not provide any relief. At this point, we will avoid further injection. In the future, interlaminar injections may be helpful. I referred him to pain management for continue treatment due to his chronic pain. I also prescribed him a topical compound cream for generalized back discomfort. He will also get the BUE EMG done that we talked about in the last visit. Pt will f/u in 2 weeks or sooner as needed. Mal Mejia was seen today for back pain. Diagnoses and all orders for this visit:    Facet syndrome  -     REFERRAL TO PAIN MANAGEMENT    Myofascial pain syndrome  -     REFERRAL TO PAIN MANAGEMENT  -     COMPOUNDMAX BASE crea; 240 g by Does Not Apply route four (4) times daily. Anti-Inflam Diclofenac Sodium 3%Gabapentin 3% Lidocaine 2% Prilocaine 2%         Follow-up Disposition:  Return in about 2 weeks (around 2017), or if symptoms worsen or fail to improve. HISTORY OF PRESENT ILLNESS  Yoav Escobedo is a 57 y.o. male. A&P / HPI from 2017: Yoav Escobedo comes in to the office today c/o 10/10 lumbar pain x 18 years. He previously saw Dr. Neelima Prescott and [facility] for a right hip replacement. He was referred here because he began to experience worsening lumbar pain afterwards. Pt also has complaints of bilateral hand numbness. He has had a lumbar CT which shows an intact fusion from L3-4 to L5-S1. There is also moderate central stenosis and severe facet hypertrophy at L2-3. Pt notes he previously had injections which provided good relief for several weeks.  This may have been the right L2-3 facet joint injections he had with  Miguel Cain in 2011. His pain is likely due to facet syndrome.     He was referred for bilateral L2-3 facet joint injections. Pt was also referred for a BUE EMG for further evaluation of his bilateral hand numbness. He was referred to pain management to manage his chronic pain.     Pt will f/u in 3 weeks or prn. Updates from 06/08/17:  Pt presents for bilateral L2-3 facet joint injections. The injections did not provide any relief. He did not get the BUE EMG. He is having continued pain in the lumbar region. PAST MEDICAL HISTORY   Past Medical History:   Diagnosis Date    Arthritis     CAD (coronary artery disease)     Essential hypertension     Low back pain     Lumbar postlaminectomy syndrome     Lumbar spondylosis     Myocardial infarction ([facility] [address]     lad stent for stemi,12/2012    Pancreatic cyst        Past Surgical History:   Procedure Laterality Date    HX HEART CATHETERIZATION      HX LUMBAR FUSION      HX ORTHOPAEDIC      lumbar spine x 5    HX ORTHOPAEDIC      Right hip replacement    HX PTCA     . MEDICATIONS      Current Outpatient Prescriptions   Medication Sig Dispense Refill    ammonium lactate (LAC-HYDRIN) 12 % topical cream Apply  to affected area two (2) times a day. rub in to affected area well 280 g 0    HYDROcodone-acetaminophen (NORCO) 7.5-325 mg per tablet Take 1 Tab by mouth every eight (8) hours as needed for Pain. Max Daily Amount: 3 Tabs. 60 Tab 0    clopidogrel (PLAVIX) 75 mg tab 75 mg.      oxyCODONE-acetaminophen (PERCOCET) 7.5-325 mg per tablet Take 1 Tab by mouth every eight (8) hours as needed. Max Daily Amount: 3 Tabs. 60 Tab 0    cyclobenzaprine (FLEXERIL) 10 mg tablet Take 1 Tab by mouth three (3) times daily as needed for Muscle Spasm(s). 90 Tab 0    oxyCODONE IR (ROXICODONE) 15 mg immediate release tablet Take 1-2 Tabs by mouth every eight (8) hours as needed.  Max Daily Amount: 90 mg. 60 Tab 0    HYDROmorphone (DILAUDID) 4 mg tablet Take 1 Tab by mouth every four (4) hours as needed for Pain. Max Daily Amount: 24 mg. Indications: PAIN 60 Tab 0    HYDROmorphone (DILAUDID) 4 mg tablet Take 1 Tab by mouth every four (4) hours as needed. Max Daily Amount: 24 mg. Indications: PAIN 64 Tab 0    polyethylene glycol (MIRALAX) 17 gram packet Take 1 Packet by mouth daily. Indications: Constipation 30 Packet 1    aspirin (ASPIRIN) 325 mg tablet Take 1 Tab by mouth two (2) times a day. 60 Tab 0    gabapentin (NEURONTIN) 300 mg capsule Take 1 Cap by mouth three (3) times daily. 90 Cap 1    gabapentin (NEURONTIN) 300 mg capsule       nitroglycerin (NITROSTAT) 0.4 mg SL tablet by SubLINGual route every five (5) minutes as needed.  carvedilol (COREG) 25 mg tablet Take 25 mg by mouth two (2) times daily (with meals).  hydrochlorothiazide (HYDRODIURIL) 25 mg tablet Take 25 mg by mouth daily.  lisinopril (PRINIVIL, ZESTRIL) 20 mg tablet Take  by mouth daily.  atorvastatin (LIPITOR) 80 mg tablet Take 80 mg by mouth daily.  HYDROcodone-acetaminophen (NORCO)  mg tablet Take 1 Tab by mouth every eight (8) hours as needed for Pain. Max Daily Amount: 3 Tabs. 60 Tab 0    ferrous sulfate 325 mg (65 mg iron) tablet Take 1 Tab by mouth two (2) times daily (with meals). 60 Tab 2    doxycycline (ADOXA) 100 mg tablet Take 1 Tab by mouth two (2) times a day. 20 Tab 0    isoniazid (NYDRAZID) 300 mg tablet Take 300 mg by mouth daily.  isosorbide mononitrate ER (IMDUR) 60 mg CR tablet Take 1 Tab by mouth every morning.  30 Tab 6        ALLERGIES    Allergies   Allergen Reactions    Vicodin [Hydrocodone-Acetaminophen] Other (comments)     denies          SOCIAL HISTORY    Social History     Social History    Marital status: SINGLE     Spouse name: N/A    Number of children: N/A    Years of education: N/A     Social History Main Topics    Smoking status: Former Smoker     Packs/day: 0.10     Types: Cigarettes     Quit date: 11/1/2012    Smokeless tobacco: Never Used    Alcohol use Yes      Comment: occasional    Drug use: Yes     Special: Cocaine      Comment: +Cocaine Screen 3/7/16 (see [address] [place] [address])    Sexual activity: Not Asked      Comment: stopped using     Other Topics Concern    None     Social History Narrative     Social History Narrative      Problem Relation Age of Onset    Heart Attack Neg Hx     Heart Surgery Neg Hx          REVIEW OF SYSTEMS  Review of Systems   Constitutional: Negative for chills, diaphoresis, fever, malaise/fatigue and weight loss. Respiratory: Negative for shortness of breath. Cardiovascular: Negative for chest pain and leg swelling. Gastrointestinal: Negative for constipation, nausea and vomiting. Neurological: Negative for dizziness, tingling, seizures, loss of consciousness, weakness and headaches. Psychiatric/Behavioral: The patient does not have insomnia. PHYSICAL EXAMINATION  Visit Vitals    /78    Pulse 88    Temp 97.7 °F (36.5 °C) (Oral)    Resp 16    Ht 6' 1\" (1.854 m)    Wt 229 lb (103.9 kg)    SpO2 99%    BMI 30.21 kg/m2       Pain Assessment  6/6/2017   Location of Pain Foot   Location Modifiers Right   Severity of Pain 10   Quality of Pain Sharp; Aching   Duration of Pain Persistent   Frequency of Pain Constant   Aggravating Factors Walking;Standing   Limiting Behavior Yes   Relieving Factors Nothing   Result of Injury No           Constitutional:  Well developed, well nourished, in no acute distress. Psychiatric: Affect and mood are appropriate. Integumentary: No rashes or abrasions noted on exposed areas. SPINE/MUSCULOSKELETAL EXAM    Cervical spine:  Neck is midline. Normal muscle tone. No focal atrophy is noted. ROM pain free. Shoulder ROM intact. Mild tenderness to palpation. Negative Spurling's sign. Negative Tinel's sign.    Negative Canseco's sign.       Sensation in the bilateral arms grossly intact to light touch.      Lumbar spine:  No rash, ecchymosis, or gross obliquity. No fasciculations. No focal atrophy is noted. No pain with hip ROM. Full range of motion. Mild tenderness to palpation . No tenderness to palpation at the sciatic notch. SI joints non-tender. Trochanters non tender. Pain with back extension.      Sensation in the bilateral legs grossly intact to light touch.     Updates from 06/08/17:  Tenderness in the lumbar region. MOTOR:      Biceps  Triceps Deltoids Wrist Ext Wrist Flex Hand Intrin   Right 5/5 5/5 5/5 5/5 5/5 5/5   Left 5/5 5/5 5/5 5/5 5/5 5/5             Hip Flex  Quads Hamstrings Ankle DF EHL Ankle PF   Right 5/5 5/5 5/5 5/5 5/5 5/5   Left 5/5 5/5 5/5 5/5 5/5 5/5     DTRs are 2+ biceps, triceps, brachioradialis, patella, and Achilles.     Negative Straight Leg raise. Squat not tested. No difficulty with tandem gait.      Ambulation with walker. FWB.       RADIOGRAPHS  Lumbar CT images taken on 3/14/2017 personally reviewed with patient:  FINDINGS:      There are only 4 non-rib-bearing vertebral bodies in the lumbar region. This  may be related to supernumerary ribs, i.e. 13 pairs of ribs. True 4 lumbar  vertebrae would be rarer than supernumerary ribs. Attempts to confirm 13 pairs  of ribs on the chest x-ray is not successful due to incomplete coverage of the  area of interest on the 1/16/17 study. For the purpose of labeling the  vertebrae consistently, the most caudal lumbar region vertebral body with ribs  is designated arbitrarily as L1 in this report.      No convincing evidence of acute fracture is detected. Mild to moderate decrease  in disc height is identified at L2-3 with vacuum disc phenomenon and prominent  endplate osteophytes. The superior endplate of L3 is irregular, with increased  sclerotic appearance. At L5-S1, minimal grade 1 spondylolisthesis is observed,  with offset of about 0.2-0.3 cm.      T11-12: Mild bilobed disc bulge. Bilateral moderate facet hypertrophy. Bilateral foraminal narrowing. Tiny focus of vacuum disc in the  anterior-inferior aspect of the disc space.      T12-L1: Subtle tiny vacuum disc focus. No convincing evidence for disc bulge  or protrusion. Bilateral moderate facet hypertrophy. Subtle foraminal  narrowing on the left side. Minimal foraminal narrowing on the right side.      L1-2: Broad-based posterior central aspect calcified or ossified density  outlining the posterior longitudinal ligament ossification. Mild central canal  stenosis. No definite disc protrusion or herniation. Moderate to severe right  facet hypertrophy. Moderate left facet hypertrophy. Mild right foraminal  narrowing. No significant left foraminal narrowing.      L2-3: Broad-based disc-osteophyte. Severe bilateral facet hypertrophy. The  combination of these processes produces moderate central canal stenosis. Moderate bilateral foraminal narrowing, more pronounced on the right side than  the left.      L3-4: Fused level. Intact fusion hardware components. The osseous fusion  across the facets appear complete. Status post laminectomy. The beam scatter  artifact from the hardware components makes it difficult to assess the disc  space and spinal canal confidently. No convincing evidence for critical  foraminal stenosis.      L4-5: Fused level. Intact fusion hardware components. The osseous fusion  across the facets appear complete. Status post laminectomy. The fusion  hardware components produce significant scatter artifact making it difficult to  assess the disc space and the foramina. There is suggestion of mild right  foraminal narrowing.      L5-S1: Fused level. Intact fusion hardware components. The osseous fusion  across the facets appear complete. Status post laminectomy. Similar to the  above levels, the beam scatter artifact from the fusion hardware components  makes it difficult to assess the neural foramina confidently.  There is  suggestion of mild to moderate foraminal narrowing on the left side. Minimal to  mild foraminal narrowing may be present on the right side.      The incidentally visualized portions of the retroperitoneal structures are  unremarkable.      IMPRESSION  IMPRESSION:      1. There are only 4 non-rib-bearing lumbar type vertebrae in the lumbar region. No evidence of sacralization of L5. The most likely explanation is  supernumerary ribs, i.e. 13th pair of ribs at L1. Cannot prove the presence of  supernumerary ribs with current set of exams. Thoracic spine series and lumbar  spine series could be helpful to confirm.      2. Disc-osteophyte at L2-3 producing central canal stenosis.      3. Multilevel neural foraminal narrowing involving both the thoracic and lumbar  regions.      4. Facet arthropathy in the thoracic and lumbar spine.      reviewed     Mr. Raúl Chery has a reminder for a \"due or due soon\" health maintenance. I have asked that he contact his primary care provider for follow-up on this health maintenance.      This plan was reviewed with the patient and patient agrees. All questions were answered. More than half of this visit today was spent on counseling. Written by Alia Desai, as dictated by Dr. Madhavi Hood. I, Dr. Madhavi Hodo, confirm that all documentation is accurate.

## 2017-06-08 NOTE — MR AVS SNAPSHOT
Visit Information Date & Time Provider Department Dept. Phone Encounter #  
 6/8/2017  1:00 PM Vicky Beckham MD [facility] [phone] [id number] Follow-up Instructions Return in about 2 weeks (around 6/22/2017), or if symptoms worsen or fail to improve. Your Appointments 6/9/2017 10:40 AM  
EMG with Adela Dahl MD  
[address]) Appt Note: Dr. Bharath Nair; upper extremities; order in cc; new pt pkt mld. .. Asad Elliott Asad Elliott Asad Elliott ywp  
 [facility] [address] [facility] [address]  
[phone]  
  
   
 [facility] [address]  
  
    
 7/11/2017 11:10 AM  
Follow Up with Megan Ward MD  
[facility] ([address] [place] [address]) Appt Note: 5wk fu  
 [address], [address] [place] [address]  
[phone] [address] [place]  
  
    
 8/3/2017 10:00 AM  
Follow Up with Sonu Domingo PA-C  
[facility] [address] [place] [address]) Appt Note: 3 M FU RT HIP  
 [address] [place] [address], [address] [facility] [address] [phone]  
  
   
 [address] [place] [address], [address] [place] [id number] Upcoming Health Maintenance Date Due Hepatitis C Screening 1955 DTaP/Tdap/Td series (1 - Tdap) 5/19/1974 FOBT Q 1 YEAR AGE 50-75 5/19/2003 ZOSTER VACCINE AGE 60> 5/19/2013 INFLUENZA AGE 9 TO ADULT 8/1/2017 Allergies as of 6/8/2017  Review Complete On: 6/8/2017 By: Per Law Severity Noted Reaction Type Reactions Vicodin [Hydrocodone-acetaminophen]    Other (comments) denies Current Immunizations  Never Reviewed No immunizations on file. Not reviewed this visit You Were Diagnosed With   
  
 Codes Comments Facet syndrome    -  Primary ICD-10-CM: M12.88 ICD-9-CM: 724.8 Myofascial pain syndrome     ICD-10-CM: M79.1 ICD-9-CM: 729.1 Vitals BP Pulse Temp Resp Height(growth percentile) Weight(growth percentile) 140/78 88 97.7 °F (36.5 °C) (Oral) 16 6' 1\" (1.854 m) 229 lb (103.9 kg) SpO2 BMI Smoking Status 99% 30.21 kg/m2 Former Smoker BMI and BSA Data Body Mass Index Body Surface Area  
 30.21 kg/m 2 2.31 m 2 Preferred Pharmacy Pharmacy Name Phone [facility] [address], [address] [facility] Your Updated Medication List  
  
   
This list is accurate as of: 6/8/17  1:35 PM.  Always use your most recent med list.  
  
  
  
  
 ammonium lactate 12 % topical cream  
Commonly known as:  LAC-HYDRIN Apply  to affected area two (2) times a day. rub in to affected area well  
  
 aspirin 325 mg tablet Commonly known as:  ASPIRIN Take 1 Tab by mouth two (2) times a day. carvedilol 25 mg tablet Commonly known as:  Edelmira Breed Take 25 mg by mouth two (2) times daily (with meals). clopidogrel 75 mg Tab Commonly known as:  PLAVIX 75 mg.  
  
 cyclobenzaprine 10 mg tablet Commonly known as:  FLEXERIL Take 1 Tab by mouth three (3) times daily as needed for Muscle Spasm(s). doxycycline 100 mg tablet Commonly known as:  ADOXA Take 1 Tab by mouth two (2) times a day. ferrous sulfate 325 mg (65 mg iron) tablet Take 1 Tab by mouth two (2) times daily (with meals). * gabapentin 300 mg capsule Commonly known as:  NEURONTIN  
  
 * gabapentin 300 mg capsule Commonly known as:  NEURONTIN Take 1 Cap by mouth three (3) times daily. hydroCHLOROthiazide 25 mg tablet Commonly known as:  HYDRODIURIL Take 25 mg by mouth daily. * HYDROcodone-acetaminophen  mg tablet Commonly known as:  Arin Safe Take 1 Tab by mouth every eight (8) hours as needed for Pain. Max Daily Amount: 3 Tabs. * HYDROcodone-acetaminophen 7.5-325 mg per tablet Commonly known as:  Arin Safe  
 Take 1 Tab by mouth every eight (8) hours as needed for Pain. Max Daily Amount: 3 Tabs. * HYDROmorphone 4 mg tablet Commonly known as:  DILAUDID Take 1 Tab by mouth every four (4) hours as needed. Max Daily Amount: 24 mg. Indications: PAIN  
  
 * HYDROmorphone 4 mg tablet Commonly known as:  DILAUDID Take 1 Tab by mouth every four (4) hours as needed for Pain. Max Daily Amount: 24 mg. Indications: PAIN  
  
 isoniazid 300 mg tablet Commonly known as:  NYDRAZID Take 300 mg by mouth daily. isosorbide mononitrate ER 60 mg CR tablet Commonly known as:  IMDUR Take 1 Tab by mouth every morning. LIPITOR 80 mg tablet Generic drug:  atorvastatin Take 80 mg by mouth daily. lisinopril 20 mg tablet Commonly known as:  Minus Salk Take  by mouth daily. NITROSTAT 0.4 mg SL tablet Generic drug:  nitroglycerin  
by SubLINGual route every five (5) minutes as needed. oxyCODONE IR 15 mg immediate release tablet Commonly known as:  Charlyne Champ Take 1-2 Tabs by mouth every eight (8) hours as needed. Max Daily Amount: 90 mg.  
  
 oxyCODONE-acetaminophen 7.5-325 mg per tablet Commonly known as:  PERCOCET Take 1 Tab by mouth every eight (8) hours as needed. Max Daily Amount: 3 Tabs. polyethylene glycol 17 gram packet Commonly known as:  Brent Hoops Take 1 Packet by mouth daily. Indications: Constipation * Notice: This list has 6 medication(s) that are the same as other medications prescribed for you. Read the directions carefully, and ask your doctor or other care provider to review them with you. We Performed the Following REFERRAL TO PAIN MANAGEMENT [[id number] Custom] Comments:  
 Medication manage for chronic myofascial lumbar pain Follow-up Instructions Return in about 2 weeks (around 6/22/2017), or if symptoms worsen or fail to improve. Referral Information Referral ID Referred By Referred To [id number] [facility] Not Available Visits Status Start Date End Date 1 New Request 6/8/17 6/8/18 If your referral has a status of pending review or denied, additional information will be sent to support the outcome of this decision. Introducing [facility] SERVICES! Denisenena Olivia introduces [employer] patient portal. Now you can access parts of your medical record, email your doctor's office, and request medication refills online. 1. In your internet browser, go to [email or web address] 2. Click on the First Time User? Click Here link in the Sign In box. You will see the New Member Sign Up page. 3. Enter your [employer] Access Code exactly as it appears below. You will not need to use this code after youve completed the sign-up process. If you do not sign up before the expiration date, you must request a new code. · [employer] Access Code: [facility] Expires: 7/16/2017 11:19 AM 
 
4. Enter the last four digits of your Social Security Number (xxxx) and Date of Birth (mm/dd/yyyy) as indicated and click Submit. You will be taken to the next sign-up page. 5. Create a [employer] ID. This will be your [employer] login ID and cannot be changed, so think of one that is secure and easy to remember. 6. Create a [employer] password. You can change your password at any time. 7. Enter your Password Reset Question and Answer. This can be used at a later time if you forget your password. 8. Enter your e-mail address. You will receive e-mail notification when new information is available in [address] 9. Click Sign Up. You can now view and download portions of your medical record. 10. Click the Download Summary menu link to download a portable copy of your medical information. If you have questions, please visit the Frequently Asked Questions section of the [employer] website. Remember, [employer] is NOT to be used for urgent needs. For medical emergencies, dial 911. Now available from your iPhone and Android! Please provide this summary of care documentation to your next provider. Your primary care clinician is listed as [facility]. If you have any questions after today's visit, please call [phone].

## 2017-06-08 NOTE — PATIENT INSTRUCTIONS

## 2017-06-09 ENCOUNTER — OFFICE VISIT (OUTPATIENT)
Dept: NEUROLOGY | Age: 62
End: 2017-06-09

## 2017-06-09 DIAGNOSIS — G56.03 BILATERAL CARPAL TUNNEL SYNDROME: Primary | ICD-10-CM

## 2017-06-09 NOTE — LETTER
6/9/2017 11:47 AM 
 
Patient:  Beena Sarmiento YOB: 1955 Date of Visit: 6/9/2017 Dear Viry Henley MD 
[address] [facility] [address] [place] [address] VIA Facsimile: [phone] Michael Hurtado MD 
[address] [place] [address] 200 [facility] [address] VIA In Basket 
 : Thank you for referring Mr. Beena Sarmiento to me for evaluation/treatment. Below are the relevant portions of my assessment and plan of care. [facility] [address] [facility], [address] 
[phone]      [place] [address] Neurophysiology Report Patient: Cheng Loo ID: [id number] Physician: Rishabh Pelaez. Spencer Mcmahon MD  
Gender: Male Ref Phys: Laurence Rodrigues MD  
Handedness:     
Study Date: June 9, 2017 Patient History: This 64-year-old gentleman has had difficulty with numbness of both of his hands for the last 6 years. He does have some neck pain. On brief exam he has intact strength. Sensation is intact to pinprick. Tinel sign is strongly positive on the right greater than on the left side. Nerve Conduction Studies Anti Sensory Summary Table Stim Site NR Peak (ms) Norm Peak (ms) O-P Amp (µV) Norm O-P Amp Dist (cm) Sesar (m/s) Left Median 2nd Digit Anti Sensory (2nd Digit) Wrist    5.5 <3.5 2.0 >20 13.0 41.9 Site 2    4.6  3.9 Site 3    4.1  3.7 Right Median 2nd Digit Anti Sensory (2nd Digit) Wrist    6.5 <3.5 2.1 >20 13.0 21.7 Site 2    7.9  2.2 Site 3    3.4  2.2 Left Ulnar Anti Sensory (5th Digit ) Wrist    3.6 <3.1 4.7 >17.0 11.0 42.3 Site 2    3.5  4.1 Site 3    3.6  2.1 Site 4    3.4  5.1 Site 5    3.7  4.1 Right Ulnar Anti Sensory (5th Digit ) Wrist    7.0 <3.1 6.7 >17.0 11.0 17.2 Site 2    3.4  4.5 Site 3    6.9  10.1 Site 4    3.4  4.0 Motor Summary Table Stim Site NR Onset (ms) Norm Onset (ms) O-P Amp (mV) Norm O-P Amp Dist (cm) Sesar (m/s) Norm Sesar (m/s) Left Median Motor (Abd Poll Brev) Wrist    4.5 <4.4 13.0 >4.0 24.0 55.8 >49 Elbow    8.8  11.3 Right Median Motor (Abd Poll Brev) Wrist    4.8 <4.4 9.3 >4.0 26.0 57.8 >49 Elbow    9.3  5.0 Left Ulnar Motor (Abd Dig Minimi ) Wrist    3.0 <3.3 5.1 >6.0 25.0 61.0 >49 B Elbow    7.1  4.3  11.0 50.0 >50 A Elbow    9.3  5.4 Right Ulnar Motor (Abd Dig Minimi ) Wrist    3.0 <3.3 6.1 >6.0 26.0 60.5 >49 B Elbow    7.3  6.5  12.0 52.2 >50 A Elbow    9.6  4.9 EMG Side Muscle Nerve Root Ins Act Fibs Psw Fasc Amp Dur Poly Recrt Int Marvie Kasal Comment Right Deltoid Axillary C5-6 Nml Nml Nml None Nml Nml 0 Nml Nml Right Biceps Musculocut C5-6 Nml Nml Nml None Nml Nml 0 Nml Nml Right Triceps Radial C6-7-8 Nml Nml Nml None Nml Nml 0 Nml Nml Right FlexCarRad Median C6-7 Nml Nml Nml None Nml Nml 0 Nml Nml Right 1stDorInt Ulnar C8-T1 Nml Nml Nml None Nml Nml 0 Nml Nml Right Abd Poll Brev Median C8-T1 Nml Nml Nml None Nml Nml 0 Nml Nml Right Cervical Parasp Up Rami C1-3 Nml Nml Nml Right Cervical Parasp Mid Rami C4-6 Nml Nml Nml Right Cervical Parasp Low Rami C7-8 Nml Nml Nml NCS/EMG FINDINGS: 
 
? Evaluation of the Left median motor and the Right median motor nerves showed prolonged distal onset latency (L4.5, R4.8 ms). ? The Left ulnar motor and the Right ulnar motor nerves were unremarkable. ? The Left Median 2nd Digit sensory and the Right Median 2nd Digit sensory nerves showed prolonged distal peak latency (L5.5, R6.5 ms), reduced amplitude (L2.0, R2.1 µV), and decreased conduction velocity (Wrist-2nd Digit, L41.9, R21.7 m/s). ? The Left ulnar sensory and the Right ulnar sensory nerves showed prolonged distal peak latency (L3.6, R7.0 ms), reduced amplitude (L4.7, R6.7 µV), and decreased conduction velocity (Wrist-5th Digit, L42.3, R17.2 m/s). INTERPRETATION: This was an abnormal nerve conduction and EMG study showing it to be signs of: 
 
 (1) prolongation of the distal latency in both median nerves consistent with a moderate degree of carpal tunnel syndrome bilaterally. (2) decreased sensory nerve action potentials in both ulnar nerves suggestive of a axonal sensory neuropathy. ___________________________ Shelia Louise MD 
 
 
 
 
Waveforms: If you have questions, please do not hesitate to call me. I look forward to following Mr. Clarke Him along with you.  
 
 
 
Sincerely, 
 
 
Reynold Jaramillo MD

## 2017-06-09 NOTE — PROGRESS NOTES
[facility]  [address] [facility] [address], [address]  [phone]      [facility] 117    Neurophysiology Report      Patient: Romi Marsh     ID: [id number] Physician: Manasa Neal. Weston Greer MD   Gender: Male Ref Phys: Scar Paul MD   Handedness:      Study Date: June 9, 2017         Patient History: This 58-year-old gentleman has had difficulty with numbness of both of his hands for the last 6 years. He does have some neck pain. On brief exam he has intact strength. Sensation is intact to pinprick. Tinel sign is strongly positive on the right greater than on the left side.       Nerve Conduction Studies  Anti Sensory Summary Table     Stim Site NR Peak (ms) Norm Peak (ms) O-P Amp (µV) Norm O-P Amp Dist (cm) Sesar (m/s)   Left Median 2nd Digit Anti Sensory (2nd Digit)   Wrist    5.5 <3.5 2.0 >20 13.0 41.9   Site 2    4.6  3.9      Site 3    4.1  3.7      Right Median 2nd Digit Anti Sensory (2nd Digit)   Wrist    6.5 <3.5 2.1 >20 13.0 21.7   Site 2    7.9  2.2      Site 3    3.4  2.2      Left Ulnar Anti Sensory (5th Digit )   Wrist    3.6 <3.1 4.7 >17.0 11.0 42.3   Site 2    3.5  4.1      Site 3    3.6  2.1      Site 4    3.4  5.1      Site 5    3.7  4.1      Right Ulnar Anti Sensory (5th Digit )   Wrist    7.0 <3.1 6.7 >17.0 11.0 17.2   Site 2    3.4  4.5      Site 3    6.9  10.1      Site 4    3.4  4.0        Motor Summary Table     Stim Site NR Onset (ms) Norm Onset (ms) O-P Amp (mV) Norm O-P Amp Dist (cm) Sesar (m/s) Norm Sesar (m/s)   Left Median Motor (Abd Poll Brev)   Wrist    4.5 <4.4 13.0 >4.0 24.0 55.8 >49   Elbow    8.8  11.3       Right Median Motor (Abd Poll Brev)   Wrist    4.8 <4.4 9.3 >4.0 26.0 57.8 >49   Elbow    9.3  5.0       Left Ulnar Motor (Abd Dig Minimi )   Wrist    3.0 <3.3 5.1 >6.0 25.0 61.0 >49   B Elbow    7.1  4.3  11.0 50.0 >50   A Elbow    9.3  5.4       Right Ulnar Motor (Abd Dig Minimi )   Wrist    3.0 <3.3 6.1 >6.0 26.0 60.5 >49   B Elbow    7.3  6.5  12.0 52.2 >50   A Elbow    9.6  4.9           EMG     Side Muscle Nerve Root Ins Act Fibs Psw Fasc Amp Dur Poly Recrt Int [facility] Comment   Right Deltoid Axillary C5-6 Nml Nml Nml None Nml Nml 0 Nml Nml    Right Biceps Musculocut C5-6 Nml Nml Nml None Nml Nml 0 Nml Nml    Right Triceps Radial C6-7-8 Nml Nml Nml None Nml Nml 0 Nml Nml    Right FlexCarRad Median C6-7 Nml Nml Nml None Nml Nml 0 Nml Nml    Right 1stDorInt Ulnar C8-T1 Nml Nml Nml None Nml Nml 0 Nml Nml    Right Abd Poll Brev Median C8-T1 Nml Nml Nml None Nml Nml 0 Nml Nml    Right Cervical Parasp Up Rami C1-3 Nml Nml Nml          Right Cervical Parasp Mid Rami C4-6 Nml Nml Nml          Right Cervical Parasp Low Rami C7-8 Nml Nml Nml            NCS/EMG FINDINGS:     Evaluation of the Left median motor and the Right median motor nerves showed prolonged distal onset latency (L4.5, R4.8 ms).  The Left ulnar motor and the Right ulnar motor nerves were unremarkable.  The Left Median 2nd Digit sensory and the Right Median 2nd Digit sensory nerves showed prolonged distal peak latency (L5.5, R6.5 ms), reduced amplitude (L2.0, R2.1 µV), and decreased conduction velocity (Wrist-2nd Digit, L41.9, R21.7 m/s).  The Left ulnar sensory and the Right ulnar sensory nerves showed prolonged distal peak latency (L3.6, R7.0 ms), reduced amplitude (L4.7, R6.7 µV), and decreased conduction velocity (Wrist-5th Digit, L42.3, R17.2 m/s). INTERPRETATION: This was an abnormal nerve conduction and EMG study showing it to be signs of:     (1) prolongation of the distal latency in both median nerves consistent with a moderate degree of carpal tunnel syndrome bilaterally. (2) decreased sensory nerve action potentials in both ulnar nerves suggestive of a axonal sensory neuropathy. ___________________________  Denita Miller MD          Waveforms:                      [facility]  OFFICE PROCEDURE PROGRESS NOTE        Chart reviewed for the following:   I, Carlos Alberto Domínguez MD, have reviewed the History, Physical and updated the Allergic reactions for [address] performed immediately prior to start of procedure:   Dixie Benavidez MD, have performed the following reviews on Liza Ek prior to the start of the procedure:            * Patient was identified by name and date of birth   * Agreement on procedure being performed was verified  * Risks and Benefits explained to the patient  * Procedure site verified and marked as necessary  * Patient was positioned for comfort  * Consent was signed and verified     Time: 11:46 AM    Date of procedure: 6/9/2017    Procedure performed by:  Carlos Alberto Domínguez MD    Provider assisted by: Federica Helton     Patient assisted by: self    How tolerated by patient: tolerated the procedure well with no complications    Post Procedural Pain Scale: 0 - No Hurt    Comments: none

## 2017-06-09 NOTE — COMMUNICATION BODY
[facility]  [address] [facility] [address], [address]  [phone]      [facility] 117    Neurophysiology Report      Patient: Stalin Laughlin     ID: [id number] Physician: Mulugeta Grimaldo. Susy Thomas MD   Gender: Male Ref Phys: Palomo Adorno MD   Handedness:      Study Date: June 9, 2017         Patient History: This 27-year-old gentleman has had difficulty with numbness of both of his hands for the last 6 years. He does have some neck pain. On brief exam he has intact strength. Sensation is intact to pinprick. Tinel sign is strongly positive on the right greater than on the left side.       Nerve Conduction Studies  Anti Sensory Summary Table     Stim Site NR Peak (ms) Norm Peak (ms) O-P Amp (µV) Norm O-P Amp Dist (cm) Sesar (m/s)   Left Median 2nd Digit Anti Sensory (2nd Digit)   Wrist    5.5 <3.5 2.0 >20 13.0 41.9   Site 2    4.6  3.9      Site 3    4.1  3.7      Right Median 2nd Digit Anti Sensory (2nd Digit)   Wrist    6.5 <3.5 2.1 >20 13.0 21.7   Site 2    7.9  2.2      Site 3    3.4  2.2      Left Ulnar Anti Sensory (5th Digit )   Wrist    3.6 <3.1 4.7 >17.0 11.0 42.3   Site 2    3.5  4.1      Site 3    3.6  2.1      Site 4    3.4  5.1      Site 5    3.7  4.1      Right Ulnar Anti Sensory (5th Digit )   Wrist    7.0 <3.1 6.7 >17.0 11.0 17.2   Site 2    3.4  4.5      Site 3    6.9  10.1      Site 4    3.4  4.0        Motor Summary Table     Stim Site NR Onset (ms) Norm Onset (ms) O-P Amp (mV) Norm O-P Amp Dist (cm) Sesar (m/s) Norm Sesar (m/s)   Left Median Motor (Abd Poll Brev)   Wrist    4.5 <4.4 13.0 >4.0 24.0 55.8 >49   Elbow    8.8  11.3       Right Median Motor (Abd Poll Brev)   Wrist    4.8 <4.4 9.3 >4.0 26.0 57.8 >49   Elbow    9.3  5.0       Left Ulnar Motor (Abd Dig Minimi )   Wrist    3.0 <3.3 5.1 >6.0 25.0 61.0 >49   B Elbow    7.1  4.3  11.0 50.0 >50   A Elbow    9.3  5.4       Right Ulnar Motor (Abd Dig Minimi )   Wrist    3.0 <3.3 6.1 >6.0 26.0 60.5 >49   B Elbow    7.3  6.5  12.0 52.2 >50   A Elbow    9.6  4.9           EMG     Side Muscle Nerve Root Ins Act Fibs Psw Fasc Amp Dur Poly Recrt Int Jose Rile Comment   Right Deltoid Axillary C5-6 Nml Nml Nml None Nml Nml 0 Nml Nml    Right Biceps Musculocut C5-6 Nml Nml Nml None Nml Nml 0 Nml Nml    Right Triceps Radial C6-7-8 Nml Nml Nml None Nml Nml 0 Nml Nml    Right FlexCarRad Median C6-7 Nml Nml Nml None Nml Nml 0 Nml Nml    Right 1stDorInt Ulnar C8-T1 Nml Nml Nml None Nml Nml 0 Nml Nml    Right Abd Poll Brev Median C8-T1 Nml Nml Nml None Nml Nml 0 Nml Nml    Right Cervical Parasp Up Rami C1-3 Nml Nml Nml          Right Cervical Parasp Mid Rami C4-6 Nml Nml Nml          Right Cervical Parasp Low Rami C7-8 Nml Nml Nml            NCS/EMG FINDINGS:     Evaluation of the Left median motor and the Right median motor nerves showed prolonged distal onset latency (L4.5, R4.8 ms).  The Left ulnar motor and the Right ulnar motor nerves were unremarkable.  The Left Median 2nd Digit sensory and the Right Median 2nd Digit sensory nerves showed prolonged distal peak latency (L5.5, R6.5 ms), reduced amplitude (L2.0, R2.1 µV), and decreased conduction velocity (Wrist-2nd Digit, L41.9, R21.7 m/s).  The Left ulnar sensory and the Right ulnar sensory nerves showed prolonged distal peak latency (L3.6, R7.0 ms), reduced amplitude (L4.7, R6.7 µV), and decreased conduction velocity (Wrist-5th Digit, L42.3, R17.2 m/s). INTERPRETATION: This was an abnormal nerve conduction and EMG study showing it to be signs of:     (1) prolongation of the distal latency in both median nerves consistent with a moderate degree of carpal tunnel syndrome bilaterally. (2) decreased sensory nerve action potentials in both ulnar nerves suggestive of a axonal sensory neuropathy. ___________________________  Adrian Stevenson MD          Waveforms:

## 2017-06-22 ENCOUNTER — OFFICE VISIT (OUTPATIENT)
Dept: ORTHOPEDIC SURGERY | Age: 62
End: 2017-06-22

## 2017-06-22 ENCOUNTER — HOSPITAL ENCOUNTER (OUTPATIENT)
Dept: LAB | Age: 62
Discharge: HOME | End: 2017-06-22
Payer: MEDICARE

## 2017-06-22 VITALS
HEART RATE: 70 BPM | DIASTOLIC BLOOD PRESSURE: 85 MMHG | HEIGHT: 73 IN | BODY MASS INDEX: 30.51 KG/M2 | TEMPERATURE: 97.6 F | SYSTOLIC BLOOD PRESSURE: 139 MMHG | WEIGHT: 230.2 LBS

## 2017-06-22 DIAGNOSIS — Z96.641 STATUS POST RIGHT HIP REPLACEMENT: ICD-10-CM

## 2017-06-22 DIAGNOSIS — M25.551 RIGHT HIP PAIN: ICD-10-CM

## 2017-06-22 DIAGNOSIS — M70.61 TROCHANTERIC BURSITIS OF RIGHT HIP: ICD-10-CM

## 2017-06-22 DIAGNOSIS — M25.551 RIGHT HIP PAIN: Primary | ICD-10-CM

## 2017-06-22 LAB
APPEARANCE SNV: NORMAL
COLOR SNV: YELLOW
EOSINOPHIL # FLD: 0 %
LYMPHOCYTES NFR FLD: 72 %
MONOCYTES NFR FLD: 16 %
NEUTS BAND # FLD: 0 %
NEUTS SEG NFR FLD: 12 %
RBC # SNV: NORMAL /CU MM
SPECIMEN SITE: NORMAL
WBC # SNV: 110 /CU MM (ref 0–200)

## 2017-06-22 PROCEDURE — 87070 CULTURE OTHR SPECIMN AEROBIC: CPT | Performed by: PHYSICIAN ASSISTANT

## 2017-06-22 PROCEDURE — 89050 BODY FLUID CELL COUNT: CPT | Performed by: PHYSICIAN ASSISTANT

## 2017-06-22 RX ORDER — BETAMETHASONE SODIUM PHOSPHATE AND BETAMETHASONE ACETATE 3; 3 MG/ML; MG/ML
6 INJECTION, SUSPENSION INTRA-ARTICULAR; INTRALESIONAL; INTRAMUSCULAR; SOFT TISSUE ONCE
Qty: 1 ML | Refills: 0
Start: 2017-06-22 | End: 2017-06-22

## 2017-06-22 NOTE — PROGRESS NOTES
[address] [place] [address]  [phone]           Patient: Luana Madrid                MRN: [id number]       SSN: [id number]  YOB: 1955        AGE: 57 y.o. SEX: male  Body mass index is 30.37 kg/(m^2). PCP: Walker Worrell MD  06/22/17      This office note has been dictated. REVIEW OF SYSTEMS:  Constitutional: Negative for fever, chills, weight loss and malaise/fatigue. HENT: Negative. Eyes: Negative. Respiratory: Negative. Cardiovascular: Negative. Gastrointestinal: No bowel incontinence or constipation. Genitourinary: No bladder incontinence or saddle anesthesia. Skin: Negative. Neurological: Negative. Endo/Heme/Allergies: Negative. Psychiatric/Behavioral: Negative. Musculoskeletal: As per HPI above. Past Medical History:   Diagnosis Date    Arthritis     CAD (coronary artery disease)     Essential hypertension     Low back pain     Lumbar postlaminectomy syndrome     Lumbar spondylosis     Myocardial infarction ([facility] [address]     lad stent for stemi,12/2012    Pancreatic cyst          Current Outpatient Prescriptions:     COMPOUNDMAX BASE crea, 240 g by Does Not Apply route four (4) times daily. Anti-Inflam Diclofenac Sodium 3%Gabapentin 3% Lidocaine 2% Prilocaine 2%, Disp: 240 g, Rfl: 3    ammonium lactate (LAC-HYDRIN) 12 % topical cream, Apply  to affected area two (2) times a day. rub in to affected area well, Disp: 280 g, Rfl: 0    HYDROcodone-acetaminophen (NORCO) 7.5-325 mg per tablet, Take 1 Tab by mouth every eight (8) hours as needed for Pain. Max Daily Amount: 3 Tabs., Disp: 60 Tab, Rfl: 0    clopidogrel (PLAVIX) 75 mg tab, 75 mg., Disp: , Rfl:     oxyCODONE-acetaminophen (PERCOCET) 7.5-325 mg per tablet, Take 1 Tab by mouth every eight (8) hours as needed.  Max Daily Amount: 3 Tabs., Disp: 60 Tab, Rfl: 0    HYDROcodone-acetaminophen (NORCO)  mg tablet, Take 1 Tab by mouth every eight (8) hours as needed for Pain. Max Daily Amount: 3 Tabs., Disp: 60 Tab, Rfl: 0    cyclobenzaprine (FLEXERIL) 10 mg tablet, Take 1 Tab by mouth three (3) times daily as needed for Muscle Spasm(s). , Disp: 90 Tab, Rfl: 0    oxyCODONE IR (ROXICODONE) 15 mg immediate release tablet, Take 1-2 Tabs by mouth every eight (8) hours as needed. Max Daily Amount: 90 mg., Disp: 60 Tab, Rfl: 0    HYDROmorphone (DILAUDID) 4 mg tablet, Take 1 Tab by mouth every four (4) hours as needed for Pain. Max Daily Amount: 24 mg. Indications: PAIN, Disp: 60 Tab, Rfl: 0    HYDROmorphone (DILAUDID) 4 mg tablet, Take 1 Tab by mouth every four (4) hours as needed. Max Daily Amount: 24 mg. Indications: PAIN, Disp: 64 Tab, Rfl: 0    polyethylene glycol (MIRALAX) 17 gram packet, Take 1 Packet by mouth daily. Indications: Constipation, Disp: 30 Packet, Rfl: 1    ferrous sulfate 325 mg (65 mg iron) tablet, Take 1 Tab by mouth two (2) times daily (with meals). , Disp: 60 Tab, Rfl: 2    aspirin (ASPIRIN) 325 mg tablet, Take 1 Tab by mouth two (2) times a day., Disp: 60 Tab, Rfl: 0    gabapentin (NEURONTIN) 300 mg capsule, Take 1 Cap by mouth three (3) times daily. , Disp: 90 Cap, Rfl: 1    doxycycline (ADOXA) 100 mg tablet, Take 1 Tab by mouth two (2) times a day., Disp: 20 Tab, Rfl: 0    gabapentin (NEURONTIN) 300 mg capsule, , Disp: , Rfl:     isoniazid (NYDRAZID) 300 mg tablet, Take 300 mg by mouth daily. , Disp: , Rfl:     nitroglycerin (NITROSTAT) 0.4 mg SL tablet, by SubLINGual route every five (5) minutes as needed. , Disp: , Rfl:     carvedilol (COREG) 25 mg tablet, Take 25 mg by mouth two (2) times daily (with meals). , Disp: , Rfl:     hydrochlorothiazide (HYDRODIURIL) 25 mg tablet, Take 25 mg by mouth daily. , Disp: , Rfl:     isosorbide mononitrate ER (IMDUR) 60 mg CR tablet, Take 1 Tab by mouth every morning., Disp: 30 Tab, Rfl: 6    lisinopril (PRINIVIL, ZESTRIL) 20 mg tablet, Take  by mouth daily. , Disp: , Rfl:     atorvastatin (LIPITOR) 80 mg tablet, Take 80 mg by mouth daily. , Disp: , Rfl:     Allergies   Allergen Reactions    Vicodin [Hydrocodone-Acetaminophen] Other (comments)     denies       Social History     Social History    Marital status: SINGLE     Spouse name: N/A    Number of children: N/A    Years of education: N/A     Occupational History    Not on file. Social History Main Topics    Smoking status: Former Smoker     Packs/day: 0.10     Types: Cigarettes     Quit date: 11/1/2012    Smokeless tobacco: Never Used    Alcohol use Yes      Comment: occasional    Drug use: Yes     Special: Cocaine      Comment: +Cocaine Screen 3/7/16 (see [address] [place] [address])    Sexual activity: Not on file      Comment: stopped using     Other Topics Concern    Not on file     Social History Narrative       Past Surgical History:   Procedure Laterality Date    HX HEART CATHETERIZATION      HX LUMBAR FUSION      HX ORTHOPAEDIC      lumbar spine x 5    HX ORTHOPAEDIC      Right hip replacement    HX PTCA             * Patient was identified by name and date of birth   * Agreement on procedure being performed was verified  * Risks and Benefits explained to the patient  * Procedure site verified and marked as necessary  * Patient was positioned for comfort  * Consent was signed and verified  4:25 PM    The patient was instructed on post injection care. We did see Mr. Manuel Navarro for followup with regards to his right hip. The patient is now about five months status post hip replacement surgery. He is doing pretty well. He is slow to progress. He is having some laterally based discomfort in his hip, as well as into his groin. He states he was doing well up until he had a cortisone injection in his back a little while ago. He has had no change in his bowel or bladder habits and no fevers or chills to report. He does ambulate with a walker still.      PHYSICAL EXAMINATION: In general, the patient is alert and oriented x 3 and is in no acute distress. The patient is well-developed and well-nourished with a normal affect. The patient is afebrile. HEENT:  Head is normocephalic and atraumatic. Pupils are equally round and reactive to light and accommodation. Extraocular eye movements are intact. Neck is supple. Trachea is midline. No JVD is present. Breathing is nonlabored. Examination of the lower extremities reveals pain-free range of motion of the hip. There is no pain to the groin with rotation. He does have discomfort with palpation of the trochanteric bursa, which is quite significant. There is some mild swelling without evidence of seroma or hematoma present. Leg lengths look good. Abduction strength is +4/5 on the right and 5/5 on the left. RADIOGRAPHS:  Radiographs in the office today, including AP of the pelvis and AP and cross table of the right hip shows total hip components are well-fixed. There is no evidence of loosening or fracture noted. ASSESSMENT:      1. Status post right total hip replacement. 2. Trochanteric bursitis, right hip. PLAN:  At this point, given the fact he is still having discomfort, we are going to move forward with an infectious workup, as well as a loosening workup. We will obtain some abs, including CBC, ESR, CRP, and IL-6, which I expect these will come back negative, as well as a technetium bone scan. There might be some slight activity due to the proximity of surgery. Today in the office, we are going to move forward with a cortisone injection for the right hip. After informed and written consent, under aseptic conditions with ultrasound-guided assistance, the right hip was prepped with Betadine and 6 mg of Celestone was injected without complications. The patient tolerated the injection well. He was instructed on post injection care. We will plan on seeing him back in the office after the labs and studies are done.             ADDENDUM: Disregard the previous plan. Today, in the office, we moved forward with an aspiration of the right hip and approximately 100 mL of normal serous fluid was aspirated without complications. This was done under sterile conditions. We will send this for Gram stain, cell count, and culture and sensitivity. No injection was given today in the office. We will still move forward with obtaining the technetium bone scan, as well as labs:  CBC, ESR, CRP, and IL-6. He is going to followup with us after the labs and scan. There is the possibility of a cortisone injection at that point. Certainly, there is no evidence for infection.   The fluid looked quite normal.               JR Sean COKER, SARA, ATC

## 2017-06-23 ENCOUNTER — TELEPHONE (OUTPATIENT)
Dept: ORTHOPEDIC SURGERY | Age: 62
End: 2017-06-23

## 2017-06-23 NOTE — TELEPHONE ENCOUNTER
[address] [facility] [address] MOODY OR A NURSE. [facility] SAID THEY RECEIVED ONLY ONE TUBE OF FLOODS FOR THE PATIENT. [facility] SAID THAT THEY WERE SUPPOSED TO GET AT LEAST FIVE TUBES OF FLUID SPECIMEN. [facility] WOULD LIKE A CALL BACK AT  TEL. [phone].

## 2017-06-26 ENCOUNTER — OFFICE VISIT (OUTPATIENT)
Dept: ORTHOPEDIC SURGERY | Age: 62
End: 2017-06-26

## 2017-06-26 VITALS
DIASTOLIC BLOOD PRESSURE: 85 MMHG | WEIGHT: 230 LBS | RESPIRATION RATE: 16 BRPM | HEIGHT: 73 IN | SYSTOLIC BLOOD PRESSURE: 173 MMHG | TEMPERATURE: 98 F | HEART RATE: 77 BPM | BODY MASS INDEX: 30.48 KG/M2

## 2017-06-26 DIAGNOSIS — G60.8 PERIPHERAL SENSORY NEUROPATHY: ICD-10-CM

## 2017-06-26 DIAGNOSIS — G56.03 BILATERAL CARPAL TUNNEL SYNDROME: Primary | ICD-10-CM

## 2017-06-26 DIAGNOSIS — M47.816 FACET SYNDROME, LUMBAR: ICD-10-CM

## 2017-06-26 NOTE — MR AVS SNAPSHOT
Visit Information Date & Time Provider Department Dept. Phone Encounter #  
 6/26/2017 10:30 AM Crystal Strong MD [facility] [phone] [id number] Follow-up Instructions Return if symptoms worsen or fail to improve. Your Appointments 7/11/2017 11:10 AM  
Follow Up with Everton Avery MD  
[facility] ([facility]) Appt Note: 5wk fu  
 [address], [address] [facility] [address]  
[phone] [address] [facility] 7/13/2017  3:40 PM  
Follow Up with Marilu Kuo PA-C  
[facility] ([facility]) Appt Note: RT HIP 3wk fu  
 [address], [address] [facility] [address]  
[phone] 27 [facility]  
  
    
 7/26/2017 10:00 AM  
New Patient with Riley Griffiths MD  
4 [facility], [address] [facility] ([facility] Appt Note: BOTH WRIST EMG DONE BY DR. David Parra 6/9/17  REF BY  [address] [place] [address] , [address] [facility] [address]  
[phone] 23[address] [facility] Upcoming Health Maintenance Date Due Hepatitis C Screening 1955 DTaP/Tdap/Td series (1 - Tdap) 5/19/1974 FOBT Q 1 YEAR AGE 50-75 5/19/2003 ZOSTER VACCINE AGE 60> 5/19/2013 INFLUENZA AGE 9 TO ADULT 8/1/2017 Allergies as of 6/26/2017  Review Complete On: 6/26/2017 By: Ronan Merino LPN Severity Noted Reaction Type Reactions Vicodin [Hydrocodone-acetaminophen]    Other (comments) denies Current Immunizations  Never Reviewed No immunizations on file. Not reviewed this visit You Were Diagnosed With   
  
 Codes Comments Bilateral carpal tunnel syndrome    -  Primary ICD-10-CM: G56.03 
ICD-9-CM: 354.0 Peripheral sensory neuropathy     ICD-10-CM: G62.9 ICD-9-CM: 356.9 Vitals BP Pulse Temp Resp Height(growth percentile) Weight(growth percentile) 173/85 77 98 °F (36.7 °C) (Oral) 16 6' 1\" (1.854 m) 230 lb (104.3 kg) BMI Smoking Status 30.34 kg/m2 Former Smoker BMI and BSA Data Body Mass Index Body Surface Area  
 30.34 kg/m 2 2.32 m 2 Preferred Pharmacy Pharmacy Name Phone [facility] [address], [address] [facility] Your Updated Medication List  
  
   
This list is accurate as of: 6/26/17 11:58 AM.  Always use your most recent med list.  
  
  
  
  
 ammonium lactate 12 % topical cream  
Commonly known as:  LAC-HYDRIN Apply  to affected area two (2) times a day. rub in to affected area well  
  
 aspirin 325 mg tablet Commonly known as:  ASPIRIN Take 1 Tab by mouth two (2) times a day. carvedilol 25 mg tablet Commonly known as:  Kash Hernandez Take 25 mg by mouth two (2) times daily (with meals). clopidogrel 75 mg Tab Commonly known as:  PLAVIX 75 mg.  
  
 COMPOUNDMAX BASE Crea Generic drug:  cream base no. 171 (bulk) 240 g by Does Not Apply route four (4) times daily. Anti-Inflam Diclofenac Sodium 3%Gabapentin 3% Lidocaine 2% Prilocaine 2%  
  
 cyclobenzaprine 10 mg tablet Commonly known as:  FLEXERIL Take 1 Tab by mouth three (3) times daily as needed for Muscle Spasm(s). doxycycline 100 mg tablet Commonly known as:  ADOXA Take 1 Tab by mouth two (2) times a day. ferrous sulfate 325 mg (65 mg iron) tablet Take 1 Tab by mouth two (2) times daily (with meals). * gabapentin 300 mg capsule Commonly known as:  NEURONTIN  
  
 * gabapentin 300 mg capsule Commonly known as:  NEURONTIN Take 1 Cap by mouth three (3) times daily. hydroCHLOROthiazide 25 mg tablet Commonly known as:  HYDRODIURIL Take 25 mg by mouth daily. * HYDROcodone-acetaminophen  mg tablet Commonly known as:  Marielena Abbe Take 1 Tab by mouth every eight (8) hours as needed for Pain. Max Daily Amount: 3 Tabs. * HYDROcodone-acetaminophen 7.5-325 mg per tablet Commonly known as:  Marielena Abbe Take 1 Tab by mouth every eight (8) hours as needed for Pain. Max Daily Amount: 3 Tabs. * HYDROmorphone 4 mg tablet Commonly known as:  DILAUDID Take 1 Tab by mouth every four (4) hours as needed. Max Daily Amount: 24 mg. Indications: PAIN  
  
 * HYDROmorphone 4 mg tablet Commonly known as:  DILAUDID Take 1 Tab by mouth every four (4) hours as needed for Pain. Max Daily Amount: 24 mg. Indications: PAIN  
  
 isoniazid 300 mg tablet Commonly known as:  NYDRAZID Take 300 mg by mouth daily. isosorbide mononitrate ER 60 mg CR tablet Commonly known as:  IMDUR Take 1 Tab by mouth every morning. LIPITOR 80 mg tablet Generic drug:  atorvastatin Take 80 mg by mouth daily. lisinopril 20 mg tablet Commonly known as:  Belpre Neve Take  by mouth daily. NITROSTAT 0.4 mg SL tablet Generic drug:  nitroglycerin  
by SubLINGual route every five (5) minutes as needed. oxyCODONE IR 15 mg immediate release tablet Commonly known as:  [place] Muster Take 1-2 Tabs by mouth every eight (8) hours as needed. Max Daily Amount: 90 mg.  
  
 oxyCODONE-acetaminophen 7.5-325 mg per tablet Commonly known as:  PERCOCET Take 1 Tab by mouth every eight (8) hours as needed. Max Daily Amount: 3 Tabs. polyethylene glycol 17 gram packet Commonly known as:  Nima Gibes Take 1 Packet by mouth daily. Indications: Constipation * Notice: This list has 6 medication(s) that are the same as other medications prescribed for you. Read the directions carefully, and ask your doctor or other care provider to review them with you. We Performed the Following REFERRAL TO ORTHOPEDICS [QRC530 Custom] Comments: Bilateral carpal tunnel REFERRAL TO PAIN MANAGEMENT [XYL335 Custom] Comments:  
 Medication management for associated diagnosis. Follow-up Instructions Return if symptoms worsen or fail to improve. Referral Information Referral ID Referred By Referred To  
  
 [id number] [place], [address], [address] [place], [address] Phone: [phone] Fax: [phone] Visits Status Start Date End Date 1 Authorized 6/26/17 6/26/18 If your referral has a status of pending review or denied, additional information will be sent to support the outcome of this decision. Referral ID Referred By Referred To  
 [id number] [address], [address] [facility] [address] Phone: [phone] Fax: [phone] Visits Status Start Date End Date 1 New Request 6/26/17 6/26/18 If your referral has a status of pending review or denied, additional information will be sent to support the outcome of this decision. Introducing [facility]! Joan Bravo introduces [facility] patient portal. Now you can access parts of your medical record, email your doctor's office, and request medication refills online. 1. In your internet browser, go to [email or web address] 2. Click on the First Time User? Click Here link in the Sign In box. You will see the New Member Sign Up page. 3. Enter your [facility] Access Code exactly as it appears below. You will not need to use this code after youve completed the sign-up process. If you do not sign up before the expiration date, you must request a new code. · [facility] Access Code: [facility] Expires: 7/16/2017 11:19 AM 
 
4. Enter the last four digits of your Social Security Number (xxxx) and Date of Birth (mm/dd/yyyy) as indicated and click Submit. You will be taken to the next sign-up page. 5. Create a [employer] ID. This will be your [employer] login ID and cannot be changed, so think of one that is secure and easy to remember. 6. Create a [employer] password. You can change your password at any time. 7. Enter your Password Reset Question and Answer. This can be used at a later time if you forget your password. 8. Enter your e-mail address. You will receive e-mail notification when new information is available in [address] 9. Click Sign Up. You can now view and download portions of your medical record. 10. Click the Download Summary menu link to download a portable copy of your medical information. If you have questions, please visit the Frequently Asked Questions section of the [employer] website. Remember, [employer] is NOT to be used for urgent needs. For medical emergencies, dial 911. Now available from your iPhone and Android! Please provide this summary of care documentation to your next provider. Your primary care clinician is listed as [facility] DE[facility]ES[facility]. If you have any questions after today's visit, please call [phone].

## 2017-06-26 NOTE — PROGRESS NOTES
[address]  [address], [address]  [place], [address]  Phone: [phone]  Fax: [phone]        Jesica Walker  : 1955  PCP: Shannan Caputo MD  2017    PROGRESS NOTE      ASSESSMENT AND PLAN    Silvestre Alegre comes in to the office today for BUE EMG f/u, which shows abnormal nerve conduction and signs of prolongation of the distal latency in both median nerves constant with bilateral carpal tunnel. It also showed decreased sensory nerve action potentials in both ulnar nerves which suggests axonal sensory neuropathy. I referred him to Dr. Shahid Mcbride for further evaluation from the carpal tunnel standpoint. He was also referred to Dr. Clarke Hodge for pain management for multifactorial pain. Pt will f/u with Dr. Shahid Mcbride. Elfrieda Dandy was seen today for back pain. Diagnoses and all orders for this visit:    Bilateral carpal tunnel syndrome  -     REFERRAL TO ORTHOPEDICS    Peripheral sensory neuropathy       Follow-up Disposition:  Return if symptoms worsen or fail to improve. HISTORY OF PRESENT ILLNESS  Silvestre Alegre is a 57 y.o. male. A&P / HPI from 2017: Silvestre Alegre comes in to the office today c/o 10/10 lumbar pain x 18 years. He previously saw Dr. Veronica Guillen and [facility] Samuels for a right hip replacement. He was referred here because he began to experience worsening lumbar pain afterwards. Pt also has complaints of bilateral hand numbness. He has had a lumbar CT which shows an intact fusion from L3-4 to L5-S1. There is also moderate central stenosis and severe facet hypertrophy at L2-3. Pt notes he previously had injections which provided good relief for several weeks. This may have been the right L2-3 facet joint injections he had with Dr. Zacarias Ruiz in . His pain is likely due to facet syndrome.      He was referred for bilateral L2-3 facet joint injections.  Pt was also referred for a BUE EMG for further evaluation of his bilateral hand numbness. He was referred to pain management to manage his chronic pain.      Pt will f/u in 3 weeks or prn.     Updates from 06/08/17:  Pt presents for bilateral L2-3 facet joint injections. The injections did not provide any relief. He did not get the BUE EMG. He is having continued pain in the lumbar region.           Updates from 06/26/17:  Pt presents for BUE EMG f/u, which shows abnormal nerve conduction and signs of prolongation of the distal latency in both median nerves constant with bilateral carpal tunnel. It also showed decreased sensory nerve action potentials in both ulnar nerves which suggests axonal sensory neuropathy. The topical cream Compoundmax has not provided any relief. PAST MEDICAL HISTORY   Past Medical History:   Diagnosis Date    Arthritis     CAD (coronary artery disease)     Essential hypertension     Low back pain     Lumbar postlaminectomy syndrome     Lumbar spondylosis     Myocardial infarction ([facility] [address]     lad stent for stemi,12/2012    Pancreatic cyst        Past Surgical History:   Procedure Laterality Date    HX HEART CATHETERIZATION      HX LUMBAR FUSION      HX ORTHOPAEDIC      lumbar spine x 5    HX ORTHOPAEDIC      Right hip replacement    HX PTCA     . MEDICATIONS      Current Outpatient Prescriptions   Medication Sig Dispense Refill    COMPOUNDMAX BASE crea 240 g by Does Not Apply route four (4) times daily. Anti-Inflam Diclofenac Sodium 3%Gabapentin 3% Lidocaine 2% Prilocaine 2% 240 g 3    ammonium lactate (LAC-HYDRIN) 12 % topical cream Apply  to affected area two (2) times a day. rub in to affected area well 280 g 0    HYDROcodone-acetaminophen (NORCO) 7.5-325 mg per tablet Take 1 Tab by mouth every eight (8) hours as needed for Pain. Max Daily Amount: 3 Tabs. 60 Tab 0    clopidogrel (PLAVIX) 75 mg tab 75 mg.      HYDROcodone-acetaminophen (NORCO)  mg tablet Take 1 Tab by mouth every eight (8) hours as needed for Pain.  Max Daily Amount: 3 Tabs. 60 Tab 0    cyclobenzaprine (FLEXERIL) 10 mg tablet Take 1 Tab by mouth three (3) times daily as needed for Muscle Spasm(s). 90 Tab 0    oxyCODONE IR (ROXICODONE) 15 mg immediate release tablet Take 1-2 Tabs by mouth every eight (8) hours as needed. Max Daily Amount: 90 mg. 60 Tab 0    HYDROmorphone (DILAUDID) 4 mg tablet Take 1 Tab by mouth every four (4) hours as needed for Pain. Max Daily Amount: 24 mg. Indications: PAIN 60 Tab 0    HYDROmorphone (DILAUDID) 4 mg tablet Take 1 Tab by mouth every four (4) hours as needed. Max Daily Amount: 24 mg. Indications: PAIN 64 Tab 0    polyethylene glycol (MIRALAX) 17 gram packet Take 1 Packet by mouth daily. Indications: Constipation 30 Packet 1    ferrous sulfate 325 mg (65 mg iron) tablet Take 1 Tab by mouth two (2) times daily (with meals). 60 Tab 2    aspirin (ASPIRIN) 325 mg tablet Take 1 Tab by mouth two (2) times a day. 60 Tab 0    gabapentin (NEURONTIN) 300 mg capsule Take 1 Cap by mouth three (3) times daily. 90 Cap 1    gabapentin (NEURONTIN) 300 mg capsule       isoniazid (NYDRAZID) 300 mg tablet Take 300 mg by mouth daily.  nitroglycerin (NITROSTAT) 0.4 mg SL tablet by SubLINGual route every five (5) minutes as needed.  carvedilol (COREG) 25 mg tablet Take 25 mg by mouth two (2) times daily (with meals).  hydrochlorothiazide (HYDRODIURIL) 25 mg tablet Take 25 mg by mouth daily.  atorvastatin (LIPITOR) 80 mg tablet Take 80 mg by mouth daily.  oxyCODONE-acetaminophen (PERCOCET) 7.5-325 mg per tablet Take 1 Tab by mouth every eight (8) hours as needed. Max Daily Amount: 3 Tabs. 60 Tab 0    doxycycline (ADOXA) 100 mg tablet Take 1 Tab by mouth two (2) times a day. 20 Tab 0    isosorbide mononitrate ER (IMDUR) 60 mg CR tablet Take 1 Tab by mouth every morning. 30 Tab 6    lisinopril (PRINIVIL, ZESTRIL) 20 mg tablet Take  by mouth daily.           ALLERGIES    Allergies   Allergen Reactions    Vicodin [Hydrocodone-Acetaminophen] Other (comments)     denies          SOCIAL HISTORY    Social History     Social History    Marital status: SINGLE     Spouse name: N/A    Number of children: N/A    Years of education: N/A     Social History Main Topics    Smoking status: Former Smoker     Packs/day: 0.10     Types: Cigarettes     Quit date: 11/1/2012    Smokeless tobacco: Never Used    Alcohol use Yes      Comment: occasional    Drug use: Yes     Special: Cocaine      Comment: +Cocaine Screen 3/7/16 (see Care Everywhere Labs)    Sexual activity: Not Asked      Comment: stopped using     Other Topics Concern    None     Social History Narrative       FAMILY HISTORY    Family History   Problem Relation Age of Onset    Heart Attack Neg Hx     Heart Surgery Neg Hx          REVIEW OF SYSTEMS  Review of Systems   Constitutional: Negative for chills, diaphoresis, fever, malaise/fatigue and weight loss. Respiratory: Negative for shortness of breath. Cardiovascular: Negative for chest pain and leg swelling. Gastrointestinal: Negative for constipation, nausea and vomiting. Neurological: Negative for dizziness, tingling, seizures, loss of consciousness, weakness and headaches. Psychiatric/Behavioral: The patient does not have insomnia. PHYSICAL EXAMINATION  Visit Vitals    /85    Pulse 77    Temp 98 °F (36.7 °C) (Oral)    Resp 16    Ht 6' 1\" (1.854 m)    Wt 230 lb (104.3 kg)    BMI 30.34 kg/m2       Pain Assessment  6/26/2017   Location of Pain Back   Location Modifiers -   Severity of Pain 8   Quality of Pain Sharp   Quality of Pain Comment N/T   Duration of Pain -   Frequency of Pain Constant   Aggravating Factors -   Limiting Behavior -   Relieving Factors -   Relieving Factors Comment -   Result of Injury No           Constitutional:  Well developed, well nourished, in no acute distress. Psychiatric: Affect and mood are appropriate.    Integumentary: No rashes or abrasions noted on exposed areas. SPINE/MUSCULOSKELETAL EXAM    Cervical spine:  Neck is midline. Normal muscle tone. No focal atrophy is noted. ROM pain free. Shoulder ROM intact. Mild tenderness to palpation. Negative Spurling's sign. Negative Tinel's sign. Negative Canseco's sign.       Sensation in the bilateral arms grossly intact to light touch.       Lumbar spine:  No rash, ecchymosis, or gross obliquity. No fasciculations. No focal atrophy is noted. No pain with hip ROM. Full range of motion. Mild tenderness to palpation . No tenderness to palpation at the sciatic notch. SI joints non-tender. Trochanters non tender. Pain with back extension.      Sensation in the bilateral legs grossly intact to light touch.      Updates from 06/08/17:  Tenderness in the lumbar region. MOTOR:      Biceps  Triceps Deltoids Wrist Ext Wrist Flex Hand Intrin   Right 5/5 5/5 5/5 5/5 5/5 5/5   Left 5/5 5/5 5/5 5/5 5/5 5/5             Hip Flex  Quads Hamstrings Ankle DF EHL Ankle PF   Right 5/5 5/5 5/5 5/5 5/5 5/5   Left 5/5 5/5 5/5 5/5 5/5 5/5     DTRs are 2+ biceps, triceps, brachioradialis, patella, and Achilles.      Negative Straight Leg raise. Squat not tested. No difficulty with tandem gait.       Ambulation with walker. FWB.       RADIOGRAPH  BUE EMG taken on 06/09/2017 personally reviewed with patient:  INTERPRETATION: This was an abnormal nerve conduction and EMG study showing it to be signs of:                           (1) prolongation of the distal latency in both median nerves consistent with a moderate degree of carpal tunnel syndrome bilaterally.                           (2) decreased sensory nerve action potentials in both ulnar nerves suggestive of a axonal sensory neuropathy.       Lumbar CT images taken on 3/14/2017 personally reviewed with patient:  FINDINGS:      There are only 4 non-rib-bearing vertebral bodies in the lumbar region.  This  may be related to supernumerary ribs, i.e. 13 pairs of ribs. True 4 lumbar  vertebrae would be rarer than supernumerary ribs. Attempts to confirm 13 pairs  of ribs on the chest x-ray is not successful due to incomplete coverage of the  area of interest on the 1/16/17 study. For the purpose of labeling the  vertebrae consistently, the most caudal lumbar region vertebral body with ribs  is designated arbitrarily as L1 in this report.      No convincing evidence of acute fracture is detected. Mild to moderate decrease  in disc height is identified at L2-3 with vacuum disc phenomenon and prominent  endplate osteophytes. The superior endplate of L3 is irregular, with increased  sclerotic appearance. At L5-S1, minimal grade 1 spondylolisthesis is observed,  with offset of about 0.2-0.3 cm.      T11-12: Mild bilobed disc bulge. Bilateral moderate facet hypertrophy. Bilateral foraminal narrowing. Tiny focus of vacuum disc in the  anterior-inferior aspect of the disc space.      T12-L1: Subtle tiny vacuum disc focus. No convincing evidence for disc bulge  or protrusion. Bilateral moderate facet hypertrophy. Subtle foraminal  narrowing on the left side. Minimal foraminal narrowing on the right side.      L1-2: Broad-based posterior central aspect calcified or ossified density  outlining the posterior longitudinal ligament ossification. Mild central canal  stenosis. No definite disc protrusion or herniation. Moderate to severe right  facet hypertrophy. Moderate left facet hypertrophy. Mild right foraminal  narrowing. No significant left foraminal narrowing.      L2-3: Broad-based disc-osteophyte. Severe bilateral facet hypertrophy. The  combination of these processes produces moderate central canal stenosis. Moderate bilateral foraminal narrowing, more pronounced on the right side than  the left.      L3-4: Fused level. Intact fusion hardware components. The osseous fusion  across the facets appear complete. Status post laminectomy.  The beam scatter  artifact from the hardware components makes it difficult to assess the disc  space and spinal canal confidently. No convincing evidence for critical  foraminal stenosis.      L4-5: Fused level. Intact fusion hardware components. The osseous fusion  across the facets appear complete. Status post laminectomy. The fusion  hardware components produce significant scatter artifact making it difficult to  assess the disc space and the foramina. There is suggestion of mild right  foraminal narrowing.      L5-S1: Fused level. Intact fusion hardware components. The osseous fusion  across the facets appear complete. Status post laminectomy. Similar to the  above levels, the beam scatter artifact from the fusion hardware components  makes it difficult to assess the neural foramina confidently. There is  suggestion of mild to moderate foraminal narrowing on the left side. Minimal to  mild foraminal narrowing may be present on the right side.      The incidentally visualized portions of the retroperitoneal structures are  unremarkable.      IMPRESSION  IMPRESSION:      1. There are only 4 non-rib-bearing lumbar type vertebrae in the lumbar region. No evidence of sacralization of L5. The most likely explanation is  supernumerary ribs, i.e. 13th pair of ribs at L1. Cannot prove the presence of  supernumerary ribs with current set of exams. Thoracic spine series and lumbar  spine series could be helpful to confirm.      2. Disc-osteophyte at L2-3 producing central canal stenosis.      3. Multilevel neural foraminal narrowing involving both the thoracic and lumbar  regions.      4. Facet arthropathy in the thoracic and lumbar spine.       reviewed      Mr. Raúl Chery has a reminder for a \"due or due soon\" health maintenance. I have asked that he contact his primary care provider for follow-up on this health maintenance.       This plan was reviewed with the patient and patient agrees. All questions were answered.  More than half of this visit today was spent on counseling.     Written by Kym Hutton, as dictated by Dr. Gates Favre, Dr. Mason Quiroz, confirm that all documentation is accurate.

## 2017-06-27 LAB
BACTERIA SPEC CULT: NORMAL
GRAM STN SPEC: NORMAL
GRAM STN SPEC: NORMAL
SERVICE CMNT-IMP: NORMAL

## 2017-06-28 RX ORDER — HYDROCODONE BITARTRATE AND ACETAMINOPHEN 7.5; 325 MG/1; MG/1
1 TABLET ORAL
Qty: 21 TAB | Refills: 0 | Status: SHIPPED | OUTPATIENT
Start: 2017-06-28 | End: 2017-07-13 | Stop reason: SDUPTHER

## 2017-06-28 NOTE — TELEPHONE ENCOUNTER
[facility]     Last Visit: 06/22/2017 with KT Wilkins   Date of Surgery: 01/24/2017 Right JB    Next Appointment: 07/13/2017 with KT Wilkins   Previous Refill Encounters: 06/01/2017 per KT Wilkins #60     Requested Prescriptions     Pending Prescriptions Disp Refills    HYDROcodone-acetaminophen (NORCO) 7.5-325 mg per tablet 60 Tab 0     Sig: Take 1 Tab by mouth every eight (8) hours as needed for Pain. Max Daily Amount: 3 Tabs.

## 2017-06-29 NOTE — TELEPHONE ENCOUNTER
Attempted to call patient at [phone] no message left voice mail full. Rx is ready for  at [facility] .

## 2017-07-13 ENCOUNTER — OFFICE VISIT (OUTPATIENT)
Dept: ORTHOPEDIC SURGERY | Age: 62
End: 2017-07-13

## 2017-07-13 VITALS
DIASTOLIC BLOOD PRESSURE: 91 MMHG | HEART RATE: 70 BPM | BODY MASS INDEX: 29.82 KG/M2 | SYSTOLIC BLOOD PRESSURE: 157 MMHG | TEMPERATURE: 98.1 F | WEIGHT: 225 LBS | HEIGHT: 73 IN

## 2017-07-13 DIAGNOSIS — M25.551 RIGHT HIP PAIN: ICD-10-CM

## 2017-07-13 DIAGNOSIS — M70.61 TROCHANTERIC BURSITIS OF RIGHT HIP: Primary | ICD-10-CM

## 2017-07-13 DIAGNOSIS — Z96.641 STATUS POST RIGHT HIP REPLACEMENT: ICD-10-CM

## 2017-07-13 RX ORDER — DICLOFENAC SODIUM 10 MG/G
4 GEL TOPICAL 4 TIMES DAILY
Qty: 1 EACH | Refills: 0 | Status: SHIPPED | OUTPATIENT
Start: 2017-07-13 | End: 2019-05-14

## 2017-07-13 RX ORDER — HYDROCODONE BITARTRATE AND ACETAMINOPHEN 7.5; 325 MG/1; MG/1
1 TABLET ORAL
Qty: 21 TAB | Refills: 0 | Status: SHIPPED | OUTPATIENT
Start: 2017-07-13 | End: 2018-03-20 | Stop reason: SDUPTHER

## 2017-07-13 NOTE — PROGRESS NOTES
[address] [place] [address]  [phone]           Patient: Leonila Vegas                MRN: [id number]       SSN: [id number]  YOB: 1955        AGE: 57 y.o. SEX: male  Body mass index is 29.69 kg/(m^2). PCP: Roddy Mcdonough MD  07/13/17      This office note has been dictated. REVIEW OF SYSTEMS:  Constitutional: Negative for fever, chills, weight loss and malaise/fatigue. HENT: Negative. Eyes: Negative. Respiratory: Negative. Cardiovascular: Negative. Gastrointestinal: No bowel incontinence or constipation. Genitourinary: No bladder incontinence or saddle anesthesia. Skin: Negative. Neurological: Negative. Endo/Heme/Allergies: Negative. Psychiatric/Behavioral: Negative. Musculoskeletal: As per HPI above. Past Medical History:   Diagnosis Date    Arthritis     CAD (coronary artery disease)     Essential hypertension     Low back pain     Lumbar postlaminectomy syndrome     Lumbar spondylosis     Myocardial infarction ([facility] [address]     lad stent for stemi,12/2012    Pancreatic cyst          Current Outpatient Prescriptions:     HYDROcodone-acetaminophen (NORCO) 7.5-325 mg per tablet, Take 1 Tab by mouth every eight (8) hours as needed for Pain. Max Daily Amount: 3 Tabs., Disp: 21 Tab, Rfl: 0    COMPOUNDMAX BASE crea, 240 g by Does Not Apply route four (4) times daily. Anti-Inflam Diclofenac Sodium 3%Gabapentin 3% Lidocaine 2% Prilocaine 2%, Disp: 240 g, Rfl: 3    ammonium lactate (LAC-HYDRIN) 12 % topical cream, Apply  to affected area two (2) times a day. rub in to affected area well, Disp: 280 g, Rfl: 0    HYDROcodone-acetaminophen (NORCO) 7.5-325 mg per tablet, Take 1 Tab by mouth every eight (8) hours as needed for Pain.  Max Daily Amount: 3 Tabs., Disp: 60 Tab, Rfl: 0    clopidogrel (PLAVIX) 75 mg tab, 75 mg., Disp: , Rfl:     oxyCODONE-acetaminophen (PERCOCET) 7.5-325 mg per tablet, Take 1 Tab by mouth every eight (8) hours as needed. Max Daily Amount: 3 Tabs., Disp: 60 Tab, Rfl: 0    HYDROcodone-acetaminophen (NORCO)  mg tablet, Take 1 Tab by mouth every eight (8) hours as needed for Pain. Max Daily Amount: 3 Tabs., Disp: 60 Tab, Rfl: 0    cyclobenzaprine (FLEXERIL) 10 mg tablet, Take 1 Tab by mouth three (3) times daily as needed for Muscle Spasm(s). , Disp: 90 Tab, Rfl: 0    oxyCODONE IR (ROXICODONE) 15 mg immediate release tablet, Take 1-2 Tabs by mouth every eight (8) hours as needed. Max Daily Amount: 90 mg., Disp: 60 Tab, Rfl: 0    HYDROmorphone (DILAUDID) 4 mg tablet, Take 1 Tab by mouth every four (4) hours as needed for Pain. Max Daily Amount: 24 mg. Indications: PAIN, Disp: 60 Tab, Rfl: 0    HYDROmorphone (DILAUDID) 4 mg tablet, Take 1 Tab by mouth every four (4) hours as needed. Max Daily Amount: 24 mg. Indications: PAIN, Disp: 64 Tab, Rfl: 0    polyethylene glycol (MIRALAX) 17 gram packet, Take 1 Packet by mouth daily. Indications: Constipation, Disp: 30 Packet, Rfl: 1    ferrous sulfate 325 mg (65 mg iron) tablet, Take 1 Tab by mouth two (2) times daily (with meals). , Disp: 60 Tab, Rfl: 2    aspirin (ASPIRIN) 325 mg tablet, Take 1 Tab by mouth two (2) times a day., Disp: 60 Tab, Rfl: 0    gabapentin (NEURONTIN) 300 mg capsule, Take 1 Cap by mouth three (3) times daily. , Disp: 90 Cap, Rfl: 1    doxycycline (ADOXA) 100 mg tablet, Take 1 Tab by mouth two (2) times a day., Disp: 20 Tab, Rfl: 0    gabapentin (NEURONTIN) 300 mg capsule, , Disp: , Rfl:     isoniazid (NYDRAZID) 300 mg tablet, Take 300 mg by mouth daily. , Disp: , Rfl:     nitroglycerin (NITROSTAT) 0.4 mg SL tablet, by SubLINGual route every five (5) minutes as needed. , Disp: , Rfl:     carvedilol (COREG) 25 mg tablet, Take 25 mg by mouth two (2) times daily (with meals). , Disp: , Rfl:     hydrochlorothiazide (HYDRODIURIL) 25 mg tablet, Take 25 mg by mouth daily. , Disp: , Rfl:     isosorbide mononitrate ER (IMDUR) 60 mg CR tablet, Take 1 Tab by mouth every morning., Disp: 30 Tab, Rfl: 6    lisinopril (PRINIVIL, ZESTRIL) 20 mg tablet, Take  by mouth daily. , Disp: , Rfl:     atorvastatin (LIPITOR) 80 mg tablet, Take 80 mg by mouth daily. , Disp: , Rfl:     Allergies   Allergen Reactions    Vicodin [Hydrocodone-Acetaminophen] Other (comments)     denies       Social History     Social History    Marital status: SINGLE     Spouse name: N/A    Number of children: N/A    Years of education: N/A     Occupational History    Not on file. Social History Main Topics    Smoking status: Former Smoker     Packs/day: 0.10     Types: Cigarettes     Quit date: 11/1/2012    Smokeless tobacco: Never Used    Alcohol use Yes      Comment: occasional    Drug use: Yes     Special: Cocaine      Comment: +Cocaine Screen 3/7/16 (see [address] [place] [address])    Sexual activity: Not on file      Comment: stopped using     Other Topics Concern    Not on file     Social History Narrative       Past Surgical History:   Procedure Laterality Date    HX HEART CATHETERIZATION      HX LUMBAR FUSION      HX ORTHOPAEDIC      lumbar spine x 5    HX ORTHOPAEDIC      Right hip replacement    HX PTCA             We did see Mr. Leodan Erazo for followup with regards to his right hip. He is status post right hip replacement. He is now about six months status post surgery. He is having some discomfort in his hip. A lot of it is laterally based. He did have a bit of a fluid collection, which was aspirated upon his last visit and sent to the lab for evaluation, which is normal.  There were no signs for infection. He comes in today for reevaluation. He continues to have laterally based discomfort. He is currently taking no anti-inflammatories. He is requesting a refill on his pain medicine. He has had no fevers, chills, systemic changes to report.      PHYSICAL EXAMINATION: In general, the patient is alert and oriented x 3 and is in no acute distress. The patient is well-developed and well-nourished with a normal affect. The patient is afebrile. HEENT:  Head is normocephalic and atraumatic. Pupils are equally round and reactive to light and accommodation. Extraocular eye movements are intact. Neck is supple. Trachea is midline. No JVD is present. Breathing is nonlabored. Examination of the lower extremities reveals pain-free range of motion of the hips. He does have to palpation of the trochanteric bursa, which is most of his problem. There is no fluctuance and no evidence for seroma or hematoma present. There is a negative straight leg raise, negative calf tenderness, and negative Tenas sign. There are no signs of DVT present. LABORATORY REVIEW:  Review of labs shows aspiration fluid to have 110 white cells with 12% neutrophils. He has an IL-6 of 6.7, CRP of 0.8, uric acid is 6.4, WBC is 5.0, and sedimentation rate is 20. ASSESSMENT:      1. Status post right hip replacement. 2. Trochanteric bursitis of the right hip. PLAN:  At this point, the patient is still having pain. We are going to order a three phase bone scan to rule out any loosening of the components. We are going to start him on Voltaren Gel. He is given a one-week prescription for pain medicine. We will see him after the labs and bone scan for reevaluation.                    JR Sean COKER, SARA, ATC

## 2017-07-25 DIAGNOSIS — M70.61 TROCHANTERIC BURSITIS OF RIGHT HIP: ICD-10-CM

## 2017-07-25 NOTE — TELEPHONE ENCOUNTER
[facility]     Last Visit: 07/13/2017 with KT Selby    Next Appointment: noted to f/u after labs and bone scan   Previous Refill Encounters: 07/13/2017 per KT Selby #[id number]    Requested Prescriptions     Pending Prescriptions Disp Refills    HYDROcodone-acetaminophen (NORCO) 7.5-325 mg per tablet 21 Tab 0     Sig: Take 1 Tab by mouth every eight (8) hours as needed for Pain. Max Daily Amount: 3 Tabs.

## 2017-07-26 ENCOUNTER — OFFICE VISIT (OUTPATIENT)
Dept: ORTHOPEDIC SURGERY | Age: 62
End: 2017-07-26

## 2017-07-26 VITALS
RESPIRATION RATE: 20 BRPM | TEMPERATURE: 97.9 F | HEART RATE: 64 BPM | DIASTOLIC BLOOD PRESSURE: 90 MMHG | BODY MASS INDEX: 30 KG/M2 | WEIGHT: 226.4 LBS | SYSTOLIC BLOOD PRESSURE: 160 MMHG | HEIGHT: 73 IN | OXYGEN SATURATION: 99 %

## 2017-07-26 DIAGNOSIS — R20.0 NUMBNESS AND TINGLING IN BOTH HANDS: ICD-10-CM

## 2017-07-26 DIAGNOSIS — M79.641 PAIN IN BOTH HANDS: ICD-10-CM

## 2017-07-26 DIAGNOSIS — M79.642 PAIN IN BOTH HANDS: ICD-10-CM

## 2017-07-26 DIAGNOSIS — R20.2 NUMBNESS AND TINGLING IN BOTH HANDS: ICD-10-CM

## 2017-07-26 DIAGNOSIS — G56.03 BILATERAL CARPAL TUNNEL SYNDROME: Primary | ICD-10-CM

## 2017-07-26 RX ORDER — HYDROCODONE BITARTRATE AND ACETAMINOPHEN 7.5; 325 MG/1; MG/1
1 TABLET ORAL
Qty: 60 TAB | Refills: 0 | Status: SHIPPED | OUTPATIENT
Start: 2017-07-26 | End: 2018-03-20 | Stop reason: SDUPTHER

## 2017-07-26 RX ORDER — HYDROCODONE BITARTRATE AND ACETAMINOPHEN 7.5; 325 MG/1; MG/1
1 TABLET ORAL
Qty: 21 TAB | Refills: 0 | OUTPATIENT
Start: 2017-07-26

## 2017-07-26 NOTE — TELEPHONE ENCOUNTER
Notify patient we will be unable to continue with narcotic refills due to federal regulatory laws. Can refer to pain management.

## 2017-07-26 NOTE — MR AVS SNAPSHOT
Visit Information Date & Time Provider Department Dept. Phone Encounter #  
 7/26/2017 10:00 AM Brissa Leija, [address] [facility] [address] [facility] [phone] [id number] Follow-up Instructions Return for history and physical prior to surgery. Upcoming Health Maintenance Date Due Hepatitis C Screening 1955 DTaP/Tdap/Td series (1 - Tdap) 5/19/1974 FOBT Q 1 YEAR AGE 50-75 5/19/2003 ZOSTER VACCINE AGE 60> 3/19/2013 INFLUENZA AGE 9 TO ADULT 8/1/2017 Allergies as of 7/26/2017  Review Complete On: 7/26/2017 By: Arlet Austin Severity Noted Reaction Type Reactions Vicodin [Hydrocodone-acetaminophen]    Other (comments) denies Current Immunizations  Never Reviewed No immunizations on file. Not reviewed this visit You Were Diagnosed With   
  
 Codes Comments Bilateral carpal tunnel syndrome    -  Primary ICD-10-CM: G56.03 
ICD-9-CM: 354.0 Numbness and tingling in both hands     ICD-10-CM: R20.0, R20.2 ICD-9-CM: 782.0 Pain in both hands     ICD-10-CM: M79.641, E59.874 ICD-9-CM: 729.5 Vitals BP Pulse Temp Resp Height(growth percentile) Weight(growth percentile) 160/90 64 97.9 °F (36.6 °C) (Oral) 20 6' 1\" (1.854 m) 226 lb 6.4 oz (102.7 kg) SpO2 BMI Smoking Status 99% 29.87 kg/m2 Former Smoker BMI and BSA Data Body Mass Index Body Surface Area  
 29.87 kg/m 2 2.3 m 2 Preferred Pharmacy Pharmacy Name Phone [facility] [address], [address] Your Updated Medication List  
  
   
This list is accurate as of: 7/26/17 11:00 AM.  Always use your most recent med list.  
  
  
  
  
 ammonium lactate 12 % topical cream  
Commonly known as:  LAC-HYDRIN Apply  to affected area two (2) times a day. rub in to affected area well  
  
 aspirin 325 mg tablet Commonly known as:  ASPIRIN  
 Take 1 Tab by mouth two (2) times a day. carvedilol 25 mg tablet Commonly known as:  Manual Moe Take 25 mg by mouth two (2) times daily (with meals). clopidogrel 75 mg Tab Commonly known as:  PLAVIX 75 mg.  
  
 COMPOUNDMAX BASE Crea Generic drug:  cream base no. 171 (bulk) 240 g by Does Not Apply route four (4) times daily. Anti-Inflam Diclofenac Sodium 3%Gabapentin 3% Lidocaine 2% Prilocaine 2%  
  
 cyclobenzaprine 10 mg tablet Commonly known as:  FLEXERIL Take 1 Tab by mouth three (3) times daily as needed for Muscle Spasm(s). diclofenac 1 % Gel Commonly known as:  VOLTAREN Apply 4 g to affected area four (4) times daily. doxycycline 100 mg tablet Commonly known as:  ADOXA Take 1 Tab by mouth two (2) times a day. ferrous sulfate 325 mg (65 mg iron) tablet Take 1 Tab by mouth two (2) times daily (with meals). * gabapentin 300 mg capsule Commonly known as:  NEURONTIN  
  
 * gabapentin 300 mg capsule Commonly known as:  NEURONTIN Take 1 Cap by mouth three (3) times daily. hydroCHLOROthiazide 25 mg tablet Commonly known as:  HYDRODIURIL Take 25 mg by mouth daily. * HYDROcodone-acetaminophen  mg tablet Commonly known as:  Newark Colton Take 1 Tab by mouth every eight (8) hours as needed for Pain. Max Daily Amount: 3 Tabs. * HYDROcodone-acetaminophen 7.5-325 mg per tablet Commonly known as:  Newark Colton Take 1 Tab by mouth every eight (8) hours as needed for Pain. Max Daily Amount: 3 Tabs. * HYDROcodone-acetaminophen 7.5-325 mg per tablet Commonly known as:  Renard Colton Take 1 Tab by mouth every eight (8) hours as needed for Pain. Max Daily Amount: 3 Tabs. * HYDROmorphone 4 mg tablet Commonly known as:  DILAUDID Take 1 Tab by mouth every four (4) hours as needed. Max Daily Amount: 24 mg. Indications: PAIN  
  
 * HYDROmorphone 4 mg tablet Commonly known as:  DILAUDID  
 Take 1 Tab by mouth every four (4) hours as needed for Pain. Max Daily Amount: 24 mg. Indications: PAIN  
  
 isoniazid 300 mg tablet Commonly known as:  NYDRAZID Take 300 mg by mouth daily. isosorbide mononitrate ER 60 mg CR tablet Commonly known as:  IMDUR Take 1 Tab by mouth every morning. LIPITOR 80 mg tablet Generic drug:  atorvastatin Take 80 mg by mouth daily. lisinopril 20 mg tablet Commonly known as:  Jose Peek Take  by mouth daily. NITROSTAT 0.4 mg SL tablet Generic drug:  nitroglycerin  
by SubLINGual route every five (5) minutes as needed. oxyCODONE IR 15 mg immediate release tablet Commonly known as:  Bessie Balk Take 1-2 Tabs by mouth every eight (8) hours as needed. Max Daily Amount: 90 mg.  
  
 oxyCODONE-acetaminophen 7.5-325 mg per tablet Commonly known as:  PERCOCET Take 1 Tab by mouth every eight (8) hours as needed. Max Daily Amount: 3 Tabs. polyethylene glycol 17 gram packet Commonly known as:  Joel Barrs Take 1 Packet by mouth daily. Indications: Constipation * Notice: This list has 7 medication(s) that are the same as other medications prescribed for you. Read the directions carefully, and ask your doctor or other care provider to review them with you. Follow-up Instructions Return for history and physical prior to surgery. Introducing [facility]! Ivette Ascencio introduces [employer] patient portal. Now you can access parts of your medical record, email your doctor's office, and request medication refills online. 1. In your internet browser, go to [email or web address] 2. Click on the First Time User? Click Here link in the Sign In box. You will see the New Member Sign Up page. 3. Enter your [employer] Access Code exactly as it appears below. You will not need to use this code after youve completed the sign-up process.  If you do not sign up before the expiration date, you must request a new code. · [email or web address] Access Code: [email or web address] Expires: 10/24/2017 11:00 AM 
 
4. Enter the last four digits of your Social Security Number (xxxx) and Date of Birth (mm/dd/yyyy) as indicated and click Submit. You will be taken to the next sign-up page. 5. Create a [email or web address] ID. This will be your [email or web address] login ID and cannot be changed, so think of one that is secure and easy to remember. 6. Create a [email or web address] password. You can change your password at any time. 7. Enter your Password Reset Question and Answer. This can be used at a later time if you forget your password. 8. Enter your e-mail address. You will receive e-mail notification when new information is available in [address] 9. Click Sign Up. You can now view and download portions of your medical record. 10. Click the Download Summary menu link to download a portable copy of your medical information. If you have questions, please visit the Frequently Asked Questions section of the [email or web address] website. Remember, [email or web address] is NOT to be used for urgent needs. For medical emergencies, dial 911. Now available from your iPhone and Android! Please provide this summary of care documentation to your next provider. Your primary care clinician is listed as [facility] DE[facility]ES[facility]. If you have any questions after today's visit, please call [phone].

## 2017-07-26 NOTE — PROGRESS NOTES
Patient: Ling Wills                MRN: [id number]       SSN: [id number]  YOB: 1955        AGE: 57 y.o. SEX: male    PCP: Neville Flores MD  07/26/17    Chief Complaint   Patient presents with    Wrist Pain     Jan     HISTORY:  Ling Wills is a 57 y.o. male who is seen for bilateral hand pain, numbness, tingling--carpal tunnel symptoms. He had a bilateral upper extremity EMG with Dr. Daniel Goldman which demonstrated moderate bilateral carpal tunnel/sensory neuropathy. He notes pain numbness and tingling in the hands. Pt reports that he experiences numbness QHS which interferes with his sleep. His hand pain is worse with rainy weather and reports that his hands ball up. He admits to difficulty gripping and occasionally drops objects. He had a right hip replacement 1/24/2017 with Dr. Zachary Mayers but states that he continues to experience hip pain. He has not tried cortisone injections for the hand pain but he does not like needles. Pt admits to no relief with a lumbar injection in the past. He is s/p CABG in the past with saphenous vein harvested from the right leg. Pt is currently on Plavix. Pain Assessment  7/26/2017   Location of Pain Wrist   Location Modifiers Left;Right   Severity of Pain 8   Quality of Pain Sharp; Aching   Quality of Pain Comment -   Duration of Pain Persistent   Frequency of Pain Constant   Aggravating Factors Bending;Stretching;Straightening   Limiting Behavior Yes   Relieving Factors Nothing   Relieving Factors Comment -   Result of Injury No     Occupation, etc:  Mr. Keisha Frazier is retired x 20 years on disability for low back pain. Pt notes that he used to work for [employer] in the [employer] department prior to his work injury. He caught a transmission and reports that it broke his back in two. He had an artifical plate with 4 screws placed by Dr. Baltazar Farah in [place]. He continues to experience chronic lower back pain with rainy weather.  He lives by himself in [place]. Pt has 6 adult children, 4 daughters and 2, sons who all live in the area and help him out. He has 10 grandchildren and no great grandchildren. He is a [employer] football fan. Current weight is 226 pounds. He is 6'1\" tall. Lab Results   Component Value Date/Time    Hemoglobin A1c 4.4 01/16/2017 11:26 AM     Weight Metrics 7/26/2017 7/13/2017 6/26/2017 6/22/2017 6/8/2017 6/6/2017 5/23/2017   Weight 226 lb 6.4 oz 225 lb 230 lb 230 lb 3.2 oz 229 lb 231 lb 6.4 oz 235 lb   BMI 29.87 kg/m2 29.69 kg/m2 30.34 kg/m2 30.37 kg/m2 30.21 kg/m2 30.53 kg/m2 31 kg/m2       Patient Active Problem List   Diagnosis Code    Coronary atherosclerosis of native coronary artery I25.10    Old myocardial infarction I25.2    Essential hypertension, benign I10    Other and unspecified hyperlipidemia E78.5    Lumbar spinal stenosis M48.06    Lumbar neuritis M54.16    Postlaminectomy syndrome, lumbar region M96.1    Hip arthritis M16.10     REVIEW OF SYSTEMS: All Below are Negative except: See HPI   Constitutional: negative for fever, chills, and weight loss. Cardiovascular: negative for chest pain, claudication, leg swelling, SOB, MALONE   Gastrointestinal: Negative for pain, N/V/C/D, Blood in stool or urine, dysuria,  hematuria, incontinence, pelvic pain. Musculoskeletal: See HPI   Neurological: Negative for dizziness and weakness. Negative for headaches, Visual changes, confusion, seizures   Phychiatric/Behavioral: Negative for depression, memory loss, substance  abuse. Extremities: Negative for hair changes, rash, or skin lesion changes. Hematologic: Negative for bleeding problems, bruising, pallor or swollen lymph  nodes   Peripheral Vascular: No calf pain, no circulation deficits. Social History     Social History    Marital status: SINGLE     Spouse name: N/A    Number of children: N/A    Years of education: N/A     Occupational History    Not on file.      Social History Main Topics    Smoking status: Former Smoker     Packs/day: 0.10     Types: Cigarettes     Quit date: 11/1/2012    Smokeless tobacco: Never Used    Alcohol use Yes      Comment: occasional    Drug use: Yes     Special: Cocaine      Comment: +Cocaine Screen 3/7/16 (see [address] [place] [address])    Sexual activity: Not on file      Comment: stopped using     Other Topics Concern    Not on file     Social History Narrative      Allergies   Allergen Reactions    Vicodin [Hydrocodone-Acetaminophen] Other (comments)     denies      Current Outpatient Prescriptions   Medication Sig    diclofenac (VOLTAREN) 1 % gel Apply 4 g to affected area four (4) times daily.  COMPOUNDMAX BASE crea 240 g by Does Not Apply route four (4) times daily. Anti-Inflam Diclofenac Sodium 3%Gabapentin 3% Lidocaine 2% Prilocaine 2%    ammonium lactate (LAC-HYDRIN) 12 % topical cream Apply  to affected area two (2) times a day. rub in to affected area well    clopidogrel (PLAVIX) 75 mg tab 75 mg.  cyclobenzaprine (FLEXERIL) 10 mg tablet Take 1 Tab by mouth three (3) times daily as needed for Muscle Spasm(s).  ferrous sulfate 325 mg (65 mg iron) tablet Take 1 Tab by mouth two (2) times daily (with meals).  aspirin (ASPIRIN) 325 mg tablet Take 1 Tab by mouth two (2) times a day.  gabapentin (NEURONTIN) 300 mg capsule Take 1 Cap by mouth three (3) times daily.  doxycycline (ADOXA) 100 mg tablet Take 1 Tab by mouth two (2) times a day.  gabapentin (NEURONTIN) 300 mg capsule     isoniazid (NYDRAZID) 300 mg tablet Take 300 mg by mouth daily.  nitroglycerin (NITROSTAT) 0.4 mg SL tablet by SubLINGual route every five (5) minutes as needed.  carvedilol (COREG) 25 mg tablet Take 25 mg by mouth two (2) times daily (with meals).  hydrochlorothiazide (HYDRODIURIL) 25 mg tablet Take 25 mg by mouth daily.  isosorbide mononitrate ER (IMDUR) 60 mg CR tablet Take 1 Tab by mouth every morning.     lisinopril (PRINIVIL, ZESTRIL) 20 mg tablet Take  by mouth daily.  atorvastatin (LIPITOR) 80 mg tablet Take 80 mg by mouth daily.  HYDROcodone-acetaminophen (NORCO) 7.5-325 mg per tablet Take 1 Tab by mouth every eight (8) hours as needed for Pain. Max Daily Amount: 3 Tabs.  HYDROcodone-acetaminophen (NORCO) 7.5-325 mg per tablet Take 1 Tab by mouth every eight (8) hours as needed for Pain. Max Daily Amount: 3 Tabs.  oxyCODONE-acetaminophen (PERCOCET) 7.5-325 mg per tablet Take 1 Tab by mouth every eight (8) hours as needed. Max Daily Amount: 3 Tabs.  HYDROcodone-acetaminophen (NORCO)  mg tablet Take 1 Tab by mouth every eight (8) hours as needed for Pain. Max Daily Amount: 3 Tabs.  oxyCODONE IR (ROXICODONE) 15 mg immediate release tablet Take 1-2 Tabs by mouth every eight (8) hours as needed. Max Daily Amount: 90 mg.    HYDROmorphone (DILAUDID) 4 mg tablet Take 1 Tab by mouth every four (4) hours as needed for Pain. Max Daily Amount: 24 mg. Indications: PAIN    HYDROmorphone (DILAUDID) 4 mg tablet Take 1 Tab by mouth every four (4) hours as needed. Max Daily Amount: 24 mg. Indications: PAIN    polyethylene glycol (MIRALAX) 17 gram packet Take 1 Packet by mouth daily. Indications: Constipation     No current facility-administered medications for this visit.        PHYSICAL EXAMINATION:  Visit Vitals    /90    Pulse 64    Temp 97.9 °F (36.6 °C) (Oral)    Resp 20    Ht 6' 1\" (1.854 m)    Wt 226 lb 6.4 oz (102.7 kg)    SpO2 99%    BMI 29.87 kg/m2        ORTHO EXAMINATION:  Examination Right Hand Left Hand   Skin Intact Intact   Deformity - -   Swelling - -   Tenderness - -   Finger flexion Full Full   Finger extension Full Full   Sensation Normal Normal   Capillary refill Normal Normal   Heberden's nodes + +   Dupuytren's - -     Examination Right Wrist Left Wrist   Skin Intact Intact   Tenderness + +   Flexion 35 35   Extension 35 35   Deformity - -   Effusion - -   Finger flexion Full Full   Finger extension Full Full Tinel's sign + +   Phalen's test - -   Finklestein maneuver - -   Pain with thumb abduction - -   Capillary refill - -     Mild thenar flattening on the right. EMG/NCS BILATERAL UPPER EXTREMITY:  INTERPRETATION: This was an abnormal nerve conduction and EMG study showing it to be signs of: (1) prolongation of the distal latency in both median nerves consistent with a moderate degree of carpal tunnel syndrome bilaterally. (2) decreased sensory nerve action potentials in both ulnar nerves suggestive of a axonal sensory neuropathy. IMPRESSION:      ICD-10-CM ICD-9-CM    1. Bilateral carpal tunnel syndrome G56.03 354.0 HYDROcodone-acetaminophen (NORCO) 7.5-325 mg per tablet   2. Numbness and tingling in both hands R20.0 782.0 HYDROcodone-acetaminophen (NORCO) 7.5-325 mg per tablet    R20.2     3. Pain in both hands M79.641 729.5 HYDROcodone-acetaminophen (NORCO) 7.5-325 mg per tablet    M79.642          PLAN:  Discussed treatment options including injections and a carpal tunnel release. He wishes to proceed with surgery at this time so he will be scheduled for a right carpal tunnel release at [facility] after temporarily stopping Plavix. Risks of surgery outlined and informed consent obtained. He will take Norco for the pain as needed at night. He will follow up for a history and physical in a few weeks.       Scribed by Bushra Crane ([facility]) as dictated by Cande Baez MD

## 2017-07-26 NOTE — PATIENT INSTRUCTIONS
Carpal Tunnel Release: Before Your Surgery  What is carpal tunnel release? Carpal tunnel release is surgery that reduces the pressure on a nerve in the wrist. Your doctor will cut a ligament that presses on the nerve. This lets the nerve pass freely through the tunnel without being squeezed. The surgery can be open or endoscopic. In open surgery, your doctor makes a small cut in the palm of your hand. This cut is called an incision. In endoscopic surgery, your doctor makes one small incision in the wrist, or one small incision in the wrist and one in the palm. Your doctor puts a thin tube with a camera attached (endoscope) into the incision. Surgical tools are put in along with the endoscope. In both types of surgeries, the incisions are closed with stitches. The incisions leave scars that usually fade in time. You may be asleep during the surgery. Or you may be awake and have medicine to numb your hand and arm so you will not feel pain. After surgery, your wrist and hand pain should begin to go away. It usually takes 3 to 4 months to recover and 1 year before your hand strength returns. How much hand strength returns is different for each person. You will go home the same day as the surgery. When you can return to work depends on the type of work you do. Follow-up care is a key part of your treatment and safety. Be sure to make and go to all appointments, and call your doctor if you are having problems. It's also a good idea to know your test results and keep a list of the medicines you take. What happens before surgery? Surgery can be stressful. This information will help you understand what you can expect. And it will help you safely prepare for surgery. Preparing for surgery  · Understand exactly what surgery is planned, along with the risks, benefits, and other options. · Tell your doctors ALL the medicines, vitamins, supplements, and herbal remedies you take.  Some of these can increase the risk of bleeding or interact with anesthesia. · If you take blood thinners, such as warfarin (Coumadin), clopidogrel (Plavix), or aspirin, be sure to talk to your doctor. He or she will tell you if you should stop taking these medicines before your surgery. Make sure that you understand exactly what your doctor wants you to do. · Your doctor will tell you which medicines to take or stop before your surgery. You may need to stop taking certain medicines a week or more before surgery. So talk to your doctor as soon as you can. · If you have an advance directive, let your doctor know. It may include a living will and a durable power of  for health care. Bring a copy to the hospital. If you don't have one, you may want to prepare one. It lets your doctor and loved ones know your health care wishes. Doctors advise that everyone prepare these papers before any type of surgery or procedure. What happens on the day of surgery? · Follow the instructions exactly about when to stop eating and drinking. If you don't, your surgery may be canceled. If your doctor told you to take your medicines on the day of surgery, take them with only a sip of water. · Take a bath or shower before you come in for your surgery. Do not apply lotions, perfumes, deodorants, or nail polish. · Do not shave the surgical site yourself. · Take off all jewelry and piercings. And take out contact lenses, if you wear them. At the hospital or surgery center  · Bring a picture ID. · The area for surgery is often marked to make sure there are no errors. · You will be kept comfortable and safe by your anesthesia provider. The anesthesia may make you sleep. Or it may just numb the area being worked on. · The surgery will take about 15 to 60 minutes. Going home  · Be sure you have someone to drive you home. Anesthesia and pain medicine make it unsafe for you to drive.   · You will be given more specific instructions about recovering from your surgery. They will cover things like diet, wound care, follow-up care, driving, and getting back to your normal routine. When should you call your doctor? · You have questions or concerns. · You don't understand how to prepare for your surgery. · You become ill before the surgery (such as fever, flu, or a cold). · You need to reschedule or have changed your mind about having the surgery. Where can you learn more? Go to http://suzanne-renetta.info/. Enter D060 in the search box to learn more about \"Carpal Tunnel Release: Before Your Surgery. \"  Current as of: March 21, 2017  Content Version: 11.3  © 6818-7111 [employer]. Care instructions adapted under license by [employer] (which disclaims liability or warranty for this information). If you have questions about a medical condition or this instruction, always ask your healthcare professional. [place] [address] any warranty or liability for your use of this information. Carpal Tunnel Syndrome: Exercises  Your Care Instructions  Here are some examples of typical rehabilitation exercises for your condition. Start each exercise slowly. Ease off the exercise if you start to have pain. Your doctor or your physical or occupational therapist will tell you when you can start these exercises and which ones will work best for you. How to do the exercises  Note: When you no longer have pain or numbness, you can do exercises to help prevent carpal tunnel syndrome from coming back. Do not do any stretch or movement that is uncomfortable or painful. Warm-up stretches  1. Rotate your wrist up, down, and from side to side. Repeat 4 times. 2. Stretch your fingers far apart. Relax them, and then stretch them again. Repeat 4 times. 3. Stretch your thumb by pulling it back gently, holding it, and then releasing it. Repeat 4 times.   Prayer stretch    1. Start with your palms together in front of your chest just below your chin. 2. Slowly lower your hands toward your waistline, keeping your hands close to your stomach and your palms together until you feel a mild to moderate stretch under your forearms. 3. Hold for at least 15 to 30 seconds. Repeat 2 to 4 times. Wrist flexor stretch    1. Extend your arm in front of you with your palm up. 2. Bend your wrist, pointing your hand toward the floor. 3. With your other hand, gently bend your wrist farther until you feel a mild to moderate stretch in your forearm. 4. Hold for at least 15 to 30 seconds. Repeat 2 to 4 times. Wrist extensor stretch    Repeat steps 1 through 4 of the stretch above, but begin with your extended hand palm down. Follow-up care is a key part of your treatment and safety. Be sure to make and go to all appointments, and call your doctor if you are having problems. It's also a good idea to know your test results and keep a list of the medicines you take. Where can you learn more? Go to http://suzanne-renetta.info/. Enter D978 in the search box to learn more about \"Carpal Tunnel Syndrome: Exercises. \"  Current as of: March 21, 2017  Content Version: 11.3  © 8602-8902 [employer]. Care instructions adapted under license by [employer] (which disclaims liability or warranty for this information). If you have questions about a medical condition or this instruction, always ask your healthcare professional. [place] [address] any warranty or liability for your use of this information.

## 2017-07-26 NOTE — TELEPHONE ENCOUNTER
Tried to leave VM but mailbox is full. If pt calls back please let him know his RX refill has been denied due to federal regulations.

## 2017-08-04 DIAGNOSIS — G56.01 CARPAL TUNNEL SYNDROME ON RIGHT: Primary | ICD-10-CM

## 2017-08-04 DIAGNOSIS — Z01.812 BLOOD TESTS PRIOR TO TREATMENT OR PROCEDURE: ICD-10-CM

## 2017-08-04 DIAGNOSIS — Z01.811 PRE-OPERATIVE RESPIRATORY EXAMINATION: ICD-10-CM

## 2017-08-09 DIAGNOSIS — G56.01 CARPAL TUNNEL SYNDROME ON RIGHT: Primary | ICD-10-CM

## 2017-08-09 DIAGNOSIS — Z01.810 PRE-OPERATIVE CARDIOVASCULAR EXAMINATION: ICD-10-CM

## 2017-08-11 ENCOUNTER — HOSPITAL ENCOUNTER (OUTPATIENT)
Dept: LAB | Age: 62
Discharge: HOME | End: 2017-08-11
Payer: MEDICARE

## 2017-08-11 ENCOUNTER — HOSPITAL ENCOUNTER (OUTPATIENT)
Dept: GENERAL RADIOLOGY | Age: 62
Discharge: HOME | End: 2017-08-11
Payer: MEDICARE

## 2017-08-11 DIAGNOSIS — G56.01 CARPAL TUNNEL SYNDROME ON RIGHT: ICD-10-CM

## 2017-08-11 DIAGNOSIS — Z01.811 PRE-OPERATIVE RESPIRATORY EXAMINATION: ICD-10-CM

## 2017-08-11 DIAGNOSIS — Z01.812 BLOOD TESTS PRIOR TO TREATMENT OR PROCEDURE: ICD-10-CM

## 2017-08-11 DIAGNOSIS — Z01.810 PRE-OPERATIVE CARDIOVASCULAR EXAMINATION: ICD-10-CM

## 2017-08-11 LAB
ANION GAP BLD CALC-SCNC: 6 MMOL/L (ref 3–18)
ATRIAL RATE: 68 BPM
BASOPHILS # BLD AUTO: 0 K/UL (ref 0–0.1)
BASOPHILS # BLD: 0 % (ref 0–2)
BUN SERPL-MCNC: 16 MG/DL (ref 7–18)
BUN/CREAT SERPL: 11 (ref 12–20)
CALCIUM SERPL-MCNC: 8.5 MG/DL (ref 8.5–10.1)
CALCULATED P AXIS, ECG09: 61 DEGREES
CALCULATED R AXIS, ECG10: 66 DEGREES
CALCULATED T AXIS, ECG11: 96 DEGREES
CHLORIDE SERPL-SCNC: 105 MMOL/L (ref 100–108)
CO2 SERPL-SCNC: 28 MMOL/L (ref 21–32)
CREAT SERPL-MCNC: 1.43 MG/DL (ref 0.6–1.3)
DIAGNOSIS, 93000: NORMAL
DIFFERENTIAL METHOD BLD: ABNORMAL
EOSINOPHIL # BLD: 0.1 K/UL (ref 0–0.4)
EOSINOPHIL NFR BLD: 2 % (ref 0–5)
ERYTHROCYTE [DISTWIDTH] IN BLOOD BY AUTOMATED COUNT: 12.5 % (ref 11.6–14.5)
GLUCOSE SERPL-MCNC: 78 MG/DL (ref 74–99)
HCT VFR BLD AUTO: 42.3 % (ref 36–48)
HGB BLD-MCNC: 14.2 G/DL (ref 13–16)
LYMPHOCYTES # BLD AUTO: 34 % (ref 21–52)
LYMPHOCYTES # BLD: 1.4 K/UL (ref 0.9–3.6)
MCH RBC QN AUTO: 32.8 PG (ref 24–34)
MCHC RBC AUTO-ENTMCNC: 33.6 G/DL (ref 31–37)
MCV RBC AUTO: 97.7 FL (ref 74–97)
MONOCYTES # BLD: 0.6 K/UL (ref 0.05–1.2)
MONOCYTES NFR BLD AUTO: 15 % (ref 3–10)
NEUTS SEG # BLD: 2 K/UL (ref 1.8–8)
NEUTS SEG NFR BLD AUTO: 49 % (ref 40–73)
P-R INTERVAL, ECG05: 188 MS
PLATELET # BLD AUTO: 205 K/UL (ref 135–420)
PMV BLD AUTO: 11.5 FL (ref 9.2–11.8)
POTASSIUM SERPL-SCNC: 4.1 MMOL/L (ref 3.5–5.5)
Q-T INTERVAL, ECG07: 408 MS
QRS DURATION, ECG06: 94 MS
QTC CALCULATION (BEZET), ECG08: 433 MS
RBC # BLD AUTO: 4.33 M/UL (ref 4.7–5.5)
SODIUM SERPL-SCNC: 139 MMOL/L (ref 136–145)
VENTRICULAR RATE, ECG03: 68 BPM
WBC # BLD AUTO: 4 K/UL (ref 4.6–13.2)

## 2017-08-11 PROCEDURE — 85025 COMPLETE CBC W/AUTO DIFF WBC: CPT | Performed by: PHYSICIAN ASSISTANT

## 2017-08-11 PROCEDURE — 36415 COLL VENOUS BLD VENIPUNCTURE: CPT | Performed by: PHYSICIAN ASSISTANT

## 2017-08-11 PROCEDURE — 80048 BASIC METABOLIC PNL TOTAL CA: CPT | Performed by: PHYSICIAN ASSISTANT

## 2017-08-11 PROCEDURE — 71020 XR CHEST PA LAT: CPT

## 2017-08-16 ENCOUNTER — HOSPITAL ENCOUNTER (OUTPATIENT)
Dept: LAB | Age: 62
Discharge: HOME | End: 2017-08-16
Payer: MEDICARE

## 2017-08-16 LAB
AMPHET UR QL SCN: NEGATIVE
BARBITURATES UR QL SCN: NEGATIVE
BENZODIAZ UR QL: NEGATIVE
CANNABINOIDS UR QL SCN: NEGATIVE
COCAINE UR QL SCN: POSITIVE
HDSCOM,HDSCOM: ABNORMAL
METHADONE UR QL: NEGATIVE
OPIATES UR QL: NEGATIVE
PCP UR QL: NEGATIVE

## 2017-08-16 PROCEDURE — 36415 COLL VENOUS BLD VENIPUNCTURE: CPT | Performed by: SPECIALIST

## 2017-08-16 PROCEDURE — 80307 DRUG TEST PRSMV CHEM ANLYZR: CPT | Performed by: SPECIALIST

## 2017-09-09 DIAGNOSIS — G56.01 CARPAL TUNNEL SYNDROME ON RIGHT: Primary | ICD-10-CM

## 2017-09-09 DIAGNOSIS — Z01.812 BLOOD TESTS PRIOR TO TREATMENT OR PROCEDURE: ICD-10-CM

## 2018-01-08 ENCOUNTER — TELEPHONE (OUTPATIENT)
Dept: ORTHOPEDIC SURGERY | Age: 63
End: 2018-01-08

## 2018-01-08 NOTE — TELEPHONE ENCOUNTER
Attempted to call patient. Voicemail is full and couldn't leave message.   If patient calls back, please find out which office he would like to  from

## 2018-01-08 NOTE — TELEPHONE ENCOUNTER
ema need clarification of orders ref bone scan. If it is to r/o determination infection and looseing then the orders need to be for bone indium scan.   Will need to know asap before 1 pm. Today to get the medication for scan   Call [address] [place] [address] at [phone]

## 2018-02-16 ENCOUNTER — OFFICE VISIT (OUTPATIENT)
Dept: ORTHOPEDIC SURGERY | Age: 63
End: 2018-02-16

## 2018-02-16 VITALS
DIASTOLIC BLOOD PRESSURE: 99 MMHG | BODY MASS INDEX: 32.77 KG/M2 | SYSTOLIC BLOOD PRESSURE: 178 MMHG | WEIGHT: 248.4 LBS | OXYGEN SATURATION: 98 % | HEART RATE: 57 BPM | TEMPERATURE: 96.8 F

## 2018-02-16 DIAGNOSIS — M70.61 TROCHANTERIC BURSITIS OF RIGHT HIP: ICD-10-CM

## 2018-02-16 DIAGNOSIS — M25.551 HIP PAIN, RIGHT: ICD-10-CM

## 2018-02-16 DIAGNOSIS — Z96.641 HISTORY OF RIGHT HIP REPLACEMENT: Primary | ICD-10-CM

## 2018-02-16 RX ORDER — HYDROCODONE BITARTRATE AND ACETAMINOPHEN 10; 325 MG/1; MG/1
1 TABLET ORAL
Qty: 21 TAB | Refills: 0 | Status: SHIPPED | OUTPATIENT
Start: 2018-02-16 | End: 2018-03-20 | Stop reason: SDUPTHER

## 2018-02-16 NOTE — PROGRESS NOTES
[address] [place] [place] [address]  [phone]           Patient: Perla Dooley                MRN: [id number]       SSN: [id number]  YOB: 1955        AGE: 57 y.o. SEX: male  Body mass index is 32.77 kg/(m^2). PCP: Terrence Jensen MD  02/16/18      This office note has been dictated. REVIEW OF SYSTEMS:  Constitutional: Negative for fever, chills, weight loss and malaise/fatigue. HENT: Negative. Eyes: Negative. Respiratory: Negative. Cardiovascular: Negative. Gastrointestinal: No bowel incontinence or constipation. Genitourinary: No bladder incontinence or saddle anesthesia. Skin: Negative. Neurological: Negative. Endo/Heme/Allergies: Negative. Psychiatric/Behavioral: Negative. Musculoskeletal: As per HPI above. Past Medical History:   Diagnosis Date    Arthritis     CAD (coronary artery disease)     Essential hypertension     Low back pain     Lumbar postlaminectomy syndrome     Lumbar spondylosis     Myocardial infarction     lad stent for stemi,12/2012    Pancreatic cyst          Current Outpatient Prescriptions:     clopidogrel (PLAVIX) 75 mg tab, 75 mg., Disp: , Rfl:     aspirin (ASPIRIN) 325 mg tablet, Take 1 Tab by mouth two (2) times a day., Disp: 60 Tab, Rfl: 0    nitroglycerin (NITROSTAT) 0.4 mg SL tablet, by SubLINGual route every five (5) minutes as needed. , Disp: , Rfl:     carvedilol (COREG) 25 mg tablet, Take 25 mg by mouth two (2) times daily (with meals). , Disp: , Rfl:     HYDROcodone-acetaminophen (NORCO) 7.5-325 mg per tablet, Take 1 Tab by mouth nightly as needed for Pain (1-2 tabs QHS prn pain). Max Daily Amount: 1 Tab., Disp: 60 Tab, Rfl: 0    diclofenac (VOLTAREN) 1 % gel, Apply 4 g to affected area four (4) times daily. , Disp: 1 Each, Rfl: 0    HYDROcodone-acetaminophen (NORCO) 7.5-325 mg per tablet, Take 1 Tab by mouth every eight (8) hours as needed for Pain. Max Daily Amount: 3 Tabs., Disp: 21 Tab, Rfl: 0    COMPOUNDMAX BASE crea, 240 g by Does Not Apply route four (4) times daily. Anti-Inflam Diclofenac Sodium 3%Gabapentin 3% Lidocaine 2% Prilocaine 2%, Disp: 240 g, Rfl: 3    ammonium lactate (LAC-HYDRIN) 12 % topical cream, Apply  to affected area two (2) times a day. rub in to affected area well, Disp: 280 g, Rfl: 0    HYDROcodone-acetaminophen (NORCO) 7.5-325 mg per tablet, Take 1 Tab by mouth every eight (8) hours as needed for Pain. Max Daily Amount: 3 Tabs., Disp: 60 Tab, Rfl: 0    oxyCODONE-acetaminophen (PERCOCET) 7.5-325 mg per tablet, Take 1 Tab by mouth every eight (8) hours as needed. Max Daily Amount: 3 Tabs., Disp: 60 Tab, Rfl: 0    HYDROcodone-acetaminophen (NORCO)  mg tablet, Take 1 Tab by mouth every eight (8) hours as needed for Pain. Max Daily Amount: 3 Tabs., Disp: 60 Tab, Rfl: 0    cyclobenzaprine (FLEXERIL) 10 mg tablet, Take 1 Tab by mouth three (3) times daily as needed for Muscle Spasm(s). , Disp: 90 Tab, Rfl: 0    oxyCODONE IR (ROXICODONE) 15 mg immediate release tablet, Take 1-2 Tabs by mouth every eight (8) hours as needed. Max Daily Amount: 90 mg., Disp: 60 Tab, Rfl: 0    HYDROmorphone (DILAUDID) 4 mg tablet, Take 1 Tab by mouth every four (4) hours as needed for Pain. Max Daily Amount: 24 mg. Indications: PAIN, Disp: 60 Tab, Rfl: 0    HYDROmorphone (DILAUDID) 4 mg tablet, Take 1 Tab by mouth every four (4) hours as needed. Max Daily Amount: 24 mg. Indications: PAIN, Disp: 64 Tab, Rfl: 0    polyethylene glycol (MIRALAX) 17 gram packet, Take 1 Packet by mouth daily. Indications: Constipation, Disp: 30 Packet, Rfl: 1    ferrous sulfate 325 mg (65 mg iron) tablet, Take 1 Tab by mouth two (2) times daily (with meals). , Disp: 60 Tab, Rfl: 2    gabapentin (NEURONTIN) 300 mg capsule, Take 1 Cap by mouth three (3) times daily. , Disp: 90 Cap, Rfl: 1    doxycycline (ADOXA) 100 mg tablet, Take 1 Tab by mouth two (2) times a day., Disp: 20 Tab, Rfl: 0    gabapentin (NEURONTIN) 300 mg capsule, , Disp: , Rfl:     isoniazid (NYDRAZID) 300 mg tablet, Take 300 mg by mouth daily. , Disp: , Rfl:     hydrochlorothiazide (HYDRODIURIL) 25 mg tablet, Take 25 mg by mouth daily. , Disp: , Rfl:     isosorbide mononitrate ER (IMDUR) 60 mg CR tablet, Take 1 Tab by mouth every morning., Disp: 30 Tab, Rfl: 6    lisinopril (PRINIVIL, ZESTRIL) 20 mg tablet, Take  by mouth daily. , Disp: , Rfl:     atorvastatin (LIPITOR) 80 mg tablet, Take 80 mg by mouth daily. , Disp: , Rfl:     Allergies   Allergen Reactions    Vicodin [Hydrocodone-Acetaminophen] Other (comments)     denies       Social History     Social History    Marital status: SINGLE     Spouse name: N/A    Number of children: N/A    Years of education: N/A     Occupational History    Not on file. Social History Main Topics    Smoking status: Former Smoker     Packs/day: 0.10     Types: Cigarettes     Quit date: 11/1/2012    Smokeless tobacco: Never Used    Alcohol use Yes      Comment: occasional    Drug use: Yes     Special: Cocaine      Comment: +Cocaine Screen 3/7/16 (see [address] [place] [address])    Sexual activity: Not on file      Comment: stopped using     Other Topics Concern    Not on file     Social History Narrative       Past Surgical History:   Procedure Laterality Date    HX HEART CATHETERIZATION      HX LUMBAR FUSION      HX ORTHOPAEDIC      lumbar spine x 5    HX ORTHOPAEDIC      Right hip replacement    HX PTCA                 We did see Mr. Lisette Segura for followup with regards to his right hip. The patient is now one year plus status post right hip replacement, laterally-based. He is having some discomfort in his hip after a fall back in January landing on his right hip. He did go to [facility] and had x-rays, which did show no acute bony abnormalities. He is having laterally-based right hip pain currently.   He also reports some pain that is radiating all the way down his lower extremity. He has had no fevers, chills, or systemic changes to report. PHYSICAL EXAMINATION:  In general, the patient is alert and oriented x 3 in no acute distress. The patient is well-developed, well-nourished, with a normal affect. The patient is afebrile. HEENT:  Head is normocephalic and atraumatic. Pupils are equally round and reactive to light and accommodation. Extraocular eye movements are intact. Neck is supple. Trachea is midline. No JVD is present. Breathing is nonlabored. Examination of the lower extremities reveals pain-free range of motion of the hips. He does have pain to palpation of the greater trochanteric bursal region. He may have a little bit of defect to the IT band. Neurovascular status is intact. There is no real discomfort with range of motion of the hip itself. There is negative straight leg raise. RADIOGRAPHS:  Radiographs in the office today, including AP of the pelvis and AP and cross table of the right hip, show total hip components are well-fixed. No evidence of loosening or fracture is noted. ASSESSMENT:      1. Status post right hip replacement. 2. Trochanteric bursitis right hip. 3. Contusion right hip. PLAN:  At this point, we are going to move forward with obtaining basic labs, CBC, ESR, CRP, IL-6, and uric acid. I suspect these will come back as negative, as well as an indium bone scan with sulfur colloid to rule out infection versus any loosening of the components. We will see him back afterwards for review. He is given a prescription for Norco 10 mg one every eight hours, number 21, without a refill.                JR Sean COKER PA-C, ATC

## 2018-02-19 ENCOUNTER — HOSPITAL ENCOUNTER (OUTPATIENT)
Dept: LAB | Age: 63
Discharge: HOME | End: 2018-02-19
Payer: MEDICARE

## 2018-02-19 LAB
ERYTHROCYTE [DISTWIDTH] IN BLOOD BY AUTOMATED COUNT: 12.5 % (ref 11.6–14.5)
ERYTHROCYTE [SEDIMENTATION RATE] IN BLOOD: 20 MM/HR (ref 0–20)
HCT VFR BLD AUTO: 40.4 % (ref 36–48)
HGB BLD-MCNC: 13.3 G/DL (ref 13–16)
MCH RBC QN AUTO: 31.7 PG (ref 24–34)
MCHC RBC AUTO-ENTMCNC: 32.9 G/DL (ref 31–37)
MCV RBC AUTO: 96.4 FL (ref 74–97)
PLATELET # BLD AUTO: 156 K/UL (ref 135–420)
PMV BLD AUTO: 12.7 FL (ref 9.2–11.8)
RBC # BLD AUTO: 4.19 M/UL (ref 4.7–5.5)
WBC # BLD AUTO: 4.1 K/UL (ref 4.6–13.2)

## 2018-02-19 PROCEDURE — 36415 COLL VENOUS BLD VENIPUNCTURE: CPT | Performed by: PHYSICIAN ASSISTANT

## 2018-02-19 PROCEDURE — 86141 C-REACTIVE PROTEIN HS: CPT | Performed by: PHYSICIAN ASSISTANT

## 2018-02-19 PROCEDURE — 83520 IMMUNOASSAY QUANT NOS NONAB: CPT | Performed by: PHYSICIAN ASSISTANT

## 2018-02-19 PROCEDURE — 85027 COMPLETE CBC AUTOMATED: CPT | Performed by: PHYSICIAN ASSISTANT

## 2018-02-19 PROCEDURE — 85652 RBC SED RATE AUTOMATED: CPT | Performed by: PHYSICIAN ASSISTANT

## 2018-02-20 DIAGNOSIS — M25.551 HIP PAIN, RIGHT: ICD-10-CM

## 2018-02-20 DIAGNOSIS — Z96.641 HISTORY OF RIGHT HIP REPLACEMENT: ICD-10-CM

## 2018-02-20 LAB
CRP SERPL HS-MCNC: 2.23 MG/L (ref 0–3)
IL6 SERPL-MCNC: 2.5 PG/ML (ref 0–15.5)

## 2018-02-21 ENCOUNTER — OFFICE VISIT (OUTPATIENT)
Dept: ORTHOPEDIC SURGERY | Age: 63
End: 2018-02-21

## 2018-02-21 VITALS
HEART RATE: 54 BPM | DIASTOLIC BLOOD PRESSURE: 97 MMHG | OXYGEN SATURATION: 98 % | TEMPERATURE: 97.5 F | BODY MASS INDEX: 32.87 KG/M2 | HEIGHT: 73 IN | WEIGHT: 248 LBS | SYSTOLIC BLOOD PRESSURE: 164 MMHG

## 2018-02-21 DIAGNOSIS — G56.01 RIGHT CARPAL TUNNEL SYNDROME: Primary | ICD-10-CM

## 2018-02-21 DIAGNOSIS — M79.89 SOFT TISSUE MASS: ICD-10-CM

## 2018-02-21 DIAGNOSIS — R20.2 NUMBNESS AND TINGLING IN BOTH HANDS: ICD-10-CM

## 2018-02-21 DIAGNOSIS — G56.02 LEFT CARPAL TUNNEL SYNDROME: ICD-10-CM

## 2018-02-21 DIAGNOSIS — R20.0 NUMBNESS AND TINGLING IN BOTH HANDS: ICD-10-CM

## 2018-02-21 NOTE — PATIENT INSTRUCTIONS
Carpal Tunnel Release: Before Your Surgery  What is carpal tunnel release? Carpal tunnel release is surgery that reduces the pressure on a nerve in the wrist. Your doctor will cut a ligament that presses on the nerve. This lets the nerve pass freely through the tunnel without being squeezed. The surgery can be open or endoscopic. In open surgery, your doctor makes a small cut in the palm of your hand. This cut is called an incision. In endoscopic surgery, your doctor makes one small incision in the wrist, or one small incision in the wrist and one in the palm. Your doctor puts a thin tube with a camera attached (endoscope) into the incision. Surgical tools are put in along with the endoscope. In both types of surgeries, the incisions are closed with stitches. The incisions leave scars that usually fade in time. You may be asleep during the surgery. Or you may be awake and have medicine to numb your hand and arm so you will not feel pain. After surgery, your wrist and hand pain should begin to go away. It usually takes 3 to 4 months to recover and 1 year before your hand strength returns. How much hand strength returns is different for each person. You will go home the same day as the surgery. When you can return to work depends on the type of work you do. Follow-up care is a key part of your treatment and safety. Be sure to make and go to all appointments, and call your doctor if you are having problems. It's also a good idea to know your test results and keep a list of the medicines you take. What happens before surgery? ?Surgery can be stressful. This information will help you understand what you can expect. And it will help you safely prepare for surgery. ? Preparing for surgery  ? · Understand exactly what surgery is planned, along with the risks, benefits, and other options. · Tell your doctors ALL the medicines, vitamins, supplements, and herbal remedies you take.  Some of these can increase the risk of bleeding or interact with anesthesia. ? · If you take blood thinners, such as warfarin (Coumadin), clopidogrel (Plavix), or aspirin, be sure to talk to your doctor. He or she will tell you if you should stop taking these medicines before your surgery. Make sure that you understand exactly what your doctor wants you to do.   ? · Your doctor will tell you which medicines to take or stop before your surgery. You may need to stop taking certain medicines a week or more before surgery. So talk to your doctor as soon as you can.   ? · If you have an advance directive, let your doctor know. It may include a living will and a durable power of  for health care. Bring a copy to the hospital. If you don't have one, you may want to prepare one. It lets your doctor and loved ones know your health care wishes. Doctors advise that everyone prepare these papers before any type of surgery or procedure. What happens on the day of surgery? · Follow the instructions exactly about when to stop eating and drinking. If you don't, your surgery may be canceled. If your doctor told you to take your medicines on the day of surgery, take them with only a sip of water. ? · Take a bath or shower before you come in for your surgery. Do not apply lotions, perfumes, deodorants, or nail polish. ? · Do not shave the surgical site yourself. ? · Take off all jewelry and piercings. And take out contact lenses, if you wear them. ? At the hospital or surgery center   · Bring a picture ID. ? · The area for surgery is often marked to make sure there are no errors. ? · You will be kept comfortable and safe by your anesthesia provider. The anesthesia may make you sleep. Or it may just numb the area being worked on. ? · The surgery will take about 15 to 60 minutes. Going home   · Be sure you have someone to drive you home. Anesthesia and pain medicine make it unsafe for you to drive.    ? · You will be given more specific instructions about recovering from your surgery. They will cover things like diet, wound care, follow-up care, driving, and getting back to your normal routine. When should you call your doctor? · You have questions or concerns. ? · You don't understand how to prepare for your surgery. ? · You become ill before the surgery (such as fever, flu, or a cold). ? · You need to reschedule or have changed your mind about having the surgery. Where can you learn more? Go to http://suzanne-renetta.info/. Enter M400 in the search box to learn more about \"Carpal Tunnel Release: Before Your Surgery. \"  Current as of: March 21, 2017  Content Version: 11.4  © 4061-1255 [employer]. Care instructions adapted under license by [employer] (which disclaims liability or warranty for this information). If you have questions about a medical condition or this instruction, always ask your healthcare professional. [place] [address] any warranty or liability for your use of this information.

## 2018-02-21 NOTE — MR AVS SNAPSHOT
[address] [place] [address], [address] [facility] 
[phone] Patient: Liss Tirado MRN: [id number] :1955 Visit Information Date & Time Provider Department Dept. Phone Encounter #  
 2018 10:30 AM Summer Chavez MD [facility] [phone] [id number] Follow-up Instructions Return if symptoms worsen or fail to improve. Upcoming Health Maintenance Date Due Hepatitis C Screening 1955 DTaP/Tdap/Td series (1 - Tdap) 1974 FOBT Q 1 YEAR AGE 50-75 2003 ZOSTER VACCINE AGE 60> 3/19/2013 Influenza Age 5 to Adult 2017 Allergies as of 2018  Review Complete On: 2018 By: Lindsey Kumari LPN Severity Noted Reaction Type Reactions Vicodin [Hydrocodone-acetaminophen]    Other (comments) denies Current Immunizations  Never Reviewed No immunizations on file. Not reviewed this visit You Were Diagnosed With   
  
 Codes Comments Right carpal tunnel syndrome    -  Primary ICD-10-CM: G56.01 
ICD-9-CM: 354.0 Left carpal tunnel syndrome     ICD-10-CM: G56.02 
ICD-9-CM: 354.0 Numbness and tingling in both hands     ICD-10-CM: R20.0, R20.2 ICD-9-CM: 782.0 Soft tissue mass     ICD-10-CM: M79.9 ICD-9-CM: 729.90 right middle finger PIP region Vitals BP Pulse Temp Height(growth percentile) Weight(growth percentile) SpO2  
 (!) 164/97 (!) 54 97.5 °F (36.4 °C) (Oral) 6' 1\" (1.854 m) 248 lb (112.5 kg) 98% BMI Smoking Status 32.72 kg/m2 Former Smoker BMI and BSA Data Body Mass Index Body Surface Area 32.72 kg/m 2 2.41 m 2 Preferred Pharmacy Pharmacy Name Phone [facility], [address] [id number] [facility] [phone] Your Updated Medication List  
  
   
 This list is accurate as of 2/21/18 12:09 PM.  Always use your most recent med list.  
  
  
  
  
 ammonium lactate 12 % topical cream  
Commonly known as:  LAC-HYDRIN Apply  to affected area two (2) times a day. rub in to affected area well  
  
 aspirin 325 mg tablet Commonly known as:  ASPIRIN Take 1 Tab by mouth two (2) times a day. carvedilol 25 mg tablet Commonly known as:  Delphina Emerson Take 25 mg by mouth two (2) times daily (with meals). clopidogrel 75 mg Tab Commonly known as:  PLAVIX 75 mg.  
  
 COMPOUNDMAX BASE Crea Generic drug:  cream base no. 171 (bulk) 240 g by Does Not Apply route four (4) times daily. Anti-Inflam Diclofenac Sodium 3%Gabapentin 3% Lidocaine 2% Prilocaine 2%  
  
 cyclobenzaprine 10 mg tablet Commonly known as:  FLEXERIL Take 1 Tab by mouth three (3) times daily as needed for Muscle Spasm(s). diclofenac 1 % Gel Commonly known as:  VOLTAREN Apply 4 g to affected area four (4) times daily. doxycycline 100 mg tablet Commonly known as:  ADOXA Take 1 Tab by mouth two (2) times a day. ferrous sulfate 325 mg (65 mg iron) tablet Take 1 Tab by mouth two (2) times daily (with meals). * gabapentin 300 mg capsule Commonly known as:  NEURONTIN  
  
 * gabapentin 300 mg capsule Commonly known as:  NEURONTIN Take 1 Cap by mouth three (3) times daily. hydroCHLOROthiazide 25 mg tablet Commonly known as:  HYDRODIURIL Take 25 mg by mouth daily. * HYDROcodone-acetaminophen  mg tablet Commonly known as:  Romel Gastelum Take 1 Tab by mouth every eight (8) hours as needed for Pain. Max Daily Amount: 3 Tabs. * HYDROcodone-acetaminophen 7.5-325 mg per tablet Commonly known as:  Romel Gastelum Take 1 Tab by mouth every eight (8) hours as needed for Pain. Max Daily Amount: 3 Tabs. * HYDROcodone-acetaminophen 7.5-325 mg per tablet Commonly known as:  Romel Gastelum  
 Take 1 Tab by mouth every eight (8) hours as needed for Pain. Max Daily Amount: 3 Tabs. * HYDROcodone-acetaminophen 7.5-325 mg per tablet Commonly known as:  Ferne Arrow Take 1 Tab by mouth nightly as needed for Pain (1-2 tabs QHS prn pain). Max Daily Amount: 1 Tab. * HYDROcodone-acetaminophen  mg tablet Commonly known as:  Ferne Arrow Take 1 Tab by mouth every eight (8) hours as needed for Pain. Max Daily Amount: 3 Tabs. * HYDROmorphone 4 mg tablet Commonly known as:  DILAUDID Take 1 Tab by mouth every four (4) hours as needed. Max Daily Amount: 24 mg. Indications: PAIN  
  
 * HYDROmorphone 4 mg tablet Commonly known as:  DILAUDID Take 1 Tab by mouth every four (4) hours as needed for Pain. Max Daily Amount: 24 mg. Indications: PAIN  
  
 isoniazid 300 mg tablet Commonly known as:  NYDRAZID Take 300 mg by mouth daily. isosorbide mononitrate ER 60 mg CR tablet Commonly known as:  IMDUR Take 1 Tab by mouth every morning. LIPITOR 80 mg tablet Generic drug:  atorvastatin Take 80 mg by mouth daily. lisinopril 20 mg tablet Commonly known as:  Anel Ceja Take  by mouth daily. NITROSTAT 0.4 mg SL tablet Generic drug:  nitroglycerin  
by SubLINGual route every five (5) minutes as needed. oxyCODONE IR 15 mg immediate release tablet Commonly known as:  Zach Phy Take 1-2 Tabs by mouth every eight (8) hours as needed. Max Daily Amount: 90 mg.  
  
 oxyCODONE-acetaminophen 7.5-325 mg per tablet Commonly known as:  PERCOCET Take 1 Tab by mouth every eight (8) hours as needed. Max Daily Amount: 3 Tabs. polyethylene glycol 17 gram packet Commonly known as:  Ernestene Score Take 1 Packet by mouth daily. Indications: Constipation * Notice: This list has 9 medication(s) that are the same as other medications prescribed for you. Read the directions carefully, and ask your doctor or other care provider to review them with you. Follow-up Instructions Return if symptoms worsen or fail to improve. Patient Instructions Carpal Tunnel Release: Before Your Surgery What is carpal tunnel release? Carpal tunnel release is surgery that reduces the pressure on a nerve in the wrist. Your doctor will cut a ligament that presses on the nerve. This lets the nerve pass freely through the tunnel without being squeezed. The surgery can be open or endoscopic. In open surgery, your doctor makes a small cut in the palm of your hand. This cut is called an incision. In endoscopic surgery, your doctor makes one small incision in the wrist, or one small incision in the wrist and one in the palm. Your doctor puts a thin tube with a camera attached (endoscope) into the incision. Surgical tools are put in along with the endoscope. In both types of surgeries, the incisions are closed with stitches. The incisions leave scars that usually fade in time. You may be asleep during the surgery. Or you may be awake and have medicine to numb your hand and arm so you will not feel pain. After surgery, your wrist and hand pain should begin to go away. It usually takes 3 to 4 months to recover and 1 year before your hand strength returns. How much hand strength returns is different for each person. You will go home the same day as the surgery. When you can return to work depends on the type of work you do. Follow-up care is a key part of your treatment and safety. Be sure to make and go to all appointments, and call your doctor if you are having problems. It's also a good idea to know your test results and keep a list of the medicines you take. What happens before surgery? ?Surgery can be stressful. This information will help you understand what you can expect. And it will help you safely prepare for surgery. ? Preparing for surgery ? · Understand exactly what surgery is planned, along with the risks, benefits, and other options. · Tell your doctors ALL the medicines, vitamins, supplements, and herbal remedies you take. Some of these can increase the risk of bleeding or interact with anesthesia. ? · If you take blood thinners, such as warfarin (Coumadin), clopidogrel (Plavix), or aspirin, be sure to talk to your doctor. He or she will tell you if you should stop taking these medicines before your surgery. Make sure that you understand exactly what your doctor wants you to do.  
? · Your doctor will tell you which medicines to take or stop before your surgery. You may need to stop taking certain medicines a week or more before surgery. So talk to your doctor as soon as you can.  
? · If you have an advance directive, let your doctor know. It may include a living will and a durable power of  for health care. Bring a copy to the hospital. If you don't have one, you may want to prepare one. It lets your doctor and loved ones know your health care wishes. Doctors advise that everyone prepare these papers before any type of surgery or procedure. What happens on the day of surgery? · Follow the instructions exactly about when to stop eating and drinking. If you don't, your surgery may be canceled. If your doctor told you to take your medicines on the day of surgery, take them with only a sip of water. ? · Take a bath or shower before you come in for your surgery. Do not apply lotions, perfumes, deodorants, or nail polish. ? · Do not shave the surgical site yourself. ? · Take off all jewelry and piercings. And take out contact lenses, if you wear them. ? At the hospital or surgery center · Bring a picture ID. ? · The area for surgery is often marked to make sure there are no errors. ? · You will be kept comfortable and safe by your anesthesia provider. The anesthesia may make you sleep. Or it may just numb the area being worked on. ? · The surgery will take about 15 to 60 minutes. Going home · Be sure you have someone to drive you home. Anesthesia and pain medicine make it unsafe for you to drive. ? · You will be given more specific instructions about recovering from your surgery. They will cover things like diet, wound care, follow-up care, driving, and getting back to your normal routine. When should you call your doctor? · You have questions or concerns. ? · You don't understand how to prepare for your surgery. ? · You become ill before the surgery (such as fever, flu, or a cold). ? · You need to reschedule or have changed your mind about having the surgery. Where can you learn more? Go to http://suzanne-renetta.info/. Enter H023 in the search box to learn more about \"Carpal Tunnel Release: Before Your Surgery. \" Current as of: March 21, 2017 Content Version: 11.4 © 3861-6010 [employer]. Care instructions adapted under license by [employer] (which disclaims liability or warranty for this information). If you have questions about a medical condition or this instruction, always ask your healthcare professional. [address] any warranty or liability for your use of this information. Introducing [facility]! Irma العلي introduces [employer] patient portal. Now you can access parts of your medical record, email your doctor's office, and request medication refills online. 1. In your internet browser, go to [email or web address] 2. Click on the First Time User? Click Here link in the Sign In box. You will see the New Member Sign Up page. 3. Enter your [employer] Access Code exactly as it appears below. You will not need to use this code after youve completed the sign-up process. If you do not sign up before the expiration date, you must request a new code. · [employer] Access Code: [email or web address] Expires: 3/12/2018  9:46 AM 
 
 4. Enter the last four digits of your Social Security Number (xxxx) and Date of Birth (mm/dd/yyyy) as indicated and click Submit. You will be taken to the next sign-up page. 5. Create a Trov ID. This will be your Trov login ID and cannot be changed, so think of one that is secure and easy to remember. 6. Create a Trov password. You can change your password at any time. 7. Enter your Password Reset Question and Answer. This can be used at a later time if you forget your password. 8. Enter your e-mail address. You will receive e-mail notification when new information is available in [address] 9. Click Sign Up. You can now view and download portions of your medical record. 10. Click the Download Summary menu link to download a portable copy of your medical information. If you have questions, please visit the Frequently Asked Questions section of the Trov website. Remember, Trov is NOT to be used for urgent needs. For medical emergencies, dial 911. Now available from your iPhone and Android! Please provide this summary of care documentation to your next provider. Your primary care clinician is listed as Arnoldo Milton. If you have any questions after today's visit, please call [phone].

## 2018-02-21 NOTE — PROGRESS NOTES
Patient: Hayder Comer                MRN: [id number]       SSN: [id number]  YOB: 1955        AGE: 57 y.o. SEX: male    PCP: Jose Garces MD  02/21/18    Chief Complaint   Patient presents with    Hand Pain     right     HISTORY:  Hayder Comer is a 57 y.o. male who is seen for bilateral hand R>L pain, numbness, tingling--carpal tunnel symptoms. He was previously scheduled for a right CTR. He notes a swelling over his right middle finger PIP joint. He states it has been there for some time. He had a bilateral upper extremity EMG with Dr. Siobhan Mendez which demonstrated moderate bilateral carpal tunnel/sensory neuropathy. He notes pain numbness and tingling in both hands. Pt reports that he experiences numbness at night which interferes with his sleep. His hand pain is worse with rainy weather. He reports that his hands ball up. He admits to difficulty gripping. He occasionally drops objects. He had a right hip replacement 1/24/2017 with Dr. Nya Lehman but states that he continues to experience hip pain. He has not tried cortisone injections for the hand pain but he does not like needles. Pt admits to no relief with a lumbar injection in the past. He is s/p CABG in the past with saphenous vein harvested from the right leg. Pt is currently on Plavix. Pain Assessment  2/16/2018   Location of Pain Hip   Location Modifiers Right   Severity of Pain 8   Quality of Pain Sharp   Quality of Pain Comment -   Duration of Pain Persistent   Frequency of Pain Constant   Aggravating Factors Walking   Limiting Behavior -   Relieving Factors Heat   Relieving Factors Comment heat makes pain worst   Result of Injury Yes   Work-Related Injury No   Type of Injury Fall     Occupation, etc:  Mr. Nasima Cohen is retired x 20 years on disability for low back pain. 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  He caught a transmission and reports that it broke his back in two. He had an artifical plate with 4 screws placed by Dr. Lynn Mao in [place]. He continues to experience chronic lower back pain with rainy weather. He is currently living with his sister in [place]. He hopes to move into his own place near the [facility] in the near future. Pt has 6 adult children, 4 daughters and 2, sons who all live in the area and help him out. He has 10 grandchildren and no great grandchildren. He is a [employer] football fan. Current weight is 248 pounds. He is 6'1\" tall. Lab Results   Component Value Date/Time    Hemoglobin A1c 4.4 01/16/2017 11:26 AM     Weight Metrics 2/21/2018 2/16/2018 7/26/2017 7/13/2017 6/26/2017 6/22/2017 6/8/2017   Weight 248 lb 248 lb 6.4 oz 226 lb 6.4 oz 225 lb 230 lb 230 lb 3.2 oz 229 lb   BMI 32.72 kg/m2 32.77 kg/m2 29.87 kg/m2 29.69 kg/m2 30.34 kg/m2 30.37 kg/m2 30.21 kg/m2       Patient Active Problem List   Diagnosis Code    Coronary atherosclerosis of native coronary artery I25.10    Old myocardial infarction I25.2    Essential hypertension, benign I10    Other and unspecified hyperlipidemia E78.5    Lumbar spinal stenosis M48.061    Lumbar neuritis M54.16    Postlaminectomy syndrome, lumbar region M96.1    Hip arthritis M16.10    Hip pain, right M25.551     REVIEW OF SYSTEMS: All Below are Negative except: See HPI   Constitutional: negative for fever, chills, and weight loss. Cardiovascular: negative for chest pain, claudication, leg swelling, SOB, MALONE   Gastrointestinal: Negative for pain, N/V/C/D, Blood in stool or urine, dysuria,  hematuria, incontinence, pelvic pain. Musculoskeletal: See HPI   Neurological: Negative for dizziness and weakness. Negative for headaches, Visual changes, confusion, seizures   Phychiatric/Behavioral: Negative for depression, memory loss, substance  abuse. Extremities: Negative for hair changes, rash, or skin lesion changes.    Hematologic: Negative for bleeding problems, bruising, pallor or swollen lymph  nodes   Peripheral Vascular: No calf pain, no circulation deficits. Social History     Social History    Marital status: SINGLE     Spouse name: N/A    Number of children: N/A    Years of education: N/A     Occupational History    Not on file. Social History Main Topics    Smoking status: Former Smoker     Packs/day: 0.10     Types: Cigarettes     Quit date: 11/1/2012    Smokeless tobacco: Never Used    Alcohol use Yes      Comment: occasional    Drug use: Yes     Special: Cocaine      Comment: +Cocaine Screen 3/7/16 (see [address] [place] [address])    Sexual activity: Not on file      Comment: stopped using     Other Topics Concern    Not on file     Social History Narrative      Allergies   Allergen Reactions    Vicodin [Hydrocodone-Acetaminophen] Other (comments)     denies      Current Outpatient Prescriptions   Medication Sig    HYDROcodone-acetaminophen (NORCO)  mg tablet Take 1 Tab by mouth every eight (8) hours as needed for Pain. Max Daily Amount: 3 Tabs.  clopidogrel (PLAVIX) 75 mg tab 75 mg.  ferrous sulfate 325 mg (65 mg iron) tablet Take 1 Tab by mouth two (2) times daily (with meals).  aspirin (ASPIRIN) 325 mg tablet Take 1 Tab by mouth two (2) times a day.  doxycycline (ADOXA) 100 mg tablet Take 1 Tab by mouth two (2) times a day.  isoniazid (NYDRAZID) 300 mg tablet Take 300 mg by mouth daily.  nitroglycerin (NITROSTAT) 0.4 mg SL tablet by SubLINGual route every five (5) minutes as needed.  carvedilol (COREG) 25 mg tablet Take 25 mg by mouth two (2) times daily (with meals).  hydrochlorothiazide (HYDRODIURIL) 25 mg tablet Take 25 mg by mouth daily.  isosorbide mononitrate ER (IMDUR) 60 mg CR tablet Take 1 Tab by mouth every morning.  lisinopril (PRINIVIL, ZESTRIL) 20 mg tablet Take  by mouth daily.  atorvastatin (LIPITOR) 80 mg tablet Take 80 mg by mouth daily.     HYDROcodone-acetaminophen (NORCO) 7.5-325 mg per tablet Take 1 Tab by mouth nightly as needed for Pain (1-2 tabs QHS prn pain). Max Daily Amount: 1 Tab.  diclofenac (VOLTAREN) 1 % gel Apply 4 g to affected area four (4) times daily.  HYDROcodone-acetaminophen (NORCO) 7.5-325 mg per tablet Take 1 Tab by mouth every eight (8) hours as needed for Pain. Max Daily Amount: 3 Tabs.  COMPOUNDMAX BASE crea 240 g by Does Not Apply route four (4) times daily. Anti-Inflam Diclofenac Sodium 3%Gabapentin 3% Lidocaine 2% Prilocaine 2%    ammonium lactate (LAC-HYDRIN) 12 % topical cream Apply  to affected area two (2) times a day. rub in to affected area well    HYDROcodone-acetaminophen (NORCO) 7.5-325 mg per tablet Take 1 Tab by mouth every eight (8) hours as needed for Pain. Max Daily Amount: 3 Tabs.  oxyCODONE-acetaminophen (PERCOCET) 7.5-325 mg per tablet Take 1 Tab by mouth every eight (8) hours as needed. Max Daily Amount: 3 Tabs.  HYDROcodone-acetaminophen (NORCO)  mg tablet Take 1 Tab by mouth every eight (8) hours as needed for Pain. Max Daily Amount: 3 Tabs.  cyclobenzaprine (FLEXERIL) 10 mg tablet Take 1 Tab by mouth three (3) times daily as needed for Muscle Spasm(s).  oxyCODONE IR (ROXICODONE) 15 mg immediate release tablet Take 1-2 Tabs by mouth every eight (8) hours as needed. Max Daily Amount: 90 mg.    HYDROmorphone (DILAUDID) 4 mg tablet Take 1 Tab by mouth every four (4) hours as needed for Pain. Max Daily Amount: 24 mg. Indications: PAIN    HYDROmorphone (DILAUDID) 4 mg tablet Take 1 Tab by mouth every four (4) hours as needed. Max Daily Amount: 24 mg. Indications: PAIN    polyethylene glycol (MIRALAX) 17 gram packet Take 1 Packet by mouth daily. Indications: Constipation    gabapentin (NEURONTIN) 300 mg capsule Take 1 Cap by mouth three (3) times daily.  gabapentin (NEURONTIN) 300 mg capsule      No current facility-administered medications for this visit.        PHYSICAL EXAMINATION:  Visit Vitals    BP (!) 164/97    Pulse (!) 54    Temp 97.5 °F (36.4 °C) (Oral)    Ht 6' 1\" (1.854 m)    Wt 248 lb (112.5 kg)    SpO2 98%    BMI 32.72 kg/m2      PHYSICAL EXAM:  Visit Vitals    BP (!) 164/97    Pulse (!) 54    Temp 97.5 °F (36.4 °C) (Oral)    Ht 6' 1\" (1.854 m)    Wt 248 lb (112.5 kg)    SpO2 98%    BMI 32.72 kg/m2       Appearance: Alert, well appearing and pleasant patient who is in no distress, oriented to person, place/time, and who follows commands. HEENT: Parker Boas hears well, does not require hearing aids. His sclera of the eyes are non-icteric. He is breathing normally and no respiratory accessory muscle use is noted. No JVD present and Neck ROM within normal limits. Psychiatric: Affect and mood are appropriate. Oriented x3  Heart[de-identified]  S1, S2 without murmer, regular rhythm  Lungs:  Breath sounds clear to auscultation  Cardiovascular/Peripheral Vascular: Normal pulses to each foot. Integumentary: No rashes,  wounds, or abrasions. Warm and normal color. No drainage.    Gait: normal  Sensory Exam: decreased sensation to light touch in the median nerve distribution bilateral hands    Lymphatic: No evidence of Lymphedema  Vascular:       Pulses: palpable  Varicosities none  Wounds/Abrasion: None Present  Neuro: Negative, no tremors  ORTHO EXAMINATION:  Examination Right Hand Left Hand   Skin Intact Intact   Deformity - -   Swelling +, PIP middle finger cystic dorsal soft tissue mass  -   Tenderness +, PIP middle finger -   Finger flexion Full, 90-5 PIP motion right middle Full   Finger extension Full Full   Sensation Normal Normal   Capillary refill Normal Normal   Heberden's nodes + +   Dupuytren's - -   Cystic structure of right middle PIP joint  Examination Right Wrist Left Wrist   Skin Intact Intact   Tenderness + +   Flexion 35 35   Extension 35 35   Deformity - -   Effusion - -   Finger flexion Full Full   Finger extension Full Full   Tinel's sign + +   Phalen's test - -   Finklestein maneuver - -   Pain with thumb abduction - -   Capillary refill - -   Mild thenar flattening on the right. EMG/NCS BILATERAL UPPER EXTREMITY:  INTERPRETATION: This was an abnormal nerve conduction and EMG study showing it to be signs of: (1) prolongation of the distal latency in both median nerves consistent with a moderate degree of carpal tunnel syndrome bilaterally. (2) decreased sensory nerve action potentials in both ulnar nerves suggestive of a axonal sensory neuropathy. IMPRESSION:      ICD-10-CM ICD-9-CM    1. Right carpal tunnel syndrome G56.01 354.0    2. Left carpal tunnel syndrome G56.02 354.0    3. Numbness and tingling in both hands R20.0 782.0     R20.2     4. Soft tissue mass M79.9 729.90     right middle finger PIP region      PLAN:  He will be scheduled for a right CTR and removal of the soft tissue mass over right middle PIP joint. Risks of surgery outlined and informed consent obtained.       Scribed by Jade Wilkes ([address]) as dictated by Israel Montes MD

## 2018-03-05 DIAGNOSIS — G56.01 CARPAL TUNNEL SYNDROME ON RIGHT: Primary | ICD-10-CM

## 2018-03-05 DIAGNOSIS — Z01.812 BLOOD TESTS PRIOR TO TREATMENT OR PROCEDURE: ICD-10-CM

## 2018-03-05 DIAGNOSIS — Z01.811 PRE-OPERATIVE RESPIRATORY EXAMINATION: ICD-10-CM

## 2018-03-05 DIAGNOSIS — M79.89 SOFT TISSUE MASS: ICD-10-CM

## 2018-03-07 NOTE — TELEPHONE ENCOUNTER
Called patient and let him know Marshell Kanner signed a new Rx for him and it is ready at the [facility] [place] office.
Imelda Viyera will cost the patient over $270. He requests an alternate pain medication. Please advise.
No bruits; no thyromegaly or nodules

## 2018-03-08 DIAGNOSIS — G56.01 CARPAL TUNNEL SYNDROME ON RIGHT: Primary | ICD-10-CM

## 2018-03-08 DIAGNOSIS — M79.89 SOFT TISSUE MASS: ICD-10-CM

## 2018-03-09 ENCOUNTER — HOSPITAL ENCOUNTER (OUTPATIENT)
Dept: LAB | Age: 63
Discharge: HOME | End: 2018-03-09

## 2018-03-09 ENCOUNTER — HOSPITAL ENCOUNTER (OUTPATIENT)
Dept: GENERAL RADIOLOGY | Age: 63
Discharge: HOME | End: 2018-03-09
Payer: MEDICARE

## 2018-03-09 ENCOUNTER — HOSPITAL ENCOUNTER (OUTPATIENT)
Dept: LAB | Age: 63
Discharge: HOME | End: 2018-03-09
Payer: MEDICARE

## 2018-03-09 DIAGNOSIS — Z01.811 PRE-OPERATIVE RESPIRATORY EXAMINATION: ICD-10-CM

## 2018-03-09 DIAGNOSIS — G56.01 CARPAL TUNNEL SYNDROME ON RIGHT: ICD-10-CM

## 2018-03-09 DIAGNOSIS — M79.89 SOFT TISSUE MASS: ICD-10-CM

## 2018-03-09 PROCEDURE — 80307 DRUG TEST PRSMV CHEM ANLYZR: CPT | Performed by: PHYSICIAN ASSISTANT

## 2018-03-09 PROCEDURE — 99001 SPECIMEN HANDLING PT-LAB: CPT | Performed by: PHYSICIAN ASSISTANT

## 2018-03-09 PROCEDURE — 71046 X-RAY EXAM CHEST 2 VIEWS: CPT

## 2018-03-20 ENCOUNTER — OFFICE VISIT (OUTPATIENT)
Dept: ORTHOPEDIC SURGERY | Facility: CLINIC | Age: 63
End: 2018-03-20

## 2018-03-20 VITALS
HEART RATE: 101 BPM | OXYGEN SATURATION: 97 % | HEIGHT: 73 IN | DIASTOLIC BLOOD PRESSURE: 95 MMHG | BODY MASS INDEX: 32.34 KG/M2 | WEIGHT: 244 LBS | RESPIRATION RATE: 16 BRPM | SYSTOLIC BLOOD PRESSURE: 154 MMHG | TEMPERATURE: 97.5 F

## 2018-03-20 DIAGNOSIS — G89.18 ACUTE POST-OPERATIVE PAIN: Primary | ICD-10-CM

## 2018-03-20 DIAGNOSIS — R20.2 NUMBNESS AND TINGLING IN BOTH HANDS: ICD-10-CM

## 2018-03-20 DIAGNOSIS — R20.0 NUMBNESS AND TINGLING IN BOTH HANDS: ICD-10-CM

## 2018-03-20 DIAGNOSIS — G56.01 RIGHT CARPAL TUNNEL SYNDROME: ICD-10-CM

## 2018-03-20 DIAGNOSIS — R22.31 MASS OF FINGER, RIGHT: ICD-10-CM

## 2018-03-20 DIAGNOSIS — Z01.818 PREOP GENERAL PHYSICAL EXAM: ICD-10-CM

## 2018-03-20 RX ORDER — OXYCODONE AND ACETAMINOPHEN 5; 325 MG/1; MG/1
1 TABLET ORAL
Qty: 24 TAB | Refills: 0 | Status: SHIPPED | OUTPATIENT
Start: 2018-03-20 | End: 2019-05-14

## 2018-03-20 NOTE — H&P
History and Physical    57 year obese AA male seen in preparation for right hand open carpal tunnel release and dorsal long finger soft tissue mass exploration with probable excission  Review of Systems   Constitutional: Positive for activity change (bilateral carpal tunnel). Negative for chills, fatigue and fever. HENT: Negative for ear pain. Eyes: Negative for pain. Respiratory: Negative for shortness of breath. Cardiovascular: Negative. Gastrointestinal: Negative for nausea and vomiting. Endocrine: Negative. Genitourinary: Negative for flank pain. Musculoskeletal: Positive for arthralgias, joint swelling and myalgias. Skin: Negative. Allergic/Immunologic: Negative. Neurological: Positive for weakness (right hand) and numbness (median nerve tract). Hematological: Negative. Psychiatric/Behavioral: Negative. Physical Exam   Nursing note and vitals reviewed. Constitutional: He is oriented to person, place, and time. He appears well-developed and well-nourished. HENT:   Head: Normocephalic and atraumatic. Eyes: Conjunctivae and EOM are normal. Pupils are equal, round, and reactive to light. Cardiovascular: Normal rate, regular rhythm, normal heart sounds and intact distal pulses. Pulmonary/Chest: Effort normal and breath sounds normal.   Musculoskeletal:        Right hand: Decreased sensation noted. Decreased sensation is present in the medial distribution. Decreased strength noted. He exhibits finger abduction and thumb/finger opposition. Hands:  Neurological: He is alert and oriented to person, place, and time. Skin: Skin is warm and dry. Psychiatric: He has a normal mood and affect.  His behavior is normal. Judgment and thought content normal.     Right hand carpal tunnel    Right hand soft tissue mass dorsal PIP long finger    right hand open carpal tunnel release and dorsal long finger soft tissue mass exploration with probable excission    ASA3    Risk / Benefit: Surgeon will discuss in \"face to face\" encounter on day of surgery. Family History and Surgical History reviewed, discussed in detail, and deemed Non-Contributory in preparation for this surgical encounter.

## 2018-03-20 NOTE — PROGRESS NOTES
Jil Ama returns for History and Physical in preparation of upcoming right hand carpal tunnel release, open, and right dorsal middle finger ganglion cyst removal.  Please see the attached forms in Epic for History, Physical, and Review of systems.

## 2018-03-21 ENCOUNTER — HOSPITAL ENCOUNTER (OUTPATIENT)
Dept: LAB | Age: 63
Discharge: HOME | End: 2018-03-21
Payer: MEDICARE

## 2018-03-21 DIAGNOSIS — Z01.818 PRE-OP TESTING: ICD-10-CM

## 2018-03-21 LAB
AMPHET UR QL SCN: NEGATIVE
ATRIAL RATE: 59 BPM
BARBITURATES UR QL SCN: NEGATIVE
BENZODIAZ UR QL: NEGATIVE
CALCULATED P AXIS, ECG09: 52 DEGREES
CALCULATED R AXIS, ECG10: 57 DEGREES
CALCULATED T AXIS, ECG11: 113 DEGREES
CANNABINOIDS UR QL SCN: NEGATIVE
COCAINE UR QL SCN: NEGATIVE
DIAGNOSIS, 93000: NORMAL
HDSCOM,HDSCOM: NORMAL
METHADONE UR QL: NEGATIVE
OPIATES UR QL: NEGATIVE
P-R INTERVAL, ECG05: 192 MS
PCP UR QL: NEGATIVE
Q-T INTERVAL, ECG07: 422 MS
QRS DURATION, ECG06: 100 MS
QTC CALCULATION (BEZET), ECG08: 417 MS
VENTRICULAR RATE, ECG03: 59 BPM

## 2018-03-21 PROCEDURE — 88304 TISSUE EXAM BY PATHOLOGIST: CPT | Performed by: SPECIALIST

## 2018-03-21 PROCEDURE — 80307 DRUG TEST PRSMV CHEM ANLYZR: CPT | Performed by: SPECIALIST

## 2018-03-21 PROCEDURE — 93005 ELECTROCARDIOGRAM TRACING: CPT

## 2018-03-21 PROCEDURE — 36415 COLL VENOUS BLD VENIPUNCTURE: CPT | Performed by: SPECIALIST

## 2018-03-23 ENCOUNTER — OFFICE VISIT (OUTPATIENT)
Dept: ORTHOPEDIC SURGERY | Facility: CLINIC | Age: 63
End: 2018-03-23

## 2018-03-23 VITALS
OXYGEN SATURATION: 96 % | DIASTOLIC BLOOD PRESSURE: 95 MMHG | HEART RATE: 62 BPM | TEMPERATURE: 96.7 F | SYSTOLIC BLOOD PRESSURE: 155 MMHG | WEIGHT: 252 LBS | BODY MASS INDEX: 33.25 KG/M2

## 2018-03-23 DIAGNOSIS — G89.18 ACUTE POST-OPERATIVE PAIN: Primary | ICD-10-CM

## 2018-03-23 DIAGNOSIS — Z98.890 S/P CARPAL TUNNEL RELEASE: ICD-10-CM

## 2018-03-23 DIAGNOSIS — Z98.890 S/P TRIGGER FINGER RELEASE: ICD-10-CM

## 2018-03-23 RX ORDER — OXYCODONE AND ACETAMINOPHEN 10; 325 MG/1; MG/1
1 TABLET ORAL
Qty: 25 TAB | Refills: 0 | Status: SHIPPED | OUTPATIENT
Start: 2018-03-23 | End: 2018-04-19 | Stop reason: SDUPTHER

## 2018-03-23 NOTE — PROGRESS NOTES
HISTORY OF PRESENT ILLNESS:  Dexter Webber return to the office status post his right hand open carpal tunnel and dorsal long finger soft tissue mass removal.  Generally, he is doing fair. He has maintained his surgical dressings. However, his pain to the palmar surgical site is in excess of 10/10. He has tried to manage his pain with Percocet 5 mg but has been relatively unsuccessful. He requests an alternate pain medication or a stronger form of what he is taking. PHYSICAL EXAM:  The right upper extremity reveals extensive surgical dressings noted. They are removed to reveal over the volar right proximal wrist and distal forearm reveals a 4-centimeter surgical incision intact with interrupted vertical mattress sutures noted. The wound borders are well approximated. There is no evidence of wound dehiscence or infection. Soft tissue swelling bracketing the surgical site is noted. The patient does have guarded flexion and extension of all digits of the right hand noting an 8-centimeter palm-to-pulp deficit on flexion. He lacks around 15° at the MCP joint of the index, long, ring, and little fingers to full extension. The dorsal aspect of the right long finger across the PIP joint reveals a 3-centimeter surgical incision intact. the wound borders are well approximated. There are interrupted vertical mattress sutures noted. Soft tissue swelling is also noted at the site. There is thin bloody drainage, dried, at both surgical sites of the right upper extremity. Distal sensation is intact to the ulnar nerve distribution of the right upper extremity fully. He is still lacking sensation with paresthesia associated, which is generally unchanged from prior to surgery of the median nerve track. PLAN:   He was increased to Percocet 10 mg, 1 q six hours, #25 dispensed. We will see him back in one week for wound check and likely suture removal.  He is going to avoid setting the surgical site wet.

## 2018-03-28 ENCOUNTER — OFFICE VISIT (OUTPATIENT)
Dept: ORTHOPEDIC SURGERY | Facility: CLINIC | Age: 63
End: 2018-03-28

## 2018-03-28 VITALS
RESPIRATION RATE: 18 BRPM | DIASTOLIC BLOOD PRESSURE: 88 MMHG | TEMPERATURE: 97.5 F | HEIGHT: 73 IN | BODY MASS INDEX: 33.05 KG/M2 | SYSTOLIC BLOOD PRESSURE: 156 MMHG | WEIGHT: 249.4 LBS | OXYGEN SATURATION: 97 % | HEART RATE: 71 BPM

## 2018-03-28 DIAGNOSIS — Z48.02 VISIT FOR SUTURE REMOVAL: ICD-10-CM

## 2018-03-28 DIAGNOSIS — G89.18 ACUTE POST-OPERATIVE PAIN: Primary | ICD-10-CM

## 2018-03-28 DIAGNOSIS — Z98.890 S/P CARPAL TUNNEL RELEASE: ICD-10-CM

## 2018-03-28 RX ORDER — CHOLECALCIFEROL TAB 125 MCG (5000 UNIT) 125 MCG
TAB ORAL DAILY
COMMUNITY

## 2018-03-28 NOTE — PROGRESS NOTES
HISTORY:  Fabrizio Kelly returns. He is one week status post his right hand open carpal tunnel release. He also had soft tissue mass removed from the dorsal aspect of the mid-portion of his right long finger. PHYSICAL EXAMINATION: Both surgical sites associated with the volar wrist and the top of his middle right finger were removed with no complications. Sterile dressings were placed. PLAN: The patient is going to continue with his volar splint. He is going to begin outpatient occupational therapy. We will see him back in about three weeks.

## 2018-04-02 ENCOUNTER — HOSPITAL ENCOUNTER (OUTPATIENT)
Dept: PHYSICAL THERAPY | Age: 63
Discharge: HOME | End: 2018-04-02
Payer: MEDICARE

## 2018-04-02 PROCEDURE — G8985 CARRY GOAL STATUS: HCPCS

## 2018-04-02 PROCEDURE — G8984 CARRY CURRENT STATUS: HCPCS

## 2018-04-02 PROCEDURE — 97165 OT EVAL LOW COMPLEX 30 MIN: CPT

## 2018-04-02 PROCEDURE — 97110 THERAPEUTIC EXERCISES: CPT

## 2018-04-02 NOTE — PROGRESS NOTES
Hand Therapy Evaluation and Daily Note    Patient Name: Patricio Hamilton  Date:2018  : 1955  Age: 57 y.o.y/o  [x]  Patient  Verified  Payor: Martín Alicea / Plan: [address] [place] [address] HMO / Product Type: [employer] /    Referring Provider: Lauryn Adams MD  Next MD Visit:  2018  Onset Date:  Over 1 year aog  Surgical Date: 3/21/2018  Surgical Procedure: s/p carpal tunnel release right    In time: 1:15  Out time: 2:00  Total Treatment Time (min): 45  Total Timed Codes (min): 45  1:1 Treatment Time ([address] only): 39   Visit #:  12    Treatment Area: Pain in right hand [M79.641]  Other specified postprocedural states [Z98.890]    Precautions:    Hand Dominance: right handed   Hand Involved: right    Total Evaluation Time:  30    History of Present Condition:  Patient is a 57 y.o. male with a chief complaint  of right wrist pain s/p right open carpal tunnel release and he also had soft tissue mass removed from the dorsal aspect of the mid-portion of his right long finger. Pt reports experiencing right wrist pain for over 1 year and it was decided between him and the MD for surgical intervention. Pt reported 8/10 pain upon arrival today. He presented with decreased right wrist ROM and mild paresthesia of right middle and ring fingers. He is unable to make a closed tight fist without increased pain and has moderate difficulty opposing thumb to fingers on right hand. He reports he uses right hand for weightbearing to assist with transfers out of the chair and it is causing increased pain and bleeding from the incision site. Today, his incision site was intact and therapist educated pt on alternative sit to stand transfer technique with carryover demonstrated by pt. He was provided an initial HEP including wrist AROM, tendon glides, median nerve glides, and isolated blocking to the FDS and FDP.   Skilled OT services are necessary to address deficits and improve quality of life.    Pain Rating:   Current: (0-no pain 10-debilitating pain) severe   At best: (0-no pain 10-debilitating pain) severe  At worst: (0-no pain 10-debilitating pain) severe  Location: right wrist  Type:  severe   Better with: pain medicine  Worse with: movement, using hands to stand up from chair    Medications/Allergies/Past Medical History:  See chart; reviewed with patient. Arthritis, HTN, LBP, visually impaired, heart disease    Diagnostic Tests: N/A    Prior Level of Function: Independent with ADLs and IADLs without functional limitations. Current Level of Function:  Decreased ADL efficiency, Unable to drive    Social History: Pt reports he is staying with his daughter since sx. Occupation/Job Requirements: Retired    Observation: Pt presented today with surgical incisions intact and healing. Scar/incision:   4 cm surgical incision intact, 3 cm surgical incision site intact   Location:  Volar right proximal wrist and distal forearm, dorsal aspect of top right middle finger    Palpation: tenderness with palpation to volar wrist in proximal incision area    Range of Motion:  Elbow/Wrist   Wrist 4/2/2018R/L        Flex 20/70       Ext 50/50       UD 30/20       RD 10/20       FA         Pro 80/80       Sup 70/70           Strength:  DNT at this time    Sensation:    Mild paresthesia on right middle and ring fingers. Edema: GIRTH CHART measured in cm  Date: 4/2/2018     Side R/L    DPC circum.  24/23.8    Wrist Crease 19.5/19.7        Special Tests: N/A     ADLs  Feeding:        []MaxA   []ModA   []Marla   [] CGA   []SBA   []Satya   [x]Independent  UE Dressing:       []MaxA   []ModA   []Marla   [] CGA   []SBA   []Satya   [x]Independent  LE Dressing:       []MaxA   []ModA   [x]Marla   [] CGA   []SBA   []Satya   []Independent  Grooming:       []MaxA   []ModA   [x]Marla   [] CGA   []SBA   []Satya   []Independent  Toileting:       []MaxA   []ModA   [x]Marla   [] CGA   []SBA   []Satya   []Independent  Bathing: []MaxA   []ModA   [x]Marla   [] CGA   []SBA   []Satya   []Independent  Light Meal Prep:    []MaxA   []ModA   [x]Marla   [] CGA   []SBA   []Satya   []Independent  Household/Other: []MaxA   []ModA   [x]Marla   [] CGA   []SBA   []Satya   []Independent  Adaptive Equip:     []MaxA   []ModA   []Marla   [] CGA   []SBA   []Satya   []Independent  Driving:       [x]MaxA   []ModA   []Marla   [] CGA   []SBA   []Satya   []Independent      Todays Treatment:  Patient received an initial evaluation today followed by education as to diagnosis, precautions and treatment plan. Patient was provided with a basic home exercise program includingwrist AROM, tendon glides, median nerve glides, and isolated blocking to the FDS and FDP . OBJECTIVE    15 min Therapeutic Exercise:  [x] See flow sheet :   Rationale: increase ROM and improve coordination to improve the patients ability to move right wrist thru pain-free ROM. With   [x] TE   [] TA   [] neuro   [] other: Patient Education: [x] Review HEP    [] Progressed/Changed HEP based on:   [] positioning   [] body mechanics   [] transfers   [] heat/ice application   [] Splint wear/care   [] Sensory re-education   [] scar management      [] other:      Pain Level (0-10 scale) post treatment: 8/10    Patient will continue to benefit from skilled OT services to modify and progress therapeutic interventions, address ROM deficits, address strength deficits, analyze and address soft tissue restrictions and analyze and cue movement patterns to attain goals. Assessment: Pt reported 8/10 pain upon arrival today. He presented with decreased right wrist ROM and mild paresthesia of right middle and ring fingers. He is unable to make a closed tight fist without increased pain and has moderate difficulty opposing thumb to fingers on right hand.   He reports he uses right hand for weightbearing to assist with transfers out of the chair and it is causing increased pain and bleeding from the incision site. Today, his incision site was intact and therapist educated pt on alternative sit to stand transfer technique with carryover demonstrated by pt    Short Term Goals: To be accomplished in 2  weeks:  1. Patient will be compliant with initial home exercise program to take an active role in their rehabilitation process. 2. Patient will demonstrate a good understanding of their condition and strategies for self-management. Long Term Goals: To be accomplished in 4 weeks:   1. Patient will have 55 degrees of right wrist extension in order to increase ease with ADL's such as bathing, eating and dressing and to eventually bear weight thru the right upper extremity as in pushing up from a chair. Status at eval: 50  2. Patient will have 40 degrees of right wrist flexion in order to perform hygiene tasks and /or work with tools such as a screwdriver. Status at eval: 20  3. Patient will report mild difficulty carrying a shopping bag with right hand on FOTO outcome measure in order to demonstrate improved functional performance. Status at Eval: Unable to do    Frequency / Duration: Patient to be seen 3 times per week for 4 weeks:    Patient/ Caregiver education and instruction: Diagnosis, prognosis, exercises    Functional Status Measure:  Patient's: 24  FOTO Benchmark: 49  Expected Change: 35  FOTO score based on 0 - 100 point scale, with 100 being no impairment. These scores are determined by patient reported functional assessments compared against national benchmarked data. Carry   Current  CL= 60-79%    Goal  CK= 40-59%    The severity rating is based on clinical judgment and the FOTO score.     Certification period: 4/2/2018 - 5/31/2018    Chantelle Watson OT 4/2/2018 1:05 PM

## 2018-04-02 NOTE — PROGRESS NOTES
[facility]  [address] [facility] [address]  [place], [address]  [phone] fax    Plan of Care/Statement of Necessity for Occupational Therapy Services    Patient name: Dora Powell Start of Care: 2018   Referral source: Neris Huffman MD : 1955    Medical Diagnosis: Pain in right hand [M79.641]  Other specified postprocedural states [Z98.890]   Onset Date:3/21/2018    Treatment Diagnosis: Right wrist pain   Prior Hospitalization: see medical history Provider#: [id number]   Medications: Verified on Patient summary List    Comorbidities: arthritis, HTN, heart disease, LBP, visually impaired. Prior Level of Function: Independent with ADLs and IADLs without functional limitations on right wrist.  The Plan of Care and following information is based on the information from the initial evaluation. Assessment/ key information: Patient is a 57 y.o. male with a chief complaint  of right wrist pain s/p right open carpal tunnel release and he also had soft tissue mass removed from the dorsal aspect of the mid-portion of his right long finger. Pt reports experiencing right wrist pain for over 1 year and it was decided between him and the MD for surgical intervention. Pt reported 8/10 pain upon arrival today. He presented with decreased right wrist ROM and mild paresthesia of right middle and ring fingers. He is unable to make a closed tight fist without increased pain and has moderate difficulty opposing thumb to fingers on right hand. He reports he uses right hand for weightbearing to assist with transfers out of the chair and it is causing increased pain and bleeding from the incision site. Today, his incision site was intact and therapist educated pt on alternative sit to stand transfer technique with carryover demonstrated by pt.   He was provided an initial HEP including wrist AROM, tendon glides, median nerve glides, and isolated blocking to the FDS and FDP. Skilled OT services are necessary to address deficits and improve quality of life. Evaluation Complexity: History LOW Complexity : Brief history review  Examination LOW Complexity : 1-3 performance deficits relating to physical, cognitive , or psychosocial skils that result in activity limitations and / or participation restrictions  Clinical Decision Making LOW Complexity : No comorbidities that affect functional and no verbal or physical assistance needed to complete eval tasks   Overall Complexity Rating: LOW     Problem List: Pain effecting function, Decreased range of motion, Decreased strength, Edema effecting function, Decreased coordination/prehension, Decreased ADL/functional abilities , Decreased flexibility/joint mobility and Sensability     Treatment Plan may include any combination of the following: Therapeutic exercise, Therapeutic activities, Physical agent/modality, Scar management, Manual therapy, Patient education and ADLs/IADLs    Patient / Family readiness to learn indicated by: interest    Persons(s) to be included in education:   patient (P)    Barriers to Learning/Limitations: None    Patient Goal (s): To relieve pain.     Patient Self Reported Health Status: good    Rehabilitation Potential: good    Short Term Goals: To be accomplished in 2  weeks:  1. Patient will be compliant with initial home exercise program to take an active role in their rehabilitation process. 2. Patient will demonstrate a good understanding of their condition and strategies for self-management. Long Term Goals: To be accomplished in 4 weeks:   1. Patient will have 55 degrees of right wrist extension in order to increase ease with ADL's such as bathing, eating and dressing and to eventually bear weight thru the right upper extremity as in pushing up from a chair. Status at eval: 50  2.  Patient will have 40 degrees of right wrist flexion in order to perform hygiene tasks and /or work with tools such as a screwdriver. Status at eval: 20  3. Patient will report mild difficulty carrying a shopping bag with right hand on FOTO outcome measure in order to demonstrate improved functional performance. Status at Eval: Unable to do    Frequency / Duration: Patient to be seen 3 times per week for 4 weeks:    Patient/ Caregiver education and instruction: Diagnosis, prognosis, exercises  [x]  Plan of care has been reviewed with Zeyad Conde OT 4/2/2018 1:05 PM  ________________________________________________________________________    I certify that the above Therapy Services are being furnished while the patient is under my care. I agree with the treatment plan and certify that this therapy is necessary.     Physician's Signature:____________________  Date:____________Time:__________    Please sign and return to [facility] [address]  [place], [address]  [phone] fax

## 2018-04-02 NOTE — MR AVS SNAPSHOT
[address] [address] [address] [id number] [phone] Patient: Hussein Hernandez MRN: [id number] :1955 Visit Information Date & Time Provider Department Dept. Phone Encounter #  
 2018  1:00 PM Gabe Henderson, Beba [facility]  [facility] [address] [phone] [id number] Your Appointments 2018 11:00 AM  
Follow Up with Johann Branch PA-C  
[facility] [address]) Appt Note: FU RT HAND  
 [address] [facility], [address] [facility] [address] [phone]  
  
   
 [address] [place] [address] Upcoming Health Maintenance Date Due Hepatitis C Screening 1955 DTaP/Tdap/Td series (1 - Tdap) 1974 FOBT Q 1 YEAR AGE 50-75 2003 ZOSTER VACCINE AGE 60> 3/19/2013 Influenza Age 5 to Adult 2017 MEDICARE YEARLY EXAM 3/14/2018 Allergies as of 2018  Review Complete On: 3/28/2018 By: Johann Branch PA-C Severity Noted Reaction Type Reactions Vicodin [Hydrocodone-acetaminophen]    Other (comments) Current Immunizations  Never Reviewed No immunizations on file. Not reviewed this visit Vitals Smoking Status Former Smoker Your Updated Medication List  
  
ASK your doctor about these medications   
 ammonium lactate 12 % topical cream  
Commonly known as:  LAC-HYDRIN Apply  to affected area two (2) times a day. rub in to affected area well  
  
 aspirin 325 mg tablet Commonly known as:  ASPIRIN Take 1 Tab by mouth two (2) times a day. carvedilol 25 mg tablet Commonly known as:  Jerl Specter Take 25 mg by mouth two (2) times daily (with meals). cholecalciferol (VITAMIN D3) 5,000 unit Tab tablet Commonly known as:  VITAMIN D3 Take  by mouth daily. clopidogrel 75 mg Tab Commonly known as:  PLAVIX 75 mg.  
  
 COMPOUNDMAX BASE Crea Generic drug:  cream base no. 171 (bulk) 240 g by Does Not Apply route four (4) times daily. Anti-Inflam Diclofenac Sodium 3%Gabapentin 3% Lidocaine 2% Prilocaine 2%  
  
 cyclobenzaprine 10 mg tablet Commonly known as:  FLEXERIL Take 1 Tab by mouth three (3) times daily as needed for Muscle Spasm(s). diclofenac 1 % Gel Commonly known as:  VOLTAREN Apply 4 g to affected area four (4) times daily. doxycycline 100 mg tablet Commonly known as:  ADOXA Take 1 Tab by mouth two (2) times a day. ferrous sulfate 325 mg (65 mg iron) tablet Take 1 Tab by mouth two (2) times daily (with meals). * gabapentin 300 mg capsule Commonly known as:  NEURONTIN  
  
 * gabapentin 300 mg capsule Commonly known as:  NEURONTIN Take 1 Cap by mouth three (3) times daily. hydroCHLOROthiazide 25 mg tablet Commonly known as:  HYDRODIURIL Take 25 mg by mouth daily. * HYDROcodone-acetaminophen  mg tablet Commonly known as:  Sarah Rupesh Take 1 Tab by mouth every eight (8) hours as needed for Pain. Max Daily Amount: 3 Tabs. * HYDROcodone-acetaminophen 7.5-325 mg per tablet Commonly known as:  Sarah Rupesh Take 1 Tab by mouth every eight (8) hours as needed for Pain. Max Daily Amount: 3 Tabs. HYDROmorphone 4 mg tablet Commonly known as:  DILAUDID Take 1 Tab by mouth every four (4) hours as needed. Max Daily Amount: 24 mg. Indications: PAIN  
  
 isoniazid 300 mg tablet Commonly known as:  NYDRAZID Take 300 mg by mouth daily. isosorbide mononitrate ER 60 mg CR tablet Commonly known as:  IMDUR Take 1 Tab by mouth every morning. LIPITOR 80 mg tablet Generic drug:  atorvastatin Take 80 mg by mouth daily. lisinopril 20 mg tablet Commonly known as:  Mechele Livings Take  by mouth daily. NITROSTAT 0.4 mg SL tablet Generic drug:  nitroglycerin  
by SubLINGual route every five (5) minutes as needed. oxyCODONE IR 15 mg immediate release tablet Commonly known as:  Rolm Woodland Take 1-2 Tabs by mouth every eight (8) hours as needed. Max Daily Amount: 90 mg.  
  
 * oxyCODONE-acetaminophen 7.5-325 mg per tablet Commonly known as:  PERCOCET Take 1 Tab by mouth every eight (8) hours as needed. Max Daily Amount: 3 Tabs. * oxyCODONE-acetaminophen 5-325 mg per tablet Commonly known as:  PERCOCET Take 1 Tab by mouth every six (6) hours as needed for Pain. Max Daily Amount: 4 Tabs. * oxyCODONE-acetaminophen  mg per tablet Commonly known as:  PERCOCET Take 1 Tab by mouth every six (6) hours as needed for Pain. Max Daily Amount: 4 Tabs. polyethylene glycol 17 gram packet Commonly known as:  Guille Prince Take 1 Packet by mouth daily. Indications: Constipation * Notice: This list has 7 medication(s) that are the same as other medications prescribed for you. Read the directions carefully, and ask your doctor or other care provider to review them with you. Introducing [facility]! [facility] introduces [employer] patient portal. Now you can access parts of your medical record, email your doctor's office, and request medication refills online. 1. In your internet browser, go to [email or web address] 2. Click on the First Time User? Click Here link in the Sign In box. You will see the New Member Sign Up page. 3. Enter your [employer] Access Code exactly as it appears below. You will not need to use this code after youve completed the sign-up process. If you do not sign up before the expiration date, you must request a new code. · Ed[employer] Access Code: [email or web address] Expires: 6/26/2018  2:34 PM 
 
4. Enter the last four digits of your Social Security Number (xxxx) and Date of Birth (mm/dd/yyyy) as indicated and click Submit. You will be taken to the next sign-up page. 5. Create a Ed[employer] ID.  This will be your Ed[employer] login ID and cannot be changed, so think of one that is secure and easy to remember. 6. Create a [employer] password. You can change your password at any time. 7. Enter your Password Reset Question and Answer. This can be used at a later time if you forget your password. 8. Enter your e-mail address. You will receive e-mail notification when new information is available in [address] 9. Click Sign Up. You can now view and download portions of your medical record. 10. Click the Download Summary menu link to download a portable copy of your medical information. If you have questions, please visit the Frequently Asked Questions section of the [employer] website. Remember, [employer] is NOT to be used for urgent needs. For medical emergencies, dial 911. Now available from your iPhone and Android! Please provide this summary of care documentation to your next provider. Your primary care clinician is listed as Kerri Retana. If you have any questions after today's visit, please call [phone].

## 2018-04-06 ENCOUNTER — APPOINTMENT (OUTPATIENT)
Dept: PHYSICAL THERAPY | Age: 63
End: 2018-04-06
Payer: MEDICARE

## 2018-04-16 ENCOUNTER — APPOINTMENT (OUTPATIENT)
Dept: PHYSICAL THERAPY | Age: 63
End: 2018-04-16
Payer: MEDICARE

## 2018-04-19 ENCOUNTER — OFFICE VISIT (OUTPATIENT)
Dept: ORTHOPEDIC SURGERY | Facility: CLINIC | Age: 63
End: 2018-04-19

## 2018-04-19 VITALS
HEIGHT: 73 IN | BODY MASS INDEX: 34.3 KG/M2 | OXYGEN SATURATION: 99 % | SYSTOLIC BLOOD PRESSURE: 148 MMHG | RESPIRATION RATE: 18 BRPM | HEART RATE: 64 BPM | TEMPERATURE: 98 F | WEIGHT: 258.8 LBS | DIASTOLIC BLOOD PRESSURE: 86 MMHG

## 2018-04-19 DIAGNOSIS — Z98.890 S/P CARPAL TUNNEL RELEASE: ICD-10-CM

## 2018-04-19 DIAGNOSIS — G89.18 ACUTE POST-OPERATIVE PAIN: Primary | ICD-10-CM

## 2018-04-19 DIAGNOSIS — T81.30XA EXTRUDING SUTURE, INITIAL ENCOUNTER: ICD-10-CM

## 2018-04-19 RX ORDER — OXYCODONE AND ACETAMINOPHEN 10; 325 MG/1; MG/1
1 TABLET ORAL
Qty: 21 TAB | Refills: 0 | Status: SHIPPED | OUTPATIENT
Start: 2018-04-19 | End: 2019-05-14

## 2018-04-19 NOTE — PROGRESS NOTES
HISTORY OF PRESENT ILLNESS:  Frandy Mosley returns one month status post his right hand open carpal tunnel release. He is generally doing well. He had occupational therapy started, but unfortunately, within the past 10 days, he was urgently admitted to [facility] for a malignant blood pressure. He received eight stents and stayed in the hospital eight days. He was not able, at this point, to restart occupational therapies. PHYSICAL EXAM:  He has had some palmar pain at the surgical site. There appears to be a suture remnant present. The pain is associated with the area of the suture remnant. The remnant today was removed with no complications and no bleeding. There is no evidence of infection. Otherwise, the surgical site is healed nicely. PLAN:   The patient is going to restart occupational therapy. A new order was given today. In addition, he is going to follow with our office in about three weeks. He was given a volar splint as well. He does not think he had a splint before, but we are going to go ahead and provide one now. He is going to limit his push, pull, lift, and carry to no more than one pound with the left upper extremity. We will see him back, as I stated, in about one month. He may continue to bathe and shower normally getting the surgical site wet. He has essentially no improvement in the paresthesia associated with the left hand of the median nerve track when compared to prior to surgery.

## 2018-04-27 ENCOUNTER — APPOINTMENT (OUTPATIENT)
Dept: PHYSICAL THERAPY | Age: 63
End: 2018-04-27
Payer: MEDICARE

## 2018-07-19 ENCOUNTER — OFFICE VISIT (OUTPATIENT)
Dept: ORTHOPEDIC SURGERY | Age: 63
End: 2018-07-19

## 2018-07-19 VITALS
BODY MASS INDEX: 34.72 KG/M2 | OXYGEN SATURATION: 99 % | HEIGHT: 73 IN | HEART RATE: 61 BPM | DIASTOLIC BLOOD PRESSURE: 72 MMHG | TEMPERATURE: 97.6 F | SYSTOLIC BLOOD PRESSURE: 117 MMHG | WEIGHT: 262 LBS

## 2018-07-19 DIAGNOSIS — R22.31 MASS OF FINGER, RIGHT: ICD-10-CM

## 2018-07-19 DIAGNOSIS — M25.551 HIP PAIN, RIGHT: Primary | ICD-10-CM

## 2018-07-23 ENCOUNTER — OFFICE VISIT (OUTPATIENT)
Dept: ORTHOPEDIC SURGERY | Facility: CLINIC | Age: 63
End: 2018-07-23

## 2018-07-23 VITALS
TEMPERATURE: 97.1 F | DIASTOLIC BLOOD PRESSURE: 78 MMHG | WEIGHT: 264.8 LBS | HEART RATE: 64 BPM | SYSTOLIC BLOOD PRESSURE: 132 MMHG | RESPIRATION RATE: 18 BRPM | OXYGEN SATURATION: 97 % | BODY MASS INDEX: 35.09 KG/M2 | HEIGHT: 73 IN

## 2018-07-23 DIAGNOSIS — M79.644 PAIN OF RIGHT MIDDLE FINGER: Primary | ICD-10-CM

## 2018-07-23 NOTE — PROGRESS NOTES
Patient: Marcy Huffman                MRN: [id number]       SSN: [id number] YOB: 1955        AGE: 70 y.o. SEX: male PCP: Clarissa De Jesus MD 
07/23/18 Chief Complaint Patient presents with  
 Hand Pain Right 3rd finger HISTORY:  Marcy Huffman is a 70 y.o. male who is seen for hand surgery followup. He is s/p a mass removal from his right middle finger and a right CTR on 3/21/18. He is reporting some continued pain in his middle finger since the surgery. He notes there is some swelling over his right middle finger PIP joint. He is reluctant to bend his middle finger. His numbness has resolved. He had a right hip replacement 1/24/2017 with Dr. Nakia Felipe but states that he continues to experience hip pain. He has not tried cortisone injections for the hand pain but he does not like needles. Pt admits to no relief with a lumbar injection in the past. He is s/p CABG in the past with saphenous vein harvested from the right leg. Pt is currently on Plavix. Pain Assessment  7/23/2018 Location of Pain Hand;Finger Pain Location Comment 3rd finger Location Modifiers Right Severity of Pain 8 Quality of Pain Aching; [place] Quality of Pain Comment - Duration of Pain Persistent Frequency of Pain Constant Date Pain First Started - Aggravating Factors Bending;Stretching;Straightening Limiting Behavior Yes Relieving Factors Nothing Relieving Factors Comment - Result of Injury No  
Work-Related Injury - Type of Injury - Occupation, etc:  Mr. Jyothi Cantu is retired x 20 years on disability for low back pain. 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 He caught a transmission and reports that it broke his back in two. He had an artifical plate with 4 screws placed by Dr. Geller President in [place]. He continues to experience chronic lower back pain with rainy weather.  He is currently living with his sister in [place]. He hopes to move into his own place near the [facility] area in the near future. Pt has 6 adult children, 4 daughters and 2, sons who all live in the area and help him out. He has 10 grandchildren and no great grandchildren. He is a [employer] football fan. Current weight is 248 pounds. He is 6'1\" tall. Lab Results Component Value Date/Time Hemoglobin A1c 4.4 01/16/2017 11:26 AM  
 
Weight Metrics 7/23/2018 7/19/2018 4/19/2018 3/28/2018 3/23/2018 3/20/2018 2/21/2018 Weight 264 lb 12.8 oz 262 lb 258 lb 12.8 oz 249 lb 6.4 oz 252 lb 244 lb 248 lb BMI 34.94 kg/m2 34.57 kg/m2 34.14 kg/m2 32.9 kg/m2 33.25 kg/m2 32.19 kg/m2 32.72 kg/m2 Patient Active Problem List  
Diagnosis Code  Coronary atherosclerosis of native coronary artery I25.10  Old myocardial infarction I25.2  Essential hypertension, benign I10  
 Other and unspecified hyperlipidemia E78.5  Lumbar spinal stenosis M48.061  Lumbar neuritis M54.16  
 Postlaminectomy syndrome, lumbar region M96.1  Hip arthritis M16.10  
 Hip pain, right M25.551 REVIEW OF SYSTEMS: All Below are Negative except: See HPI Constitutional: negative for fever, chills, and weight loss. Cardiovascular: negative for chest pain, claudication, leg swelling, SOB, MALONE Gastrointestinal: Negative for pain, N/V/C/D, Blood in stool or urine, dysuria,  hematuria, incontinence, pelvic pain. Musculoskeletal: See HPI Neurological: Negative for dizziness and weakness. Negative for headaches, Visual changes, confusion, seizures Phychiatric/Behavioral: Negative for depression, memory loss, substance  abuse. Extremities: Negative for hair changes, rash, or skin lesion changes. Hematologic: Negative for bleeding problems, bruising, pallor or swollen lymph  nodes Peripheral Vascular: No calf pain, no circulation deficits. Social History Social History  Marital status: SINGLE   Spouse name: N/A  
 Number of children: N/A  
 Years of education: N/A Occupational History  Not on file. Social History Main Topics  Smoking status: Former Smoker Packs/day: 0.10 Types: Cigarettes Quit date: 11/1/2012  Smokeless tobacco: Current User  Alcohol use No  
 Drug use: Yes Special: Cocaine Comment: +Cocaine Screen 3/7/16 (see Care Everywhere Labs)  Sexual activity: Not on file Comment: stopped using Other Topics Concern  Not on file Social History Narrative Allergies Allergen Reactions  Vicodin [Hydrocodone-Acetaminophen] Other (comments) Current Outpatient Prescriptions Medication Sig  cholecalciferol, VITAMIN D3, (VITAMIN D3) 5,000 unit tab tablet Take  by mouth daily.  clopidogrel (PLAVIX) 75 mg tab 75 mg.  
 oxyCODONE IR (ROXICODONE) 15 mg immediate release tablet Take 1-2 Tabs by mouth every eight (8) hours as needed. Max Daily Amount: 90 mg.  
 ferrous sulfate 325 mg (65 mg iron) tablet Take 1 Tab by mouth two (2) times daily (with meals).  aspirin (ASPIRIN) 325 mg tablet Take 1 Tab by mouth two (2) times a day. (Patient taking differently: Take 81 mg by mouth daily.)  nitroglycerin (NITROSTAT) 0.4 mg SL tablet by SubLINGual route every five (5) minutes as needed.  lisinopril (PRINIVIL, ZESTRIL) 20 mg tablet Take 40 mg by mouth daily.  atorvastatin (LIPITOR) 80 mg tablet Take 80 mg by mouth daily.  oxyCODONE-acetaminophen (PERCOCET)  mg per tablet Take 1 Tab by mouth every eight (8) hours as needed for Pain. Max Daily Amount: 3 Tabs.  oxyCODONE-acetaminophen (PERCOCET) 5-325 mg per tablet Take 1 Tab by mouth every six (6) hours as needed for Pain. Max Daily Amount: 4 Tabs.  diclofenac (VOLTAREN) 1 % gel Apply 4 g to affected area four (4) times daily.  COMPOUNDMAX BASE crea 240 g by Does Not Apply route four (4) times daily. Anti-Inflam Diclofenac Sodium 3%Gabapentin 3% Lidocaine 2% Prilocaine 2%  ammonium lactate (LAC-HYDRIN) 12 % topical cream Apply  to affected area two (2) times a day. rub in to affected area well  
 HYDROcodone-acetaminophen (NORCO) 7.5-325 mg per tablet Take 1 Tab by mouth every eight (8) hours as needed for Pain. Max Daily Amount: 3 Tabs.  oxyCODONE-acetaminophen (PERCOCET) 7.5-325 mg per tablet Take 1 Tab by mouth every eight (8) hours as needed. Max Daily Amount: 3 Tabs.  HYDROcodone-acetaminophen (NORCO)  mg tablet Take 1 Tab by mouth every eight (8) hours as needed for Pain. Max Daily Amount: 3 Tabs.  cyclobenzaprine (FLEXERIL) 10 mg tablet Take 1 Tab by mouth three (3) times daily as needed for Muscle Spasm(s).  HYDROmorphone (DILAUDID) 4 mg tablet Take 1 Tab by mouth every four (4) hours as needed. Max Daily Amount: 24 mg. Indications: PAIN  polyethylene glycol (MIRALAX) 17 gram packet Take 1 Packet by mouth daily. Indications: Constipation  gabapentin (NEURONTIN) 300 mg capsule Take 1 Cap by mouth three (3) times daily.  doxycycline (ADOXA) 100 mg tablet Take 1 Tab by mouth two (2) times a day.  gabapentin (NEURONTIN) 300 mg capsule  isoniazid (NYDRAZID) 300 mg tablet Take 300 mg by mouth daily.  carvedilol (COREG) 25 mg tablet Take 25 mg by mouth two (2) times daily (with meals).  hydrochlorothiazide (HYDRODIURIL) 25 mg tablet Take 25 mg by mouth daily.  isosorbide mononitrate ER (IMDUR) 60 mg CR tablet Take 1 Tab by mouth every morning. No current facility-administered medications for this visit. PHYSICAL EXAM: 
Visit Vitals  /78  Pulse 64  Temp 97.1 °F (36.2 °C) (Oral)  Resp 18  Ht 6' 1\" (1.854 m)  Wt 264 lb 12.8 oz (120.1 kg)  SpO2 97%  BMI 34.94 kg/m2 ORTHO EXAMINATION: 
Examination Right Hand Left Hand Skin Intact Intact Deformity - - Swelling mild - Tenderness + even to light touch -  
Finger flexion 90-5 PIP motion right middle Full Finger extension Full Full Sensation Normal Normal  
Capillary refill Normal Normal  
Heberden's nodes + + Dupuytren's - - Tenderness even to light touch diffusely Examination Right Wrist Left Wrist  
Skin Intact Intact Tenderness - - Flexion 35 35 Extension 35 35 Deformity - - Effusion - - Finger flexion Full Full Finger extension Full Full Tinel's sign + + Phalen's test - - Finklestein maneuver - -  
Pain with thumb abduction - - Capillary refill - - Mild thenar flattening on the right. EMG/NCS BILATERAL UPPER EXTREMITY 6/9/17 INTERPRETATION: This was an abnormal nerve conduction and EMG study showing it to be signs of: (1) prolongation of the distal latency in both median nerves consistent with a moderate degree of carpal tunnel syndrome bilaterally. (2) decreased sensory nerve action potentials in both ulnar nerves suggestive of a axonal sensory neuropathy. IMPRESSION:   
  ICD-10-CM ICD-9-CM 1. Pain of right middle finger M79.644 729.5 PLAN:  There is no need for any further surgery at this time. He will rub anson butter on his right middle finger surgical site to soften any scar tissue that may be present. Continue hand/finger ROM exercises outlined in hand therapy.   
 
Scribed by [facility]) as dictated by Yazmin Saunders MD

## 2019-02-01 ENCOUNTER — OFFICE VISIT (OUTPATIENT)
Dept: ORTHOPEDIC SURGERY | Age: 64
End: 2019-02-01

## 2019-02-01 VITALS
HEIGHT: 73 IN | HEART RATE: 77 BPM | TEMPERATURE: 98.2 F | RESPIRATION RATE: 16 BRPM | WEIGHT: 264 LBS | OXYGEN SATURATION: 97 % | BODY MASS INDEX: 34.99 KG/M2 | DIASTOLIC BLOOD PRESSURE: 81 MMHG | SYSTOLIC BLOOD PRESSURE: 160 MMHG

## 2019-02-01 DIAGNOSIS — M25.552 LEFT HIP PAIN: Primary | ICD-10-CM

## 2019-02-01 DIAGNOSIS — M16.12 PRIMARY OSTEOARTHRITIS OF LEFT HIP: ICD-10-CM

## 2019-02-01 DIAGNOSIS — M25.552 BILATERAL HIP PAIN: ICD-10-CM

## 2019-02-01 DIAGNOSIS — M25.551 BILATERAL HIP PAIN: ICD-10-CM

## 2019-02-01 RX ORDER — HYDROCODONE BITARTRATE AND ACETAMINOPHEN 7.5; 325 MG/1; MG/1
1 TABLET ORAL
Qty: 28 TAB | Refills: 0 | Status: SHIPPED | OUTPATIENT
Start: 2019-02-01 | End: 2019-03-14 | Stop reason: SDUPTHER

## 2019-02-01 RX ORDER — ACETAMINOPHEN AND CODEINE PHOSPHATE 300; 30 MG/1; MG/1
1 TABLET ORAL
Qty: 28 TAB | Refills: 0 | Status: SHIPPED | OUTPATIENT
Start: 2019-02-01 | End: 2019-05-14

## 2019-02-01 NOTE — PROGRESS NOTES
[address] [place] [address]  [phone]           Patient: Skip Weeks                MRN: [id number]       SSN: [id number]  YOB: 1955        AGE: 70 y.o. SEX: male  Body mass index is 34.83 kg/m². PCP: Asya Valdovinos MD  02/01/19      This office note has been dictated. REVIEW OF SYSTEMS:  Constitutional: Negative for fever, chills, weight loss and malaise/fatigue. HENT: Negative. Eyes: Negative. Respiratory: Negative. Cardiovascular: Negative. Gastrointestinal: No bowel incontinence or constipation. Genitourinary: No bladder incontinence or saddle anesthesia. Skin: Negative. Neurological: Negative. Endo/Heme/Allergies: Negative. Psychiatric/Behavioral: Negative. Musculoskeletal: As per HPI above. Past Medical History:   Diagnosis Date    Arthritis     CAD (coronary artery disease)     Essential hypertension     Low back pain     Lumbar postlaminectomy syndrome     Lumbar spondylosis     Myocardial infarction ([facility] [address]     lad stent for stemi,12/2012    Pancreatic cyst          Current Outpatient Medications:     oxyCODONE-acetaminophen (PERCOCET)  mg per tablet, Take 1 Tab by mouth every eight (8) hours as needed for Pain. Max Daily Amount: 3 Tabs., Disp: 21 Tab, Rfl: 0    cholecalciferol, VITAMIN D3, (VITAMIN D3) 5,000 unit tab tablet, Take  by mouth daily. , Disp: , Rfl:     oxyCODONE-acetaminophen (PERCOCET) 5-325 mg per tablet, Take 1 Tab by mouth every six (6) hours as needed for Pain. Max Daily Amount: 4 Tabs., Disp: 24 Tab, Rfl: 0    diclofenac (VOLTAREN) 1 % gel, Apply 4 g to affected area four (4) times daily. , Disp: 1 Each, Rfl: 0    COMPOUNDMAX BASE crea, 240 g by Does Not Apply route four (4) times daily.  Anti-Inflam Diclofenac Sodium 3%Gabapentin 3% Lidocaine 2% Prilocaine 2%, Disp: 240 g, Rfl: 3    ammonium lactate (LAC-HYDRIN) 12 % topical cream, Apply to affected area two (2) times a day. rub in to affected area well, Disp: 280 g, Rfl: 0    HYDROcodone-acetaminophen (NORCO) 7.5-325 mg per tablet, Take 1 Tab by mouth every eight (8) hours as needed for Pain. Max Daily Amount: 3 Tabs., Disp: 60 Tab, Rfl: 0    clopidogrel (PLAVIX) 75 mg tab, 75 mg., Disp: , Rfl:     oxyCODONE-acetaminophen (PERCOCET) 7.5-325 mg per tablet, Take 1 Tab by mouth every eight (8) hours as needed. Max Daily Amount: 3 Tabs., Disp: 60 Tab, Rfl: 0    HYDROcodone-acetaminophen (NORCO)  mg tablet, Take 1 Tab by mouth every eight (8) hours as needed for Pain. Max Daily Amount: 3 Tabs., Disp: 60 Tab, Rfl: 0    cyclobenzaprine (FLEXERIL) 10 mg tablet, Take 1 Tab by mouth three (3) times daily as needed for Muscle Spasm(s). , Disp: 90 Tab, Rfl: 0    oxyCODONE IR (ROXICODONE) 15 mg immediate release tablet, Take 1-2 Tabs by mouth every eight (8) hours as needed. Max Daily Amount: 90 mg., Disp: 60 Tab, Rfl: 0    HYDROmorphone (DILAUDID) 4 mg tablet, Take 1 Tab by mouth every four (4) hours as needed. Max Daily Amount: 24 mg. Indications: PAIN, Disp: 64 Tab, Rfl: 0    polyethylene glycol (MIRALAX) 17 gram packet, Take 1 Packet by mouth daily. Indications: Constipation, Disp: 30 Packet, Rfl: 1    ferrous sulfate 325 mg (65 mg iron) tablet, Take 1 Tab by mouth two (2) times daily (with meals). , Disp: 60 Tab, Rfl: 2    aspirin (ASPIRIN) 325 mg tablet, Take 1 Tab by mouth two (2) times a day. (Patient taking differently: Take 81 mg by mouth daily.), Disp: 60 Tab, Rfl: 0    gabapentin (NEURONTIN) 300 mg capsule, Take 1 Cap by mouth three (3) times daily. , Disp: 90 Cap, Rfl: 1    gabapentin (NEURONTIN) 300 mg capsule, , Disp: , Rfl:     isoniazid (NYDRAZID) 300 mg tablet, Take 300 mg by mouth daily. , Disp: , Rfl:     nitroglycerin (NITROSTAT) 0.4 mg SL tablet, by SubLINGual route every five (5) minutes as needed. , Disp: , Rfl:     carvedilol (COREG) 25 mg tablet, Take 25 mg by mouth two (2) times daily (with meals). , Disp: , Rfl:     hydrochlorothiazide (HYDRODIURIL) 25 mg tablet, Take 25 mg by mouth daily. , Disp: , Rfl:     isosorbide mononitrate ER (IMDUR) 60 mg CR tablet, Take 1 Tab by mouth every morning., Disp: 30 Tab, Rfl: 6    lisinopril (PRINIVIL, ZESTRIL) 20 mg tablet, Take 40 mg by mouth daily. , Disp: , Rfl:     atorvastatin (LIPITOR) 80 mg tablet, Take 80 mg by mouth daily. , Disp: , Rfl:     doxycycline (ADOXA) 100 mg tablet, Take 1 Tab by mouth two (2) times a day., Disp: 20 Tab, Rfl: 0    Allergies   Allergen Reactions    Vicodin [Hydrocodone-Acetaminophen] Other (comments)       Social History     Socioeconomic History    Marital status: SINGLE     Spouse name: Not on file    Number of children: Not on file    Years of education: Not on file    Highest education level: Not on file   Social Needs    Financial resource strain: Not on file    Food insecurity - worry: Not on file    Food insecurity - inability: Not on file    Transportation needs - medical: Not on file   [employer] needs - non-medical: Not on file   Occupational History    Not on file   Tobacco Use    Smoking status: Former Smoker     Packs/day: 0.10     Types: Cigarettes     Last attempt to quit: 2012     Years since quittin.2    Smokeless tobacco: Current User   Substance and Sexual Activity    Alcohol use: No    Drug use: Yes     Types: Cocaine     Comment: +Cocaine Screen 3/7/16 (see [address] [place] [address])    Sexual activity: Not on file     Comment: stopped using   Other Topics Concern    Not on file   Social History Narrative    Not on file       Past Surgical History:   Procedure Laterality Date    HAND/FINGER SURGERY UNLISTED      HX HEART CATHETERIZATION      HX HIP REPLACEMENT      HX LUMBAR FUSION      HX ORTHOPAEDIC      lumbar spine x 5    HX ORTHOPAEDIC      Right hip replacement    HX PTCA             SUBJECTIVE:   We did see Mr. Conrad Severs for followup in regard to bilateral hips. He is status post right hip replacement, did very well with it, very happy with results of the hip replacement. The left hip, he has fed up and frustrated with it. He would like to move forward with getting it replaced. He has pain affecting his activities of daily living and quality of life. He has trouble putting on his shoes and socks, getting in and out of bed as well as in and out of the car. He has night pain. He ambulates with a cane. Currently taking no pain medicine. He does have a cardiac history. He does continue on Plavix and aspirin, and does understand he needs to come off of this about a week or so prior to surgery. PHYSICAL EXAMINATION: In general, the patient is alert and oriented x3 and is in no acute distress. The patient is well-developed and well-nourished. The patient is afebrile. HEENT:  Head is normocephalic and atraumatic. Extraocular eye movements are intact. No JVD is present. Breathing is nonlabored. Examination of the lower extremities reveals pain-free range of motion of the hips. No pain over the trochanteric bursa. Surgical incision has healed nicely. The left hip has decreased range of motion reproducing groin and thigh discomfort with rotation. Negative straight leg raise. No calf tenderness. No Homans. No evidence of DVT present. The leg lengths show the right lower extremity slightly longer when sitting in a chair. RADIOGRAPHS:  Radiographs were reviewed today in the office including AP pelvis, AP and cross-table lateral of each of the hips which shows on the right side, total components are well fixed. No evidence of loosening or fracturing noted. The left hip does confirm end-stage arthritis with bone-on-bone eburnation, likely a component of AVN on the femoral head. ASSESSMENT:    1. Status post right hip replacement, doing well. 2. Left hip end-stage osteoarthritis.     PLAN:  At this point, we discussed treatment options. We are going to get him scheduled for surgery. The alternatives and complications including but not limited to infection, blood loss requiring transfusion, DVT, pulmonary embolism, anesthetic complications, postoperative pain and stiffness, prosthesis longevity, leg length discrepancy, hip dislocation, need for more surgery were discussed. Patient understands and wishes to proceed with surgery. He was given a 1-week supply of Norco today in the office. I have instructed him to use it sparingly.     CC:  MD Johanny Ramon, SARA, ATC

## 2019-02-05 ENCOUNTER — TELEPHONE (OUTPATIENT)
Dept: ORTHOPEDIC SURGERY | Facility: CLINIC | Age: 64
End: 2019-02-05

## 2019-02-05 NOTE — TELEPHONE ENCOUNTER
Pt states his Cardiologist told him to stop taking the Tylenol #3 that he was prescribed because it is increasing his heart rate too much. He also stated that he was taken OTC Tylenol PM at night along with it. Per patient, Cardiologist is letting him continue the OTC Tylenol PM.  He also states he is allergic to the [address].   Wants to know what else he can take. Please call him at [phone].

## 2019-02-07 ENCOUNTER — OFFICE VISIT (OUTPATIENT)
Dept: ORTHOPEDIC SURGERY | Age: 64
End: 2019-02-07

## 2019-02-07 VITALS
HEIGHT: 73 IN | WEIGHT: 270 LBS | BODY MASS INDEX: 35.78 KG/M2 | TEMPERATURE: 98.2 F | RESPIRATION RATE: 16 BRPM | DIASTOLIC BLOOD PRESSURE: 81 MMHG | OXYGEN SATURATION: 97 % | HEART RATE: 73 BPM | SYSTOLIC BLOOD PRESSURE: 143 MMHG

## 2019-02-07 DIAGNOSIS — Z96.641 HISTORY OF TOTAL RIGHT HIP REPLACEMENT: ICD-10-CM

## 2019-02-07 DIAGNOSIS — M25.552 LEFT HIP PAIN: ICD-10-CM

## 2019-02-07 DIAGNOSIS — M16.12 PRIMARY OSTEOARTHRITIS OF LEFT HIP: Primary | ICD-10-CM

## 2019-02-07 DIAGNOSIS — M54.16 LUMBAR RADICULOPATHY: ICD-10-CM

## 2019-02-07 PROBLEM — E66.01 SEVERE OBESITY (HCC): Status: ACTIVE | Noted: 2019-02-07

## 2019-02-07 RX ORDER — HYDROCODONE BITARTRATE AND IBUPROFEN 7.5; 2 MG/1; MG/1
1 TABLET, FILM COATED ORAL
Qty: 28 TAB | Refills: 0 | Status: SHIPPED | OUTPATIENT
Start: 2019-02-07 | End: 2019-05-14

## 2019-02-07 RX ORDER — AMLODIPINE BESYLATE 10 MG/1
TABLET ORAL DAILY
COMMUNITY
End: 2020-07-16

## 2019-02-07 RX ORDER — CYCLOBENZAPRINE HCL 10 MG
10 TABLET ORAL
Qty: 30 TAB | Refills: 2 | Status: SHIPPED | OUTPATIENT
Start: 2019-02-07 | End: 2021-07-26

## 2019-02-07 RX ORDER — METHYLPREDNISOLONE 4 MG/1
TABLET ORAL
Qty: 1 DOSE PACK | Refills: 0 | Status: SHIPPED | OUTPATIENT
Start: 2019-02-07 | End: 2020-01-20

## 2019-02-07 NOTE — PROGRESS NOTES
[address] [facility], [address] [place] [id number] 
[phone] Patient: Dora Powell                MRN: [id number]       SSN: [id number] YOB: 1955        AGE: 70 y.o. SEX: male Body mass index is 35.62 kg/m². PCP: Malou Arnett MD 
02/07/19 This office note has been dictated. REVIEW OF SYSTEMS: 
Constitutional: Negative for fever, chills, weight loss and malaise/fatigue. HENT: Negative. Eyes: Negative. Respiratory: Negative. Cardiovascular: Negative. Gastrointestinal: No bowel incontinence or constipation. Genitourinary: No bladder incontinence or saddle anesthesia. Skin: Negative. Neurological: Negative. Endo/Heme/Allergies: Negative. Psychiatric/Behavioral: Negative. Musculoskeletal: As per HPI above. Past Medical History:  
Diagnosis Date  Arthritis  CAD (coronary artery disease)  Essential hypertension  Low back pain  Lumbar postlaminectomy syndrome  Lumbar spondylosis  Myocardial infarction ([facility] [address]   
 lad stent for stemi,12/2012  Pancreatic cyst   
 
 
 
Current Outpatient Medications:  
  acetaminophen/diphenhydramine (TYLENOL PM PO), Take  by mouth., Disp: , Rfl:  
  amLODIPine (NORVASC) 10 mg tablet, Take  by mouth daily. , Disp: , Rfl:  
  cholecalciferol, VITAMIN D3, (VITAMIN D3) 5,000 unit tab tablet, Take  by mouth daily. , Disp: , Rfl:  
  clopidogrel (PLAVIX) 75 mg tab, 75 mg., Disp: , Rfl:  
  aspirin (ASPIRIN) 325 mg tablet, Take 1 Tab by mouth two (2) times a day. (Patient taking differently: Take 81 mg by mouth daily.), Disp: 60 Tab, Rfl: 0 
  nitroglycerin (NITROSTAT) 0.4 mg SL tablet, by SubLINGual route every five (5) minutes as needed. , Disp: , Rfl:  
  lisinopril (PRINIVIL, ZESTRIL) 20 mg tablet, Take 40 mg by mouth daily. , Disp: , Rfl:  
  atorvastatin (LIPITOR) 80 mg tablet, Take 80 mg by mouth daily. , Disp: , Rfl:  
   HYDROcodone-acetaminophen (NORCO) 7.5-325 mg per tablet, Take 1 Tab by mouth every six (6) hours as needed for Pain. Max Daily Amount: 4 Tabs., Disp: 28 Tab, Rfl: 0 
  acetaminophen-codeine (TYLENOL-CODEINE #3) 300-30 mg per tablet, Take 1 Tab by mouth every six (6) hours as needed for Pain. Max Daily Amount: 4 Tabs., Disp: 28 Tab, Rfl: 0 
  oxyCODONE-acetaminophen (PERCOCET)  mg per tablet, Take 1 Tab by mouth every eight (8) hours as needed for Pain. Max Daily Amount: 3 Tabs., Disp: 21 Tab, Rfl: 0 
  oxyCODONE-acetaminophen (PERCOCET) 5-325 mg per tablet, Take 1 Tab by mouth every six (6) hours as needed for Pain. Max Daily Amount: 4 Tabs., Disp: 24 Tab, Rfl: 0 
  diclofenac (VOLTAREN) 1 % gel, Apply 4 g to affected area four (4) times daily. , Disp: 1 Each, Rfl: 0 
  COMPOUNDMAX BASE crea, 240 g by Does Not Apply route four (4) times daily. Anti-Inflam Diclofenac Sodium 3%Gabapentin 3% Lidocaine 2% Prilocaine 2%, Disp: 240 g, Rfl: 3 
  ammonium lactate (LAC-HYDRIN) 12 % topical cream, Apply  to affected area two (2) times a day. rub in to affected area well, Disp: 280 g, Rfl: 0 
  oxyCODONE-acetaminophen (PERCOCET) 7.5-325 mg per tablet, Take 1 Tab by mouth every eight (8) hours as needed. Max Daily Amount: 3 Tabs., Disp: 60 Tab, Rfl: 0 
  HYDROcodone-acetaminophen (NORCO)  mg tablet, Take 1 Tab by mouth every eight (8) hours as needed for Pain. Max Daily Amount: 3 Tabs., Disp: 60 Tab, Rfl: 0 
  cyclobenzaprine (FLEXERIL) 10 mg tablet, Take 1 Tab by mouth three (3) times daily as needed for Muscle Spasm(s). , Disp: 90 Tab, Rfl: 0 
  oxyCODONE IR (ROXICODONE) 15 mg immediate release tablet, Take 1-2 Tabs by mouth every eight (8) hours as needed. Max Daily Amount: 90 mg., Disp: 60 Tab, Rfl: 0 
  HYDROmorphone (DILAUDID) 4 mg tablet, Take 1 Tab by mouth every four (4) hours as needed. Max Daily Amount: 24 mg.  Indications: PAIN, Disp: 64 Tab, Rfl: 0 
   polyethylene glycol (MIRALAX) 17 gram packet, Take 1 Packet by mouth daily. Indications: Constipation, Disp: 30 Packet, Rfl: 1 
  ferrous sulfate 325 mg (65 mg iron) tablet, Take 1 Tab by mouth two (2) times daily (with meals). , Disp: 60 Tab, Rfl: 2 
  gabapentin (NEURONTIN) 300 mg capsule, Take 1 Cap by mouth three (3) times daily. , Disp: 90 Cap, Rfl: 1 
  doxycycline (ADOXA) 100 mg tablet, Take 1 Tab by mouth two (2) times a day., Disp: 20 Tab, Rfl: 0 
  gabapentin (NEURONTIN) 300 mg capsule, , Disp: , Rfl:  
  isoniazid (NYDRAZID) 300 mg tablet, Take 300 mg by mouth daily. , Disp: , Rfl:  
  carvedilol (COREG) 25 mg tablet, Take 25 mg by mouth two (2) times daily (with meals). , Disp: , Rfl:  
  hydrochlorothiazide (HYDRODIURIL) 25 mg tablet, Take 25 mg by mouth daily. , Disp: , Rfl:  
  isosorbide mononitrate ER (IMDUR) 60 mg CR tablet, Take 1 Tab by mouth every morning., Disp: 30 Tab, Rfl: 6 Allergies Allergen Reactions  Vicodin [Hydrocodone-Acetaminophen] Other (comments) Social History Socioeconomic History  Marital status: SINGLE Spouse name: Not on file  Number of children: Not on file  Years of education: Not on file  Highest education level: Not on file Social Needs  Financial resource strain: Not on file  Food insecurity - worry: Not on file  Food insecurity - inability: Not on file  Transportation needs - medical: Not on file  Transportation needs - non-medical: Not on file Occupational History  Not on file Tobacco Use  Smoking status: Former Smoker Packs/day: 0.10 Types: Cigarettes Last attempt to quit: 2012 Years since quittin.2  Smokeless tobacco: Current User Substance and Sexual Activity  Alcohol use: No  
 Drug use: Yes Types: Cocaine Comment: +Cocaine Screen 3/7/16 (see Care Everywhere Labs)  Sexual activity: Not on file Comment: stopped using Other Topics Concern  Not on file Social History Narrative  Not on file Past Surgical History:  
Procedure Laterality Date  HAND/FINGER SURGERY UNLISTED  HX HEART CATHETERIZATION    
 HX HIP REPLACEMENT    
 HX LUMBAR FUSION    
 HX ORTHOPAEDIC    
 lumbar spine x 5  
 HX ORTHOPAEDIC Right hip replacement  HX PTCA We did see Mr. Braeden Dickens for followup with regards to his bilateral hips. He is status post right hip replacement. He did very well with it. The left hip has end-staged arthritis. He is scheduled for surgery in May. He had a fall recently. He lost his balance landing on the left side. He presents today for reevaluation. He does have a history of back problems with previous surgery. He does report some radiating pain down the left lower extremity at times, intermittently. He has had no change in his bowel or bladder habits and no fevers or chills. PHYSICAL EXAMINATION:  In general, the patient is alert and oriented x 3 in no acute distress. The patient is well-developed, well-nourished, with a normal affect. The patient is afebrile. HEENT:  Head is normocephalic and atraumatic. Pupils are equally round and reactive to light and accommodation. Extraocular eye movements are intact. Neck is supple. Trachea is midline. No JVD is present. Breathing is nonlabored. Examination of the lower extremities reveals pain-free range of motion of right hip. There is no pain to palpation of the greater trochanteric bursae. The left hip has decreased range of motion with reproduction of groin and thigh discomfort. There is negative straight leg raise. There is negative calf tenderness. There is negative Tenas. There is no evidence of DVT present. RADIOGRAPHS:  Radiographs in the office today, including AP of the pelvis and AP and cross table of the left hip, shows on the right side, the total hip components are well-fixed.   There is no evidence of loosening or fracture noted. The left hip does confirm end-staged arthritis with bone-to-bone eburnation. No acute abnormalities are noted. ASSESSMENT:   
 
1. Lumbar radiculopathy. 2. Left hip end-staged arthritis. 3. Status post right hip replacement. PLAN:  At this point, we discussed treatment options. The patient, again, is scheduled for surgery for the left hip, which I think is the only treatment that would be helpful for him. Intraarticular injection probably would not give him much relief at all. I am going to try him on a Medrol Dosepak to see if we can settle everything down, as well as a prescription for Flexeril and Vicoprofen is given. We will see him back in the office for preop history and physical for his upcoming left hip replacement. He will call with any questions or concerns that shall arise. I have asked him to follow up with The [address] [place] [address] with regards to his back.    
 
 
 
 
 
 
 
JR Sean COKER, PA-C, ATC

## 2019-02-08 ENCOUNTER — TELEPHONE (OUTPATIENT)
Dept: FAMILY MEDICINE CLINIC | Age: 64
End: 2019-02-08

## 2019-02-21 NOTE — TELEPHONE ENCOUNTER
PA Case: [id number]  Status: Approved  Coverage Starts on: 2/8/2019 12:00:00 AM  Coverage Ends on: 2/7/2021 12:00:00 AM   Questions? Contact [phone].

## 2019-03-14 ENCOUNTER — OFFICE VISIT (OUTPATIENT)
Dept: ORTHOPEDIC SURGERY | Age: 64
End: 2019-03-14

## 2019-03-14 VITALS
HEIGHT: 73 IN | HEART RATE: 67 BPM | OXYGEN SATURATION: 97 % | BODY MASS INDEX: 35.78 KG/M2 | WEIGHT: 270 LBS | TEMPERATURE: 96.8 F | RESPIRATION RATE: 11 BRPM | DIASTOLIC BLOOD PRESSURE: 92 MMHG | SYSTOLIC BLOOD PRESSURE: 149 MMHG

## 2019-03-14 DIAGNOSIS — M25.552 BILATERAL HIP PAIN: ICD-10-CM

## 2019-03-14 DIAGNOSIS — M25.551 BILATERAL HIP PAIN: ICD-10-CM

## 2019-03-14 DIAGNOSIS — M16.12 PRIMARY OSTEOARTHRITIS OF LEFT HIP: ICD-10-CM

## 2019-03-14 DIAGNOSIS — M25.552 LEFT HIP PAIN: Primary | ICD-10-CM

## 2019-03-14 RX ORDER — HYDROCODONE BITARTRATE AND ACETAMINOPHEN 7.5; 325 MG/1; MG/1
1 TABLET ORAL
Qty: 28 TAB | Refills: 0 | Status: SHIPPED | OUTPATIENT
Start: 2019-03-14 | End: 2019-03-28

## 2019-03-14 NOTE — PROGRESS NOTES
[address] [place] [address], [address] [place] [address]  [phone]           Patient: Laure Camargo                MRN: [id number]       SSN: [id number]  YOB: 1955        AGE: 70 y.o. SEX: male  Body mass index is 35.62 kg/m². PCP: Efren Hernandez MD  03/14/19      This office note has been dictated. REVIEW OF SYSTEMS:  Constitutional: Negative for fever, chills, weight loss and malaise/fatigue. HENT: Negative. Eyes: Negative. Respiratory: Negative. Cardiovascular: Negative. Gastrointestinal: No bowel incontinence or constipation. Genitourinary: No bladder incontinence or saddle anesthesia. Skin: Negative. Neurological: Negative. Endo/Heme/Allergies: Negative. Psychiatric/Behavioral: Negative. Musculoskeletal: As per HPI above. Past Medical History:   Diagnosis Date    Arthritis     CAD (coronary artery disease)     Essential hypertension     Low back pain     Lumbar postlaminectomy syndrome     Lumbar spondylosis     Myocardial infarction ([facility])     lad stent for stemi,12/2012    Pancreatic cyst          Current Outpatient Medications:     acetaminophen/diphenhydramine (TYLENOL PM PO), Take  by mouth., Disp: , Rfl:     amLODIPine (NORVASC) 10 mg tablet, Take  by mouth daily. , Disp: , Rfl:     methylPREDNISolone (MEDROL DOSEPACK) 4 mg tablet, Per dose pack instructions, Disp: 1 Dose Pack, Rfl: 0    cyclobenzaprine (FLEXERIL) 10 mg tablet, Take 1 Tab by mouth nightly as needed for Muscle Spasm(s). , Disp: 30 Tab, Rfl: 2    HYDROcodone-ibuprofen (VICOPROFEN) 7.5-200 mg per tablet, Take 1 Tab by mouth every six (6) hours as needed for Pain. Max Daily Amount: 4 Tabs., Disp: 28 Tab, Rfl: 0    HYDROcodone-acetaminophen (NORCO) 7.5-325 mg per tablet, Take 1 Tab by mouth every six (6) hours as needed for Pain.  Max Daily Amount: 4 Tabs., Disp: 28 Tab, Rfl: 0    acetaminophen-codeine (TYLENOL-CODEINE #3) 300-30 mg per tablet, Take 1 Tab by mouth every six (6) hours as needed for Pain. Max Daily Amount: 4 Tabs., Disp: 28 Tab, Rfl: 0    oxyCODONE-acetaminophen (PERCOCET)  mg per tablet, Take 1 Tab by mouth every eight (8) hours as needed for Pain. Max Daily Amount: 3 Tabs., Disp: 21 Tab, Rfl: 0    cholecalciferol, VITAMIN D3, (VITAMIN D3) 5,000 unit tab tablet, Take  by mouth daily. , Disp: , Rfl:     oxyCODONE-acetaminophen (PERCOCET) 5-325 mg per tablet, Take 1 Tab by mouth every six (6) hours as needed for Pain. Max Daily Amount: 4 Tabs., Disp: 24 Tab, Rfl: 0    diclofenac (VOLTAREN) 1 % gel, Apply 4 g to affected area four (4) times daily. , Disp: 1 Each, Rfl: 0    COMPOUNDMAX BASE crea, 240 g by Does Not Apply route four (4) times daily. Anti-Inflam Diclofenac Sodium 3%Gabapentin 3% Lidocaine 2% Prilocaine 2%, Disp: 240 g, Rfl: 3    ammonium lactate (LAC-HYDRIN) 12 % topical cream, Apply  to affected area two (2) times a day. rub in to affected area well, Disp: 280 g, Rfl: 0    clopidogrel (PLAVIX) 75 mg tab, 75 mg., Disp: , Rfl:     oxyCODONE-acetaminophen (PERCOCET) 7.5-325 mg per tablet, Take 1 Tab by mouth every eight (8) hours as needed. Max Daily Amount: 3 Tabs., Disp: 60 Tab, Rfl: 0    HYDROcodone-acetaminophen (NORCO)  mg tablet, Take 1 Tab by mouth every eight (8) hours as needed for Pain. Max Daily Amount: 3 Tabs., Disp: 60 Tab, Rfl: 0    cyclobenzaprine (FLEXERIL) 10 mg tablet, Take 1 Tab by mouth three (3) times daily as needed for Muscle Spasm(s). , Disp: 90 Tab, Rfl: 0    oxyCODONE IR (ROXICODONE) 15 mg immediate release tablet, Take 1-2 Tabs by mouth every eight (8) hours as needed. Max Daily Amount: 90 mg., Disp: 60 Tab, Rfl: 0    HYDROmorphone (DILAUDID) 4 mg tablet, Take 1 Tab by mouth every four (4) hours as needed. Max Daily Amount: 24 mg. Indications: PAIN, Disp: 64 Tab, Rfl: 0    polyethylene glycol (MIRALAX) 17 gram packet, Take 1 Packet by mouth daily.  Indications: Constipation, Disp: 30 Packet, Rfl: 1    ferrous sulfate 325 mg (65 mg iron) tablet, Take 1 Tab by mouth two (2) times daily (with meals). , Disp: 60 Tab, Rfl: 2    aspirin (ASPIRIN) 325 mg tablet, Take 1 Tab by mouth two (2) times a day. (Patient taking differently: Take 81 mg by mouth daily.), Disp: 60 Tab, Rfl: 0    gabapentin (NEURONTIN) 300 mg capsule, Take 1 Cap by mouth three (3) times daily. , Disp: 90 Cap, Rfl: 1    doxycycline (ADOXA) 100 mg tablet, Take 1 Tab by mouth two (2) times a day., Disp: 20 Tab, Rfl: 0    gabapentin (NEURONTIN) 300 mg capsule, , Disp: , Rfl:     isoniazid (NYDRAZID) 300 mg tablet, Take 300 mg by mouth daily. , Disp: , Rfl:     nitroglycerin (NITROSTAT) 0.4 mg SL tablet, by SubLINGual route every five (5) minutes as needed. , Disp: , Rfl:     carvedilol (COREG) 25 mg tablet, Take 25 mg by mouth two (2) times daily (with meals). , Disp: , Rfl:     hydrochlorothiazide (HYDRODIURIL) 25 mg tablet, Take 25 mg by mouth daily. , Disp: , Rfl:     isosorbide mononitrate ER (IMDUR) 60 mg CR tablet, Take 1 Tab by mouth every morning., Disp: 30 Tab, Rfl: 6    lisinopril (PRINIVIL, ZESTRIL) 20 mg tablet, Take 40 mg by mouth daily. , Disp: , Rfl:     atorvastatin (LIPITOR) 80 mg tablet, Take 80 mg by mouth daily. , Disp: , Rfl:     Allergies   Allergen Reactions    Vicodin [Hydrocodone-Acetaminophen] Other (comments)       Social History     Socioeconomic History    Marital status: SINGLE     Spouse name: Not on file    Number of children: Not on file    Years of education: Not on file    Highest education level: Not on file   Social Needs    Financial resource strain: Not on file    Food insecurity - worry: Not on file    Food insecurity - inability: Not on file    Transportation needs - medical: Not on file   [employer] needs - non-medical: Not on file   Occupational History    Not on file   Tobacco Use    Smoking status: Former Smoker     Packs/day: 0.10     Types: Cigarettes     Last attempt to quit: 2012     Years since quittin.3    Smokeless tobacco: Current User   Substance and Sexual Activity    Alcohol use: No    Drug use: Yes     Types: Cocaine     Comment: +Cocaine Screen 3/7/16 (see [address] [place] [address])    Sexual activity: Not on file     Comment: stopped using   Other Topics Concern    Not on file   Social History Narrative    Not on file       Past Surgical History:   Procedure Laterality Date    HAND/FINGER SURGERY UNLISTED      HX HEART CATHETERIZATION      HX HIP REPLACEMENT      HX LUMBAR FUSION      HX ORTHOPAEDIC      lumbar spine x 5    HX ORTHOPAEDIC      Right hip replacement    HX PTCA           We did see Mr. Kvng Cline for followup with regards to his left hip. The patient, unfortunately, had a fall one week or so ago. He had increased discomfort of his left hip. He is scheduled for surgery in May. He had a right hip replacement and did very well with it. He does report he had quite a bit of bruising to the posterior aspect of his thigh. This is improving. He does report he takes blood thinners. PHYSICAL EXAMINATION:  In general, the patient is alert and oriented x 3 in no acute distress. The patient is well-developed, well-nourished, with a normal affect. The patient is afebrile. HEENT:  Head is normocephalic and atraumatic. Pupils are equally round and reactive to light and accommodation. Extraocular eye movements are intact. Neck is supple. Trachea is midline. No JVD is present. Breathing is nonlabored. Examination of the lower extremities reveals good range of motion of the right hip. The left hip has decreased range of motion reproducing groin and thigh discomfort. There is negative straight leg raise. There is negative calf tenderness. There is negative Tenas. There is no evidence of DVT present.       RADIOGRAPHS:  Radiographs in the office today, including AP of the pelvis and AP and cross table of the left hip, show end-staged arthritis of the left hip without obvious, acute abnormality noted. He does have possibly an exostosis, a little cortical thickening in the subtrochanteric area. We will get an MRI for this. ASSESSMENT:      1. Left hip end-staged arthritis. 2. Status post right hip replacement. PLAN:  At this point, we are going to move forward with obtaining an MRI of the left hip to rule out any occult abnormalities. We will see him back afterwards for review. He was given a prescription for Norco today in the office. He will call with any questions or concerns that shall arise.                       JR Sean COKER, PA-C, ATC

## 2019-03-14 NOTE — PROGRESS NOTES
1. Have you been to the ER, urgent care clinic since your last visit? Hospitalized since your last visit? No    2. Have you seen or consulted any other health care providers outside of the [address] [place] [address] since your last visit? Include any pap smears or colon screening.  No

## 2019-03-28 ENCOUNTER — HOSPITAL ENCOUNTER (OUTPATIENT)
Age: 64
Discharge: HOME | End: 2019-03-28
Attending: PHYSICIAN ASSISTANT
Payer: MEDICARE

## 2019-03-28 DIAGNOSIS — M16.12 PRIMARY OSTEOARTHRITIS OF LEFT HIP: ICD-10-CM

## 2019-03-28 DIAGNOSIS — M25.552 LEFT HIP PAIN: ICD-10-CM

## 2019-03-28 PROCEDURE — 73721 MRI JNT OF LWR EXTRE W/O DYE: CPT

## 2019-04-08 DIAGNOSIS — M16.12 PRIMARY OSTEOARTHRITIS OF LEFT HIP: ICD-10-CM

## 2019-04-08 DIAGNOSIS — Z01.818 PREOP EXAMINATION: Primary | ICD-10-CM

## 2019-04-24 ENCOUNTER — HOSPITAL ENCOUNTER (OUTPATIENT)
Dept: PREADMISSION TESTING | Age: 64
Discharge: HOME | End: 2019-04-24
Payer: MEDICARE

## 2019-04-24 ENCOUNTER — HOSPITAL ENCOUNTER (OUTPATIENT)
Dept: GENERAL RADIOLOGY | Age: 64
Discharge: HOME | End: 2019-04-24
Payer: MEDICARE

## 2019-04-24 DIAGNOSIS — M16.12 PRIMARY OSTEOARTHRITIS OF LEFT HIP: ICD-10-CM

## 2019-04-24 DIAGNOSIS — Z01.818 PREOP EXAMINATION: ICD-10-CM

## 2019-04-24 DIAGNOSIS — Z01.818 PREOP EXAMINATION: Primary | ICD-10-CM

## 2019-04-24 LAB
ABO + RH BLD: NORMAL
ALBUMIN SERPL-MCNC: 3.8 G/DL (ref 3.4–5)
ANION GAP SERPL CALC-SCNC: 4 MMOL/L (ref 3–18)
APPEARANCE UR: CLEAR
APTT PPP: 26.8 SEC (ref 23–36.4)
BASOPHILS # BLD: 0 K/UL (ref 0–0.1)
BASOPHILS NFR BLD: 0 % (ref 0–2)
BILIRUB UR QL: NEGATIVE
BLOOD GROUP ANTIBODIES SERPL: NORMAL
BUN SERPL-MCNC: 25 MG/DL (ref 7–18)
BUN/CREAT SERPL: 16 (ref 12–20)
CALCIUM SERPL-MCNC: 9.6 MG/DL (ref 8.5–10.1)
CHLORIDE SERPL-SCNC: 108 MMOL/L (ref 100–108)
CO2 SERPL-SCNC: 28 MMOL/L (ref 21–32)
COLOR UR: YELLOW
CREAT SERPL-MCNC: 1.58 MG/DL (ref 0.6–1.3)
DIFFERENTIAL METHOD BLD: ABNORMAL
EOSINOPHIL # BLD: 0.1 K/UL (ref 0–0.4)
EOSINOPHIL NFR BLD: 2 % (ref 0–5)
ERYTHROCYTE [DISTWIDTH] IN BLOOD BY AUTOMATED COUNT: 12.6 % (ref 11.6–14.5)
EST. AVERAGE GLUCOSE BLD GHB EST-MCNC: NORMAL MG/DL
GLUCOSE SERPL-MCNC: 99 MG/DL (ref 74–99)
GLUCOSE UR STRIP.AUTO-MCNC: NEGATIVE MG/DL
HBA1C MFR BLD: 4.8 % (ref 4.2–5.6)
HCT VFR BLD AUTO: 37.7 % (ref 36–48)
HGB BLD-MCNC: 12.5 G/DL (ref 13–16)
HGB UR QL STRIP: NEGATIVE
INR PPP: 0.9 (ref 0.8–1.2)
KETONES UR QL STRIP.AUTO: NEGATIVE MG/DL
LEUKOCYTE ESTERASE UR QL STRIP.AUTO: NEGATIVE
LYMPHOCYTES # BLD: 2.1 K/UL (ref 0.9–3.6)
LYMPHOCYTES NFR BLD: 36 % (ref 21–52)
MCH RBC QN AUTO: 31.8 PG (ref 24–34)
MCHC RBC AUTO-ENTMCNC: 33.2 G/DL (ref 31–37)
MCV RBC AUTO: 95.9 FL (ref 74–97)
MONOCYTES # BLD: 0.4 K/UL (ref 0.05–1.2)
MONOCYTES NFR BLD: 7 % (ref 3–10)
NEUTS SEG # BLD: 3.2 K/UL (ref 1.8–8)
NEUTS SEG NFR BLD: 55 % (ref 40–73)
NITRITE UR QL STRIP.AUTO: NEGATIVE
PH UR STRIP: 7 [PH] (ref 5–8)
PLATELET # BLD AUTO: 199 K/UL (ref 135–420)
PMV BLD AUTO: 10.9 FL (ref 9.2–11.8)
POTASSIUM SERPL-SCNC: 4.2 MMOL/L (ref 3.5–5.5)
PROT UR STRIP-MCNC: NEGATIVE MG/DL
PROTHROMBIN TIME: 12.1 SEC (ref 11.5–15.2)
RBC # BLD AUTO: 3.93 M/UL (ref 4.7–5.5)
SODIUM SERPL-SCNC: 140 MMOL/L (ref 136–145)
SP GR UR REFRACTOMETRY: 1.01 (ref 1–1.03)
SPECIMEN EXP DATE BLD: NORMAL
UROBILINOGEN UR QL STRIP.AUTO: 0.2 EU/DL (ref 0.2–1)
WBC # BLD AUTO: 5.8 K/UL (ref 4.6–13.2)

## 2019-04-24 PROCEDURE — 81003 URINALYSIS AUTO W/O SCOPE: CPT

## 2019-04-24 PROCEDURE — 86900 BLOOD TYPING SEROLOGIC ABO: CPT

## 2019-04-24 PROCEDURE — 85610 PROTHROMBIN TIME: CPT

## 2019-04-24 PROCEDURE — 71046 X-RAY EXAM CHEST 2 VIEWS: CPT

## 2019-04-24 PROCEDURE — 87086 URINE CULTURE/COLONY COUNT: CPT

## 2019-04-24 PROCEDURE — 82040 ASSAY OF SERUM ALBUMIN: CPT

## 2019-04-24 PROCEDURE — 83036 HEMOGLOBIN GLYCOSYLATED A1C: CPT

## 2019-04-24 PROCEDURE — 80048 BASIC METABOLIC PNL TOTAL CA: CPT

## 2019-04-24 PROCEDURE — 85025 COMPLETE CBC W/AUTO DIFF WBC: CPT

## 2019-04-24 PROCEDURE — 36415 COLL VENOUS BLD VENIPUNCTURE: CPT

## 2019-04-24 PROCEDURE — 80307 DRUG TEST PRSMV CHEM ANLYZR: CPT

## 2019-04-24 PROCEDURE — 85730 THROMBOPLASTIN TIME PARTIAL: CPT

## 2019-04-24 NOTE — PERIOP NOTES
Mr Anila Huff was here today for his PAT appointment. Health assessment was completed and instructions given regarding NPO status, medications, Hibiclens washes, and removal of all jewelry and/or body piercing. Instructed patient not to remove the red [employer] armband that was placed on their arm when the Type and Screen was drawn. Instructed to bring walker to the hospital on the day of surgery so it can be properly adjusted by the physical therapist.  Umair Reyes was given to ask questions and all questions were answered. Understanding of instructions was verbalized. Patient had a history of cocaine use, he stated he use it last in 2017. Left message/emailed to Felton at Dr Filomena Flowers office to add UDS in the order.

## 2019-04-24 NOTE — PROGRESS NOTES
Kelvin Black has decided with their surgeon to have a joint replacement to improve mobility and decrease pain. Joint Replacement Pre-Operative class was attended today. Topics discussed included surgery preparation, what to expect the day of surgery, medications (to include a multimodal approach to pain control and limiting narcotics), nutrition, glycemic control, respiratory therapy, physical and occupational therapy, and discharge planning. Discussed the importance of using these alternative pain management methods with the goal of using less opioid use after surgery and at home. A patient education notebook was provided and the opportunity was given to ask questions. The phone number of the Orthopaedic  was provided for any future questions or concerns.

## 2019-04-25 DIAGNOSIS — Z01.818 PREOP EXAMINATION: ICD-10-CM

## 2019-04-25 LAB
AMPHET UR QL SCN: NEGATIVE
BACTERIA SPEC CULT: NORMAL
BARBITURATES UR QL SCN: NEGATIVE
BENZODIAZ UR QL: NEGATIVE
CANNABINOIDS UR QL SCN: NEGATIVE
COCAINE UR QL SCN: NEGATIVE
HDSCOM,HDSCOM: NORMAL
METHADONE UR QL: NEGATIVE
OPIATES UR QL: NEGATIVE
PCP UR QL: NEGATIVE
SERVICE CMNT-IMP: NORMAL

## 2019-04-30 ENCOUNTER — OFFICE VISIT (OUTPATIENT)
Dept: ORTHOPEDIC SURGERY | Facility: CLINIC | Age: 64
End: 2019-04-30

## 2019-04-30 DIAGNOSIS — Z01.818 ENCOUNTER FOR PREOPERATIVE EXAMINATION FOR GENERAL SURGICAL PROCEDURE: ICD-10-CM

## 2019-04-30 DIAGNOSIS — M16.12 PRIMARY OSTEOARTHRITIS OF LEFT HIP: Primary | ICD-10-CM

## 2019-05-02 ENCOUNTER — OFFICE VISIT (OUTPATIENT)
Dept: ORTHOPEDIC SURGERY | Facility: CLINIC | Age: 64
End: 2019-05-02

## 2019-05-02 VITALS
HEART RATE: 70 BPM | HEIGHT: 73 IN | WEIGHT: 259 LBS | BODY MASS INDEX: 34.33 KG/M2 | RESPIRATION RATE: 18 BRPM | OXYGEN SATURATION: 99 % | TEMPERATURE: 96.8 F | DIASTOLIC BLOOD PRESSURE: 80 MMHG | SYSTOLIC BLOOD PRESSURE: 130 MMHG

## 2019-05-02 DIAGNOSIS — M16.12 PRIMARY OSTEOARTHRITIS OF LEFT HIP: Primary | ICD-10-CM

## 2019-05-02 RX ORDER — HYDROCODONE BITARTRATE AND ACETAMINOPHEN 7.5; 325 MG/1; MG/1
1 TABLET ORAL
Qty: 28 TAB | Refills: 0 | Status: SHIPPED | OUTPATIENT
Start: 2019-05-02 | End: 2019-05-14

## 2019-05-02 RX ORDER — WARFARIN 1 MG/1
10 TABLET ORAL ONCE
Status: CANCELLED | OUTPATIENT
Start: 2019-05-02 | End: 2019-05-02

## 2019-05-02 RX ORDER — PREGABALIN 25 MG/1
75 CAPSULE ORAL ONCE
Status: CANCELLED | OUTPATIENT
Start: 2019-05-02 | End: 2019-05-02

## 2019-05-02 RX ORDER — CELECOXIB 100 MG/1
400 CAPSULE ORAL ONCE
Status: CANCELLED | OUTPATIENT
Start: 2019-05-02 | End: 2019-05-02

## 2019-05-02 RX ORDER — ACETAMINOPHEN 325 MG/1
1000 TABLET ORAL ONCE
Status: CANCELLED | OUTPATIENT
Start: 2019-05-02 | End: 2019-05-02

## 2019-05-02 NOTE — H&P
[address], [address] [facility] [id number] [phone] HISTORY & PHYSICAL Patient: Dilcia Gill                MRN: [id number]       SSN: [id number] YOB: 1955        AGE: 70 y.o. SEX: male Body mass index is 34.17 kg/m². PCP: Gary Sampson MD 
05/02/19 CC: left hip end stage OA Problem List Items Addressed This Visit None Visit Diagnoses Primary osteoarthritis of left hip    -  Primary Relevant Medications HYDROcodone-acetaminophen (NORCO) 7.5-325 mg per tablet HPI:  The patient is a pleasant 70 y.o. whom has end stage OA of their Left hip and has failed conservative treatment including but not limited to NSAIDS, cortisone injections, viscosupplementation, PT, and pain medicine. Due to the current findings and affected activities of daily living, surgical intervention is indicated. The alternatives, risks, complications, as well as expected outcome were discussed. These include but are not limited to infection, blood loss, need for blood transfusion, neurovascular damage, DVT, PE,  post-op stiffness and pain, leg length discrepancy, dislocation, anesthetic complications, prothesis longevity, need for more surgery, MI, stroke, and even death. The patient understands and wishes to proceed with surgery. Past Medical History:  
Diagnosis Date  Arthritis  CAD (coronary artery disease)  Essential hypertension  Low back pain  Lumbar postlaminectomy syndrome  Lumbar spondylosis  Myocardial infarction ([facility] [address]   
 lad stent for stemi,12/2012  Pancreatic cyst   
 
 
 
Current Outpatient Medications:  
  HYDROcodone-acetaminophen (NORCO) 7.5-325 mg per tablet, Take 1 Tab by mouth every six (6) hours as needed for Pain for up to 14 days.  Max Daily Amount: 4 Tabs., Disp: 28 Tab, Rfl: 0 
   acetaminophen/diphenhydramine (TYLENOL PM PO), Take  by mouth., Disp: , Rfl:  
  methylPREDNISolone (MEDROL DOSEPACK) 4 mg tablet, Per dose pack instructions, Disp: 1 Dose Pack, Rfl: 0 
  cholecalciferol, VITAMIN D3, (VITAMIN D3) 5,000 unit tab tablet, Take  by mouth daily. , Disp: , Rfl:  
  clopidogrel (PLAVIX) 75 mg tab, 75 mg., Disp: , Rfl:  
  cyclobenzaprine (FLEXERIL) 10 mg tablet, Take 1 Tab by mouth three (3) times daily as needed for Muscle Spasm(s). , Disp: 90 Tab, Rfl: 0 
  oxyCODONE IR (ROXICODONE) 15 mg immediate release tablet, Take 1-2 Tabs by mouth every eight (8) hours as needed. Max Daily Amount: 90 mg., Disp: 60 Tab, Rfl: 0 
  aspirin (ASPIRIN) 325 mg tablet, Take 1 Tab by mouth two (2) times a day. (Patient taking differently: Take 81 mg by mouth daily.), Disp: 60 Tab, Rfl: 0 
  gabapentin (NEURONTIN) 300 mg capsule, , Disp: , Rfl:  
  nitroglycerin (NITROSTAT) 0.4 mg SL tablet, by SubLINGual route every five (5) minutes as needed. , Disp: , Rfl:  
  hydrochlorothiazide (HYDRODIURIL) 25 mg tablet, Take 25 mg by mouth daily. , Disp: , Rfl:  
  isosorbide mononitrate ER (IMDUR) 60 mg CR tablet, Take 1 Tab by mouth every morning., Disp: 30 Tab, Rfl: 6 
  lisinopril (PRINIVIL, ZESTRIL) 20 mg tablet, Take 40 mg by mouth daily. , Disp: , Rfl:  
  atorvastatin (LIPITOR) 80 mg tablet, Take 80 mg by mouth daily. , Disp: , Rfl:  
  amLODIPine (NORVASC) 10 mg tablet, Take  by mouth daily. , Disp: , Rfl:  
  cyclobenzaprine (FLEXERIL) 10 mg tablet, Take 1 Tab by mouth nightly as needed for Muscle Spasm(s). , Disp: 30 Tab, Rfl: 2 
  HYDROcodone-ibuprofen (VICOPROFEN) 7.5-200 mg per tablet, Take 1 Tab by mouth every six (6) hours as needed for Pain. Max Daily Amount: 4 Tabs., Disp: 28 Tab, Rfl: 0 
  acetaminophen-codeine (TYLENOL-CODEINE #3) 300-30 mg per tablet, Take 1 Tab by mouth every six (6) hours as needed for Pain.  Max Daily Amount: 4 Tabs., Disp: 28 Tab, Rfl: 0 
   oxyCODONE-acetaminophen (PERCOCET)  mg per tablet, Take 1 Tab by mouth every eight (8) hours as needed for Pain. Max Daily Amount: 3 Tabs., Disp: 21 Tab, Rfl: 0 
  oxyCODONE-acetaminophen (PERCOCET) 5-325 mg per tablet, Take 1 Tab by mouth every six (6) hours as needed for Pain. Max Daily Amount: 4 Tabs., Disp: 24 Tab, Rfl: 0 
  diclofenac (VOLTAREN) 1 % gel, Apply 4 g to affected area four (4) times daily. , Disp: 1 Each, Rfl: 0 
  COMPOUNDMAX BASE crea, 240 g by Does Not Apply route four (4) times daily. Anti-Inflam Diclofenac Sodium 3%Gabapentin 3% Lidocaine 2% Prilocaine 2%, Disp: 240 g, Rfl: 3 
  ammonium lactate (LAC-HYDRIN) 12 % topical cream, Apply  to affected area two (2) times a day. rub in to affected area well, Disp: 280 g, Rfl: 0 
  oxyCODONE-acetaminophen (PERCOCET) 7.5-325 mg per tablet, Take 1 Tab by mouth every eight (8) hours as needed. Max Daily Amount: 3 Tabs., Disp: 60 Tab, Rfl: 0 
  HYDROcodone-acetaminophen (NORCO)  mg tablet, Take 1 Tab by mouth every eight (8) hours as needed for Pain. Max Daily Amount: 3 Tabs., Disp: 60 Tab, Rfl: 0 
  HYDROmorphone (DILAUDID) 4 mg tablet, Take 1 Tab by mouth every four (4) hours as needed. Max Daily Amount: 24 mg. Indications: PAIN, Disp: 64 Tab, Rfl: 0 
  polyethylene glycol (MIRALAX) 17 gram packet, Take 1 Packet by mouth daily. Indications: Constipation, Disp: 30 Packet, Rfl: 1 
  ferrous sulfate 325 mg (65 mg iron) tablet, Take 1 Tab by mouth two (2) times daily (with meals). , Disp: 60 Tab, Rfl: 2 
  gabapentin (NEURONTIN) 300 mg capsule, Take 1 Cap by mouth three (3) times daily. , Disp: 90 Cap, Rfl: 1 
  doxycycline (ADOXA) 100 mg tablet, Take 1 Tab by mouth two (2) times a day., Disp: 20 Tab, Rfl: 0 
  isoniazid (NYDRAZID) 300 mg tablet, Take 300 mg by mouth daily. , Disp: , Rfl:  
  carvedilol (COREG) 25 mg tablet, Take 25 mg by mouth two (2) times daily (with meals). , Disp: , Rfl:  
 
Allergies Allergen Reactions  Vicodin [Hydrocodone-Acetaminophen] Nausea and Vomiting Social History Socioeconomic History  Marital status: SINGLE Spouse name: Not on file  Number of children: Not on file  Years of education: Not on file  Highest education level: Not on file Occupational History  Not on file Social Needs  Financial resource strain: Not on file  Food insecurity:  
  Worry: Not on file Inability: Not on file  Transportation needs:  
  Medical: Not on file Non-medical: Not on file Tobacco Use  Smoking status: Former Smoker Packs/day: 0.10 Types: Cigarettes Last attempt to quit: 2012 Years since quittin.5  Smokeless tobacco: Current User Substance and Sexual Activity  Alcohol use: Yes Comment: socially  Drug use: Yes Types: Cocaine, Marijuana, Heroin Comment: +Cocaine Screen + UDS in   Sexual activity: Not on file Comment: stopped using Lifestyle  Physical activity:  
  Days per week: Not on file Minutes per session: Not on file  Stress: Not on file Relationships  Social connections:  
  Talks on phone: Not on file Gets together: Not on file Attends Qu[employer] service: Not on file Active member of club or organization: Not on file Attends meetings of clubs or organizations: Not on file Relationship status: Not on file  Intimate partner violence:  
  Fear of current or ex partner: Not on file Emotionally abused: Not on file Physically abused: Not on file Forced sexual activity: Not on file Other Topics Concern  Not on file Social History Narrative  Not on file Past Surgical History:  
Procedure Laterality Date  HAND/FINGER SURGERY UNLISTED Carpal tunnel  HX CORONARY ARTERY BYPASS GRAFT  2014  
 at [address]    
 HX HIP REPLACEMENT    
 HX LUMBAR FUSION    
 HX ORTHOPAEDIC    
 lumbar spine x 5  
  HX ORTHOPAEDIC Right hip replacement  HX ORTHOPAEDIC    
 right foot calous removed  HX PTCA Family History:  Non-contributory. PE: 
Visit Vitals /80 Pulse 70 Temp 96.8 °F (36 °C) (Oral) Resp 18 Ht 6' 1\" (1.854 m) Wt 259 lb (117.5 kg) SpO2 99% BMI 34.17 kg/m² A&O X3, NAD, well develop, well nourished Heart: S1-S2, rrr 
Lungs: CTA bilat Abd: soft, nt, nt, + bs in all quadrants Ext:  Pos distal pulses to DP, PT Leg length shows the left LE to be slightly shorter grossly sitting in the chair X-ray: left hip shows end stage OA Labs: labs were reviewed and wnl.  ua neg A:  Left  hip end stage OA 
 
P:  At this point we will move forward with surgery. Again, the alternatives, risks, complications, as well as expected outcome were discussed and the patient wishes to proceed with surgery. Pt has been instructed to stop aspirin, nsaids, rheumatologic medications and blood thinners. They have also been instructed to continue on any heart and bp meds and to take them the morning of surgery with sips of water. Lateral approach. The patient will require in-patient admission. Admission as an in-patient is reasonable and necessary due to increased risk of surgery due to the factors indicated as well as the possible need for prolonged in-hospital or skilled post-acute care in order to improve this patient's functional ability. Kylah Cueva

## 2019-05-02 NOTE — H&P (VIEW-ONLY)
[address], [address] [facility] [address] 
[phone] HISTORY & PHYSICAL Patient: Daniel Ashraf                MRN: [id number]       SSN: [id number] YOB: 1955        AGE: 70 y.o. SEX: male Body mass index is 34.17 kg/m². PCP: Oskar Hodges MD 
05/02/19 CC: left hip end stage OA Problem List Items Addressed This Visit None Visit Diagnoses Primary osteoarthritis of left hip    -  Primary Relevant Medications HYDROcodone-acetaminophen (NORCO) 7.5-325 mg per tablet HPI:  The patient is a pleasant 70 y.o. whom has end stage OA of their Left hip and has failed conservative treatment including but not limited to NSAIDS, cortisone injections, viscosupplementation, PT, and pain medicine. Due to the current findings and affected activities of daily living, surgical intervention is indicated. The alternatives, risks, complications, as well as expected outcome were discussed. These include but are not limited to infection, blood loss, need for blood transfusion, neurovascular damage, DVT, PE,  post-op stiffness and pain, leg length discrepancy, dislocation, anesthetic complications, prothesis longevity, need for more surgery, MI, stroke, and even death. The patient understands and wishes to proceed with surgery. Past Medical History:  
Diagnosis Date  Arthritis  CAD (coronary artery disease)  Essential hypertension  Low back pain  Lumbar postlaminectomy syndrome  Lumbar spondylosis  Myocardial infarction ([facility] [address]   
 lad stent for stemi,12/2012  Pancreatic cyst   
 
 
 
Current Outpatient Medications:  
  HYDROcodone-acetaminophen (NORCO) 7.5-325 mg per tablet, Take 1 Tab by mouth every six (6) hours as needed for Pain for up to 14 days.  Max Daily Amount: 4 Tabs., Disp: 28 Tab, Rfl: 0 
   acetaminophen/diphenhydramine (TYLENOL PM PO), Take  by mouth., Disp: , Rfl:  
  methylPREDNISolone (MEDROL DOSEPACK) 4 mg tablet, Per dose pack instructions, Disp: 1 Dose Pack, Rfl: 0 
  cholecalciferol, VITAMIN D3, (VITAMIN D3) 5,000 unit tab tablet, Take  by mouth daily. , Disp: , Rfl:  
  clopidogrel (PLAVIX) 75 mg tab, 75 mg., Disp: , Rfl:  
  cyclobenzaprine (FLEXERIL) 10 mg tablet, Take 1 Tab by mouth three (3) times daily as needed for Muscle Spasm(s). , Disp: 90 Tab, Rfl: 0 
  oxyCODONE IR (ROXICODONE) 15 mg immediate release tablet, Take 1-2 Tabs by mouth every eight (8) hours as needed. Max Daily Amount: 90 mg., Disp: 60 Tab, Rfl: 0 
  aspirin (ASPIRIN) 325 mg tablet, Take 1 Tab by mouth two (2) times a day. (Patient taking differently: Take 81 mg by mouth daily.), Disp: 60 Tab, Rfl: 0 
  gabapentin (NEURONTIN) 300 mg capsule, , Disp: , Rfl:  
  nitroglycerin (NITROSTAT) 0.4 mg SL tablet, by SubLINGual route every five (5) minutes as needed. , Disp: , Rfl:  
  hydrochlorothiazide (HYDRODIURIL) 25 mg tablet, Take 25 mg by mouth daily. , Disp: , Rfl:  
  isosorbide mononitrate ER (IMDUR) 60 mg CR tablet, Take 1 Tab by mouth every morning., Disp: 30 Tab, Rfl: 6 
  lisinopril (PRINIVIL, ZESTRIL) 20 mg tablet, Take 40 mg by mouth daily. , Disp: , Rfl:  
  atorvastatin (LIPITOR) 80 mg tablet, Take 80 mg by mouth daily. , Disp: , Rfl:  
  amLODIPine (NORVASC) 10 mg tablet, Take  by mouth daily. , Disp: , Rfl:  
  cyclobenzaprine (FLEXERIL) 10 mg tablet, Take 1 Tab by mouth nightly as needed for Muscle Spasm(s). , Disp: 30 Tab, Rfl: 2 
  HYDROcodone-ibuprofen (VICOPROFEN) 7.5-200 mg per tablet, Take 1 Tab by mouth every six (6) hours as needed for Pain. Max Daily Amount: 4 Tabs., Disp: 28 Tab, Rfl: 0 
  acetaminophen-codeine (TYLENOL-CODEINE #3) 300-30 mg per tablet, Take 1 Tab by mouth every six (6) hours as needed for Pain.  Max Daily Amount: 4 Tabs., Disp: 28 Tab, Rfl: 0 
   oxyCODONE-acetaminophen (PERCOCET)  mg per tablet, Take 1 Tab by mouth every eight (8) hours as needed for Pain. Max Daily Amount: 3 Tabs., Disp: 21 Tab, Rfl: 0 
  oxyCODONE-acetaminophen (PERCOCET) 5-325 mg per tablet, Take 1 Tab by mouth every six (6) hours as needed for Pain. Max Daily Amount: 4 Tabs., Disp: 24 Tab, Rfl: 0 
  diclofenac (VOLTAREN) 1 % gel, Apply 4 g to affected area four (4) times daily. , Disp: 1 Each, Rfl: 0 
  COMPOUNDMAX BASE crea, 240 g by Does Not Apply route four (4) times daily. Anti-Inflam Diclofenac Sodium 3%Gabapentin 3% Lidocaine 2% Prilocaine 2%, Disp: 240 g, Rfl: 3 
  ammonium lactate (LAC-HYDRIN) 12 % topical cream, Apply  to affected area two (2) times a day. rub in to affected area well, Disp: 280 g, Rfl: 0 
  oxyCODONE-acetaminophen (PERCOCET) 7.5-325 mg per tablet, Take 1 Tab by mouth every eight (8) hours as needed. Max Daily Amount: 3 Tabs., Disp: 60 Tab, Rfl: 0 
  HYDROcodone-acetaminophen (NORCO)  mg tablet, Take 1 Tab by mouth every eight (8) hours as needed for Pain. Max Daily Amount: 3 Tabs., Disp: 60 Tab, Rfl: 0 
  HYDROmorphone (DILAUDID) 4 mg tablet, Take 1 Tab by mouth every four (4) hours as needed. Max Daily Amount: 24 mg. Indications: PAIN, Disp: 64 Tab, Rfl: 0 
  polyethylene glycol (MIRALAX) 17 gram packet, Take 1 Packet by mouth daily. Indications: Constipation, Disp: 30 Packet, Rfl: 1 
  ferrous sulfate 325 mg (65 mg iron) tablet, Take 1 Tab by mouth two (2) times daily (with meals). , Disp: 60 Tab, Rfl: 2 
  gabapentin (NEURONTIN) 300 mg capsule, Take 1 Cap by mouth three (3) times daily. , Disp: 90 Cap, Rfl: 1 
  doxycycline (ADOXA) 100 mg tablet, Take 1 Tab by mouth two (2) times a day., Disp: 20 Tab, Rfl: 0 
  isoniazid (NYDRAZID) 300 mg tablet, Take 300 mg by mouth daily. , Disp: , Rfl:  
  carvedilol (COREG) 25 mg tablet, Take 25 mg by mouth two (2) times daily (with meals). , Disp: , Rfl:  
 
Allergies Allergen Reactions  Vicodin [Hydrocodone-Acetaminophen] Nausea and Vomiting Social History Socioeconomic History  Marital status: SINGLE Spouse name: Not on file  Number of children: Not on file  Years of education: Not on file  Highest education level: Not on file Occupational History  Not on file Social Needs  Financial resource strain: Not on file  Food insecurity:  
  Worry: Not on file Inability: Not on file  Transportation needs:  
  Medical: Not on file Non-medical: Not on file Tobacco Use  Smoking status: Former Smoker Packs/day: 0.10 Types: Cigarettes Last attempt to quit: 2012 Years since quittin.5  Smokeless tobacco: Current User Substance and Sexual Activity  Alcohol use: Yes Comment: socially  Drug use: Yes Types: Cocaine, Marijuana, Heroin Comment: +Cocaine Screen + UDS in   Sexual activity: Not on file Comment: stopped using Lifestyle  Physical activity:  
  Days per week: Not on file Minutes per session: Not on file  Stress: Not on file Relationships  Social connections:  
  Talks on phone: Not on file Gets together: Not on file Attends Christian service: Not on file Active member of club or organization: Not on file Attends meetings of clubs or organizations: Not on file Relationship status: Not on file  Intimate partner violence:  
  Fear of current or ex partner: Not on file Emotionally abused: Not on file Physically abused: Not on file Forced sexual activity: Not on file Other Topics Concern  Not on file Social History Narrative  Not on file Past Surgical History:  
Procedure Laterality Date  HAND/FINGER SURGERY UNLISTED Carpal tunnel  HX CORONARY ARTERY BYPASS GRAFT  2014  
 at [address]    
 HX HIP REPLACEMENT    
 HX LUMBAR FUSION    
 HX ORTHOPAEDIC    
 lumbar spine x 5  
  HX ORTHOPAEDIC Right hip replacement  HX ORTHOPAEDIC    
 right foot calous removed  HX PTCA Family History:  Non-contributory. PE: 
Visit Vitals /80 Pulse 70 Temp 96.8 °F (36 °C) (Oral) Resp 18 Ht 6' 1\" (1.854 m) Wt 259 lb (117.5 kg) SpO2 99% BMI 34.17 kg/m² A&O X3, NAD, well develop, well nourished Heart: S1-S2, rrr 
Lungs: CTA bilat Abd: soft, nt, nt, + bs in all quadrants Ext:  Pos distal pulses to DP, PT Leg length shows the left LE to be slightly shorter grossly sitting in the chair X-ray: left hip shows end stage OA Labs: labs were reviewed and wnl.  ua neg A:  Left  hip end stage OA 
 
P:  At this point we will move forward with surgery. Again, the alternatives, risks, complications, as well as expected outcome were discussed and the patient wishes to proceed with surgery. Pt has been instructed to stop aspirin, nsaids, rheumatologic medications and blood thinners. They have also been instructed to continue on any heart and bp meds and to take them the morning of surgery with sips of water. Lateral approach. The patient will require in-patient admission. Admission as an in-patient is reasonable and necessary due to increased risk of surgery due to the factors indicated as well as the possible need for prolonged in-hospital or skilled post-acute care in order to improve this patient's functional ability. Adriane Hoff

## 2019-05-12 ENCOUNTER — ANESTHESIA EVENT (OUTPATIENT)
Dept: SURGERY | Age: 64
DRG: 470 | End: 2019-05-12
Payer: MEDICARE

## 2019-05-13 ENCOUNTER — APPOINTMENT (OUTPATIENT)
Dept: GENERAL RADIOLOGY | Age: 64
DRG: 470 | End: 2019-05-13
Attending: ORTHOPAEDIC SURGERY
Payer: MEDICARE

## 2019-05-13 ENCOUNTER — ANESTHESIA (OUTPATIENT)
Dept: SURGERY | Age: 64
DRG: 470 | End: 2019-05-13
Payer: MEDICARE

## 2019-05-13 ENCOUNTER — HOSPITAL ENCOUNTER (INPATIENT)
Age: 64
LOS: 1 days | Discharge: HOME HEALTH | DRG: 470 | End: 2019-05-14
Attending: ORTHOPAEDIC SURGERY | Admitting: ORTHOPAEDIC SURGERY
Payer: MEDICARE

## 2019-05-13 DIAGNOSIS — M16.10 HIP ARTHRITIS: Primary | ICD-10-CM

## 2019-05-13 LAB
ABO + RH BLD: NORMAL
AMPHET UR QL SCN: NEGATIVE
BARBITURATES UR QL SCN: NEGATIVE
BENZODIAZ UR QL: NEGATIVE
BLOOD GROUP ANTIBODIES SERPL: NORMAL
BUN BLD-MCNC: 15 MG/DL (ref 7–18)
CANNABINOIDS UR QL SCN: NEGATIVE
CHLORIDE BLD-SCNC: 108 MMOL/L (ref 100–108)
COCAINE UR QL SCN: NEGATIVE
GLUCOSE BLD STRIP.AUTO-MCNC: 87 MG/DL (ref 74–106)
HCT VFR BLD CALC: 40 % (ref 36–49)
HDSCOM,HDSCOM: NORMAL
HGB BLD-MCNC: 13.6 G/DL (ref 12–16)
INR BLD: 1.1 (ref 0.9–1.2)
METHADONE UR QL: NEGATIVE
OPIATES UR QL: NEGATIVE
PCP UR QL: NEGATIVE
POTASSIUM BLD-SCNC: 4 MMOL/L (ref 3.5–5.5)
SODIUM BLD-SCNC: 145 MMOL/L (ref 136–145)
SPECIMEN EXP DATE BLD: NORMAL

## 2019-05-13 PROCEDURE — 76010000153 HC OR TIME 1.5 TO 2 HR: Performed by: ORTHOPAEDIC SURGERY

## 2019-05-13 PROCEDURE — 77030018836 HC SOL IRR NACL ICUM -A: Performed by: ORTHOPAEDIC SURGERY

## 2019-05-13 PROCEDURE — 77030003029 HC SUT VCRL J&J -B: Performed by: ORTHOPAEDIC SURGERY

## 2019-05-13 PROCEDURE — 74011000250 HC RX REV CODE- 250: Performed by: PHYSICIAN ASSISTANT

## 2019-05-13 PROCEDURE — 74011250637 HC RX REV CODE- 250/637: Performed by: ORTHOPAEDIC SURGERY

## 2019-05-13 PROCEDURE — 85610 PROTHROMBIN TIME: CPT

## 2019-05-13 PROCEDURE — 77030006835 HC BLD SAW SAG STRY -B: Performed by: ORTHOPAEDIC SURGERY

## 2019-05-13 PROCEDURE — 82947 ASSAY GLUCOSE BLOOD QUANT: CPT

## 2019-05-13 PROCEDURE — C1776 JOINT DEVICE (IMPLANTABLE): HCPCS | Performed by: ORTHOPAEDIC SURGERY

## 2019-05-13 PROCEDURE — 77030013079 HC BLNKT BAIR HGGR 3M -A: Performed by: ANESTHESIOLOGY

## 2019-05-13 PROCEDURE — 77030008467 HC STPLR SKN COVD -B: Performed by: ORTHOPAEDIC SURGERY

## 2019-05-13 PROCEDURE — 77030027138 HC INCENT SPIROMETER -A: Performed by: ORTHOPAEDIC SURGERY

## 2019-05-13 PROCEDURE — 77030018835 HC SOL IRR LR ICUM -A: Performed by: ORTHOPAEDIC SURGERY

## 2019-05-13 PROCEDURE — 80307 DRUG TEST PRSMV CHEM ANLYZR: CPT

## 2019-05-13 PROCEDURE — 77030019557 HC ELECTRD VES SEAL MEDT -F: Performed by: ORTHOPAEDIC SURGERY

## 2019-05-13 PROCEDURE — 77030020294 HC ABD PLLW HIP DERY -B: Performed by: ORTHOPAEDIC SURGERY

## 2019-05-13 PROCEDURE — 77030012935 HC DRSG AQUACEL BMS -B: Performed by: ORTHOPAEDIC SURGERY

## 2019-05-13 PROCEDURE — 77030008683 HC TU ET CUF COVD -A: Performed by: ANESTHESIOLOGY

## 2019-05-13 PROCEDURE — 97116 GAIT TRAINING THERAPY: CPT

## 2019-05-13 PROCEDURE — 65270000029 HC RM PRIVATE

## 2019-05-13 PROCEDURE — 0SRB0JA REPLACEMENT OF LEFT HIP JOINT WITH SYNTHETIC SUBSTITUTE, UNCEMENTED, OPEN APPROACH: ICD-10-PCS | Performed by: ORTHOPAEDIC SURGERY

## 2019-05-13 PROCEDURE — 74011250636 HC RX REV CODE- 250/636

## 2019-05-13 PROCEDURE — 77030012890: Performed by: ORTHOPAEDIC SURGERY

## 2019-05-13 PROCEDURE — 97161 PT EVAL LOW COMPLEX 20 MIN: CPT

## 2019-05-13 PROCEDURE — 74011250637 HC RX REV CODE- 250/637: Performed by: PHYSICIAN ASSISTANT

## 2019-05-13 PROCEDURE — 74011250636 HC RX REV CODE- 250/636: Performed by: ORTHOPAEDIC SURGERY

## 2019-05-13 PROCEDURE — 88311 DECALCIFY TISSUE: CPT

## 2019-05-13 PROCEDURE — 77030003666 HC NDL SPINAL BD -A: Performed by: ORTHOPAEDIC SURGERY

## 2019-05-13 PROCEDURE — 73502 X-RAY EXAM HIP UNI 2-3 VIEWS: CPT

## 2019-05-13 PROCEDURE — 77030002933 HC SUT MCRYL J&J -A: Performed by: ORTHOPAEDIC SURGERY

## 2019-05-13 PROCEDURE — 88304 TISSUE EXAM BY PATHOLOGIST: CPT

## 2019-05-13 PROCEDURE — 74011250636 HC RX REV CODE- 250/636: Performed by: NURSE ANESTHETIST, CERTIFIED REGISTERED

## 2019-05-13 PROCEDURE — 74011000250 HC RX REV CODE- 250

## 2019-05-13 PROCEDURE — 77030031139 HC SUT VCRL2 J&J -A: Performed by: ORTHOPAEDIC SURGERY

## 2019-05-13 PROCEDURE — 77030027138 HC INCENT SPIROMETER -A

## 2019-05-13 PROCEDURE — 74011000258 HC RX REV CODE- 258: Performed by: PHYSICIAN ASSISTANT

## 2019-05-13 PROCEDURE — 77030020782 HC GWN BAIR PAWS FLX 3M -B: Performed by: ORTHOPAEDIC SURGERY

## 2019-05-13 PROCEDURE — C9290 INJ, BUPIVACAINE LIPOSOME: HCPCS | Performed by: PHYSICIAN ASSISTANT

## 2019-05-13 PROCEDURE — 74011000250 HC RX REV CODE- 250: Performed by: ORTHOPAEDIC SURGERY

## 2019-05-13 PROCEDURE — 74011250637 HC RX REV CODE- 250/637: Performed by: NURSE ANESTHETIST, CERTIFIED REGISTERED

## 2019-05-13 PROCEDURE — 51798 US URINE CAPACITY MEASURE: CPT

## 2019-05-13 PROCEDURE — C9290 INJ, BUPIVACAINE LIPOSOME: HCPCS | Performed by: ORTHOPAEDIC SURGERY

## 2019-05-13 PROCEDURE — 74011000258 HC RX REV CODE- 258: Performed by: ORTHOPAEDIC SURGERY

## 2019-05-13 PROCEDURE — 64520 N BLOCK LUMBAR/THORACIC: CPT | Performed by: ANESTHESIOLOGY

## 2019-05-13 PROCEDURE — 74011250636 HC RX REV CODE- 250/636: Performed by: PHYSICIAN ASSISTANT

## 2019-05-13 PROCEDURE — 76060000034 HC ANESTHESIA 1.5 TO 2 HR: Performed by: ORTHOPAEDIC SURGERY

## 2019-05-13 PROCEDURE — 77030013708 HC HNDPC SUC IRR PULS STRY –B: Performed by: ORTHOPAEDIC SURGERY

## 2019-05-13 PROCEDURE — 76210000016 HC OR PH I REC 1 TO 1.5 HR: Performed by: ORTHOPAEDIC SURGERY

## 2019-05-13 PROCEDURE — 97530 THERAPEUTIC ACTIVITIES: CPT

## 2019-05-13 PROCEDURE — 86900 BLOOD TYPING SEROLOGIC ABO: CPT

## 2019-05-13 DEVICE — COMPONENT HIP PRSS FT MTL ON CERM POLYETH X3: Type: IMPLANTABLE DEVICE | Site: HIP | Status: FUNCTIONAL

## 2019-05-13 DEVICE — HEAD FEM DELT V40 -5MM NK 36MM -- V40 BIOLOX: Type: IMPLANTABLE DEVICE | Site: HIP | Status: FUNCTIONAL

## 2019-05-13 DEVICE — STEM FEM SZ 6 127D 35X111MM -- ACCOLADE II V40: Type: IMPLANTABLE DEVICE | Site: HIP | Status: FUNCTIONAL

## 2019-05-13 DEVICE — LINER ACET SZ F ID36MM THK7.9MM 0DEG HIP X3 LOK RNG FOR: Type: IMPLANTABLE DEVICE | Site: HIP | Status: FUNCTIONAL

## 2019-05-13 DEVICE — SHELL ACET CLUS H 58F TRTANIUM -- TRIDENT II: Type: IMPLANTABLE DEVICE | Site: HIP | Status: FUNCTIONAL

## 2019-05-13 DEVICE — PLUG BNE BK TI HA HMSPHR SLD FOR TRIDENT ACET SHELL DOME H: Type: IMPLANTABLE DEVICE | Site: HIP | Status: FUNCTIONAL

## 2019-05-13 RX ORDER — LISINOPRIL 40 MG/1
40 TABLET ORAL DAILY
Status: DISCONTINUED | OUTPATIENT
Start: 2019-05-14 | End: 2019-05-14 | Stop reason: HOSPADM

## 2019-05-13 RX ORDER — ZOLPIDEM TARTRATE 5 MG/1
5 TABLET ORAL
Status: DISCONTINUED | OUTPATIENT
Start: 2019-05-13 | End: 2019-05-14 | Stop reason: HOSPADM

## 2019-05-13 RX ORDER — CARVEDILOL 25 MG/1
25 TABLET ORAL 2 TIMES DAILY WITH MEALS
Status: DISCONTINUED | OUTPATIENT
Start: 2019-05-13 | End: 2019-05-14 | Stop reason: HOSPADM

## 2019-05-13 RX ORDER — FENTANYL CITRATE 50 UG/ML
INJECTION, SOLUTION INTRAMUSCULAR; INTRAVENOUS AS NEEDED
Status: DISCONTINUED | OUTPATIENT
Start: 2019-05-13 | End: 2019-05-13 | Stop reason: HOSPADM

## 2019-05-13 RX ORDER — ASPIRIN 325 MG
325 TABLET, DELAYED RELEASE (ENTERIC COATED) ORAL 2 TIMES DAILY
Status: DISCONTINUED | OUTPATIENT
Start: 2019-05-14 | End: 2019-05-14 | Stop reason: HOSPADM

## 2019-05-13 RX ORDER — NEOSTIGMINE METHYLSULFATE 5 MG/5 ML
SYRINGE (ML) INTRAVENOUS AS NEEDED
Status: DISCONTINUED | OUTPATIENT
Start: 2019-05-13 | End: 2019-05-13 | Stop reason: HOSPADM

## 2019-05-13 RX ORDER — NALOXONE HYDROCHLORIDE 0.4 MG/ML
0.4 INJECTION, SOLUTION INTRAMUSCULAR; INTRAVENOUS; SUBCUTANEOUS AS NEEDED
Status: DISCONTINUED | OUTPATIENT
Start: 2019-05-13 | End: 2019-05-14 | Stop reason: HOSPADM

## 2019-05-13 RX ORDER — MIDAZOLAM HYDROCHLORIDE 1 MG/ML
2 INJECTION, SOLUTION INTRAMUSCULAR; INTRAVENOUS
Status: DISCONTINUED | OUTPATIENT
Start: 2019-05-13 | End: 2019-05-13 | Stop reason: HOSPADM

## 2019-05-13 RX ORDER — LANOLIN ALCOHOL/MO/W.PET/CERES
1 CREAM (GRAM) TOPICAL 2 TIMES DAILY WITH MEALS
Status: DISCONTINUED | OUTPATIENT
Start: 2019-05-13 | End: 2019-05-14 | Stop reason: HOSPADM

## 2019-05-13 RX ORDER — HYDROCHLOROTHIAZIDE 25 MG/1
25 TABLET ORAL DAILY
Status: DISCONTINUED | OUTPATIENT
Start: 2019-05-14 | End: 2019-05-14 | Stop reason: HOSPADM

## 2019-05-13 RX ORDER — WARFARIN 10 MG/1
10 TABLET ORAL ONCE
Status: COMPLETED | OUTPATIENT
Start: 2019-05-13 | End: 2019-05-13

## 2019-05-13 RX ORDER — POVIDONE-IODINE 10 %
SOLUTION, NON-ORAL TOPICAL AS NEEDED
Status: DISCONTINUED | OUTPATIENT
Start: 2019-05-13 | End: 2019-05-13 | Stop reason: HOSPADM

## 2019-05-13 RX ORDER — FAMOTIDINE 20 MG/1
20 TABLET, FILM COATED ORAL ONCE
Status: COMPLETED | OUTPATIENT
Start: 2019-05-13 | End: 2019-05-13

## 2019-05-13 RX ORDER — NITROGLYCERIN 0.4 MG/1
0.4 TABLET SUBLINGUAL
Status: DISCONTINUED | OUTPATIENT
Start: 2019-05-13 | End: 2019-05-14 | Stop reason: HOSPADM

## 2019-05-13 RX ORDER — AMOXICILLIN 250 MG
1 CAPSULE ORAL 2 TIMES DAILY
Status: DISCONTINUED | OUTPATIENT
Start: 2019-05-13 | End: 2019-05-14 | Stop reason: HOSPADM

## 2019-05-13 RX ORDER — SODIUM CHLORIDE 0.9 % (FLUSH) 0.9 %
5-40 SYRINGE (ML) INJECTION AS NEEDED
Status: DISCONTINUED | OUTPATIENT
Start: 2019-05-13 | End: 2019-05-14 | Stop reason: HOSPADM

## 2019-05-13 RX ORDER — SODIUM CHLORIDE 0.9 % (FLUSH) 0.9 %
5-40 SYRINGE (ML) INJECTION EVERY 8 HOURS
Status: DISCONTINUED | OUTPATIENT
Start: 2019-05-13 | End: 2019-05-14 | Stop reason: HOSPADM

## 2019-05-13 RX ORDER — LIDOCAINE HYDROCHLORIDE 10 MG/ML
0.1 INJECTION, SOLUTION EPIDURAL; INFILTRATION; INTRACAUDAL; PERINEURAL AS NEEDED
Status: DISCONTINUED | OUTPATIENT
Start: 2019-05-13 | End: 2019-05-13 | Stop reason: HOSPADM

## 2019-05-13 RX ORDER — OXYCODONE HYDROCHLORIDE 20 MG/1
20 TABLET ORAL
Status: DISCONTINUED | OUTPATIENT
Start: 2019-05-13 | End: 2019-05-14 | Stop reason: HOSPADM

## 2019-05-13 RX ORDER — ROPIVACAINE HYDROCHLORIDE 2 MG/ML
30 INJECTION, SOLUTION EPIDURAL; INFILTRATION; PERINEURAL
Status: COMPLETED | OUTPATIENT
Start: 2019-05-13 | End: 2019-05-13

## 2019-05-13 RX ORDER — EPHEDRINE SULFATE/0.9% NACL/PF 50 MG/5 ML
SYRINGE (ML) INTRAVENOUS AS NEEDED
Status: DISCONTINUED | OUTPATIENT
Start: 2019-05-13 | End: 2019-05-13 | Stop reason: HOSPADM

## 2019-05-13 RX ORDER — PHENYLEPHRINE HCL IN 0.9% NACL 1 MG/10 ML
SYRINGE (ML) INTRAVENOUS AS NEEDED
Status: DISCONTINUED | OUTPATIENT
Start: 2019-05-13 | End: 2019-05-13 | Stop reason: HOSPADM

## 2019-05-13 RX ORDER — SODIUM CHLORIDE 0.9 % (FLUSH) 0.9 %
5-40 SYRINGE (ML) INJECTION AS NEEDED
Status: DISCONTINUED | OUTPATIENT
Start: 2019-05-13 | End: 2019-05-13 | Stop reason: HOSPADM

## 2019-05-13 RX ORDER — ISOSORBIDE MONONITRATE 60 MG/1
60 TABLET, EXTENDED RELEASE ORAL
Status: DISCONTINUED | OUTPATIENT
Start: 2019-05-14 | End: 2019-05-14 | Stop reason: HOSPADM

## 2019-05-13 RX ORDER — CELECOXIB 400 MG/1
400 CAPSULE ORAL ONCE
Status: COMPLETED | OUTPATIENT
Start: 2019-05-13 | End: 2019-05-13

## 2019-05-13 RX ORDER — ONDANSETRON 2 MG/ML
4 INJECTION INTRAMUSCULAR; INTRAVENOUS
Status: DISCONTINUED | OUTPATIENT
Start: 2019-05-13 | End: 2019-05-14 | Stop reason: HOSPADM

## 2019-05-13 RX ORDER — ROCURONIUM BROMIDE 10 MG/ML
INJECTION, SOLUTION INTRAVENOUS AS NEEDED
Status: DISCONTINUED | OUTPATIENT
Start: 2019-05-13 | End: 2019-05-13 | Stop reason: HOSPADM

## 2019-05-13 RX ORDER — PREGABALIN 50 MG/1
50 CAPSULE ORAL 2 TIMES DAILY
Status: DISCONTINUED | OUTPATIENT
Start: 2019-05-13 | End: 2019-05-14 | Stop reason: HOSPADM

## 2019-05-13 RX ORDER — ACETAMINOPHEN 500 MG
1000 TABLET ORAL EVERY 6 HOURS
Status: DISCONTINUED | OUTPATIENT
Start: 2019-05-13 | End: 2019-05-14 | Stop reason: HOSPADM

## 2019-05-13 RX ORDER — HYDROMORPHONE HYDROCHLORIDE 1 MG/ML
0.5 INJECTION, SOLUTION INTRAMUSCULAR; INTRAVENOUS; SUBCUTANEOUS
Status: DISCONTINUED | OUTPATIENT
Start: 2019-05-13 | End: 2019-05-14 | Stop reason: HOSPADM

## 2019-05-13 RX ORDER — ACETAMINOPHEN 500 MG
1000 TABLET ORAL ONCE
Status: COMPLETED | OUTPATIENT
Start: 2019-05-13 | End: 2019-05-13

## 2019-05-13 RX ORDER — SODIUM CHLORIDE, SODIUM LACTATE, POTASSIUM CHLORIDE, CALCIUM CHLORIDE 600; 310; 30; 20 MG/100ML; MG/100ML; MG/100ML; MG/100ML
50 INJECTION, SOLUTION INTRAVENOUS CONTINUOUS
Status: DISCONTINUED | OUTPATIENT
Start: 2019-05-13 | End: 2019-05-13 | Stop reason: HOSPADM

## 2019-05-13 RX ORDER — DEXAMETHASONE SODIUM PHOSPHATE 4 MG/ML
INJECTION, SOLUTION INTRA-ARTICULAR; INTRALESIONAL; INTRAMUSCULAR; INTRAVENOUS; SOFT TISSUE AS NEEDED
Status: DISCONTINUED | OUTPATIENT
Start: 2019-05-13 | End: 2019-05-13 | Stop reason: HOSPADM

## 2019-05-13 RX ORDER — AMLODIPINE BESYLATE 10 MG/1
10 TABLET ORAL DAILY
Status: DISCONTINUED | OUTPATIENT
Start: 2019-05-14 | End: 2019-05-14 | Stop reason: HOSPADM

## 2019-05-13 RX ORDER — GLYCOPYRROLATE 0.2 MG/ML
INJECTION INTRAMUSCULAR; INTRAVENOUS AS NEEDED
Status: DISCONTINUED | OUTPATIENT
Start: 2019-05-13 | End: 2019-05-13 | Stop reason: HOSPADM

## 2019-05-13 RX ORDER — SUCCINYLCHOLINE CHLORIDE 20 MG/ML
INJECTION INTRAMUSCULAR; INTRAVENOUS AS NEEDED
Status: DISCONTINUED | OUTPATIENT
Start: 2019-05-13 | End: 2019-05-13 | Stop reason: HOSPADM

## 2019-05-13 RX ORDER — FENTANYL CITRATE 50 UG/ML
100 INJECTION, SOLUTION INTRAMUSCULAR; INTRAVENOUS
Status: DISCONTINUED | OUTPATIENT
Start: 2019-05-13 | End: 2019-05-13 | Stop reason: HOSPADM

## 2019-05-13 RX ORDER — LIDOCAINE HYDROCHLORIDE 20 MG/ML
INJECTION, SOLUTION EPIDURAL; INFILTRATION; INTRACAUDAL; PERINEURAL AS NEEDED
Status: DISCONTINUED | OUTPATIENT
Start: 2019-05-13 | End: 2019-05-13 | Stop reason: HOSPADM

## 2019-05-13 RX ORDER — CELECOXIB 100 MG/1
200 CAPSULE ORAL 2 TIMES DAILY
Status: DISCONTINUED | OUTPATIENT
Start: 2019-05-13 | End: 2019-05-14 | Stop reason: HOSPADM

## 2019-05-13 RX ORDER — POLYMYXIN B 500000 [USP'U]/1
INJECTION, POWDER, LYOPHILIZED, FOR SOLUTION INTRAMUSCULAR; INTRATHECAL; INTRAVENOUS; OPHTHALMIC AS NEEDED
Status: DISCONTINUED | OUTPATIENT
Start: 2019-05-13 | End: 2019-05-13 | Stop reason: HOSPADM

## 2019-05-13 RX ORDER — DEXTROSE MONOHYDRATE AND SODIUM CHLORIDE 5; .45 G/100ML; G/100ML
100 INJECTION, SOLUTION INTRAVENOUS CONTINUOUS
Status: DISCONTINUED | OUTPATIENT
Start: 2019-05-13 | End: 2019-05-13

## 2019-05-13 RX ORDER — ONDANSETRON 2 MG/ML
INJECTION INTRAMUSCULAR; INTRAVENOUS AS NEEDED
Status: DISCONTINUED | OUTPATIENT
Start: 2019-05-13 | End: 2019-05-13 | Stop reason: HOSPADM

## 2019-05-13 RX ORDER — FLUMAZENIL 0.1 MG/ML
0.2 INJECTION INTRAVENOUS AS NEEDED
Status: DISCONTINUED | OUTPATIENT
Start: 2019-05-13 | End: 2019-05-14 | Stop reason: HOSPADM

## 2019-05-13 RX ORDER — VANCOMYCIN HYDROCHLORIDE 1 G/20ML
INJECTION, POWDER, LYOPHILIZED, FOR SOLUTION INTRAVENOUS AS NEEDED
Status: DISCONTINUED | OUTPATIENT
Start: 2019-05-13 | End: 2019-05-13 | Stop reason: HOSPADM

## 2019-05-13 RX ORDER — ONDANSETRON 2 MG/ML
4 INJECTION INTRAMUSCULAR; INTRAVENOUS ONCE
Status: DISCONTINUED | OUTPATIENT
Start: 2019-05-13 | End: 2019-05-13 | Stop reason: HOSPADM

## 2019-05-13 RX ORDER — PREGABALIN 75 MG/1
75 CAPSULE ORAL ONCE
Status: COMPLETED | OUTPATIENT
Start: 2019-05-13 | End: 2019-05-13

## 2019-05-13 RX ORDER — ROPIVACAINE HYDROCHLORIDE 2 MG/ML
INJECTION, SOLUTION EPIDURAL; INFILTRATION; PERINEURAL
Status: COMPLETED | OUTPATIENT
Start: 2019-05-13 | End: 2019-05-13

## 2019-05-13 RX ORDER — SODIUM CHLORIDE 0.9 % (FLUSH) 0.9 %
5-40 SYRINGE (ML) INJECTION EVERY 8 HOURS
Status: DISCONTINUED | OUTPATIENT
Start: 2019-05-13 | End: 2019-05-13 | Stop reason: HOSPADM

## 2019-05-13 RX ORDER — PROPOFOL 10 MG/ML
INJECTION, EMULSION INTRAVENOUS AS NEEDED
Status: DISCONTINUED | OUTPATIENT
Start: 2019-05-13 | End: 2019-05-13 | Stop reason: HOSPADM

## 2019-05-13 RX ORDER — CYCLOBENZAPRINE HCL 10 MG
10 TABLET ORAL
Status: DISCONTINUED | OUTPATIENT
Start: 2019-05-13 | End: 2019-05-14 | Stop reason: HOSPADM

## 2019-05-13 RX ORDER — POLYETHYLENE GLYCOL 3350 17 G/17G
17 POWDER, FOR SOLUTION ORAL DAILY
Status: DISCONTINUED | OUTPATIENT
Start: 2019-05-14 | End: 2019-05-14 | Stop reason: HOSPADM

## 2019-05-13 RX ORDER — FENTANYL CITRATE 50 UG/ML
50 INJECTION, SOLUTION INTRAMUSCULAR; INTRAVENOUS
Status: DISCONTINUED | OUTPATIENT
Start: 2019-05-13 | End: 2019-05-13 | Stop reason: HOSPADM

## 2019-05-13 RX ADMIN — ROPIVACAINE HYDROCHLORIDE 50 MG: 2 INJECTION, SOLUTION EPIDURAL; INFILTRATION; PERINEURAL at 11:23

## 2019-05-13 RX ADMIN — FENTANYL CITRATE 50 MCG: 50 INJECTION, SOLUTION INTRAMUSCULAR; INTRAVENOUS at 12:58

## 2019-05-13 RX ADMIN — HYDROMORPHONE HYDROCHLORIDE 0.5 MG: 1 INJECTION, SOLUTION INTRAMUSCULAR; INTRAVENOUS; SUBCUTANEOUS at 14:15

## 2019-05-13 RX ADMIN — Medication 200 MCG: at 11:42

## 2019-05-13 RX ADMIN — MIDAZOLAM HYDROCHLORIDE 2 MG: 2 INJECTION, SOLUTION INTRAMUSCULAR; INTRAVENOUS at 11:13

## 2019-05-13 RX ADMIN — GLYCOPYRROLATE 0.6 MG: 0.2 INJECTION INTRAMUSCULAR; INTRAVENOUS at 12:58

## 2019-05-13 RX ADMIN — HYDROMORPHONE HYDROCHLORIDE 0.5 MG: 1 INJECTION, SOLUTION INTRAMUSCULAR; INTRAVENOUS; SUBCUTANEOUS at 14:04

## 2019-05-13 RX ADMIN — ACETAMINOPHEN 1000 MG: 500 TABLET ORAL at 16:47

## 2019-05-13 RX ADMIN — ROPIVACAINE HYDROCHLORIDE 60 MG: 2 INJECTION, SOLUTION EPIDURAL; INFILTRATION at 11:21

## 2019-05-13 RX ADMIN — ROCURONIUM BROMIDE 10 MG: 10 INJECTION, SOLUTION INTRAVENOUS at 12:31

## 2019-05-13 RX ADMIN — FENTANYL CITRATE 100 MCG: 50 INJECTION, SOLUTION INTRAMUSCULAR; INTRAVENOUS at 11:29

## 2019-05-13 RX ADMIN — Medication 4 MG: at 12:58

## 2019-05-13 RX ADMIN — Medication 100 MCG: at 11:49

## 2019-05-13 RX ADMIN — ONDANSETRON 4 MG: 2 INJECTION INTRAMUSCULAR; INTRAVENOUS at 11:40

## 2019-05-13 RX ADMIN — PREGABALIN 50 MG: 50 CAPSULE ORAL at 17:49

## 2019-05-13 RX ADMIN — PROPOFOL 150 MG: 10 INJECTION, EMULSION INTRAVENOUS at 11:33

## 2019-05-13 RX ADMIN — ACETAMINOPHEN 1000 MG: 500 TABLET ORAL at 10:51

## 2019-05-13 RX ADMIN — SENNOSIDES, DOCUSATE SODIUM 1 TABLET: 50; 8.6 TABLET, FILM COATED ORAL at 17:49

## 2019-05-13 RX ADMIN — FENTANYL CITRATE 50 MCG: 50 INJECTION, SOLUTION INTRAMUSCULAR; INTRAVENOUS at 12:28

## 2019-05-13 RX ADMIN — FENTANYL CITRATE 50 MCG: 50 INJECTION, SOLUTION INTRAMUSCULAR; INTRAVENOUS at 11:56

## 2019-05-13 RX ADMIN — DEXAMETHASONE SODIUM PHOSPHATE 8 MG: 4 INJECTION, SOLUTION INTRA-ARTICULAR; INTRALESIONAL; INTRAMUSCULAR; INTRAVENOUS; SOFT TISSUE at 11:40

## 2019-05-13 RX ADMIN — ACETAMINOPHEN 1000 MG: 500 TABLET ORAL at 22:42

## 2019-05-13 RX ADMIN — ROCURONIUM BROMIDE 40 MG: 10 INJECTION, SOLUTION INTRAVENOUS at 11:45

## 2019-05-13 RX ADMIN — CEFAZOLIN 3 G: 1 INJECTION, POWDER, FOR SOLUTION INTRAMUSCULAR; INTRAVENOUS; PARENTERAL at 20:39

## 2019-05-13 RX ADMIN — CELECOXIB 200 MG: 100 CAPSULE ORAL at 17:49

## 2019-05-13 RX ADMIN — Medication 100 MCG: at 11:58

## 2019-05-13 RX ADMIN — Medication 5 MG: at 11:49

## 2019-05-13 RX ADMIN — LIDOCAINE HYDROCHLORIDE 2 ML: 10 INJECTION, SOLUTION EPIDURAL; INFILTRATION; INTRACAUDAL; PERINEURAL at 11:16

## 2019-05-13 RX ADMIN — WARFARIN SODIUM 10 MG: 10 TABLET ORAL at 10:51

## 2019-05-13 RX ADMIN — CARVEDILOL 25 MG: 25 TABLET, FILM COATED ORAL at 16:46

## 2019-05-13 RX ADMIN — CEFAZOLIN SODIUM IN SODIUM CHLORIDE 0.9% IV SOLN 3 GM/100ML 3 G: 3-0.9/1 SOLUTION at 11:30

## 2019-05-13 RX ADMIN — FAMOTIDINE 20 MG: 20 TABLET ORAL at 10:51

## 2019-05-13 RX ADMIN — FERROUS SULFATE TAB 325 MG (65 MG ELEMENTAL FE) 325 MG: 325 (65 FE) TAB at 16:48

## 2019-05-13 RX ADMIN — Medication 10 ML: at 17:54

## 2019-05-13 RX ADMIN — DEXTROSE MONOHYDRATE AND SODIUM CHLORIDE 100 ML/HR: 5; .45 INJECTION, SOLUTION INTRAVENOUS at 16:00

## 2019-05-13 RX ADMIN — OXYCODONE HYDROCHLORIDE 20 MG: 20 TABLET ORAL at 19:47

## 2019-05-13 RX ADMIN — SUCCINYLCHOLINE CHLORIDE 120 MG: 20 INJECTION INTRAMUSCULAR; INTRAVENOUS at 11:34

## 2019-05-13 RX ADMIN — HYDROMORPHONE HYDROCHLORIDE 0.5 MG: 1 INJECTION, SOLUTION INTRAMUSCULAR; INTRAVENOUS; SUBCUTANEOUS at 16:41

## 2019-05-13 RX ADMIN — Medication 5 MG: at 11:42

## 2019-05-13 RX ADMIN — CELECOXIB 400 MG: 400 CAPSULE ORAL at 10:51

## 2019-05-13 RX ADMIN — Medication 100 MCG: at 11:53

## 2019-05-13 RX ADMIN — SODIUM CHLORIDE, SODIUM LACTATE, POTASSIUM CHLORIDE, AND CALCIUM CHLORIDE 50 ML/HR: 600; 310; 30; 20 INJECTION, SOLUTION INTRAVENOUS at 10:51

## 2019-05-13 RX ADMIN — Medication 5 MG: at 11:58

## 2019-05-13 RX ADMIN — LIDOCAINE HYDROCHLORIDE 80 MG: 20 INJECTION, SOLUTION EPIDURAL; INFILTRATION; INTRACAUDAL; PERINEURAL at 11:33

## 2019-05-13 RX ADMIN — TRANEXAMIC ACID 1 G: 1 INJECTION, SOLUTION INTRAVENOUS at 11:38

## 2019-05-13 RX ADMIN — PREGABALIN 75 MG: 75 CAPSULE ORAL at 10:52

## 2019-05-13 RX ADMIN — FENTANYL CITRATE 100 MCG: 50 INJECTION INTRAMUSCULAR; INTRAVENOUS at 11:15

## 2019-05-13 NOTE — PROGRESS NOTES
Problem: Mobility Impaired (Adult and Pediatric) Goal: *Acute Goals and Plan of Care (Insert Text) Description Physical Therapy Goals Initiated 5/13/2019 and to be accomplished within 7 day(s) 1. Patient will move from supine to sit and sit to supine , scoot up and down and roll side to side in bed with modified independence. 2.  Patient will transfer from bed to chair and chair to bed with modified independence using the least restrictive device. 3.  Patient will perform sit to stand with modified independence. 4.  Patient will ambulate with modified independence for 250 feet with the least restrictive device. 5.  Patient will ascend/descend 3 stairs with 1-2 handrail(s) with minimal assistance/contact guard assist.  
 
Prior Level of Function: Independent with mobility including gait using cane. Pt lives alone in single story home with 3 steps to enter with B HRA. Pt has low toilet. Outcome: Progressing Towards Goal 
 PHYSICAL THERAPY EVALUATION Patient: Eliot Crain (74 y.o. male) Date: 5/13/2019 Primary Diagnosis: Osteoarthritis of left hip, unspecified osteoarthritis type [M16.12] Hip arthritis [M16.10] Procedure(s) (LRB): LEFT TOTAL HIP ARTHROPLASTY LATERAL APPROACH (Left) Day of Surgery Precautions:   Fall, WBAT, Total hip Weight Bearing: WBAT 
 
ASSESSMENT : 
PT orders received and patient cleared by nursing to participate with therapy. Patient is a 70 y.o. male admitted to the hospital due to s/p Left total hip arthroplasty Dr. Radhika Mckeon 5/13/19. Patient consents to PT evaluation and treatment. Pt educated on safety, mobility, therex, total hip precautions, and OOB 3-5 times with nursing assistance. Supine to sit moderate assistance. Sit to stands minimal assistance from elevated surface. Gait 30 feet with RW minimal assistance/contact guard assistance. Pt having difficulty advancing L LE initially.  Pt also has some numbness noted in L LE but no knee buckling during gait. Pt is very motivated to move. Pt reports he lives alone and family will check in on him but no one staying with him after surgery. Educated pt on safety and importance of having some assistance at home. Pt also has no equipment with needs for RW and BSC. Pt ended therapy sitting in recliner with ice donned and all needs met. Patient will benefit from skilled intervention to address the above impairments. Patient's rehabilitation potential is considered to be Good Factors which may influence rehabilitation potential include:   
? None noted ? Mental ability/status ? Medical condition ? Home/family situation and support systems ? Safety awareness 
? Pain tolerance/management 
? Other: PLAN : 
Recommendations and Planned Interventions:   
?           Bed Mobility Training             ? Neuromuscular Re-Education ? Transfer Training                   ? Orthotic/Prosthetic Training 
? Gait Training                          ? Modalities ? Therapeutic Exercises           ? Edema Management/Control ? Therapeutic Activities            ? Family Training/Education ? Patient Education ? Other (comment): Frequency/Duration: Patient will be followed by physical therapy 1-2 times per day to address goals. Discharge Recommendations: Home Health Further Equipment Recommendations for Discharge: bedside commode and rolling walker SUBJECTIVE:  
Patient stated ? Come on leg (trying to advance his L LE). ? OBJECTIVE DATA SUMMARY:  
 
Past Medical History:  
Diagnosis Date Arthritis CAD (coronary artery disease) Essential hypertension Low back pain Lumbar postlaminectomy syndrome Lumbar spondylosis Myocardial infarction [facility])   
 lad stent for stemi,12/2012 Pancreatic cyst   
 
Past Surgical History: Procedure Laterality Date HAND/FINGER SURGERY UNLISTED Carpal tunnel HX CORONARY ARTERY BYPASS GRAFT  2014  
 at [facility]  
 HX HEART CATHETERIZATION    
 HX HIP REPLACEMENT    
 HX LUMBAR FUSION    
 HX ORTHOPAEDIC    
 lumbar spine x 5 HX ORTHOPAEDIC Right hip replacement HX ORTHOPAEDIC    
 right foot calous removed HX PTCA Barriers to Learning/Limitations: yes;  altered mental status (i.e.Sedation, Confusion) Compensate with: Visual Cues, Verbal Cues and Tactile Cues Home Situation: 
Home Situation Home Environment: Private residence # Steps to Enter: 3 Rails to Enter: Yes Hand Rails : Bilateral 
One/Two Story Residence: One story Living Alone: Yes Patient Expects to be Discharged to[de-identified] Private residence Current DME Used/Available at Home: Cane, straight Critical Behavior: 
Neurologic State: Alert;Drowsy Orientation Level: Oriented X4 Cognition: Follows commands;Decreased attention/concentration Safety/Judgement: Fall prevention Psychosocial 
Patient Behaviors: Calm; Cooperative Family  Behaviors: Calm Skin Condition/Temp: Warm Family  Behaviors: Calm Skin Integrity: Incision (comment)(surgical incision to left hip) Skin Integumentary Skin Color: Appropriate for ethnicity Skin Condition/Temp: Warm Skin Integrity: Incision (comment)(surgical incision to left hip) B LE Strength:   
Strength: (R 4+ to 5/5 L 3- to 3/5) B LE Tone & Sensation:  
Tone: Normal         
Sensation: (R WFL, L decreased light touch) B LE Range Of Motion: 
AROM: (WFL except limited L hip) Functional Mobility: 
Bed Mobility: 
  
Supine to Sit: Moderate assistance Transfers: 
Sit to Stand: Minimum assistance Balance:  
Sitting: Impaired; With support Sitting - Static: Good (unsupported) Sitting - Dynamic: Fair (occasional) Standing: Impaired; With support Standing - Static: Fair Standing - Dynamic : Fair Ambulation/Gait Training: 
Distance (ft): 30 Feet (ft) Assistive Device: Walker, rolling Ambulation - Level of Assistance: Contact guard assistance;Minimal assistance Gait Abnormalities: Antalgic;Decreased step clearance; Step to gait Left Side Weight Bearing: As tolerated Base of Support: Center of gravity altered;Shift to right Speed/Marian: Slow Step Length: Right shortened;Left shortened Therapeutic Exercises:  
Reviewed and performed ankle pumps. Pain: 
Pain level pre-treatment: 10/10 sharp L hip/back pain Pain level post-treatment: 10/10 sharp L hip/back pain Pain Intervention(s) : Medication (see MAR); Rest, Ice, Repositioning Response to intervention: Nurse notified, See doc flow Activity Tolerance:  
fair Please refer to the flowsheet for vital signs taken during this treatment. After treatment:  
?         Patient left in no apparent distress sitting up in chair ? Patient left in no apparent distress in bed 
? Call bell left within reach ? Personal items in reach ? Nursing notified Fe ? Caregiver present ? Bed/chair alarm activated ? SCDs applied COMMUNICATION/EDUCATION:  
?         Role of Physical Therapy in the acute care setting. ?         Fall prevention education was provided and the patient/caregiver indicated understanding. ? Patient/family have participated as able in goal setting and plan of care. ?         Patient/family agree to work toward stated goals and plan of care. ?         Patient understands intent and goals of therapy, but is neutral about his/her participation. ? Patient is unable to participate in goal setting/plan of care: ongoing with therapy staff. ?         Out of bed with nursing assistance 3-5 times a day. ? Other: Thank you for this referral. 
Sita Lara, PT, DPT Time Calculation: 42 mins Eval Complexity: History: LOW Complexity : Zero comorbidities / personal factors that will impact the outcome / POCExam:HIGH Complexity : 4+ Standardized tests and measures addressing body structure, function, activity limitation and / or participation in recreation  Presentation: LOW Complexity : Stable, uncomplicated  Clinical Decision Making:Low Complexity    Overall Complexity:LOW

## 2019-05-13 NOTE — PERIOP NOTES
TRANSFER - OUT REPORT: 
 
Verbal report given to Tasha RN on Mildred Offer  being transferred to [facility] for routine post - op Report consisted of patients Situation, Background, Assessment and  
Recommendations(SBAR). Information from the following report(s) SBAR, OR Summary, Procedure Summary, Intake/Output, MAR and Recent Results was reviewed with the receiving nurse. Lines:  
Peripheral IV 05/13/19 Right Arm (Active) Site Assessment Clean, dry, & intact 5/13/2019 10:42 AM  
Phlebitis Assessment 0 5/13/2019 10:42 AM  
Infiltration Assessment 0 5/13/2019 10:42 AM  
Dressing Status Clean, dry, & intact 5/13/2019 10:42 AM  
Dressing Type Tape;Transparent 5/13/2019 10:42 AM  
Hub Color/Line Status Pink; Infusing 5/13/2019 10:42 AM  
  
 
Opportunity for questions and clarification was provided. Patient transported with: 
 Registered Nurse

## 2019-05-13 NOTE — INTERVAL H&P NOTE
H&P Update: 
Ishaan Valencia was seen and examined. History and physical has been reviewed. The patient has been examined.  There have been no significant clinical changes since the completion of the originally dated History and Physical.

## 2019-05-13 NOTE — CONSULTS
Consult Note    Patient: Beka Salazar MRN: [id number]  CSN: [id number]    YOB: 1955  Age: 70 y.o. Sex: male    DOA: 2019 LOS:  LOS: 0 days        Requesting Physician: Ale Boateng MD    Reason for Consultation: HTN, CHF and CAD management                HPI:     Beka Salazar is a 70 y.o.  male who has been seen for post OP management for HTN and CHF. Patient had L hip arthoplasty today. Seen in PACU, pt feels fine, still slightly sleepy but able to answer simple questions. Denies any CP or SOB. No events intra operative. [facility] reviewed.      Past Medical History:   Diagnosis Date    Arthritis     CAD (coronary artery disease)     Essential hypertension     Low back pain     Lumbar postlaminectomy syndrome     Lumbar spondylosis     Myocardial infarction ([facility] [address]     lad stent for stemi,2012    Pancreatic cyst        Past Surgical History:   Procedure Laterality Date    HAND/FINGER SURGERY UNLISTED      Carpal tunnel    HX CORONARY ARTERY BYPASS GRAFT      [facility] HX HIP REPLACEMENT      HX LUMBAR FUSION      HX ORTHOPAEDIC      lumbar spine x 5    HX ORTHOPAEDIC      Right hip replacement    HX ORTHOPAEDIC      right foot calous removed    HX PTCA         Family History   Problem Relation Age of Onset    Heart Attack Neg Hx     Heart Surgery Neg Hx        Social History     Socioeconomic History    Marital status: SINGLE     Spouse name: Not on file    Number of children: Not on file    Years of education: Not on file    Highest education level: Not on file   Tobacco Use    Smoking status: Former Smoker     Packs/day: 0.10     Types: Cigarettes     Last attempt to quit: 2012     Years since quittin.5    Smokeless tobacco: Current User   Substance and Sexual Activity    Alcohol use: Yes     Comment: socially    Drug use: Yes     Types: Cocaine, Marijuana, Heroin     Comment: +Cocaine Screen + UDS in August, 2017        Prior to Admission medications    Medication Sig Start Date End Date Taking? Authorizing Provider   acetaminophen/diphenhydramine (TYLENOL PM PO) Take  by mouth. Yes Provider, Historical   amLODIPine (NORVASC) 10 mg tablet Take  by mouth daily. Yes Provider, Historical   cholecalciferol, VITAMIN D3, (VITAMIN D3) 5,000 unit tab tablet Take  by mouth daily. Yes Provider, Historical   clopidogrel (PLAVIX) 75 mg tab 75 mg. 5/9/17  Yes Provider, Historical   cyclobenzaprine (FLEXERIL) 10 mg tablet Take 1 Tab by mouth three (3) times daily as needed for Muscle Spasm(s). 3/8/17  Yes Leonora VANESSA PA-C   oxyCODONE IR (ROXICODONE) 15 mg immediate release tablet Take 1-2 Tabs by mouth every eight (8) hours as needed. Max Daily Amount: 90 mg. 3/8/17  Yes Linda Odell PA-C   aspirin (ASPIRIN) 325 mg tablet Take 1 Tab by mouth two (2) times a day. Patient taking differently: Take 81 mg by mouth daily. 1/25/17  Yes Leonora VANESSA PA-C   isosorbide mononitrate ER (IMDUR) 60 mg CR tablet Take 1 Tab by mouth every morning. 6/10/13  Yes Don Batres MD   lisinopril (PRINIVIL, ZESTRIL) 20 mg tablet Take 40 mg by mouth daily. Yes Provider, Historical   atorvastatin (LIPITOR) 80 mg tablet Take 80 mg by mouth daily. Yes Provider, Historical   HYDROcodone-acetaminophen (NORCO) 7.5-325 mg per tablet Take 1 Tab by mouth every six (6) hours as needed for Pain for up to 14 days. Max Daily Amount: 4 Tabs. 5/2/19 5/16/19  Linda Odell PA-C   methylPREDNISolone (MEDROL DOSEPACK) 4 mg tablet Per dose pack instructions 2/7/19   Linda Odell PA-C   cyclobenzaprine (FLEXERIL) 10 mg tablet Take 1 Tab by mouth nightly as needed for Muscle Spasm(s). 2/7/19   Linda Odell PA-C   HYDROcodone-ibuprofen (VICOPROFEN) 7.5-200 mg per tablet Take 1 Tab by mouth every six (6) hours as needed for Pain. Max Daily Amount: 4 Tabs.  2/7/19   Linda Odell PA-C   acetaminophen-codeine (TYLENOL-CODEINE #3) 300-30 mg per tablet Take 1 Tab by mouth every six (6) hours as needed for Pain. Max Daily Amount: 4 Tabs. 2/1/19   Vickie Adams PA-C   oxyCODONE-acetaminophen (PERCOCET)  mg per tablet Take 1 Tab by mouth every eight (8) hours as needed for Pain. Max Daily Amount: 3 Tabs. 4/19/18   Glory Miranda PA-C   oxyCODONE-acetaminophen (PERCOCET) 5-325 mg per tablet Take 1 Tab by mouth every six (6) hours as needed for Pain. Max Daily Amount: 4 Tabs. 3/20/18   Glory Miranda PA-C   diclofenac (VOLTAREN) 1 % gel Apply 4 g to affected area four (4) times daily. 7/13/17   Vickie CaseSARA   COMPOUNDMAX BASE crea 240 g by Does Not Apply route four (4) times daily. Anti-Inflam Diclofenac Sodium 3%Gabapentin 3% Lidocaine 2% Prilocaine 2% 6/8/17   Stanislav Medeiros MD   ammonium lactate (LAC-HYDRIN) 12 % topical cream Apply  to affected area two (2) times a day. rub in to affected area well 6/6/17   Hector Varghese MD   oxyCODONE-acetaminophen (PERCOCET) 7.5-325 mg per tablet Take 1 Tab by mouth every eight (8) hours as needed. Max Daily Amount: 3 Tabs. 5/4/17   Vickie Adams PA-C   HYDROcodone-acetaminophen [facility])  mg tablet Take 1 Tab by mouth every eight (8) hours as needed for Pain. Max Daily Amount: 3 Tabs. 4/5/17   Vickie Adams PA-C   HYDROmorphone (DILAUDID) 4 mg tablet Take 1 Tab by mouth every four (4) hours as needed. Max Daily Amount: 24 mg. Indications: PAIN 1/26/17   Glory Miranda PA-C   polyethylene glycol (MIRALAX) 17 gram packet Take 1 Packet by mouth daily. Indications: Constipation 1/26/17   Glory Miranda PA-C   ferrous sulfate 325 mg (65 mg iron) tablet Take 1 Tab by mouth two (2) times daily (with meals). 1/25/17   Vickie Adams PA-C   gabapentin (NEURONTIN) 300 mg capsule Take 1 Cap by mouth three (3) times daily. 4/1/16   Marvin Judd MD   doxycycline (ADOXA) 100 mg tablet Take 1 Tab by mouth two (2) times a day.  3/30/16 Thelma Marshall MD   gabapentin (NEURONTIN) 300 mg capsule  16   Provider, Historical   isoniazid (NYDRAZID) 300 mg tablet Take 300 mg by mouth daily. Provider, Historical   nitroglycerin (NITROSTAT) 0.4 mg SL tablet by SubLINGual route every five (5) minutes as needed. Provider, Historical   carvedilol (COREG) 25 mg tablet Take 25 mg by mouth two (2) times daily (with meals). Provider, Historical   hydrochlorothiazide (HYDRODIURIL) 25 mg tablet Take 25 mg by mouth daily. Provider, Historical       Allergies   Allergen Reactions    Vicodin [Hydrocodone-Acetaminophen] Nausea and Vomiting       Review of Systems  GENERAL: Patient alert, awake, able to communicate but slow and not in distress. Denies any cp or sob or N/V or HA or abd pain or chills or fevers   Detail ROS couldn't be done since pt slow to respond due to anesthesia     Physical Exam:      Visit Vitals  /74   Pulse 65   Temp 97.8 °F (36.6 °C)   Resp 15   Ht 6' 1\" (1.854 m)   Wt 118.4 kg (261 lb)   SpO2 100%   BMI 34.43 kg/m²    O2 Flow Rate (L/min): 6 l/min O2 Device: Oxygen mask    Temp (24hrs), Av.9 °F (36.6 °C), Min:97.8 °F (36.6 °C), Max:98.1 °F (36.7 °C)     0701 -  1900  In: 1200 [I.V.:1200]  Out: 300    No intake/output data recorded. General:  Alert, cooperative, no acute distress    HEENT:  PERRLA, anicteric sclerae. Pulmonary:  CTA Bilaterally. No Wheezing/Rales. Cardiovascular:  Regular rate and rhythm. GI:  Soft, Non distended, Non tender.  + Bowel sounds. Extremities:  No edema, L hip Sx site dressing clean.    Neurologic: AA, slow to respond, Moves all ext           Labs Reviewed:  BMP: No results found for: NA, K, CL, CO2, AGAP, GLU, BUN, CREA, GFRAA, GFRNA  CMP: No results found for: NA, K, CL, CO2, AGAP, GLU, BUN, CREA, GFRAA, GFRNA, CA, MG, PHOS, ALB, TBIL, TP, ALB, GLOB, AGRAT, SGOT, ALT, GPT  CBC: No results found for: WBC, HGB, HGBEXT, HCT, HCTEXT, PLT, PLTEXT, HGBEXT, HCTEXT, PLTEXT    Procedures/imaging: see electronic medical records for all procedures/Xrays and details which were not copied into this note but were reviewed prior to creation of Plan    Assessment/Plan     1. HTN: BP stable, will resume home med's and monitor   2. Chronic systolic HF, EF 29%: currently well compensated, will be cautious with IVF. 3. Hip arthritis s/p total hip arthoplasty: plan per ortho team.   4. CAD s/p CABG, stable now, resume asa at discharge, per pt he doesn't take plavix any more   5.  DVT prophylaxis per ortho team   Will follow     Perez Paez MD  5/13/2019 1:53 PM

## 2019-05-13 NOTE — ANESTHESIA PREPROCEDURE EVALUATION
Relevant Problems No relevant active problems Anesthetic History No history of anesthetic complications Review of Systems / Medical History Patient summary reviewed and pertinent labs reviewed Pulmonary Within defined limits Neuro/Psych Within defined limits Cardiovascular Hypertension: well controlled CAD and cardiac stents (2 stents. Denies Angina) Exercise tolerance: >4 METS 
  
GI/Hepatic/Renal 
Within defined limits Endo/Other Morbid obesity and arthritis Other Findings Physical Exam 
 
Airway Mallampati: II 
TM Distance: 4 - 6 cm Neck ROM: normal range of motion Mouth opening: Normal 
 
 Cardiovascular Regular rate and rhythm,  S1 and S2 normal,  no murmur, click, rub, or gallop Dental 
 
Dentition: Full upper dentures Pulmonary Breath sounds clear to auscultation Abdominal 
GI exam deferred Other Findings Anesthetic Plan ASA: 3 Anesthesia type: general and regional - lumbar plexus block Induction: Intravenous Anesthetic plan and risks discussed with: Patient

## 2019-05-13 NOTE — BRIEF OP NOTE
BRIEF OPERATIVE NOTE Date of Procedure: 5/13/2019 Preoperative Diagnosis: Osteoarthritis of left hip, unspecified osteoarthritis type with ankylosis and FFD [M16.12] Postoperative Diagnosis: Osteoarthritis of left hip, unspecified osteoarthritis type [M16.12] Procedure(s): LEFT TOTAL HIP ARTHROPLASTY LATERAL APPROACH Surgeon(s) and Role: Darrin Gutierrez MD - Primary Surgical Assistant: Madhuri Lawler Surgical Staff: 
Circ-1: Benjamin Bedolla Physician Assistant: Will Pereira PA-C Scrub Tech-1: Obey Uribe Surg Asst-1: Vandana Stevenson Event Time In Time Out Incision Start  Incision Close Anesthesia: General  
Estimated Blood Loss: 300ml Specimens:  
ID Type Source Tests Collected by Time Destination 1 : Left Femoral Head Preservative Hip, left  Lakisha Reno MD 5/13/2019 1217 Pathology Findings: same Complications: none Implants:  
Implant Name Type Inv. Item Serial No.  Lot No. LRB No. Used Action SHELL ACET CLUS H 58F Cas Ramos II - [id number]  SHELL ACET CLUS H 58F TRTANIUM -- [facility] [place] [employer] HOW [id number] Left 1 Implanted PLUG APCL H ACET SHLL --  - [id number]  PLUG APCL H ACET SHLL --   [employer] HOW [id number] Left 1 Implanted 6.5mm LOW PROFILE HEX SCREW    [employer] [id number] Left 1 Implanted 6.5mm LOW PROFILE HEX SCREW    [employer] [id number] Left 1 Implanted INSERT ACET 0DEG Neil Harrell -- TRIDENT - [id number]  INSERT ACET 0DEG 36MM F X3 -- TRIDENT  [employer] HOW [id number] Left 1 Implanted STEM FEM SZ 6 127D [id number] -- Terald Malgorzata - [id number]  STEM FEM SZ 6 127D [id number] -- [employer] HOW [id number] Left 1 Implanted HEAD FEM DELT V40 -5MM NK 36MM -- V40 BIOLOX - [id number]  HEAD FEM DELT V40 -5MM NK 36MM -- V40 BIOLOX  [employer] HOW [id number] Left 1 Implanted

## 2019-05-13 NOTE — PROGRESS NOTES
Reason for Admission:  Osteoarthritis of left hip, unspecified osteoarthritis type [M16.12] Hip arthritis [M16.10] RRAT Score:    13 Plan for utilizing home health: [address] Likelihood of Readmission:   Moderate Do you (patient/family) have any concerns for transition/discharge?  no 
 
Transition of Care Plan:   Home with [facility] Initial assessment completed with patient. Cognitive status of patient: oriented to time, place, person and situation. Face sheet information confirmed:  yes. The patient designates Abraham Carey, daughter ([phone]) and Roger Hough, son ([phone]) to participate in his discharge plan and to receive any needed information. This patient lives alone in a single family home. Patient was able to navigate steps as needed. Prior to hospitalization, patient was considered to be independent with ADLs/IADLS : yes . Patient has a current ACP document on file: no The patient's daughter will be available to transport patient home upon discharge. The patient already has Floored equipment available in the home. Patient is not currently active with home health. Patient has stayed in a skilled nursing facility or rehab. Was  stay within last 60 days : no.   Pt was in [facility] 2 years ago. This patient is on dialysis :no 
 
List of available Home Health agencies were provided and reviewed with the patient prior to discharge. Freedom of choice signed: yes, for [facility] and referral sent. Currently, the discharge plan is Home with [address] [place] [address] [place]. The patient states that he can obtain his medications from the pharmacy, and take his medications as directed. Patient's current insurance is [employer] Care Management Interventions PCP Verified by CM: Yes Last Visit to PCP: 05/06/19 Mode of Transport at Discharge: Self Transition of Care Consult (CM Consult): Discharge Planning MyChart Signup: No 
Discharge Durable Medical Equipment: No 
Physical Therapy Consult: Yes Occupational Therapy Consult: Yes Speech Therapy Consult: No 
Current Support Network: Lives Alone Confirm Follow Up Transport: Family Plan discussed with Pt/Family/Caregiver: Yes Freedom of Choice Offered: Yes Discharge Location Discharge Placement: Home with home health SUSAN Dhillon, RN Pager # [phone] Care Manager

## 2019-05-13 NOTE — ANESTHESIA PROCEDURE NOTES
Peripheral Block    Start time: 5/13/2019 11:13 AM  End time: 5/13/2019 11:23 AM  Performed by: Shaquille Swift MD  Authorized by: Shaquille Swift MD       Pre-procedure: Indications: at surgeon's request, post-op pain management and procedure for pain    Preanesthetic Checklist: patient identified, risks and benefits discussed, site marked, timeout performed, anesthesia consent given and patient being monitored    Timeout Time: 11:13          Block Type:   Block Type:  Lumbar plexus  Laterality:  Left  Monitoring:  Standard ASA monitoring, heart rate, continuous pulse ox, frequent vital sign checks, oxygen and responsive to questions  Injection Technique:  Single shot  Procedures: nerve stimulator    Patient Position: right lateral decubitus  Prep: chlorhexidine    Location:  Sacral area  Needle Type:  Stimuplex  Needle Gauge:  21 G  Needle Localization:  Anatomical landmarks and nerve stimulator    Assessment:  Number of attempts:  2  Injection Assessment:  Incremental injection every 5 mL, negative aspiration for blood, no intravascular symptoms, no paresthesia and negative aspiration for CSF  Patient tolerance:  Patient tolerated the procedure well with no immediate complications  Location:  PREOP HOLDING    Patient given 2 mg IV Versed and 100 mcg IV Fentanyl for sedation.     5/13/2019     11:24 AM     Quan Espinoza MD

## 2019-05-13 NOTE — PROGRESS NOTES
Patients arrives via bed alert awake and oriented,Area tpo Lhip aguacel dry and intact CMS+. no s/s of distress or sob

## 2019-05-14 ENCOUNTER — HOME HEALTH ADMISSION (OUTPATIENT)
Dept: HOME HEALTH SERVICES | Facility: HOME HEALTH | Age: 64
End: 2019-05-14

## 2019-05-14 VITALS
DIASTOLIC BLOOD PRESSURE: 66 MMHG | HEART RATE: 60 BPM | WEIGHT: 261 LBS | HEIGHT: 73 IN | TEMPERATURE: 97 F | OXYGEN SATURATION: 98 % | BODY MASS INDEX: 34.59 KG/M2 | RESPIRATION RATE: 17 BRPM | SYSTOLIC BLOOD PRESSURE: 104 MMHG

## 2019-05-14 PROCEDURE — 97116 GAIT TRAINING THERAPY: CPT

## 2019-05-14 PROCEDURE — 51798 US URINE CAPACITY MEASURE: CPT

## 2019-05-14 PROCEDURE — 74011250637 HC RX REV CODE- 250/637: Performed by: ORTHOPAEDIC SURGERY

## 2019-05-14 PROCEDURE — 97535 SELF CARE MNGMENT TRAINING: CPT

## 2019-05-14 PROCEDURE — 74011250636 HC RX REV CODE- 250/636: Performed by: ORTHOPAEDIC SURGERY

## 2019-05-14 PROCEDURE — 97165 OT EVAL LOW COMPLEX 30 MIN: CPT

## 2019-05-14 RX ORDER — DOCUSATE SODIUM 100 MG/1
100 CAPSULE, LIQUID FILLED ORAL 2 TIMES DAILY
Qty: 60 CAP | Refills: 2 | Status: SHIPPED | OUTPATIENT
Start: 2019-05-14 | End: 2019-08-12

## 2019-05-14 RX ORDER — LANOLIN ALCOHOL/MO/W.PET/CERES
325 CREAM (GRAM) TOPICAL 2 TIMES DAILY WITH MEALS
Qty: 60 TAB | Refills: 2 | Status: SHIPPED | OUTPATIENT
Start: 2019-05-14 | End: 2020-01-20

## 2019-05-14 RX ORDER — ASPIRIN 325 MG
325 TABLET, DELAYED RELEASE (ENTERIC COATED) ORAL 2 TIMES DAILY
Qty: 60 TAB | Refills: 1 | Status: SHIPPED | OUTPATIENT
Start: 2019-05-14

## 2019-05-14 RX ORDER — CELECOXIB 200 MG/1
200 CAPSULE ORAL 2 TIMES DAILY
Qty: 60 CAP | Refills: 2 | Status: SHIPPED | OUTPATIENT
Start: 2019-05-14 | End: 2019-08-12

## 2019-05-14 RX ORDER — OXYCODONE AND ACETAMINOPHEN 10; 325 MG/1; MG/1
1-2 TABLET ORAL
Qty: 56 TAB | Refills: 0 | Status: SHIPPED | OUTPATIENT
Start: 2019-05-14 | End: 2019-05-21

## 2019-05-14 RX ADMIN — HYDROCHLOROTHIAZIDE 25 MG: 25 TABLET ORAL at 08:07

## 2019-05-14 RX ADMIN — CARVEDILOL 25 MG: 25 TABLET, FILM COATED ORAL at 07:58

## 2019-05-14 RX ADMIN — ISOSORBIDE MONONITRATE 60 MG: 60 TABLET, EXTENDED RELEASE ORAL at 07:57

## 2019-05-14 RX ADMIN — SENNOSIDES, DOCUSATE SODIUM 1 TABLET: 50; 8.6 TABLET, FILM COATED ORAL at 08:00

## 2019-05-14 RX ADMIN — OXYCODONE HYDROCHLORIDE 20 MG: 20 TABLET ORAL at 07:58

## 2019-05-14 RX ADMIN — OXYCODONE HYDROCHLORIDE 20 MG: 20 TABLET ORAL at 13:57

## 2019-05-14 RX ADMIN — ASPIRIN 325 MG: 325 TABLET, COATED ORAL at 08:06

## 2019-05-14 RX ADMIN — CEFAZOLIN 3 G: 1 INJECTION, POWDER, FOR SOLUTION INTRAMUSCULAR; INTRAVENOUS; PARENTERAL at 05:19

## 2019-05-14 RX ADMIN — AMLODIPINE BESYLATE 10 MG: 10 TABLET ORAL at 08:07

## 2019-05-14 RX ADMIN — ACETAMINOPHEN 1000 MG: 500 TABLET ORAL at 05:19

## 2019-05-14 RX ADMIN — CELECOXIB 200 MG: 100 CAPSULE ORAL at 08:00

## 2019-05-14 RX ADMIN — PREGABALIN 50 MG: 50 CAPSULE ORAL at 08:00

## 2019-05-14 RX ADMIN — FERROUS SULFATE TAB 325 MG (65 MG ELEMENTAL FE) 325 MG: 325 (65 FE) TAB at 07:57

## 2019-05-14 RX ADMIN — LISINOPRIL 40 MG: 40 TABLET ORAL at 08:07

## 2019-05-14 RX ADMIN — CYCLOBENZAPRINE HYDROCHLORIDE 10 MG: 10 TABLET, FILM COATED ORAL at 11:02

## 2019-05-14 RX ADMIN — OXYCODONE HYDROCHLORIDE 20 MG: 20 TABLET ORAL at 11:02

## 2019-05-14 NOTE — DISCHARGE SUMMARY
5/13/2019  9:45 AM    5/14/2019, 9:54 AM    Primary Dx:left Orthopedic / Rheumatologic: Total Hip Replacement  Secondary Dx: Etiological Diagnoses: none    HPI:  Pt has end stage OA and had failed conservative treatment. Due to the current findings and affected activity of daily living surgical intervention is indicated.   The alternatives, risks, complications as well as expected outcome were discussed, the patient understands and wishes to proceed with surgery    Past Medical History:   Diagnosis Date    Arthritis     CAD (coronary artery disease)     Essential hypertension     Low back pain     Lumbar postlaminectomy syndrome     Lumbar spondylosis     Myocardial infarction ([facility] [address]     lad stent for stemi,12/2012    Pancreatic cyst          Current Facility-Administered Medications:     amLODIPine (NORVASC) tablet 10 mg, 10 mg, Oral, DAILY, Naveed Villagomez MD, 10 mg at 05/14/19 0807    carvedilol (COREG) tablet 25 mg, 25 mg, Oral, BID WITH MEALS, Naveed Villagomez MD, 25 mg at 05/14/19 0758    cyclobenzaprine (FLEXERIL) tablet 10 mg, 10 mg, Oral, TID PRN, Naveed Villagomez MD    hydroCHLOROthiazide (HYDRODIURIL) tablet 25 mg, 25 mg, Oral, DAILY, Naveed Villagomez MD, 25 mg at 05/14/19 0807    isosorbide mononitrate ER (IMDUR) tablet 60 mg, 60 mg, Oral, 7am, Naveed Villagomez MD, 60 mg at 05/14/19 0757    lisinopril (PRINIVIL, ZESTRIL) tablet 40 mg, 40 mg, Oral, DAILY, Naveed Villagomez MD, 40 mg at 05/14/19 0807    nitroglycerin (NITROSTAT) tablet 0.4 mg, 0.4 mg, SubLINGual, Q5MIN PRN, Naveed Villagomez MD    polyethylene glycol (MIRALAX) packet 17 g, 17 g, Oral, DAILY, Quinton Fairbanks MD    sodium chloride (NS) flush 5-40 mL, 5-40 mL, IntraVENous, Q8H, Naveed Villagomez MD, Stopped at 05/13/19 2200    sodium chloride (NS) flush 5-40 mL, 5-40 mL, IntraVENous, PRN, Naveed Villagomez MD    ferrous sulfate tablet 325 mg, 1 Tab, Oral, BID WITH MEALS, Naveed Villagomez MD, 325 mg at 05/14/19 0750   zolpidem (AMBIEN) tablet 5 mg, 5 mg, Oral, QHS PRN, Faheem Mcknight MD    acetaminophen (TYLENOL) tablet 1,000 mg, 1,000 mg, Oral, Q6H, Faheem Mcknight MD, 1,000 mg at 05/14/19 0519    oxyCODONE IR (ROXICODONE) tablet 20 mg, 20 mg, Oral, Q3H PRN, Faheem Mcknight MD, 20 mg at 05/14/19 0758    celecoxib (CELEBREX) capsule 200 mg, 200 mg, Oral, BID, Faheem Mcknight MD, 200 mg at 05/14/19 0800    naloxone (NARCAN) injection 0.4 mg, 0.4 mg, IntraVENous, PRN, Faheem Mcknight MD    flumazenil (ROMAZICON) 0.1 mg/mL injection 0.2 mg, 0.2 mg, IntraVENous, PRN, Faheem Mcknight MD    aspirin delayed-release tablet 325 mg, 325 mg, Oral, BID, Faheem Mcknight MD, 325 mg at 05/14/19 0806    ceFAZolin (ANCEF) 3 g in 0.9%  ml IVPB, 3 g, IntraVENous, Q8H, Faheem Mcknight MD, 3 g at 05/14/19 0519    ondansetron (ZOFRAN) injection 4 mg, 4 mg, IntraVENous, Q4H PRN, Faheem Mcknight MD    senna-docusate (PERICOLACE) 8.6-50 mg per tablet 1 Tab, 1 Tab, Oral, BID, Faheem Mcknight MD, 1 Tab at 05/14/19 0800    pregabalin (LYRICA) capsule 50 mg, 50 mg, Oral, BID, Faheem Mcknight MD, 50 mg at 05/14/19 0800    HYDROmorphone (DILAUDID) injection 0.5 mg, 0.5 mg, IntraVENous, Q10MIN PRN, Silas Solares CRNA, 0.5 mg at 05/13/19 1641    Vicodin [hydrocodone-acetaminophen]    Physical Exam:  General A&O x3 NAD, well developed, well nourished, normal affect  Heart: S1-S2, RRR  Lungs: CTA Bilat  Abd: soft NT, ND  Ext: n/v intact    Hospital Course:    Pt. Had leftOrthopedic / Rheumatologic: Total Hip Replacement    Post -op Course: The patient tolerated the procedure well. They were followed by internal medicine for help with medical management. Pt. Was place on Abx pre and post-op for prophylaxis against infection as well as coumadin pre and post-op for prophylaxis against DVT. Vitals signs remained stable, remained af. The wound wasclean, dry, no drainage. Pain was well controlled.   Pt. Had negative calf tenderness or swelling, no evidence for DVT. Patient had PT/OT consult for evaluation and treatment. CBC  Lab Results   Component Value Date/Time    WBC 5.8 04/24/2019 10:47 AM    RBC 3.93 (L) 04/24/2019 10:47 AM    HCT 37.7 04/24/2019 10:47 AM    MCV 95.9 04/24/2019 10:47 AM    MCH 31.8 04/24/2019 10:47 AM    MCHC 33.2 04/24/2019 10:47 AM    RDW 12.6 04/24/2019 10:47 AM     Coagulation  Lab Results   Component Value Date    INR 1.1 05/13/2019    APTT 26.8 04/24/2019      Basic Metabolic Profile  Lab Results   Component Value Date     04/24/2019    CO2 28 04/24/2019    BUN 25 (H) 04/24/2019       Discharge Meds:  Current Discharge Medication List      START taking these medications    Details   aspirin delayed-release 325 mg tablet Take 1 Tab by mouth two (2) times a day. Qty: 60 Tab, Refills: 1    Associated Diagnoses: Hip arthritis      celecoxib (CELEBREX) 200 mg capsule Take 1 Cap by mouth two (2) times a day for 90 days. Qty: 60 Cap, Refills: 2    Associated Diagnoses: Hip arthritis      docusate sodium (COLACE) 100 mg capsule Take 1 Cap by mouth two (2) times a day for 90 days. Qty: 60 Cap, Refills: 2    Associated Diagnoses: Hip arthritis         CONTINUE these medications which have CHANGED    Details   ferrous sulfate 325 mg (65 mg iron) tablet Take 1 Tab by mouth two (2) times daily (with meals). Qty: 60 Tab, Refills: 2    Associated Diagnoses: Hip arthritis      oxyCODONE-acetaminophen (PERCOCET)  mg per tablet Take 1-2 Tabs by mouth every six (6) hours as needed for Pain for up to 7 days. Max Daily Amount: 8 Tabs. Qty: 64 Tab, Refills: 0    Associated Diagnoses: Hip arthritis         CONTINUE these medications which have NOT CHANGED    Details   acetaminophen/diphenhydramine (TYLENOL PM PO) Take  by mouth. amLODIPine (NORVASC) 10 mg tablet Take  by mouth daily. cholecalciferol, VITAMIN D3, (VITAMIN D3) 5,000 unit tab tablet Take  by mouth daily.       oxyCODONE IR (ROXICODONE) 15 mg immediate release tablet Take 1-2 Tabs by mouth every eight (8) hours as needed. Max Daily Amount: 90 mg.  Qty: 60 Tab, Refills: 0    Associated Diagnoses: Status post right hip replacement      isosorbide mononitrate ER (IMDUR) 60 mg CR tablet Take 1 Tab by mouth every morning. Qty: 30 Tab, Refills: 6      lisinopril (PRINIVIL, ZESTRIL) 20 mg tablet Take 40 mg by mouth daily. atorvastatin (LIPITOR) 80 mg tablet Take 80 mg by mouth daily. methylPREDNISolone (MEDROL DOSEPACK) 4 mg tablet Per dose pack instructions  Qty: 1 Dose Pack, Refills: 0    Associated Diagnoses: Primary osteoarthritis of left hip; Left hip pain; Lumbar radiculopathy      cyclobenzaprine (FLEXERIL) 10 mg tablet Take 1 Tab by mouth nightly as needed for Muscle Spasm(s). Qty: 30 Tab, Refills: 2    Associated Diagnoses: Primary osteoarthritis of left hip; Left hip pain; Lumbar radiculopathy      COMPOUNDMAX BASE crea 240 g by Does Not Apply route four (4) times daily. Anti-Inflam Diclofenac Sodium 3%Gabapentin 3% Lidocaine 2% Prilocaine 2%  Qty: 240 g, Refills: 3    Associated Diagnoses: Myofascial pain syndrome      ammonium lactate (LAC-HYDRIN) 12 % topical cream Apply  to affected area two (2) times a day. rub in to affected area well  Qty: 280 g, Refills: 0    Associated Diagnoses: Primary osteoarthritis of right foot; Bunion of great toe of right foot; Skin callus; Corn of foot      polyethylene glycol (MIRALAX) 17 gram packet Take 1 Packet by mouth daily. Indications: Constipation  Qty: 30 Packet, Refills: 1      !! gabapentin (NEURONTIN) 300 mg capsule Take 1 Cap by mouth three (3) times daily. Qty: 90 Cap, Refills: 1      doxycycline (ADOXA) 100 mg tablet Take 1 Tab by mouth two (2) times a day. Qty: 20 Tab, Refills: 0      !! gabapentin (NEURONTIN) 300 mg capsule       isoniazid (NYDRAZID) 300 mg tablet Take 300 mg by mouth daily.       nitroglycerin (NITROSTAT) 0.4 mg SL tablet by SubLINGual route every five (5) minutes as needed. carvedilol (COREG) 25 mg tablet Take 25 mg by mouth two (2) times daily (with meals). hydrochlorothiazide (HYDRODIURIL) 25 mg tablet Take 25 mg by mouth daily. !! - Potential duplicate medications found. Please discuss with provider. STOP taking these medications       clopidogrel (PLAVIX) 75 mg tab Comments:   Reason for Stopping:         aspirin (ASPIRIN) 325 mg tablet Comments:   Reason for Stopping:         HYDROcodone-acetaminophen (NORCO) 7.5-325 mg per tablet Comments:   Reason for Stopping:         HYDROcodone-ibuprofen (VICOPROFEN) 7.5-200 mg per tablet Comments:   Reason for Stopping:         acetaminophen-codeine (TYLENOL-CODEINE #3) 300-30 mg per tablet Comments:   Reason for Stopping:         oxyCODONE-acetaminophen (PERCOCET) 5-325 mg per tablet Comments:   Reason for Stopping:         diclofenac (VOLTAREN) 1 % gel Comments:   Reason for Stopping:         oxyCODONE-acetaminophen (PERCOCET) 7.5-325 mg per tablet Comments:   Reason for Stopping:         HYDROcodone-acetaminophen (NORCO)  mg tablet Comments:   Reason for Stopping:         HYDROmorphone (DILAUDID) 4 mg tablet Comments:   Reason for Stopping:               Discharge Plan:  The patient will be d/c'd to home, total hip protocol, WBAT. he will have [facility] PT and nursing. Total joint protocol. Pt safe for homebound transfer, sp Total joint replacement. A walker, bedside commode, and shower chair will be utilized for ADL's. Follow up with Dr. Shayla Hobson in 10-12 days. Call with any questions or concerns.

## 2019-05-14 NOTE — HOME CARE
Received HH referral, Discharge orders  noted for today ,[facility] will follow for SN,PT ,Magdi Hip protocol, pt states he has a cane; DME: RW ordered from DME rep (Amalia) of [employer] ; pt states he lives alone ,but he has many children that come in and out that will be helping him during recovery period, he states daughter Tory Feliciano) comes by often to help him. ZECHARIAH RICE.

## 2019-05-14 NOTE — PROGRESS NOTES
Problem: Mobility Impaired (Adult and Pediatric) Goal: *Acute Goals and Plan of Care (Insert Text) Description Physical Therapy Goals Initiated 5/13/2019 and to be accomplished within 7 day(s) 1. Patient will move from supine to sit and sit to supine , scoot up and down and roll side to side in bed with modified independence. 2.  Patient will transfer from bed to chair and chair to bed with modified independence using the least restrictive device. 3.  Patient will perform sit to stand with modified independence. 4.  Patient will ambulate with modified independence for 250 feet with the least restrictive device. 5.  Patient will ascend/descend 3 stairs with 1-2 handrail(s) with minimal assistance/contact guard assist.  
 
Prior Level of Function: Independent with mobility including gait using cane. Pt lives alone in single story home with 3 steps to enter with B HRA. Pt has low toilet. Outcome: Progressing Towards Goal 
 PHYSICAL THERAPY TREATMENT Patient: Barabara Olszewski (25 y.o. male) Date: 5/14/2019 Diagnosis: Osteoarthritis of left hip, unspecified osteoarthritis type [M16.12] Hip arthritis [M16.10] <principal problem not specified> Procedure(s) (LRB): LEFT TOTAL HIP ARTHROPLASTY LATERAL APPROACH (Left) 1 Day Post-Op Precautions: Fall, WBAT, Total hip Chart, physical therapy assessment, plan of care and goals were reviewed. OBJECTIVE/ ASSESSMENT: 
Patient found sitting in b/s chair willing to work with PT. Pt reports pain as nearly 10/10 but wants to attempt ambulation. Pt ambulated into hallway with RW and forward flexed posture. Pt reports increased bilateral triceps fatigue as walker is too high for patient. RW was adjusted to pt's height with forward trunk lean. Pt was educated that in the future one of his goals needs to be to stand more upright with ambulation and that RW adjustment should be temporary.  Pt negotiated 12 total stairs with bilateral handrails. PT requires frequent VCs for proper sequencing as he attempt step over step pattern multiple times and present with rapid descent x 1 when his operated LE moya snot support him. PT returned to b/s chair and was left with needs in reach. Progression toward goals: 
?      Improving appropriately and progressing toward goals ? Improving slowly and progressing toward goals ? Not making progress toward goals and plan of care will be adjusted PLAN: 
Patient continues to benefit from skilled intervention to address the above impairments. Continue treatment per established plan of care. Discharge Recommendations:  Home Health Further Equipment Recommendations for Discharge:  rolling walker SUBJECTIVE:  
Patient stated ? Oh, I did that wrong didn't I?? 
 
OBJECTIVE DATA SUMMARY:  
Critical Behavior: 
Neurologic State: Alert Orientation Level: Oriented X4 Cognition: Follows commands Safety/Judgement: Fall prevention Functional Mobility Training: 
Transfers: 
Sit to Stand: Stand-by assistance Stand to Sit: Stand-by assistance Balance: 
Sitting: Impaired Sitting - Static: Good (unsupported) Sitting - Dynamic: Fair (occasional)(+) Standing: Impaired; With support Standing - Static: Fair(+) Standing - Dynamic : Fair Ambulation/Gait Training: 
Distance (ft): 145 Feet (ft) Assistive Device: Walker, rolling Ambulation - Level of Assistance: Stand-by assistance;Supervision Gait Abnormalities: Antalgic;Decreased step clearance Left Side Weight Bearing: As tolerated Base of Support: Center of gravity altered Speed/Marian: Slow Step Length: Left shortened;Right shortened Therapeutic Exercises:  
Reviewed Pain: 
Pain level pre-treatment: \"nearly 10\" Pain level post-treatment: 6/10 Pain Intervention(s): Repositioning and ambulation Activity Tolerance:  
Good Please refer to the flowsheet for vital signs taken during this treatment. After treatment: ? Patient left in no apparent distress sitting up in chair ? Patient left in no apparent distress in bed 
? Call bell left within reach ? Nursing notified ? Caregiver present ? Bed alarm activated ? SCDs applied COMMUNICATION/EDUCATION:  
?         Role of Physical Therapy ? Fall prevention ? Bed mobility ? Transfers ? Ambulation / gait ? Assistive device management ? Stairs ? Body mechanics ? Position change ? Therapeutic exercise ? Activity pacing / energy conservation ? Other: 
   
Sharon Lloyd PTA Time Calculation: 23 mins

## 2019-05-14 NOTE — PROGRESS NOTES
Mobility Intervention:  
 
  [] Pt dangled at edge of bed 
  [] Pt assisted OOB to bedside commode 
  [] Pt assisted OOB to chair 
  [] Pt ambulated to bathroom [x] Patient was ambulated in room/hallway Assistive Device Utilized:  
  
 [x] Rolling walker 
 [] Crutches 
 [] Straight Cane 
 [] Knee immobilizer [] IV pole After Mobilization:  
 
[x] Patient left in no apparent distress sitting up in chair 
[] Patient left in no apparent distress in bed 
[x] Call bell left within reach [x] SCDs on & machine turned on 
[x] Ice applied 
[x] RN notified 
[] Caregiver present 
[] Bed alarm activated Reason patient not mobilized:  
 
 [] Patient refused 
 [] Nausea/vomiting 
 [] Low blood pressure 
 [] Drowsy/lethargic Pain Rating:  
 
[] 0 [] 1 Assistive Device:       
[] 2 [] 3 [] 4 
[] 5 
[] 6 Assistive Device:       
[] 7 [x] 8 [] 9 [] 10 Comments:

## 2019-05-14 NOTE — PROGRESS NOTES
Thank you for your referral for a front wheel walker- delivered equipment to patients room.  
  
Tung manuel [facility] [employer]

## 2019-05-14 NOTE — PROGRESS NOTES
SUBJECTIVE: 
 
C/o hip pain. States he voided urine. No chest or abdominal pain. No SOB or cough. No nausea or vomiting. No dizziness. Lives in [place]. Follows with dr. Juanna Felty. OBJECTIVE: 
 
/66 (BP 1 Location: Left arm, BP Patient Position: Sitting)   Pulse 60   Temp 97 °F (36.1 °C)   Resp 17   Ht 6' 1\" (1.854 m)   Wt 118.4 kg (261 lb)   SpO2 98%   BMI 34.43 kg/m² General appearance - alert, well appearing, and in no distress Chest - good air entry noted in bases, no wheezes Heart - S1 and S2 normal 
Abdomen - soft, nontender, nondistended, Bowel sounds present Neurological - alert, oriented, normal speech, no focal findings noted Extremities - no pedal edema noted ASSESSMENT/PLAN: 
 
1. HTN: BP stable on current medications 2. Chronic systolic HF, EF 64%: currently well compensate 3. Hip arthritis s/p total hip arthoplasty: cont discharge plan per ortho team.  
4. CAD s/p CABG - stable

## 2019-05-14 NOTE — PROGRESS NOTES
Thank you for your referral for a front wheel walker- spoke to patient,waiting for patients daughter to call back regarding copay. Tung manuel [facility] [employer]

## 2019-05-14 NOTE — PROGRESS NOTES
Ortho Pt. Seen and evaluated. Doing well, up in chair, pain well controlled Denies cp, sob, abd pain Visit Vitals /68 (BP 1 Location: Left arm, BP Patient Position: At rest) Pulse 60 Temp 97.8 °F (36.6 °C) Resp 17 Ht 6' 1\" (1.854 m) Wt 261 lb (118.4 kg) SpO2 97% BMI 34.43 kg/m²  
   
left total hip replacement 
left hip Woundclean, dry, no drainage Sensory intact to LT Motor intact 
nv intact Neg calf tenderness. Labs. CBC 
@ 
CBC:  
Lab Results Component Value Date/Time WBC 5.8 04/24/2019 10:47 AM  
 RBC 3.93 (L) 04/24/2019 10:47 AM  
 HGB 12.5 (L) 04/24/2019 10:47 AM  
 HCT 37.7 04/24/2019 10:47 AM  
 PLATELET 378 94/52/4791 10:47 AM  
 and BMP:  
Lab Results Component Value Date/Time Glucose 99 04/24/2019 10:47 AM  
 Sodium 140 04/24/2019 10:47 AM  
 Potassium 4.2 04/24/2019 10:47 AM  
 Chloride 108 04/24/2019 10:47 AM  
 CO2 28 04/24/2019 10:47 AM  
 BUN 25 (H) 04/24/2019 10:47 AM  
 Creatinine 1.58 (H) 04/24/2019 10:47 AM  
 Calcium 9.6 04/24/2019 10:47 AM  
@ Coagulation Lab Results Component Value Date INR 1.1 05/13/2019 APTT 26.8 04/24/2019 Basic Metabolic Profile Lab Results Component Value Date  04/24/2019 CO2 28 04/24/2019 BUN 25 (H) 04/24/2019 Assesment:leftOrthopedic / Rheumatologic: Total Hip Replacement Past Medical History:  
Diagnosis Date  Arthritis  CAD (coronary artery disease)  Essential hypertension  Low back pain  Lumbar postlaminectomy syndrome  Lumbar spondylosis  Myocardial infarction ([facility] [address]   
 lad stent for stemi,12/2012  Pancreatic cyst   
 
ASA: 3 Pt is status post joint replacement and at risk for bleeding, blood clots, and infection. Plan:  aspirin, PT, DC to home if cleared by PT and ok with medicine.

## 2019-05-14 NOTE — PROGRESS NOTES
Alice Retana rounded on post hip replacement. Patient educated: Activity: OOB for all meals, walk every hour to prevent blood clots & help with stiffness Follow hip precautions. Put pillow under whole leg to help with swelling. VTE prophylaxis:  
Use SCD pumps except when walking. Ankle pumps 10 times an hour at hospital & home. Take blood thinner medication as ordered by surgeon. Do not skip a dose. Pain Control: 
Pain medications side effects discussed. Wean off narcotics ASAP. Use Tylenol ( 3000 mg/24 hours) , ice, distraction, moving, & change position to help with pain. Rest between activity. Raise leg up on pillows. Don't get nauseated. Eat a snack before taking pain medication Do not get constipated: take stool softener/mild laxative daily while on narcotics. Incentive Spirometry:   
Use of incentive spirometer 10 x/hr. Demonstration  2000 ml x 3 Wound Care: Dressing dry and intact. Do not to take dressing off at home. Bathe daily with dial soap & wear clean clothes/clean towel. No lotions, powders, creams to surgical leg. Mabeline Sink Diet:  
Eat for healing. Protein heals bone/muscle. Drink 8 glasses of water a day. Patient Safety:  
Call light & belongings in reach. Call for help when want to walk or get OOB. Educational material given. Patient agreed to continue doing everything at home to prevent complications and have a successful recovery. Patient  verbalized understand. Given the opportunity for asking questions. Mobility Intervention:  
 
  [] Pt dangled at edge of bed 
  [] Pt assisted OOB to bedside commode 
  [] Pt assisted OOB to chair 
  [] Pt ambulated to bathroom 
  [] Patient was ambulated in room/hallway Assistive Device Utilized:  
  
 [] Rolling walker 
 [] Crutches 
 [] Straight Cane 
 [] Knee immobilizer [] IV pole After Rounding and Checking on Patient [x] Patient left in no apparent distress sitting up in chair [] Patient left in no apparent distress in bed 
[x] Call bell left within reach [x] SCDs on both legs & machine turned on 
[x] Ice applied 
[x] RN notified 
[]  present 
[] Bed alarm activated Reason patient not mobilized:  
 
 [] Patient refused 
 [] Nausea/vomiting 
 [] Low blood pressure 
 [] Drowsy/lethargic Pain Rating:  
 
 
Left patient with call light, cell phone and personal belongings in reach for safety.

## 2019-05-14 NOTE — ROUTINE PROCESS
Bedside and Verbal shift change report given to Julia Rivas RN (oncoming nurse) by US Dacosta RN (offgoing nurse). Report included the following information SBAR, Kardex, Intake/Output and MAR.

## 2019-05-14 NOTE — OP NOTES
[facility]  OPERATIVE REPORT    Name:  Umm Pierre  MR#:   [id number]  :  1955  ACCOUNT #:  [de-identified]  DATE OF SERVICE:  2019      PREOPERATIVE DIAGNOSIS:  End-stage arthritis of the left hip with fixed flexion deformity and also ankylotic hip osteophytosis posterior inferiorly. POSTOPERATIVE DIAGNOSIS:  End-stage arthritis of the left hip with fixed flexion deformity and also ankylotic hip osteophytosis posterior inferiorly. PROCEDURES PERFORMED:  1. Left total hip replacement using the Moo Accolade II system with a size 6, 127 high-offset femoral component, a 58-mm Trident II Tritanium cup with screw augmentation, neutral 36 liner and a 36 minus 5 ceramic femoral head. 2.  Excision of posterior osteophytes. 3.  Release of capsular contracture. SURGEON:  Shelly Hoff MD    ASSISTANT:  Sonia Spring, first assistant. ANESTHESIA:  Preoperative lumbar plexus block with light general.    COMPLICATIONS:  No complications. SPECIMENS REMOVED:  Femoral head. IMPLANTS:  As above mentioned. ESTIMATED BLOOD LOSS:  300 mL. SECOND ASSISTANT:  Luli Grijalva. ANESTHETIST:  Dr. Rodney Byers. Sonia Spring was the first assistant who assisted with all phases of the surgery commencing with patient positioning, patient prep, patient drape, leg positioning during the surgery, retracting, assisting with the surgery itself, closure, dressing placement, and transfer. PROCEDURE:  After the anesthetic was successfully induced, it was confirmed the patient did receive his antibiotics, standard prep and drape, and time-out was performed. We did previously determine his leg lengths while supine and he had bilateral fixed flexion deformities of his hips. He did very well with his other hip. After the time-out, lateral approach, good hemostasis was achieved. IT band delineated and incised sharply.   Sciatic nerve identified, protected and avoided, and the Charnley retractor introduced. His abductors were slightly fatty infiltrated due to disuse. Flory Delarosa was reduced using the capsular scissors. Minimus and capsule divided directly over femoral neck carrying back to the tip of the trochanter, anteriorly along down the vastus lateralis. Whole cup of tissue was taken in one contiguous layer, the lesser trochanter identified. Pin placed in the superior iliac crest for offset and leg length measurement. After some further capsular release, hip dislocated uneventfully. Using an appropriate fingerbreadth above the lesser trochanter as per the preoperative templating, femoral neck divided with two blunt Hohmann's elevating the femur from the wound. Posterior capsule was somewhat adherent and reflected with a combination of electrocautery and Arevalo elevator. We instrumented around the acetabular area, delineated the medial wall for the foveal notch, removed some initially loose osteophytes and medialized appropriately in appropriate inclination and anteversion, reamed up to accommodate the cup. I went for the line-to-line reaming, trialed this. I was very happy with it. After trialing, we implanted the definitive shell, made sure it seated fully, augmented with some screws, and then removed a fairly hefty posterior inferior horseshoe style osteophyte. I used the Aquamantys and Exparel cocktail and definitive liner, made sure it was fully seated, protected with a Raytec gauze, which later would be accounted for. With the leg in the sterile leg bag, did have some small enchondroma and little bit stenosis initially, and we got down with the canal finder after the chili pepper. We then lateralized with the Leksell and box osteotome, and broached our way up to accommodate the size 6 very, very snug and countersunk the 5 and went back to the 6 calcar planer, trialed the hip, and we had excellent restoration of offset and leg lengths with a minus 5.   Soft tissue tensioning was excellent, combined anteversion excellent, no impingement, excellent stability. Lavaged out, implanted the definitive femoral component, re-trialed,and I was happiest with the minus 5, cleaned the trunnion, impacted on again reducing the hip. At the end of the case, instrument, sponge, and needle counts were correct. No complications. The patient tolerated the procedure well. Blood loss was 300 mL. Excellent outcome of the case and routine closure.       Avelino Li MD      [id number]  D:  05/13/2019 13:24  T:  05/13/2019 20:10  JOB #:  [id number]

## 2019-05-14 NOTE — DISCHARGE INSTRUCTIONS
Patient Education        Hip Replacement Surgery: What to Expect at the Hospital  Your Recovery  After hip replacement surgery, you will be taken to the recovery room. In a few hours, you will go to your hospital room. You may see a metal triangle called a trapeze over your bed. You can use this to help move yourself around in bed. You will be very tired and will want to rest. Your nurse may also help turn you as you rest.  You will probably still have a tube that drains urine from your bladder (urinary catheter), and you will probably get fluids through a tube in your vein called an IV. You may also have a drain near the cut (incision) on your hip. You may not feel hungry. You may feel sick to your stomach or constipated for a couple of days. This is normal. Your nurse may give you stool softeners or laxatives to help with constipation. You may have stockings that put pressure on your legs to prevent blood clots. Your nurse may also give you medicines and exercise instructions to help prevent clots. Most people get out of bed with help on the day of surgery or the next day. Your doctor will let you know if you will stay in the hospital or if you can go home the day of surgery. Having surgery can be stressful. The information below tells you what to expect. Each person has a different experience and recovers at a different pace. What will happen in the hospital?   Pain and pain medicine    · You will receive medicine to help control pain. Some pain medicines are given through an IV and some are taken by mouth.     · Take it as needed, but remember that it is easier to prevent pain before it starts than to stop it once it has started.     · If you still have a lot of pain after you take your medicine, tell your nurse. You may need new medicine or to get the medicine in a different form.     · You may also have some mild pain in your back. Changing your position in bed may help this.  Tell your nurse you want to turn, and he or she will help you.    Other medicines    · Your doctor will probably give you blood thinners to prevent blood clots in your leg. You take this medicine during your hospital stay, and you may also take it after you go home.     · Your doctor may give you antibiotics for about 24 hours after surgery.    Rehabilitation    · Your rehabilitation (rehab) will probably begin the day of your surgery. Your physical therapist will get you started. It may be painful to exercise at first, but your nurse will give you pain medicine if you need it.     · Your physical therapist will help you walk, go up and down stairs, and get in and out of bed and chairs. He or she will help improve the range of motion and strength in your hip. Your physical therapist will teach you positions and motions that will help keep your hip from popping out of the socket (dislocating). This is a very important part of your therapy.     · How quickly you regain strength and motion and do things on your own depends on how well you follow your physical therapy. Your physical therapist will teach you the exercises, but you must do them yourself.     · An occupational therapist will work with you. He or she will teach you how to bathe, dress, and do daily activities. You may need tools to help with everyday activities. Tools include shower stools, shoehorns, and long-handled sponges. Diet    · You will get liquids at first, but you can begin to eat your normal diet when you feel better. If your stomach is upset, your nurse will probably bring you bland, low-fat foods like plain rice, broiled chicken, toast, and yogurt.     · You may have more fiber added to your meals to prevent constipation. Incision care    · You will have a bandage over your incision. Your nurse will care for this. Other instructions    · Your nurse or respiratory therapist will have you do breathing and coughing exercises to prevent problems such as pneumonia. Breathe in deeply through your nose, and slowly breathe out through your mouth. Do this 3 times, and then cough 2 times.     · You may have a device that you suck air through to help keep your lungs healthy (incentive spirometer). Use this as your nurse or therapist tells you to. When should you call for help? · You have trouble breathing.     · You have a cough, shortness of breath, or chest pain.     · You are sick to your stomach or cannot keep fluids down.     · You have signs of a blood clot, such as:  ? Pain in your calf, back of the knee, thigh, or groin. ? Redness and swelling in your leg or groin.     · You are in pain, or your pain does not get better after you take pain medicine.     · You have loose stitches, or your incision comes open.     · You have signs of infection, such as:  ? Increased pain, swelling, warmth, or redness. ? Red streaks leading from the incision. ? Pus draining from the incision. ? A fever. Where can you learn more? Go to http://suzanne-renetta.info/. Enter R239 in the search box to learn more about \"Hip Replacement Surgery: What to Expect at the Hospital.\"  Current as of: September 20, 2018  Content Version: 11.9  © 7940-8463 Healthwise, Incorporated. Care instructions adapted under license by [employer] (which disclaims liability or warranty for this information). If you have questions about a medical condition or this instruction, always ask your healthcare professional. [place] [address] any warranty or liability for your use of this information. [employer] Activation    Thank you for requesting access to [employer]. Please follow the instructions below to securely access and download your online medical record. [employer] allows you to send messages to your doctor, view your test results, renew your prescriptions, schedule appointments, and more. How Do I Sign Up? 1.  In your internet browser, go to [email or web address]  2. Click on the First Time User? Click Here link in the Sign In box. You will be redirect to the New Member Sign Up page. 3. Enter your [employer] Access Code exactly as it appears below. You will not need to use this code after youve completed the sign-up process. If you do not sign up before the expiration date, you must request a new code. [employer] Access Code: [email or web address]  Expires: 2019  9:59 AM (This is the date your [employer] access code will )    4. Enter the last four digits of your Social Security Number (xxxx) and Date of Birth (mm/dd/yyyy) as indicated and click Submit. You will be taken to the next sign-up page. 5. Create a [employer]Star ID. This will be your [employer] login ID and cannot be changed, so think of one that is secure and easy to remember. 6. Create a [employer] password. You can change your password at any time. 7. Enter your Password Reset Question and Answer. This can be used at a later time if you forget your password. 8. Enter your e-mail address. You will receive e-mail notification when new information is available in [address] 9. Click Sign Up. You can now view and download portions of your medical record. 10. Click the Download Summary menu link to download a portable copy of your medical information. Additional Information    If you have questions, please visit the Frequently Asked Questions section of the [employer] website at [email or web address] Remember, [employer] is NOT to be used for urgent needs. For medical emergencies, dial 911.       Patient armband removed and shredded    DISCHARGE SUMMARY from Nurse    PATIENT INSTRUCTIONS:    After general anesthesia or intravenous sedation, for 24 hours or while taking prescription Narcotics:  · Limit your activities  · Do not drive and operate hazardous machinery  · Do not make important personal or business decisions  · Do  not drink alcoholic beverages  · If you have not urinated within 8 hours after discharge, please contact your surgeon on call. Report the following to your surgeon:  · Excessive pain, swelling, redness or odor of or around the surgical area  · Temperature over 100.5  · Nausea and vomiting lasting longer than 4 hours or if unable to take medications  · Any signs of decreased circulation or nerve impairment to extremity: change in color, persistent  numbness, tingling, coldness or increase pain  · Any questions    What to do at Home:  Recommended activity: No lifting, Driving, or Strenuous exercise for 2-4 wks     If you experience any of the following symptoms Nausea, vomiting, chest pain, shortness of breath, fever greater than 101.5, please follow up with PCP. *  Please give a list of your current medications to your Primary Care Provider. *  Please update this list whenever your medications are discontinued, doses are      changed, or new medications (including over-the-counter products) are added. *  Please carry medication information at all times in case of emergency situations. These are general instructions for a healthy lifestyle:    No smoking/ No tobacco products/ Avoid exposure to second hand smoke  Surgeon General's Warning:  Quitting smoking now greatly reduces serious risk to your health. Obesity, smoking, and sedentary lifestyle greatly increases your risk for illness    A healthy diet, regular physical exercise & weight monitoring are important for maintaining a healthy lifestyle    You may be retaining fluid if you have a history of heart failure or if you experience any of the following symptoms:  Weight gain of 3 pounds or more overnight or 5 pounds in a week, increased swelling in our hands or feet or shortness of breath while lying flat in bed. Please call your doctor as soon as you notice any of these symptoms; do not wait until your next office visit.     Recognize signs and symptoms of STROKE:    F-face looks uneven    A-arms unable to move or move unevenly    S-speech slurred or non-existent    T-time-call 911 as soon as signs and symptoms begin-DO NOT go       Back to bed or wait to see if you get better-TIME IS BRAIN. Warning Signs of HEART ATTACK     Call 911 if you have these symptoms:   Chest discomfort. Most heart attacks involve discomfort in the center of the chest that lasts more than a few minutes, or that goes away and comes back. It can feel like uncomfortable pressure, squeezing, fullness, or pain.  Discomfort in other areas of the upper body. Symptoms can include pain or discomfort in one or both arms, the back, neck, jaw, or stomach.  Shortness of breath with or without chest discomfort.  Other signs may include breaking out in a cold sweat, nausea, or lightheadedness. Don't wait more than five minutes to call 911 - MINUTES MATTER! Fast action can save your life. Calling 911 is almost always the fastest way to get lifesaving treatment. Emergency Medical Services staff can begin treatment when they arrive -- up to an hour sooner than if someone gets to the hospital by car. The discharge information has been reviewed with the patient. The patient verbalized understanding. Discharge medications reviewed with the patient and appropriate educational materials and side effects teaching were provided.   ___________________________________________________________________________________________________________________________________

## 2019-05-14 NOTE — PROGRESS NOTES
OCCUPATIONAL THERAPY EVALUATION/DISCHARGE Patient: Fermin Zhou (67 y.o. male) Date: 5/14/2019 Primary Diagnosis: Osteoarthritis of left hip, unspecified osteoarthritis type [M16.12] Hip arthritis [M16.10] Procedure(s) (LRB): LEFT TOTAL HIP ARTHROPLASTY LATERAL APPROACH (Left) 1 Day Post-Op Precautions:   Fall, Total hip, WBAT 
PLOF: Pt was independent with basic self care tasks and used a [facility] for functional mobility PTA. ASSESSMENT AND RECOMMENDATIONS: 
Based on the objective data described below, the patient is able to perform basic self care tasks without assistance using AE (reacher, sock aid, LHS, [place] 9) given after demonstration/practice while seated. Supervision given for functional standing and transfers. Will defer to PT for mobility training. Hip precautions reviewed and patient verbalized understanding. Discussed use of a raised seat over the toilet for safety/ease and patient to purchase upon discharge. Patient has a supportive daughter who lives near by to assist him prn. Skilled occupational therapy is not indicated at this time. Discharge Recommendations: None Further Equipment Recommendations for Discharge: raised toilet seat SUBJECTIVE:  
Patient stated ? I'll take all the help I can get. ? OBJECTIVE DATA SUMMARY:  
 
Past Medical History:  
Diagnosis Date Arthritis CAD (coronary artery disease) Essential hypertension Low back pain Lumbar postlaminectomy syndrome Lumbar spondylosis Myocardial infarction [facility])   
 lad stent for stemi,12/2012 Pancreatic cyst   
 
Past Surgical History:  
Procedure Laterality Date HAND/FINGER SURGERY UNLISTED Carpal tunnel HX CORONARY ARTERY BYPASS GRAFT  2014  
 at [facility]  
 HX HEART CATHETERIZATION    
 HX HIP REPLACEMENT    
 HX LUMBAR FUSION    
 HX ORTHOPAEDIC    
 lumbar spine x 5 HX ORTHOPAEDIC Right hip replacement HX ORTHOPAEDIC    
 right foot calous removed HX PTCA Barriers to Learning/Limitations: None Compensate with: visual, verbal, tactile, kinesthetic cues/model Home Situation:  
Home Situation Home Environment: Private residence # Steps to Enter: 3 Rails to Enter: Yes Hand Rails : Bilateral 
One/Two Story Residence: One story Living Alone: Yes Support Systems: Family member(s), Friends \ neighbors Patient Expects to be Discharged to[de-identified] Private residence Current DME Used/Available at Home: Cane, straight Tub or Shower Type: Tub/Shower combination(Patient sponge bathing at home.) ? Right hand dominant   ? Left hand dominant Cognitive/Behavioral Status: 
Neurologic State: Alert Orientation Level: Oriented X4 Cognition: Appropriate decision making; Follows commands Safety/Judgement: Awareness of environment; Fall prevention Skin: Intact on UEs Edema: None noted in UEs Vision/Perceptual:   
Acuity: Within Defined Limits Coordination: BUE Fine Motor Skills-Upper: Left Intact; Right Intact Gross Motor Skills-Upper: Left Intact; Right Intact Balance: 
Sitting: Impaired Sitting - Static: Good (unsupported) Sitting - Dynamic: Fair (occasional)(+) Standing: Impaired; With support Standing - Static: Fair(+) Standing - Dynamic : Fair Strength: BUE Strength: Within functional limits Tone & Sensation: BUE Tone: Normal 
Sensation: Intact Range of Motion: BUE 
AROM: Within functional limits Functional Mobility and Transfers for ADLs: 
Transfers: 
Sit to Stand: Stand-by assistance Toilet Transfer : Supervision ADL Assessment: 
Feeding: Independent Oral Facial Hygiene/Grooming: Independent Bathing: Supervision Upper Body Dressing: Independent Lower Body Dressing: Supervision Toileting: Modified independent ADL Intervention: 
Patient practiced LB dressing with use of AE given (reacher, sock aid) after demonstration while seated and in standing.   No assist needed after practice. Patient also given a long handled sponge and long handled shoe horn after demonstration. Cognitive Retraining Safety/Judgement: Awareness of environment; Fall prevention Pain: 
Pain level pre-treatment: 7/10, aching in LLE Pain level post-treatment: 7/10, aching in LLE Pain Intervention(s): Medication (see MAR); Rest, Ice, Repositioning Response to intervention: Nurse notified, See doc flow Activity Tolerance:  
Good Please refer to the flowsheet for vital signs taken during this treatment. After treatment:  
?  Patient left in no apparent distress sitting up in chair ? Patient left in no apparent distress in bed 
? Call bell left within reach ? Nursing notified ? Caregiver present ? Bed alarm activated COMMUNICATION/EDUCATION:  
?      Role of Occupational Therapy in the acute care setting 
? Home safety education was provided and the patient/caregiver indicated understanding. ? Patient/family have participated as able and agree with findings and recommendations. ?      Patient is unable to participate in plan of care at this time. Thank you for this referral. 
Clydia Gosselin, MS OTR/L Time Calculation: 26 mins Eval Complexity: History: LOW Complexity : Brief history review ; Examination: LOW Complexity : 1-3 performance deficits relating to physical, cognitive , or psychosocial skils that result in activity limitations and / or participation restrictions ; Decision Making:LOW Complexity : No comorbidities that affect functional and no verbal or physical assistance needed to complete eval tasks

## 2019-05-15 ENCOUNTER — HOME CARE VISIT (OUTPATIENT)
Dept: SCHEDULING | Facility: HOME HEALTH | Age: 64
End: 2019-05-15

## 2019-05-15 ENCOUNTER — HOME CARE VISIT (OUTPATIENT)
Dept: HOME HEALTH SERVICES | Facility: HOME HEALTH | Age: 64
End: 2019-05-15

## 2019-05-15 VITALS
SYSTOLIC BLOOD PRESSURE: 112 MMHG | OXYGEN SATURATION: 96 % | HEART RATE: 83 BPM | TEMPERATURE: 97.7 F | DIASTOLIC BLOOD PRESSURE: 76 MMHG

## 2019-05-17 ENCOUNTER — APPOINTMENT (OUTPATIENT)
Dept: GENERAL RADIOLOGY | Age: 64
End: 2019-05-17
Attending: EMERGENCY MEDICINE
Payer: MEDICARE

## 2019-05-17 ENCOUNTER — HOSPITAL ENCOUNTER (EMERGENCY)
Age: 64
Discharge: NURSING, REHAB OR LONG-TERM CARE | End: 2019-05-17
Attending: EMERGENCY MEDICINE | Admitting: EMERGENCY MEDICINE
Payer: MEDICARE

## 2019-05-17 VITALS
HEART RATE: 69 BPM | SYSTOLIC BLOOD PRESSURE: 127 MMHG | DIASTOLIC BLOOD PRESSURE: 50 MMHG | TEMPERATURE: 98.2 F | OXYGEN SATURATION: 98 % | RESPIRATION RATE: 16 BRPM

## 2019-05-17 DIAGNOSIS — R26.2 IMPAIRED AMBULATION: ICD-10-CM

## 2019-05-17 DIAGNOSIS — M25.552 LEFT HIP PAIN: Primary | ICD-10-CM

## 2019-05-17 PROCEDURE — 73502 X-RAY EXAM HIP UNI 2-3 VIEWS: CPT

## 2019-05-17 PROCEDURE — 99282 EMERGENCY DEPT VISIT SF MDM: CPT

## 2019-05-17 NOTE — DISCHARGE INSTRUCTIONS
Patient Education        Hip Pain: Care Instructions  Your Care Instructions    Hip pain may be caused by many things, including overuse, a fall, or a twisting movement. Another cause of hip pain is arthritis. Your pain may increase when you stand up, walk, or squat. The pain may come and go or may be constant. Home treatment can help relieve hip pain, swelling, and stiffness. If your pain is ongoing, you may need more tests and treatment. Follow-up care is a key part of your treatment and safety. Be sure to make and go to all appointments, and call your doctor if you are having problems. It's also a good idea to know your test results and keep a list of the medicines you take. How can you care for yourself at home? · Take pain medicines exactly as directed. ? If the doctor gave you a prescription medicine for pain, take it as prescribed. ? If you are not taking a prescription pain medicine, ask your doctor if you can take an over-the-counter medicine. · Rest and protect your hip. Take a break from any activity, including standing or walking, that may cause pain. · Put ice or a cold pack against your hip for 10 to 20 minutes at a time. Try to do this every 1 to 2 hours for the next 3 days (when you are awake) or until the swelling goes down. Put a thin cloth between the ice and your skin. · Sleep on your healthy side with a pillow between your knees, or sleep on your back with pillows under your knees. · If there is no swelling, you can put moist heat, a heating pad, or a warm cloth on your hip. Do gentle stretching exercises to help keep your hip flexible. · Learn how to prevent falls. Have your vision and hearing checked regularly. Wear slippers or shoes with a nonskid sole. · Stay at a healthy weight. · Wear comfortable shoes. When should you call for help? Call 911 anytime you think you may need emergency care.  For example, call if:    · You have sudden chest pain and shortness of breath, or you cough up blood.     · You are not able to stand or walk or bear weight.     · Your buttocks, legs, or feet feel numb or tingly.     · Your leg or foot is cool or pale or changes color.     · You have severe pain.    Call your doctor now or seek immediate medical care if:    · You have signs of infection, such as:  ? Increased pain, swelling, warmth, or redness in the hip area. ? Red streaks leading from the hip area. ? Pus draining from the hip area. ? A fever.     · You have signs of a blood clot, such as:  ? Pain in your calf, back of the knee, thigh, or groin. ? Redness and swelling in your leg or groin.     · You are not able to bend, straighten, or move your leg normally.     · You have trouble urinating or having bowel movements.    Watch closely for changes in your health, and be sure to contact your doctor if:    · You do not get better as expected. Where can you learn more? Go to http://suzanne-renetta.info/. Enter D148 in the search box to learn more about \"Hip Pain: Care Instructions. \"  Current as of: September 23, 2018  Content Version: 11.9  © 7774-2224 [employer]. Care instructions adapted under license by [employer] (which disclaims liability or warranty for this information). If you have questions about a medical condition or this instruction, always ask your healthcare professional. [place] [address] any warranty or liability for your use of this information.

## 2019-05-17 NOTE — ED PROVIDER NOTES
Emergency Department Treatment Report Patient: Beka Salazar Age: 70 y.o. Sex: male YOB: 1955 Admit Date: 5/17/2019 PCP: Ayde Dallas MD  
MRN: [id number]  CSN: [id number] Room: ER17/17 Time Dictated: 1:48 PM   
 
Chief Complaint Chief Complaint Patient presents with  
 Hip Pain History of Present Illness 70 y.o. male, PMH left total hip replacement by Dr. Noelle Rodriguez on 5/13/2019, presents from [facility] ER with increased pain and falls after his surgery. He was in the ER for 3 days while waiting a bed assignment for direct admission at [facility]. He was accepted by Dr. Chanda Shafer [facility] Hospital[facility]) and Dr. Noelle Rodriguez is aware of the patient. Patient says the Percocet is helping with his pain and he denies fevers, numbness or tingling. Physical therapy saw him in the outside ER and recommended SNF. Review of Systems Constitutional: No fever, chills, or weight loss Eyes: No visual symptoms. ENT: No sore throat, runny nose or ear pain. Respiratory: No cough, dyspnea or wheezing. Cardiovascular: No chest pain, pressure, palpitations, tightness or heaviness. Gastrointestinal: No vomiting, diarrhea or abdominal pain. Genitourinary: No dysuria, frequency, or urgency. Musculoskeletal: +left hip pain Integumentary: No rashes. Neurological: No headaches, sensory or motor symptoms. Denies complaints in all other systems. Past Medical/Surgical History Past Medical History:  
Diagnosis Date  Arthritis  CAD (coronary artery disease)  Essential hypertension  Low back pain  Lumbar postlaminectomy syndrome  Lumbar spondylosis  Myocardial infarction ([address]   
 lad stent for stemi,12/2012  Pancreatic cyst   
 
Past Surgical History:  
Procedure Laterality Date  HAND/FINGER SURGERY UNLISTED Carpal tunnel  HX CORONARY ARTERY BYPASS GRAFT  2014  
 at [address] HX HIP REPLACEMENT    
 HX LUMBAR FUSION    
  HX ORTHOPAEDIC    
 lumbar spine x 5  
 HX ORTHOPAEDIC Right hip replacement  HX ORTHOPAEDIC    
 right foot calous removed  HX PTCA Social History Social History Socioeconomic History  Marital status: SINGLE Spouse name: Not on file  Number of children: Not on file  Years of education: Not on file  Highest education level: Not on file Tobacco Use  Smoking status: Former Smoker Packs/day: 0.10 Types: Cigarettes Last attempt to quit: 2012 Years since quittin.5  Smokeless tobacco: Current User Substance and Sexual Activity  Alcohol use: Yes Comment: socially  Drug use: Yes Types: Cocaine, Marijuana, Heroin Comment: +Cocaine Screen + UDS in  Family History Family History Problem Relation Age of Onset  Heart Attack Neg Hx   
 Heart Surgery Neg Hx Home Medications Prior to Admission Medications Prescriptions Last Dose Informant Patient Reported? Taking? COMPOUNDMAX BASE crea   No No  
Si g by Does Not Apply route four (4) times daily. Anti-Inflam Diclofenac Sodium 3%Gabapentin 3% Lidocaine 2% Prilocaine 2%  
acetaminophen/diphenhydramine (TYLENOL PM PO)   Yes No  
Sig: Take  by mouth. amLODIPine (NORVASC) 10 mg tablet   Yes No  
Sig: Take  by mouth daily. ammonium lactate (LAC-HYDRIN) 12 % topical cream   No No  
Sig: Apply  to affected area two (2) times a day. rub in to affected area well  
aspirin delayed-release 325 mg tablet   No No  
Sig: Take 1 Tab by mouth two (2) times a day. atorvastatin (LIPITOR) 80 mg tablet   Yes No  
Sig: Take 80 mg by mouth daily. carvedilol (COREG) 25 mg tablet   Yes No  
Sig: Take 25 mg by mouth two (2) times daily (with meals). celecoxib (CELEBREX) 200 mg capsule   No No  
Sig: Take 1 Cap by mouth two (2) times a day for 90 days.   
cholecalciferol, VITAMIN D3, (VITAMIN D3) 5,000 unit tab tablet   Yes No  
 Sig: Take  by mouth daily. cyclobenzaprine (FLEXERIL) 10 mg tablet   No No  
Sig: Take 1 Tab by mouth nightly as needed for Muscle Spasm(s). docusate sodium (COLACE) 100 mg capsule   No No  
Sig: Take 1 Cap by mouth two (2) times a day for 90 days. doxycycline (ADOXA) 100 mg tablet   No No  
Sig: Take 1 Tab by mouth two (2) times a day. ferrous sulfate 325 mg (65 mg iron) tablet   No No  
Sig: Take 1 Tab by mouth two (2) times daily (with meals). gabapentin (NEURONTIN) 300 mg capsule   Yes No  
gabapentin (NEURONTIN) 300 mg capsule   No No  
Sig: Take 1 Cap by mouth three (3) times daily. hydrochlorothiazide (HYDRODIURIL) 25 mg tablet   Yes No  
Sig: Take 25 mg by mouth daily. isoniazid (NYDRAZID) 300 mg tablet   Yes No  
Sig: Take 300 mg by mouth daily. isosorbide mononitrate ER (IMDUR) 60 mg CR tablet   No No  
Sig: Take 1 Tab by mouth every morning. lisinopril (PRINIVIL, ZESTRIL) 20 mg tablet   Yes No  
Sig: Take 40 mg by mouth daily. methylPREDNISolone (MEDROL DOSEPACK) 4 mg tablet   No No  
Sig: Per dose pack instructions  
nitroglycerin (NITROSTAT) 0.4 mg SL tablet   Yes No  
Sig: by SubLINGual route every five (5) minutes as needed. oxyCODONE IR (ROXICODONE) 15 mg immediate release tablet   No No  
Sig: Take 1-2 Tabs by mouth every eight (8) hours as needed. Max Daily Amount: 90 mg.  
oxyCODONE-acetaminophen (PERCOCET)  mg per tablet   No No  
Sig: Take 1-2 Tabs by mouth every six (6) hours as needed for Pain for up to 7 days. Max Daily Amount: 8 Tabs. polyethylene glycol (MIRALAX) 17 gram packet   No No  
Sig: Take 1 Packet by mouth daily. Indications: Constipation Facility-Administered Medications: None Allergies Allergies Allergen Reactions  Vicodin [Hydrocodone-Acetaminophen] Nausea and Vomiting Physical Exam  
 
ED Triage Vitals ED Encounter Vitals Group BP Pulse Resp Temp Temp src SpO2 Weight Height Constitutional: Patient appears well developed and well nourished. Appearance and behavior are age and situation appropriate. HEENT: Conjunctiva clear. PERRLA. Mucous membranes moist, non-erythematous. Surface of the pharynx, palate, and tongue are pink, moist and without lesions. Neck: supple, non tender, symmetrical, no masses or JVD. Respiratory: lungs clear to auscultation, nonlabored respirations. No tachypnea or accessory muscle use. Cardiovascular: heart regular rate and rhythm without murmur rubs or gallops. Calves soft and non-tender. Distal pulses 2+ and equal bilaterally. No peripheral edema. Gastrointestinal:  Abdomen soft, nontender without complaint of pain to palpation Musculoskeletal: Nail beds pink with prompt capillary refill, well-healing left hip surgical wound without any drainage or surrounding erythema Integumentary: warm and dry without rashes or lesions Neurologic: alert and oriented, Sensation intact, motor strength equal and symmetric. No facial asymmetry or dysarthria. Diagnostic Studies Lab:  
No results found for this or any previous visit (from the past 12 hour(s)). Imaging: Xr Hip Lt W Or Wo Pelv 2-3 Vws Result Date: 5/17/2019 LEFT HIP RADIOGRAPH-2 VIEWS INDICATION: Left hip pain. Multiple falls. COMPARISON: Left hip x-ray 5/13/2019 FINDINGS: Left hip orthoplasty appears intact. Overlying surgical staples remain mild underlying soft tissue swelling as expected for recent postsurgical status. No evidence for acute fracture. The bony pelvis and sacrum appear grossly intact. Right hip arthroplasty appears grossly intact within limitations of the exam. Lumbosacral hardware partially imaged. IMPRESSION: No acute fracture. Left hip arthroplasty appears intact. [facility] 
 
ED Course/Medical Decision Making Patient's labs from [facility] reviewed. 3:15 PM: Spoke to Dr. Yane Cohen (Orthopedics).  Patient's x-ray shows an intact left hip arthoplasty without any complications. Wound appears clean without drainage or signs of infection. He recommends weightbearing as tolerated, 325 mg aspirin twice daily, SCDs, oxycodone, Zofran and restarting his Plavix. Says this will be a social admission to the Hospitalist to help with SNF for skilled nursing. 3:25 PM: Call from Case Management. Patient has authorization for SNF. He will be going to [facility]. Spoke to Dr. Audrey Dennis (on call Ortho) and he is comfortable with discharge to the nursing home and outpatient follow-up. Medications - No data to display Final Diagnosis ICD-10-CM ICD-9-CM 1. Left hip pain M25.552 719.45  
2. Impaired ambulation R26.2 719.7 Disposition Patient is discharged home in stable condition. Advised to follow with their primary care physician. Patient advised to return to the ER for any new or worsening symptoms. My Medications ASK your doctor about these medications Instructions Each Dose to Equal Morning Noon Evening Bedtime  
ammonium lactate 12 % topical cream 
Commonly known as:  LAC-HYDRIN Your last dose was: Your next dose is:   
 
  
 Apply  to affected area two (2) times a day. rub in to affected area well 
   
  
  
  
  
aspirin delayed-release 325 mg tablet Your last dose was: Your next dose is: Take 1 Tab by mouth two (2) times a day. 325 mg 
  
  
  
  
  
carvedilol 25 mg tablet Commonly known as:  Kenya Dome Your last dose was: Your next dose is: Take 25 mg by mouth two (2) times daily (with meals). 25 mg 
  
  
  
  
  
celecoxib 200 mg capsule Commonly known as:  CELEBREX Your last dose was: Your next dose is: Take 1 Cap by mouth two (2) times a day for 90 days. 200 mg 
  
  
  
  
  
cholecalciferol (VITAMIN D3) 5,000 unit Tab tablet Commonly known as:  VITAMIN D3 Your last dose was: Your next dose is: Take  by mouth daily. COMPOUNDMAX BASE Crea Generic drug:  cream base no. 171 (bulk) Your last dose was: Your next dose is:   
 
  
 240 g by Does Not Apply route four (4) times daily. Anti-Inflam Diclofenac Sodium 3%Gabapentin 3% Lidocaine 2% Prilocaine 2% 
 240 g 
  
  
  
  
  
cyclobenzaprine 10 mg tablet Commonly known as:  FLEXERIL Your last dose was: Your next dose is: Take 1 Tab by mouth nightly as needed for Muscle Spasm(s). 10 mg 
  
  
  
  
  
docusate sodium 100 mg capsule Commonly known as:  Hildegarde Berth Your last dose was: Your next dose is: Take 1 Cap by mouth two (2) times a day for 90 days. 100 mg 
  
  
  
  
  
doxycycline 100 mg tablet Commonly known as:  ADOXA Your last dose was: Your next dose is: Take 1 Tab by mouth two (2) times a day. 100 mg 
  
  
  
  
  
ferrous sulfate 325 mg (65 mg iron) tablet Your last dose was: Your next dose is: Take 1 Tab by mouth two (2) times daily (with meals). 325 mg 
  
  
  
  
  
* gabapentin 300 mg capsule Commonly known as:  NEURONTIN Your last dose was: Your next dose is:   
 
  
    
  
  
  
  
* gabapentin 300 mg capsule Commonly known as:  NEURONTIN Your last dose was: Your next dose is: Take 1 Cap by mouth three (3) times daily. 300 mg 
  
  
  
  
  
hydroCHLOROthiazide 25 mg tablet Commonly known as:  HYDRODIURIL Your last dose was: Your next dose is: Take 25 mg by mouth daily. 25 mg 
  
  
  
  
  
isoniazid 300 mg tablet Commonly known as:  NYDRAZID Your last dose was: Your next dose is: Take 300 mg by mouth daily. 300 mg 
  
  
  
  
  
isosorbide mononitrate ER 60 mg CR tablet Commonly known as:  IMDUR Your last dose was: Your next dose is: Take 1 Tab by mouth every morning. 60 mg 
  
  
  
  
  
LIPITOR 80 mg tablet Generic drug:  atorvastatin Your last dose was: Your next dose is: Take 80 mg by mouth daily. 80 mg 
  
  
  
  
  
lisinopril 20 mg tablet Commonly known as:  Robertha Roup Your last dose was: Your next dose is: Take 40 mg by mouth daily. 40 mg 
  
  
  
  
  
methylPREDNISolone 4 mg tablet Commonly known as:  Cleta Loss Your last dose was: Your next dose is:   
 
  
 Per dose pack instructions NITROSTAT 0.4 mg SL tablet Generic drug:  nitroglycerin Your last dose was: Your next dose is:   
 
  
 by SubLINGual route every five (5) minutes as needed. NORVASC 10 mg tablet Generic drug:  amLODIPine Your last dose was: Your next dose is: Take  by mouth daily. oxyCODONE IR 15 mg immediate release tablet Commonly known as:  Alexa Alexandre Your last dose was: Your next dose is: Take 1-2 Tabs by mouth every eight (8) hours as needed. Max Daily Amount: 90 mg. 
 15-30 mg 
  
  
  
  
  
oxyCODONE-acetaminophen  mg per tablet Commonly known as:  PERCOCET Your last dose was: Your next dose is: Take 1-2 Tabs by mouth every six (6) hours as needed for Pain for up to 7 days. Max Daily Amount: 8 Tabs. 1-2 Tab 
  
  
  
  
  
polyethylene glycol 17 gram packet Commonly known as:  Lelan Situ Your last dose was: Your next dose is: Take 1 Packet by mouth daily. Indications: Constipation 17 g TYLENOL PM PO Your last dose was: Your next dose is: Take  by mouth. * This list has 2 medication(s) that are the same as other medications prescribed for you. Read the directions carefully, and ask your doctor or other care provider to review them with you. Efraín Villavicencio MD 
May 17, 2019 My signature above authenticates this document and my orders, the final   
 diagnosis (es), discharge prescription (s), and instructions in the Epic   
record. If you have any questions please contact [phone]. Nursing notes have been reviewed by the physician/ advanced practice Clinician. Disclaimer: Sections of this note are dictated using utilizing voice recognition software. Minor typographical errors may be present. If questions arise, please do not hesitate to contact me or call our department.

## 2019-05-29 ENCOUNTER — TELEPHONE (OUTPATIENT)
Dept: ORTHOPEDIC SURGERY | Age: 64
End: 2019-05-29

## 2019-05-29 NOTE — TELEPHONE ENCOUNTER
After speaking with Christy Tucker, she contacted [facility] and they will release their home health referral. She will authorize  Roger Hayden tomorrow and try to have a home visit on Friday. Thank you.

## 2019-05-29 NOTE — TELEPHONE ENCOUNTER
Kortney with [address] [place] [address] called stating that she has received a referral from our office and [facility] for home healthcare. She said she has tried to get insurance approval but cannot due to him already having another facility on file and was not sure if that is the case or not. She tried calling Mr. Hunter Pyle to see if he had home healthcare and couldn't understand him on the phone. She is wondering if he has care or not at home so she can close out her referrals.  Please advise Anatoly De Leon at [phone]

## 2019-05-30 ENCOUNTER — OFFICE VISIT (OUTPATIENT)
Dept: ORTHOPEDIC SURGERY | Age: 64
End: 2019-05-30

## 2019-05-30 VITALS
RESPIRATION RATE: 16 BRPM | SYSTOLIC BLOOD PRESSURE: 123 MMHG | BODY MASS INDEX: 34.17 KG/M2 | DIASTOLIC BLOOD PRESSURE: 68 MMHG | HEART RATE: 67 BPM | OXYGEN SATURATION: 100 % | HEIGHT: 73 IN

## 2019-05-30 DIAGNOSIS — Z96.642 STATUS POST HIP REPLACEMENT, LEFT: Primary | ICD-10-CM

## 2019-05-30 RX ORDER — OXYCODONE AND ACETAMINOPHEN 10; 325 MG/1; MG/1
1-2 TABLET ORAL
Qty: 42 TAB | Refills: 0 | Status: SHIPPED | OUTPATIENT
Start: 2019-05-30 | End: 2019-06-06 | Stop reason: SDUPTHER

## 2019-05-30 NOTE — PROGRESS NOTES
[address] [place] [address]  [phone]           Patient: Dilcia Gill                MRN: [id number]       SSN: [id number]  YOB: 1955        AGE: 75 y.o. SEX: male  Body mass index is 34.17 kg/m². PCP: Gary Sampson MD  05/30/19      This office note has been dictated. REVIEW OF SYSTEMS:  Constitutional: Negative for fever, chills, weight loss and malaise/fatigue. HENT: Negative. Eyes: Negative. Respiratory: Negative. Cardiovascular: Negative. Gastrointestinal: No bowel incontinence or constipation. Genitourinary: No bladder incontinence or saddle anesthesia. Skin: Negative. Neurological: Negative. Endo/Heme/Allergies: Negative. Psychiatric/Behavioral: Negative. Musculoskeletal: As per HPI above. Past Medical History:   Diagnosis Date    Arthritis     CAD (coronary artery disease)     Essential hypertension     Low back pain     Lumbar postlaminectomy syndrome     Lumbar spondylosis     Myocardial infarction ([facility] [address]     lad stent for stemi,12/2012    Pancreatic cyst          Current Outpatient Medications:     oxyCODONE-acetaminophen (PERCOCET)  mg per tablet, Take 1-2 Tabs by mouth every eight (8) hours as needed for Pain for up to 14 days. Max Daily Amount: 6 Tabs., Disp: 42 Tab, Rfl: 0    aspirin delayed-release 325 mg tablet, Take 1 Tab by mouth two (2) times a day., Disp: 60 Tab, Rfl: 1    ferrous sulfate 325 mg (65 mg iron) tablet, Take 1 Tab by mouth two (2) times daily (with meals). , Disp: 60 Tab, Rfl: 2    celecoxib (CELEBREX) 200 mg capsule, Take 1 Cap by mouth two (2) times a day for 90 days. , Disp: 60 Cap, Rfl: 2    docusate sodium (COLACE) 100 mg capsule, Take 1 Cap by mouth two (2) times a day for 90 days. , Disp: 60 Cap, Rfl: 2    acetaminophen/diphenhydramine (TYLENOL PM PO), Take  by mouth., Disp: , Rfl:     amLODIPine (NORVASC) 10 mg tablet, Take  by mouth daily. , Disp: , Rfl:     methylPREDNISolone (MEDROL DOSEPACK) 4 mg tablet, Per dose pack instructions, Disp: 1 Dose Pack, Rfl: 0    cyclobenzaprine (FLEXERIL) 10 mg tablet, Take 1 Tab by mouth nightly as needed for Muscle Spasm(s). , Disp: 30 Tab, Rfl: 2    cholecalciferol, VITAMIN D3, (VITAMIN D3) 5,000 unit tab tablet, Take  by mouth daily. , Disp: , Rfl:     COMPOUNDMAX BASE crea, 240 g by Does Not Apply route four (4) times daily. Anti-Inflam Diclofenac Sodium 3%Gabapentin 3% Lidocaine 2% Prilocaine 2%, Disp: 240 g, Rfl: 3    ammonium lactate (LAC-HYDRIN) 12 % topical cream, Apply  to affected area two (2) times a day. rub in to affected area well, Disp: 280 g, Rfl: 0    oxyCODONE IR (ROXICODONE) 15 mg immediate release tablet, Take 1-2 Tabs by mouth every eight (8) hours as needed. Max Daily Amount: 90 mg., Disp: 60 Tab, Rfl: 0    polyethylene glycol (MIRALAX) 17 gram packet, Take 1 Packet by mouth daily. Indications: Constipation, Disp: 30 Packet, Rfl: 1    gabapentin (NEURONTIN) 300 mg capsule, Take 1 Cap by mouth three (3) times daily. , Disp: 90 Cap, Rfl: 1    doxycycline (ADOXA) 100 mg tablet, Take 1 Tab by mouth two (2) times a day., Disp: 20 Tab, Rfl: 0    isoniazid (NYDRAZID) 300 mg tablet, Take 300 mg by mouth daily. , Disp: , Rfl:     nitroglycerin (NITROSTAT) 0.4 mg SL tablet, by SubLINGual route every five (5) minutes as needed. , Disp: , Rfl:     carvedilol (COREG) 25 mg tablet, Take 25 mg by mouth two (2) times daily (with meals). , Disp: , Rfl:     hydrochlorothiazide (HYDRODIURIL) 25 mg tablet, Take 25 mg by mouth daily. , Disp: , Rfl:     isosorbide mononitrate ER (IMDUR) 60 mg CR tablet, Take 1 Tab by mouth every morning., Disp: 30 Tab, Rfl: 6    lisinopril (PRINIVIL, ZESTRIL) 20 mg tablet, Take 40 mg by mouth daily. , Disp: , Rfl:     atorvastatin (LIPITOR) 80 mg tablet, Take 80 mg by mouth daily. , Disp: , Rfl:     Allergies   Allergen Reactions    Vicodin [Hydrocodone-Acetaminophen] Nausea and Vomiting       Social History     Socioeconomic History    Marital status: SINGLE     Spouse name: Not on file    Number of children: Not on file    Years of education: Not on file    Highest education level: Not on file   Occupational History    Not on file   Social Needs    Financial resource strain: Not on file    Food insecurity:     Worry: Not on file     Inability: Not on file    Transportation needs:     Medical: Not on file     Non-medical: Not on file   Tobacco Use    Smoking status: Former Smoker     Packs/day: 0.10     Types: Cigarettes     Last attempt to quit: 2012     Years since quittin.5    Smokeless tobacco: Current User   Substance and Sexual Activity    Alcohol use: Yes     Comment: socially    Drug use: Yes     Types: Cocaine, Marijuana, Heroin     Comment: +Cocaine Screen + UDS in      Sexual activity: Not on file     Comment: stopped using   Lifestyle    Physical activity:     Days per week: Not on file     Minutes per session: Not on file    Stress: Not on file   Relationships    Social connections:     Talks on phone: Not on file     Gets together: Not on file     Attends Mandaeism service: Not on file     Active member of club or organization: Not on file     Attends meetings of clubs or organizations: Not on file     Relationship status: Not on file    Intimate partner violence:     Fear of current or ex partner: Not on file     Emotionally abused: Not on file     Physically abused: Not on file     Forced sexual activity: Not on file   Other Topics Concern    Not on file   Social History Narrative    Not on file       Past Surgical History:   Procedure Laterality Date    HAND/FINGER SURGERY UNLISTED      Carpal tunnel    HX CORONARY ARTERY BYPASS GRAFT      at [address]      HX HIP REPLACEMENT      HX LUMBAR FUSION      HX ORTHOPAEDIC      lumbar spine x 5    HX ORTHOPAEDIC Right hip replacement    HX ORTHOPAEDIC      right foot calous removed    HX PTCA               We did see Mr. Brian Hendrickson for followup with regards to his left hip replacement. The patient is now 17 days status post surgery, and he is progressing well. The patient was unable to make it at home and went to [facility] and spent a day or so there. He was transferred over to [facility] and sent to rehab from there. He has done well. He is now at home receiving [facility] physical therapy without complications. He has had no trouble with the wound and no fever, chills, systemic changes, or injuries to report, and no chest pain or shortness of breath. He is requesting a refill of pain medicine. PHYSICAL EXAMINATION:  In general, the patient is alert and oriented x 3 in no acute distress. The patient is well-developed, well-nourished, with a normal affect. The patient is afebrile. HEENT:  Head is normocephalic and atraumatic. Pupils are equally round and reactive to light and accommodation. Extraocular eye movements are intact. Neck is supple. Trachea is midline. No JVD is present. Breathing is nonlabored. Examination of the left hip reveals the skin is intact. The surgical wounds are healed nicely. There is no erythema, ecchymosis, and no warmth or signs of infection or cellulitis present. Leg lengths look good. Abduction strength is 4+/5 on the left and 5/5 on the right. There is negative calf tenderness and no signs for DVT present. ASSESSMENT:  Status post left total hip replacement. PLAN:  At this point, the patient is doing well. He is doing well at home without complications. He will continue with Home Health physical therapy. A new prescription was given. I gave him a refill of his pain medicine and instructed him on use as well as the usual precautions. We will plan on seeing him back in the office in about two weeks' time for evaluation and would check.   He will call with any questions or concerns that shall arise.                 JR Sean COEKR, SARA, ATC

## 2019-05-30 NOTE — PROGRESS NOTES
1. Have you been to the ER, urgent care clinic since your last visit? Hospitalized since your last visit? NO    2. Have you seen or consulted any other health care providers outside of the [address] [place] [address] since your last visit? Include any pap smears or colon screening.  NO

## 2019-06-04 ENCOUNTER — TELEPHONE (OUTPATIENT)
Dept: ORTHOPEDIC SURGERY | Age: 64
End: 2019-06-04

## 2019-06-04 NOTE — TELEPHONE ENCOUNTER
Patient states he has fallen on his L side and has pain going down his L leg into his ankle. He also has a knot on his   L arm. He states his L leg keeps giving out on him. He fell on Saturday 6/1/19 and Monday 6/3/19.     SX 5/13/19

## 2019-06-06 ENCOUNTER — TELEPHONE (OUTPATIENT)
Dept: ORTHOPEDIC SURGERY | Facility: CLINIC | Age: 64
End: 2019-06-06

## 2019-06-06 ENCOUNTER — OFFICE VISIT (OUTPATIENT)
Dept: ORTHOPEDIC SURGERY | Facility: CLINIC | Age: 64
End: 2019-06-06

## 2019-06-06 VITALS
DIASTOLIC BLOOD PRESSURE: 69 MMHG | OXYGEN SATURATION: 99 % | WEIGHT: 253.6 LBS | BODY MASS INDEX: 33.61 KG/M2 | HEIGHT: 73 IN | TEMPERATURE: 97.5 F | RESPIRATION RATE: 16 BRPM | HEART RATE: 71 BPM | SYSTOLIC BLOOD PRESSURE: 121 MMHG

## 2019-06-06 DIAGNOSIS — Z96.642 STATUS POST HIP REPLACEMENT, LEFT: Primary | ICD-10-CM

## 2019-06-06 DIAGNOSIS — M16.12 PRIMARY OSTEOARTHRITIS OF LEFT HIP: ICD-10-CM

## 2019-06-06 DIAGNOSIS — M70.32 BURSITIS OF LEFT ELBOW, UNSPECIFIED BURSA: ICD-10-CM

## 2019-06-06 DIAGNOSIS — M25.522 ELBOW PAIN, LEFT: ICD-10-CM
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
## 2019-06-06 RX ORDER — OXYCODONE AND ACETAMINOPHEN 10; 325 MG/1; MG/1
1 TABLET ORAL
Qty: 28 TAB | Refills: 0 | Status: SHIPPED | OUTPATIENT
Start: 2019-06-06 | End: 2019-06-17 | Stop reason: SDUPTHER

## 2019-06-06 NOTE — TELEPHONE ENCOUNTER
Farhat Alberto from [facility] called in requesting to speak with clinical staff in regards to the patient. Farhat Alberto can be reached at [phone].

## 2019-06-06 NOTE — PROGRESS NOTES
[address], [facility]  [phone]           Patient: Garrick Kanner                MRN: [id number]       SSN: [id number]  YOB: 1955        AGE: 75 y.o. SEX: male  Body mass index is 33.46 kg/m². PCP: Maura Sinclair MD  06/06/19      This office note has been dictated. REVIEW OF SYSTEMS:  Constitutional: Negative for fever, chills, weight loss and malaise/fatigue. HENT: Negative. Eyes: Negative. Respiratory: Negative. Cardiovascular: Negative. Gastrointestinal: No bowel incontinence or constipation. Genitourinary: No bladder incontinence or saddle anesthesia. Skin: Negative. Neurological: Negative. Endo/Heme/Allergies: Negative. Psychiatric/Behavioral: Negative. Musculoskeletal: As per HPI above. Past Medical History:   Diagnosis Date    Arthritis     CAD (coronary artery disease)     Essential hypertension     Low back pain     Lumbar postlaminectomy syndrome     Lumbar spondylosis     Myocardial infarction ([facility] [address]     lad stent for stemi,12/2012    Pancreatic cyst          Current Outpatient Medications:     oxyCODONE-acetaminophen (PERCOCET)  mg per tablet, Take 1-2 Tabs by mouth every eight (8) hours as needed for Pain for up to 14 days. Max Daily Amount: 6 Tabs., Disp: 42 Tab, Rfl: 0    aspirin delayed-release 325 mg tablet, Take 1 Tab by mouth two (2) times a day., Disp: 60 Tab, Rfl: 1    ferrous sulfate 325 mg (65 mg iron) tablet, Take 1 Tab by mouth two (2) times daily (with meals). , Disp: 60 Tab, Rfl: 2    celecoxib (CELEBREX) 200 mg capsule, Take 1 Cap by mouth two (2) times a day for 90 days. , Disp: 60 Cap, Rfl: 2    docusate sodium (COLACE) 100 mg capsule, Take 1 Cap by mouth two (2) times a day for 90 days. , Disp: 60 Cap, Rfl: 2    acetaminophen/diphenhydramine (TYLENOL PM PO), Take  by mouth., Disp: , Rfl:     amLODIPine (NORVASC) 10 mg tablet, Take  by mouth daily. , Disp: , Rfl:     methylPREDNISolone (MEDROL DOSEPACK) 4 mg tablet, Per dose pack instructions, Disp: 1 Dose Pack, Rfl: 0    cyclobenzaprine (FLEXERIL) 10 mg tablet, Take 1 Tab by mouth nightly as needed for Muscle Spasm(s). , Disp: 30 Tab, Rfl: 2    cholecalciferol, VITAMIN D3, (VITAMIN D3) 5,000 unit tab tablet, Take  by mouth daily. , Disp: , Rfl:     COMPOUNDMAX BASE crea, 240 g by Does Not Apply route four (4) times daily. Anti-Inflam Diclofenac Sodium 3%Gabapentin 3% Lidocaine 2% Prilocaine 2%, Disp: 240 g, Rfl: 3    ammonium lactate (LAC-HYDRIN) 12 % topical cream, Apply  to affected area two (2) times a day. rub in to affected area well, Disp: 280 g, Rfl: 0    oxyCODONE IR (ROXICODONE) 15 mg immediate release tablet, Take 1-2 Tabs by mouth every eight (8) hours as needed. Max Daily Amount: 90 mg., Disp: 60 Tab, Rfl: 0    polyethylene glycol (MIRALAX) 17 gram packet, Take 1 Packet by mouth daily. Indications: Constipation, Disp: 30 Packet, Rfl: 1    gabapentin (NEURONTIN) 300 mg capsule, Take 1 Cap by mouth three (3) times daily. , Disp: 90 Cap, Rfl: 1    doxycycline (ADOXA) 100 mg tablet, Take 1 Tab by mouth two (2) times a day., Disp: 20 Tab, Rfl: 0    isoniazid (NYDRAZID) 300 mg tablet, Take 300 mg by mouth daily. , Disp: , Rfl:     nitroglycerin (NITROSTAT) 0.4 mg SL tablet, by SubLINGual route every five (5) minutes as needed. , Disp: , Rfl:     carvedilol (COREG) 25 mg tablet, Take 25 mg by mouth two (2) times daily (with meals). , Disp: , Rfl:     hydrochlorothiazide (HYDRODIURIL) 25 mg tablet, Take 25 mg by mouth daily. , Disp: , Rfl:     isosorbide mononitrate ER (IMDUR) 60 mg CR tablet, Take 1 Tab by mouth every morning., Disp: 30 Tab, Rfl: 6    lisinopril (PRINIVIL, ZESTRIL) 20 mg tablet, Take 40 mg by mouth daily. , Disp: , Rfl:     atorvastatin (LIPITOR) 80 mg tablet, Take 80 mg by mouth daily. , Disp: , Rfl:     Allergies   Allergen Reactions    Vicodin [Hydrocodone-Acetaminophen] Nausea and Vomiting       Social History     Socioeconomic History    Marital status: SINGLE     Spouse name: Not on file    Number of children: Not on file    Years of education: Not on file    Highest education level: Not on file   Occupational History    Not on file   Social Needs    Financial resource strain: Not on file    Food insecurity:     Worry: Not on file     Inability: Not on file    Transportation needs:     Medical: Not on file     Non-medical: Not on file   Tobacco Use    Smoking status: Former Smoker     Packs/day: 0.10     Types: Cigarettes     Last attempt to quit: 2012     Years since quittin.5    Smokeless tobacco: Current User   Substance and Sexual Activity    Alcohol use: Yes     Comment: socially    Drug use: Yes     Types: Cocaine, Marijuana, Heroin     Comment: +Cocaine Screen + UDS in      Sexual activity: Not on file     Comment: stopped using   Lifestyle    Physical activity:     Days per week: Not on file     Minutes per session: Not on file    Stress: Not on file   Relationships    Social connections:     Talks on phone: Not on file     Gets together: Not on file     Attends Quaker service: Not on file     Active member of club or organization: Not on file     Attends meetings of clubs or organizations: Not on file     Relationship status: Not on file    Intimate partner violence:     Fear of current or ex partner: Not on file     Emotionally abused: Not on file     Physically abused: Not on file     Forced sexual activity: Not on file   Other Topics Concern    Not on file   Social History Narrative    Not on file       Past Surgical History:   Procedure Laterality Date    HAND/FINGER SURGERY UNLISTED      Carpal tunnel    HX CORONARY ARTERY BYPASS GRAFT      [address]      HX HIP REPLACEMENT      HX LUMBAR FUSION      HX ORTHOPAEDIC      lumbar spine x 5    HX ORTHOPAEDIC Right hip replacement    HX ORTHOPAEDIC      right foot calous removed    HX PTCA               We did see Mr. Roger Hayden for followup with regards to his left hip, as well as his left elbow. The patient had a left total hip replacement done on May 13, 2019. The patient has had a couple of falls but no emergency room visits. Fortunately, he did not injure himself badly with the fall. He does have a little swelling to his left elbow. He has no increasing discomfort of his hips. He is receiving home physical therapy without complications. He is requesting a refill of his pain medicine. PHYSICAL EXAMINATION:  In general, the patient is alert and oriented x 3 in no acute distress. The patient is well-developed, well-nourished, with a normal affect. The patient is afebrile. HEENT:  Head is normocephalic and atraumatic. Pupils are equally round and reactive to light and accommodation. Extraocular eye movements are intact. Neck is supple. Trachea is midline. No JVD is present. Breathing is nonlabored. Examination of the left elbow reveals the skin is intact. There is no erythema, ecchymosis, and no warmth. He is able to move the elbow with full range of motion. He does have a little bit of swelling to the olecranon bursa. There is no erythema and no signs for infection. Neurovascular status is intact. Radial pulse is 2+. Examination of the lower extremities reveals pain-free range of motion of the hips. There is a little tenderness to palpation to the left greater trochanteric bursal region. There is minimal swelling. No seroma or hematoma are present. There is no pain with rotation of the hip. Leg lengths look perfect. There is negative calf tenderness. There is negative Tena's. There is no evidence of DVT present. RADIOGRAPHS:  Radiographs in the office, including AP of the pelvis and AP and cross table lateral of the left hip, show no acute bony abnormalities.   The components are well-fixed without evidence for loosening or fracture noted. Three views of the left elbow show no acute bony abnormalities. There is a negative sail sign. There is no fracture or dislocation noted. ASSESSMENT:      1. Left traumatic olecranon bursitis. 2. Status post left hip replacement. 3. Status post right hip replacement. PLAN:  At this point, the patient was instructed on ice for both the elbow and the hip and placing an Ace wrap to the left elbow to help with a little bit of the swelling. He will continue with Home Health physical therapy. A new prescription was given. He was also given a one-week supply of Percocet. He is going to followup with us in two weeks' time for evaluation. He will call with any questions or concerns that shall arise.                   JR Sean COKER, PAJavedC, ATC

## 2019-06-10 NOTE — TELEPHONE ENCOUNTER
Neto Barrios from [facility] in [address] called again and is requesting to speak with Nicole Owens or a Nurse. Foster Mike it was in regards to the patient last visit and Therapy. Kristal Castro. [phone].

## 2019-06-12 ENCOUNTER — TELEPHONE (OUTPATIENT)
Dept: ORTHOPEDIC SURGERY | Age: 64
End: 2019-06-12

## 2019-06-12 NOTE — TELEPHONE ENCOUNTER
Patient called in states that his incision has been itching and he is on blood thinners, he would like something to put on his incision so that he doesn't scratch it and start bleeding. Patient can be reached at [phone].

## 2019-06-17 DIAGNOSIS — M25.522 ELBOW PAIN, LEFT: ICD-10-CM

## 2019-06-17 DIAGNOSIS — Z96.642 STATUS POST HIP REPLACEMENT, LEFT: ICD-10-CM
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
## 2019-06-17 RX ORDER — OXYCODONE AND ACETAMINOPHEN 10; 325 MG/1; MG/1
1 TABLET ORAL
Qty: 28 TAB | Refills: 0 | Status: SHIPPED | OUTPATIENT
Start: 2019-06-17 | End: 2019-07-01

## 2019-06-17 NOTE — TELEPHONE ENCOUNTER
Post op Patient called and is requesting Percocet medication from [address] [place] [address]. Would like to  from the [facility] location. Patient tel. [phone].

## 2019-06-17 NOTE — TELEPHONE ENCOUNTER
Patient is requesting a refill on oxyCODONE-acetaminophen (PERCOCET)  mg. Please advise and pend new Rx if appropriate.     Last Visit: 6/6/19 with KT Rodríguez *post-op 5/13/19*  Next Appointment: 7/11/19 with KT Rodríguez  Previous Refill Encounter(s): 6/6/19 Percocet #28

## 2019-07-02 NOTE — TELEPHONE ENCOUNTER
Patient called in requesting pain medicine for hip pain. Patient stated best call back number is  [phone].

## 2019-07-03 DIAGNOSIS — Z96.642 STATUS POST HIP REPLACEMENT, LEFT: Primary | ICD-10-CM

## 2019-07-03 RX ORDER — HYDROCODONE BITARTRATE AND ACETAMINOPHEN 10; 325 MG/1; MG/1
1-2 TABLET ORAL
Qty: 42 TAB | Refills: 0 | Status: CANCELLED | OUTPATIENT
Start: 2019-07-03 | End: 2019-07-17

## 2019-07-03 RX ORDER — HYDROCODONE BITARTRATE AND ACETAMINOPHEN 10; 325 MG/1; MG/1
1-2 TABLET ORAL
Qty: 42 TAB | Refills: 0 | Status: SHIPPED | OUTPATIENT
Start: 2019-07-03 | End: 2019-07-10

## 2019-07-03 NOTE — TELEPHONE ENCOUNTER
Please see previous for further info.  Refill approved and pended for signature for [employer] to print at [facility]

## 2019-07-11 ENCOUNTER — OFFICE VISIT (OUTPATIENT)
Dept: ORTHOPEDIC SURGERY | Age: 64
End: 2019-07-11

## 2019-07-11 VITALS
SYSTOLIC BLOOD PRESSURE: 131 MMHG | OXYGEN SATURATION: 97 % | WEIGHT: 240 LBS | RESPIRATION RATE: 16 BRPM | BODY MASS INDEX: 31.81 KG/M2 | TEMPERATURE: 98.2 F | HEIGHT: 73 IN | DIASTOLIC BLOOD PRESSURE: 76 MMHG | HEART RATE: 73 BPM

## 2019-07-11 DIAGNOSIS — M25.522 ELBOW PAIN, LEFT: Primary | ICD-10-CM

## 2019-07-11 DIAGNOSIS — M25.512 CHRONIC LEFT SHOULDER PAIN: ICD-10-CM

## 2019-07-11 DIAGNOSIS — G89.29 CHRONIC LEFT SHOULDER PAIN: ICD-10-CM

## 2019-07-11 DIAGNOSIS — S42.452A: ICD-10-CM

## 2019-07-11 DIAGNOSIS — Z96.642 STATUS POST HIP REPLACEMENT, LEFT: ICD-10-CM
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
## 2019-07-11 DIAGNOSIS — M25.552 LEFT HIP PAIN: ICD-10-CM

## 2019-07-11 RX ORDER — OXYCODONE AND ACETAMINOPHEN 7.5; 325 MG/1; MG/1
1-2 TABLET ORAL
Qty: 42 TAB | Refills: 0 | Status: SHIPPED | OUTPATIENT
Start: 2019-07-11 | End: 2019-07-18

## 2019-07-11 NOTE — PROGRESS NOTES
[address] [place] [address]  [phone]           Patient: Clevester Leventhal                MRN: [id number]       SSN: [id number]  YOB: 1955        AGE: 75 y.o. SEX: male  Body mass index is 31.66 kg/m². PCP: Charity Gaspar MD  07/11/19      This office note has been dictated. REVIEW OF SYSTEMS:  Constitutional: Negative for fever, chills, weight loss and malaise/fatigue. HENT: Negative. Eyes: Negative. Respiratory: Negative. Cardiovascular: Negative. Gastrointestinal: No bowel incontinence or constipation. Genitourinary: No bladder incontinence or saddle anesthesia. Skin: Negative. Neurological: Negative. Endo/Heme/Allergies: Negative. Psychiatric/Behavioral: Negative. Musculoskeletal: As per HPI above. Past Medical History:   Diagnosis Date    Arthritis     CAD (coronary artery disease)     Essential hypertension     Low back pain     Lumbar postlaminectomy syndrome     Lumbar spondylosis     Myocardial infarction ([facility] [address]     lad stent for stemi,12/2012    Pancreatic cyst          Current Outpatient Medications:     oxyCODONE-acetaminophen (PERCOCET) 7.5-325 mg per tablet, Take 1-2 Tabs by mouth every four (4) hours as needed for Pain for up to 7 days. Max Daily Amount: 12 Tabs., Disp: 42 Tab, Rfl: 0    aspirin delayed-release 325 mg tablet, Take 1 Tab by mouth two (2) times a day., Disp: 60 Tab, Rfl: 1    ferrous sulfate 325 mg (65 mg iron) tablet, Take 1 Tab by mouth two (2) times daily (with meals). , Disp: 60 Tab, Rfl: 2    celecoxib (CELEBREX) 200 mg capsule, Take 1 Cap by mouth two (2) times a day for 90 days. , Disp: 60 Cap, Rfl: 2    docusate sodium (COLACE) 100 mg capsule, Take 1 Cap by mouth two (2) times a day for 90 days. , Disp: 60 Cap, Rfl: 2    acetaminophen/diphenhydramine (TYLENOL PM PO), Take  by mouth., Disp: , Rfl:     amLODIPine (NORVASC) 10 mg tablet, Take  by mouth daily. , Disp: , Rfl:     cholecalciferol, VITAMIN D3, (VITAMIN D3) 5,000 unit tab tablet, Take  by mouth daily. , Disp: , Rfl:     COMPOUNDMAX BASE crea, 240 g by Does Not Apply route four (4) times daily. Anti-Inflam Diclofenac Sodium 3%Gabapentin 3% Lidocaine 2% Prilocaine 2%, Disp: 240 g, Rfl: 3    ammonium lactate (LAC-HYDRIN) 12 % topical cream, Apply  to affected area two (2) times a day. rub in to affected area well, Disp: 280 g, Rfl: 0    oxyCODONE IR (ROXICODONE) 15 mg immediate release tablet, Take 1-2 Tabs by mouth every eight (8) hours as needed. Max Daily Amount: 90 mg., Disp: 60 Tab, Rfl: 0    polyethylene glycol (MIRALAX) 17 gram packet, Take 1 Packet by mouth daily. Indications: Constipation, Disp: 30 Packet, Rfl: 1    doxycycline (ADOXA) 100 mg tablet, Take 1 Tab by mouth two (2) times a day., Disp: 20 Tab, Rfl: 0    isoniazid (NYDRAZID) 300 mg tablet, Take 300 mg by mouth daily. , Disp: , Rfl:     nitroglycerin (NITROSTAT) 0.4 mg SL tablet, by SubLINGual route every five (5) minutes as needed. , Disp: , Rfl:     carvedilol (COREG) 25 mg tablet, Take 25 mg by mouth two (2) times daily (with meals). , Disp: , Rfl:     hydrochlorothiazide (HYDRODIURIL) 25 mg tablet, Take 25 mg by mouth daily. , Disp: , Rfl:     isosorbide mononitrate ER (IMDUR) 60 mg CR tablet, Take 1 Tab by mouth every morning., Disp: 30 Tab, Rfl: 6    lisinopril (PRINIVIL, ZESTRIL) 20 mg tablet, Take 40 mg by mouth daily. , Disp: , Rfl:     atorvastatin (LIPITOR) 80 mg tablet, Take 80 mg by mouth daily. , Disp: , Rfl:     methylPREDNISolone (MEDROL DOSEPACK) 4 mg tablet, Per dose pack instructions, Disp: 1 Dose Pack, Rfl: 0    cyclobenzaprine (FLEXERIL) 10 mg tablet, Take 1 Tab by mouth nightly as needed for Muscle Spasm(s). , Disp: 30 Tab, Rfl: 2    gabapentin (NEURONTIN) 300 mg capsule, Take 1 Cap by mouth three (3) times daily. , Disp: 90 Cap, Rfl: 1    Allergies   Allergen Reactions    Vicodin [Hydrocodone-Acetaminophen] Nausea and Vomiting       Social History     Socioeconomic History    Marital status: SINGLE     Spouse name: Not on file    Number of children: Not on file    Years of education: Not on file    Highest education level: Not on file   Occupational History    Not on file   Social Needs    Financial resource strain: Not on file    Food insecurity:     Worry: Not on file     Inability: Not on file    Transportation needs:     Medical: Not on file     Non-medical: Not on file   Tobacco Use    Smoking status: Former Smoker     Packs/day: 0.10     Types: Cigarettes     Last attempt to quit: 2012     Years since quittin.6    Smokeless tobacco: Current User   Substance and Sexual Activity    Alcohol use: Yes     Comment: socially    Drug use: Yes     Types: Cocaine, Marijuana, Heroin     Comment: +Cocaine Screen + UDS in      Sexual activity: Not on file     Comment: stopped using   Lifestyle    Physical activity:     Days per week: Not on file     Minutes per session: Not on file    Stress: Not on file   Relationships    Social connections:     Talks on phone: Not on file     Gets together: Not on file     Attends Pentecostalism service: Not on file     Active member of club or organization: Not on file     Attends meetings of clubs or organizations: Not on file     Relationship status: Not on file    Intimate partner violence:     Fear of current or ex partner: Not on file     Emotionally abused: Not on file     Physically abused: Not on file     Forced sexual activity: Not on file   Other Topics Concern    Not on file   Social History Narrative    Not on file       Past Surgical History:   Procedure Laterality Date    HAND/FINGER SURGERY UNLISTED      Carpal tunnel    HX CORONARY ARTERY BYPASS GRAFT      [address]      HX HIP REPLACEMENT      HX LUMBAR FUSION      HX ORTHOPAEDIC      lumbar spine x 5    HX ORTHOPAEDIC Right hip replacement    HX ORTHOPAEDIC      right foot calous removed    HX PTCA             We did see Mr. Oleksandr Carter for followup with regards to his left shoulder, left elbow, as well as his left hip. The patient is status post left hip replacement, and he is now about seven week status post surgery. He did have another fall. He did go to the emergency room and had x-rays of his elbow and his shoulder on the left side. He was treated with a sling and reported to follow up with us in the office. The patient is still having some shoulder discomfort. The elbow does not hurt him as much. He is also reporting a little bit of hip discomfort. He has had no fever or chills. He denies any radiating pain down the lower extremities and has had no change in his bowel or bladder habits. PHYSICAL EXAMINATION:  In general, the patient is alert and oriented x 3 in no acute distress. The patient is well-developed, well-nourished, with a normal affect. The patient is afebrile. HEENT:  Head is normocephalic and atraumatic. Pupils are equally round and reactive to light and accommodation. Extraocular eye movements are intact. Neck is supple. Trachea is midline. No JVD is present. Breathing is nonlabored. Examination of the left shoulder reveals the skin is intact. There is no ecchymosis, no warmth, and no signs of infection or cellulitis present. He has about 120ø of forward flexion and 110ø of abduction. There is decreased strength in external rotation with discomfort to the subacromial space. The left elbow reveals the skin is intact. There is no ecchymosis, no warmth. There are no signs for infection or cellulitis. He has a little bit of fullness to the olecranon bursa and well-maintained range of motion. There is no pain to palpation to the medial and lateral epicondyles, as well as the olecranon itself. Examination of the lower extremities reveals pain-free range of motion of the hips.   He does have some discomfort, laterally-based, to the trochanteric bursa and a little discomfort with resisted hip flexion anteriorly to the psoas tendon. There is negative straight leg raise. There is negative calf tenderness. There is negative Tena's. There is no evidence of DVT present. RADIOGRAPHS:  Review of radiographs of the shoulder done on June 24, 2019, at [facility] shows no acute bony abnormalities and no dislocation. Four views of the left elbow from [facility] show a questionable non-displaced fracture along with an osteophyte involving the olecranon process. There is no evidence for dislocation seen. Radiographs obtained today in the office including AP of the pelvis and AP and cross table of the left hip, show no acute bony abnormalities. Total hip components are well-fixed without evidence of loosening or fracture noted. Three views of the left elbow obtained in the office today show no acute bony abnormalities as well. There is no fracture or dislocation noted. ASSESSMENT:      1. Left shoulder pain, likely rotator cuff tear. 2. Left elbow olecranon bursitis, improving. 3. Left hip replacement. PLAN:  At this point, we are going to move forward with an MRI of the left shoulder for further evaluation to rule out rotator cuff pathology. With regards to the left elbow, he is going to continue in his sling for another couple of weeks for continued treatment of the elbow to let it calm down. We will obtain some basic labs, CBC, ESR, CRP, and IL-6. I expect these will come back as negative. He was given a prescription for Percocet today in the office. He was on Norco, which did not help him at all. He is having some increased discomfort with the recent fall. He will call with any questions or concerns that shall arise.                  JR Sean COKER, PA-C, ATC

## 2019-07-11 NOTE — PROGRESS NOTES
1. Have you been to the ER, urgent care clinic since your last visit? Hospitalized since your last visit? Yes, [facility]       2. Have you seen or consulted any other health care providers outside of the [address] [place] [address] since your last visit? Include any pap smears or colon screening.  No

## 2019-07-18 ENCOUNTER — HOSPITAL ENCOUNTER (OUTPATIENT)
Dept: LAB | Age: 64
Discharge: HOME | End: 2019-07-18

## 2019-07-18 DIAGNOSIS — M25.552 LEFT HIP PAIN: ICD-10-CM

## 2019-07-18 LAB — XX-LABCORP SPECIMEN COL,LCBCF: NORMAL

## 2019-07-18 PROCEDURE — 99001 SPECIMEN HANDLING PT-LAB: CPT

## 2019-07-24 ENCOUNTER — HOSPITAL ENCOUNTER (OUTPATIENT)
Age: 64
Discharge: HOME | End: 2019-07-24
Attending: PHYSICIAN ASSISTANT
Payer: MEDICARE

## 2019-07-24 DIAGNOSIS — G89.29 CHRONIC LEFT SHOULDER PAIN: ICD-10-CM

## 2019-07-24 DIAGNOSIS — M25.512 CHRONIC LEFT SHOULDER PAIN: ICD-10-CM

## 2019-07-24 PROCEDURE — 73221 MRI JOINT UPR EXTREM W/O DYE: CPT

## 2019-07-25 ENCOUNTER — TELEPHONE (OUTPATIENT)
Dept: ORTHOPEDIC SURGERY | Age: 64
End: 2019-07-25

## 2019-07-25 DIAGNOSIS — M25.552 LEFT HIP PAIN: Primary | ICD-10-CM

## 2019-07-25 DIAGNOSIS — Z96.642 STATUS POST HIP REPLACEMENT, LEFT: ICD-10-CM

## 2019-07-25 DIAGNOSIS — M16.12 PRIMARY OSTEOARTHRITIS OF LEFT HIP: ICD-10-CM

## 2019-07-25 NOTE — TELEPHONE ENCOUNTER
LEYLA Almodovar from the patient's PCP office called stating that the patient called their office complaining of pain as well as wanting referrals for pain management and therapy and that he has tried to contact our office. I did inform her we have not heard from the patient since his last appointment. She had a feeling that he has not tried to actually contact us but reached out for him. She is wondering if there is a way to speak to the patient about his pain.  Please advise patient at [phone]

## 2019-07-26 DIAGNOSIS — Z96.642 STATUS POST HIP REPLACEMENT, LEFT: Primary | ICD-10-CM

## 2019-07-26 RX ORDER — HYDROCODONE BITARTRATE AND ACETAMINOPHEN 7.5; 325 MG/1; MG/1
1 TABLET ORAL
Qty: 30 TAB | Refills: 0 | Status: SHIPPED | OUTPATIENT
Start: 2019-07-26 | End: 2019-08-25

## 2019-07-26 NOTE — TELEPHONE ENCOUNTER
Spoke with patient and made him aware of the change in medicine. He is ok with it and he will pick it up from  on Monday morning. .. [facility] will be pended in a separate message so its prints at . This message was placed under HV.

## 2019-07-26 NOTE — TELEPHONE ENCOUNTER
Post op patient called for refill on Oxycodone medication from [facility]. Would like to  from the [facility] location. Patient aware [facility] and [facility] are out of the office . Patient said he is completely out of the medication. Patient el. [phone]. Note: patient had sx done by [facility] on 05/13/2019 for the left hip.

## 2019-07-26 NOTE — TELEPHONE ENCOUNTER
Please see previous message from todays date, to see more information. Rx was approved by Dr. Bambi Grover for Norco 7.5. rx pended for HS print and patient will  on Monday morning.

## 2019-07-26 NOTE — TELEPHONE ENCOUNTER
Post op Patient called again and is asking if we could have the Script for the [address] for today Friday 7/26/19. Emerya Snowball that his Daughter, Litzy Bobo ( on [address] ), who is around the corner form the [place]  [facility] can pick it up for him today. Patient tel. [phone].

## 2019-08-05 NOTE — TELEPHONE ENCOUNTER
I placed orders for the PT and pain management referral. I then tried to call the patient and received a personal voicemail. I left him a message asking him to call us back.

## 2019-08-29 ENCOUNTER — TELEPHONE (OUTPATIENT)
Dept: ORTHOPEDIC SURGERY | Age: 64
End: 2019-08-29

## 2019-08-29 NOTE — TELEPHONE ENCOUNTER
I called patient to reschedule his appointment 8/30/19 and he asked if he could a refill on his medications.  Please advise patient at [phone]

## 2019-09-06 ENCOUNTER — OFFICE VISIT (OUTPATIENT)
Dept: ORTHOPEDIC SURGERY | Age: 64
End: 2019-09-06

## 2019-09-06 VITALS
HEART RATE: 60 BPM | SYSTOLIC BLOOD PRESSURE: 118 MMHG | TEMPERATURE: 97 F | WEIGHT: 247 LBS | BODY MASS INDEX: 32.74 KG/M2 | OXYGEN SATURATION: 96 % | RESPIRATION RATE: 16 BRPM | DIASTOLIC BLOOD PRESSURE: 69 MMHG | HEIGHT: 73 IN

## 2019-09-06 DIAGNOSIS — M25.512 CHRONIC LEFT SHOULDER PAIN: ICD-10-CM

## 2019-09-06 DIAGNOSIS — G89.29 CHRONIC LEFT SHOULDER PAIN: ICD-10-CM

## 2019-09-06 DIAGNOSIS — M25.552 LEFT HIP PAIN: ICD-10-CM

## 2019-09-06 DIAGNOSIS — Z96.642 STATUS POST HIP REPLACEMENT, LEFT: Primary | ICD-10-CM

## 2019-09-06 RX ORDER — OXYCODONE AND ACETAMINOPHEN 7.5; 325 MG/1; MG/1
1 TABLET ORAL
Qty: 28 TAB | Refills: 0 | Status: SHIPPED | OUTPATIENT
Start: 2019-09-06 | End: 2019-09-13

## 2019-09-06 NOTE — PROGRESS NOTES
[address] [place] [address]  [phone]           Patient: Clevester Leventhal                MRN: [id number]       SSN: [id number]  YOB: 1955        AGE: 75 y.o. SEX: male  Body mass index is 32.59 kg/m². PCP: Charity Gaspar MD  09/06/19      This office note has been dictated. REVIEW OF SYSTEMS:  Constitutional: Negative for fever, chills, weight loss and malaise/fatigue. HENT: Negative. Eyes: Negative. Respiratory: Negative. Cardiovascular: Negative. Gastrointestinal: No bowel incontinence or constipation. Genitourinary: No bladder incontinence or saddle anesthesia. Skin: Negative. Neurological: Negative. Endo/Heme/Allergies: Negative. Psychiatric/Behavioral: Negative. Musculoskeletal: As per HPI above. Past Medical History:   Diagnosis Date    Arthritis     CAD (coronary artery disease)     Essential hypertension     Low back pain     Lumbar postlaminectomy syndrome     Lumbar spondylosis     Myocardial infarction ([facility])     lad stent for stemi,12/2012    Pancreatic cyst          Current Outpatient Medications:     aspirin delayed-release 325 mg tablet, Take 1 Tab by mouth two (2) times a day., Disp: 60 Tab, Rfl: 1    ferrous sulfate 325 mg (65 mg iron) tablet, Take 1 Tab by mouth two (2) times daily (with meals). , Disp: 60 Tab, Rfl: 2    acetaminophen/diphenhydramine (TYLENOL PM PO), Take  by mouth., Disp: , Rfl:     amLODIPine (NORVASC) 10 mg tablet, Take  by mouth daily. , Disp: , Rfl:     methylPREDNISolone (MEDROL DOSEPACK) 4 mg tablet, Per dose pack instructions, Disp: 1 Dose Pack, Rfl: 0    cyclobenzaprine (FLEXERIL) 10 mg tablet, Take 1 Tab by mouth nightly as needed for Muscle Spasm(s). , Disp: 30 Tab, Rfl: 2    cholecalciferol, VITAMIN D3, (VITAMIN D3) 5,000 unit tab tablet, Take  by mouth daily. , Disp: , Rfl:     oxyCODONE IR (ROXICODONE) 15 mg immediate release tablet, Take 1-2 Tabs by mouth every eight (8) hours as needed. Max Daily Amount: 90 mg., Disp: 60 Tab, Rfl: 0    polyethylene glycol (MIRALAX) 17 gram packet, Take 1 Packet by mouth daily. Indications: Constipation, Disp: 30 Packet, Rfl: 1    doxycycline (ADOXA) 100 mg tablet, Take 1 Tab by mouth two (2) times a day., Disp: 20 Tab, Rfl: 0    isoniazid (NYDRAZID) 300 mg tablet, Take 300 mg by mouth daily. , Disp: , Rfl:     carvedilol (COREG) 25 mg tablet, Take 25 mg by mouth two (2) times daily (with meals). , Disp: , Rfl:     hydrochlorothiazide (HYDRODIURIL) 25 mg tablet, Take 25 mg by mouth daily. , Disp: , Rfl:     isosorbide mononitrate ER (IMDUR) 60 mg CR tablet, Take 1 Tab by mouth every morning., Disp: 30 Tab, Rfl: 6    lisinopril (PRINIVIL, ZESTRIL) 20 mg tablet, Take 40 mg by mouth daily. , Disp: , Rfl:     atorvastatin (LIPITOR) 80 mg tablet, Take 80 mg by mouth daily. , Disp: , Rfl:     COMPOUNDMAX BASE crea, 240 g by Does Not Apply route four (4) times daily. Anti-Inflam Diclofenac Sodium 3%Gabapentin 3% Lidocaine 2% Prilocaine 2%, Disp: 240 g, Rfl: 3    ammonium lactate (LAC-HYDRIN) 12 % topical cream, Apply  to affected area two (2) times a day. rub in to affected area well, Disp: 280 g, Rfl: 0    gabapentin (NEURONTIN) 300 mg capsule, Take 1 Cap by mouth three (3) times daily. , Disp: 90 Cap, Rfl: 1    nitroglycerin (NITROSTAT) 0.4 mg SL tablet, by SubLINGual route every five (5) minutes as needed. , Disp: , Rfl:     Allergies   Allergen Reactions    Vicodin [Hydrocodone-Acetaminophen] Nausea and Vomiting       Social History     Socioeconomic History    Marital status: SINGLE     Spouse name: Not on file    Number of children: Not on file    Years of education: Not on file    Highest education level: Not on file   Occupational History    Not on file   Social Needs    Financial resource strain: Not on file    Food insecurity:     Worry: Not on file     Inability: Not on file   [place] Transportation needs:     Medical: Not on file     Non-medical: Not on file   Tobacco Use    Smoking status: Former Smoker     Packs/day: 0.10     Types: Cigarettes     Last attempt to quit: 2012     Years since quittin.8    Smokeless tobacco: Current User   Substance and Sexual Activity    Alcohol use: Yes     Comment: socially    Drug use: Yes     Types: Cocaine, Marijuana, Heroin     Comment: +Cocaine Screen + UDS in      Sexual activity: Not on file     Comment: stopped using   Lifestyle    Physical activity:     Days per week: Not on file     Minutes per session: Not on file    Stress: Not on file   Relationships    Social connections:     Talks on phone: Not on file     Gets together: Not on file     Attends Jain service: Not on file     Active member of club or organization: Not on file     Attends meetings of clubs or organizations: Not on file     Relationship status: Not on file    Intimate partner violence:     Fear of current or ex partner: Not on file     Emotionally abused: Not on file     Physically abused: Not on file     Forced sexual activity: Not on file   Other Topics Concern    Not on file   Social History Narrative    Not on file       Past Surgical History:   Procedure Laterality Date    HAND/FINGER SURGERY UNLISTED      Carpal tunnel    HX CORONARY ARTERY [facility]PASS GRAFT  2014    [facility] HX HIP REPLACEMENT      HX [address]      HX ORTHOPAEDIC      lumbar spine x 5    HX ORTHOPAEDIC      Right hip replacement    HX ORTHOPAEDIC      right foot calous removed    HX PTCA               We did see Mr. Rebekah Camargo for followup with regards to his left shoulder and left hip. The patient is status post left hip replacement. He did well with it. He has had a couple of falls since the surgery. He has landed on his left side. There was an injury to the left shoulder. We sent him for an MRI.   He presents today for reevaluation. We also did some blood work on him, as he was having some residual hip discomfort. The patient does continue to have shoulder discomfort, as well as hip discomfort. He reports laterally-based discomfort of his hip and a little groin discomfort at times. There is no radiating pain down the lower extremities currently. There has been no change in his bowel or bladder habits. PHYSICAL EXAMINATION:  In general, the patient is alert and oriented x 3 in no acute distress. The patient is well-developed, well-nourished, with a normal affect. The patient is afebrile. HEENT:  Head is normocephalic and atraumatic. Pupils are equally round and reactive to light and accommodation. Extraocular eye movements are intact. Neck is supple. Trachea is midline. No JVD is present. Breathing is nonlabored. Examination of the left shoulder reveals the skin is intact. There is no ecchymosis, no warmth, and no signs of infection or cellulitis present. He does have range of motion of about 80ø of forward flexion and 80ø abduction. He has decreased strength in external rotation. There is discomfort to palpation anteriorly, as well as in the subacromial space. Radial pulse is 2+, and capillary refill is within normal limits. Examination of the lower extremities reveals pain-free range of motion of the hips. He does have a little discomfort to palpation of the greater trochanteric bursa on the left side, as well as anteriorly to the psoas tendon. He is able to do a straight leg raise without complications. Leg lengths look good. Strength is 4+/5 on the left and 5/5 on the right. RADIOGRAPHS:  Radiographs in the office today, including AP of the pelvis and AP and cross table of the left hip, show the total hip components are well-fixed with no evidence of loosening or fracture noted.       Review of the MRI of the left shoulder shows severe supraspinatus tendinosis, likely articular sided partial thickness tear, calcific tendinitis, and infraspinatus subscapularis tendinosis noted. There is a split tear in the biceps tendon. The is severe AC arthritis. There is a questionable tear to the superior labrum. There is edema and possible Hill-Sachs deformity in the humeral head noted. LABORATORY:  Review of labs, shows CBC white count of 5.5. IL-6 is 10.2. Uric acid is 8.3. Sed rate is 19. CRP is 6 mg/L. ASSESSMENT:      1. Left shoulder impingement syndrome, rotator cuff pathology, questionable Hill-Sachs deformity in the humeral head, rotator cuff tear partial.  2. Status post left hip replacement. 3. Trochanteric bursitis left hip. PLAN:  At this point, the patient will continue in physical therapy. We are going to refer him over to Dr. Aakash Priest for further evaluation with regards to his shoulder. We will see him back in the office in about six weeks' time after he does some physical therapy. He was given a one-week supply of his pain medicine today.                   JR Sean COKER, PA-C, ATC

## 2019-09-06 NOTE — PROGRESS NOTES
[facility] is a 75 y.o. male presents in office for    Chief Complaint   Patient presents with    Results     MRI & Labs        Visit Vitals  /69 (BP 1 Location: Right arm, BP Patient Position: Sitting)   Pulse 60   Temp 97 °F (36.1 °C) (Temporal)   Resp 16   Ht 6' 1\" (1.854 m)   Wt 247 lb (112 kg)   SpO2 96%   BMI 32.59 kg/m²         Health Maintenance Due   Topic Date Due    Hepatitis C Screening  1955    Shingrix Vaccine Age 50> (1 of 2) 05/19/2003    FOBT Q 1 YEAR AGE 50-75  05/19/2003    DTaP/Tdap/Td series (1 - Tdap) 04/20/2011    MEDICARE YEARLY EXAM  03/14/2018    GLAUCOMA SCREENING Q2Y  05/19/2018    Pneumococcal 65+ years (2 of 2 - PPSV23) 05/19/2018    Influenza Age 9 to Adult  08/01/2019         1. Have you been to the ER, urgent care clinic since your last visit? Hospitalized since your last visit? No    2. Have you seen or consulted any other health care providers outside of the [address] [place] [address] since your last visit? Include any pap smears or colon screening.  No     Learning Assessment 9/6/2019   PRIMARY LEARNER Patient   HIGHEST LEVEL OF EDUCATION - PRIMARY LEARNER  GRADUATED HIGH SCHOOL OR GED   BARRIERS PRIMARY LEARNER NONE   CO-LEARNER CAREGIVER No   PRIMARY LANGUAGE ENGLISH   LEARNER PREFERENCE PRIMARY DEMONSTRATION   ANSWERED BY patient   RELATIONSHIP SELF

## 2019-09-13 ENCOUNTER — OFFICE VISIT (OUTPATIENT)
Dept: ORTHOPEDIC SURGERY | Age: 64
End: 2019-09-13

## 2019-09-13 VITALS
DIASTOLIC BLOOD PRESSURE: 67 MMHG | SYSTOLIC BLOOD PRESSURE: 104 MMHG | OXYGEN SATURATION: 95 % | WEIGHT: 247 LBS | HEIGHT: 73 IN | BODY MASS INDEX: 32.74 KG/M2 | RESPIRATION RATE: 13 BRPM | HEART RATE: 61 BPM | TEMPERATURE: 97.5 F

## 2019-09-13 DIAGNOSIS — S42.452D: ICD-10-CM

## 2019-09-13 DIAGNOSIS — M70.22 OLECRANON BURSITIS OF LEFT ELBOW: ICD-10-CM

## 2019-09-13 DIAGNOSIS — G89.29 CHRONIC LEFT SHOULDER PAIN: Primary | ICD-10-CM

## 2019-09-13 DIAGNOSIS — M25.512 CHRONIC LEFT SHOULDER PAIN: Primary | ICD-10-CM

## 2019-09-13 RX ORDER — TRIAMCINOLONE ACETONIDE 40 MG/ML
40 INJECTION, SUSPENSION INTRA-ARTICULAR; INTRAMUSCULAR ONCE
Qty: 1 VIAL | Refills: 0
Start: 2019-09-13 | End: 2019-09-13

## 2019-09-13 NOTE — PROGRESS NOTES
Patient: Anatoliy Slaughter                MRN: [id number]       SSN: [id number]  YOB: 1955        AGE: 75 y.o. SEX: male  Body mass index is 32.59 kg/m². PCP: Quan Aparicio MD  09/13/19    Chief Complaint: Left shoulder and elbow pain    HISTORY OF PRESENT ILLNESS:  Srinivas Espinosa is a 75year-old male who presents to the office today with left shoulder pain and stiffness and left elbow swelling and pain. The pain began approximately four months ago when he had a fall when he was recovering from a knee replacement. He fell onto his left side. Since that time, he has been wearing a sling. He was told he had an injury to his elbow. He has noticed a swelling over the posterior aspect of his elbow as well. For his shoulder, he has a lot of pain whenever he tries to move it and he has been in a sling now for almost three months. Past Medical History:   Diagnosis Date    Arthritis     CAD (coronary artery disease)     Essential hypertension     Low back pain     Lumbar postlaminectomy syndrome     Lumbar spondylosis     Myocardial infarction (HCC)     lad stent for stemi,12/2012    Pancreatic cyst        Family History   Problem Relation Age of Onset    Heart Attack Neg Hx     Heart Surgery Neg Hx        Current Outpatient Medications   Medication Sig Dispense Refill    triamcinolone acetonide (KENALOG-40) 40 mg/mL injection 1 mL by IntraBURSal route once for 1 dose. 1 Vial 0    oxyCODONE-acetaminophen (PERCOCET) 7.5-325 mg per tablet Take 1 Tab by mouth every six (6) hours as needed for Pain for up to 7 days. Max Daily Amount: 4 Tabs. 28 Tab 0    aspirin delayed-release 325 mg tablet Take 1 Tab by mouth two (2) times a day. 60 Tab 1    ferrous sulfate 325 mg (65 mg iron) tablet Take 1 Tab by mouth two (2) times daily (with meals). 60 Tab 2    acetaminophen/diphenhydramine (TYLENOL PM PO) Take  by mouth.  amLODIPine (NORVASC) 10 mg tablet Take  by mouth daily.  methylPREDNISolone (MEDROL DOSEPACK) 4 mg tablet Per dose pack instructions 1 Dose Pack 0    cyclobenzaprine (FLEXERIL) 10 mg tablet Take 1 Tab by mouth nightly as needed for Muscle Spasm(s). 30 Tab 2    cholecalciferol, VITAMIN D3, (VITAMIN D3) 5,000 unit tab tablet Take  by mouth daily.  COMPOUNDMAX BASE crea 240 g by Does Not Apply route four (4) times daily. Anti-Inflam Diclofenac Sodium 3%Gabapentin 3% Lidocaine 2% Prilocaine 2% 240 g 3    oxyCODONE IR (ROXICODONE) 15 mg immediate release tablet Take 1-2 Tabs by mouth every eight (8) hours as needed. Max Daily Amount: 90 mg. 60 Tab 0    polyethylene glycol (MIRALAX) 17 gram packet Take 1 Packet by mouth daily. Indications: Constipation 30 Packet 1    gabapentin (NEURONTIN) 300 mg capsule Take 1 Cap by mouth three (3) times daily. 90 Cap 1    doxycycline (ADOXA) 100 mg tablet Take 1 Tab by mouth two (2) times a day. 20 Tab 0    isoniazid (NYDRAZID) 300 mg tablet Take 300 mg by mouth daily.  nitroglycerin (NITROSTAT) 0.4 mg SL tablet by SubLINGual route every five (5) minutes as needed.  carvedilol (COREG) 25 mg tablet Take 25 mg by mouth two (2) times daily (with meals).  hydrochlorothiazide (HYDRODIURIL) 25 mg tablet Take 25 mg by mouth daily.  isosorbide mononitrate ER (IMDUR) 60 mg CR tablet Take 1 Tab by mouth every morning. 30 Tab 6    lisinopril (PRINIVIL, ZESTRIL) 20 mg tablet Take 40 mg by mouth daily.  atorvastatin (LIPITOR) 80 mg tablet Take 80 mg by mouth daily.  ammonium lactate (LAC-HYDRIN) 12 % topical cream Apply  to affected area two (2) times a day.  rub in to affected area well 280 g 0       Allergies   Allergen Reactions    Vicodin [Hydrocodone-Acetaminophen] Nausea and Vomiting       Past Surgical History:   Procedure Laterality Date    HAND/FINGER SURGERY UNLISTED      Carpal tunnel    HX CORONARY ARTERY BYPASS GRAFT  2014    at [place] [address] HX HIP REPLACEMENT      HX LUMBAR FUSION      HX ORTHOPAEDIC      lumbar spine x 5    HX ORTHOPAEDIC      Right hip replacement    HX ORTHOPAEDIC      right foot calous removed    HX PTCA         Social History     Socioeconomic History    Marital status: SINGLE     Spouse name: Not on file    Number of children: Not on file    Years of education: Not on file    Highest education level: Not on file   Occupational History    Not on file   Social Needs    Financial resource strain: Not on file    Food insecurity:     Worry: Not on file     Inability: Not on file    Transportation needs:     Medical: Not on file     Non-medical: Not on file   Tobacco Use    Smoking status: Former Smoker     Packs/day: 0.10     Types: Cigarettes     Last attempt to quit: 2012     Years since quittin.8    Smokeless tobacco: Current User   Substance and Sexual Activity    Alcohol use: Yes     Comment: socially    Drug use: Yes     Types: Cocaine, Marijuana, Heroin     Comment: +Cocaine Screen + UDS in      Sexual activity: Not on file     Comment: stopped using   Lifestyle    Physical activity:     Days per week: Not on file     Minutes per session: Not on file    Stress: Not on file   Relationships    Social connections:     Talks on phone: Not on file     Gets together: Not on file     Attends Judaism service: Not on file     Active member of club or organization: Not on file     Attends meetings of clubs or organizations: Not on file     Relationship status: Not on file    Intimate partner violence:     Fear of current or ex partner: Not on file     Emotionally abused: Not on file     Physically abused: Not on file     Forced sexual activity: Not on file   Other Topics Concern    Not on file   Social History Narrative    Not on file       REVIEW OF SYSTEMS:      CON: negative for recent weight loss/gain, fever, or chills  EYE: negative for double or blurry vision  ENT: negative for hoarseness  RS:   negative for cough, URI, SOB  CV:  negative for chest pain, palpitations  GI:    negative for blood in stool, nausea/vomiting  :  negative for blood in urine  MS: As per HPI  Other systems reviewed and noted below. PHYSICAL EXAMINATION:  Visit Vitals  /67   Pulse 61   Temp 97.5 °F (36.4 °C) (Oral)   Resp 13   Ht 6' 1\" (1.854 m)   Wt 247 lb (112 kg)   SpO2 95%   BMI 32.59 kg/m²     Body mass index is 32.59 kg/m². GENERAL: Alert and oriented x3, in no acute distress, well-developed, well-nourished. HEENT: Normocephalic, atraumatic. RESP: Non labored breathing with equal chest rise on inspiration. CV: Well perfused extremities. No cyanosis or clubbing noted. ABDOMEN: Soft, non-tender, non-distended. PHYSICAL EXAM:  Physical exam of the left arm with decreased range of motion about the left shoulder and pain with any attempts at moving it past about 30 degrees of forward flexion, 10 degrees of external rotation in the posterolateral buttock for internal rotation. I am unable to test his rotator cuff due to restrictions in his motion today. He reports prior to the fall, he had full shoulder range of motion. Examination of the elbow with mild crepitus with elbow range of motion. There is a large swelling over the posterior aspect of the proximal forearm over the ulna. He is otherwise neurovascularly intact. IMAGING:  X-rays of the left shoulder were taken in the office today. These show slightly high riding humeral head with some sclerosis of the greater tuberosity consistent with possible longstanding chronic rotator cuff pathology. X-rays of the left elbow were also reviewed. These show ulnohumeral arthritis with an olecranon osteophyte, as well as a large soft tissue swelling over the proximal dorsal ulna. ASSESSMENT AND PLAN:   Yang Gurrola is a 75year-old male with left shoulder stiffness. I recommend physical therapy for that, which he is about to start.   We discussed a cortisone shot, but we will hold off on that. For the left elbow, he has hemorrhagic olecranon bursitis, which I recommended an aspiration and injection today. I aspirated approximately 25 cc of bloody fluid and followed it up with an injection. I will see him back after he is done with physical therapy.         [facility] - [facility]  OFFICE PROCEDURE PROGRESS NOTE        Chart reviewed for the following:   Deya Marr MD, have reviewed the History, Physical and updated the Allergic reactions for [address] performed immediately prior to start of procedure:   Deya Marr MD, have performed the following reviews on Darío Heard prior to the start of the procedure:            * Patient was identified by name and date of birth   * Agreement on procedure being performed was verified  * Risks and Benefits explained to the patient  * Procedure site verified and marked as necessary  * Patient was positioned for comfort  * Consent was signed and verified    Time: 1150      Date of procedure: 9/13/2019    Procedure performed by:  Zuri Chester MD    Provider assisted by: Kwasi Ceja LPN    Patient assisted by: self    How tolerated by patient: tolerated the procedure well with no complications    Post Procedural Pain Scale: 0 - No Hurt    Comments: none                  Electronically signed by: Zuri Chester MD No

## 2019-09-16 ENCOUNTER — TELEPHONE (OUTPATIENT)
Dept: ORTHOPEDIC SURGERY | Age: 64
End: 2019-09-16

## 2019-09-16 NOTE — TELEPHONE ENCOUNTER
Received call from patient asking for [facility] pain medication. Patient would like to  the RX from [facility] location. I advised would have nurses review and call him back to advise accordingly.      patient can be reached at [phone]

## 2019-09-17 ENCOUNTER — TELEPHONE (OUTPATIENT)
Dept: ORTHOPEDIC SURGERY | Age: 64
End: 2019-09-17

## 2019-09-17 NOTE — TELEPHONE ENCOUNTER
Called and spoke to patient. He does not want the prescription voltaren as \"it won't touch his pain\". States he also takes tylenol every night. I advised him that we cannot offer pain medication. Patient was upset but was understanding.

## 2019-09-17 NOTE — TELEPHONE ENCOUNTER
Patient is asking for Percocet 7.5 325mg for his severe hip pain. He is taking therapy for lt hip and having more pain after therapy. Please call him back about  prescription. He will be able to come Thurs for  office.   Patient can be reached at [phone]

## 2019-10-10 ENCOUNTER — OFFICE VISIT (OUTPATIENT)
Dept: ORTHOPEDIC SURGERY | Age: 64
End: 2019-10-10

## 2019-10-10 VITALS
TEMPERATURE: 96.3 F | OXYGEN SATURATION: 99 % | SYSTOLIC BLOOD PRESSURE: 145 MMHG | RESPIRATION RATE: 18 BRPM | HEART RATE: 62 BPM | WEIGHT: 236.6 LBS | HEIGHT: 73 IN | DIASTOLIC BLOOD PRESSURE: 83 MMHG | BODY MASS INDEX: 31.36 KG/M2

## 2019-10-10 DIAGNOSIS — M25.552 LEFT HIP PAIN: ICD-10-CM

## 2019-10-10 DIAGNOSIS — Z96.642 STATUS POST HIP REPLACEMENT, LEFT: Primary | ICD-10-CM

## 2019-10-10 RX ORDER — HYDROCODONE BITARTRATE AND ACETAMINOPHEN 7.5; 325 MG/1; MG/1
1 TABLET ORAL
Qty: 28 TAB | Refills: 0 | Status: SHIPPED | OUTPATIENT
Start: 2019-10-10 | End: 2019-10-17

## 2019-10-10 NOTE — PROGRESS NOTES
1. Have you been to the ER, urgent care clinic since your last visit? Hospitalized since your last visit? No    2. Have you seen or consulted any other health care providers outside of the [address] [place] [address] since your last visit? Include any pap smears or colon screening.  No

## 2019-10-10 NOTE — PROGRESS NOTES
[address] [place] [address]  [phone]           Patient: Marcie Rae                MRN: [id number]       SSN: [id number]  YOB: 1955        AGE: 75 y.o. SEX: male  Body mass index is 31.22 kg/m². PCP: Luana Ingram MD  10/10/19      This office note has been dictated. REVIEW OF SYSTEMS:  Constitutional: Negative for fever, chills, weight loss and malaise/fatigue. HENT: Negative. Eyes: Negative. Respiratory: Negative. Cardiovascular: Negative. Gastrointestinal: No bowel incontinence or constipation. Genitourinary: No bladder incontinence or saddle anesthesia. Skin: Negative. Neurological: Negative. Endo/Heme/Allergies: Negative. Psychiatric/Behavioral: Negative. Musculoskeletal: As per HPI above. Past Medical History:   Diagnosis Date    Arthritis     CAD (coronary artery disease)     Essential hypertension     Low back pain     Lumbar postlaminectomy syndrome     Lumbar spondylosis     Myocardial infarction (HCC)     lad stent for stemi,12/2012    Pancreatic cyst          Current Outpatient Medications:     aspirin delayed-release 325 mg tablet, Take 1 Tab by mouth two (2) times a day., Disp: 60 Tab, Rfl: 1    ferrous sulfate 325 mg (65 mg iron) tablet, Take 1 Tab by mouth two (2) times daily (with meals). , Disp: 60 Tab, Rfl: 2    acetaminophen/diphenhydramine (TYLENOL PM PO), Take  by mouth., Disp: , Rfl:     amLODIPine (NORVASC) 10 mg tablet, Take  by mouth daily. , Disp: , Rfl:     methylPREDNISolone (MEDROL DOSEPACK) 4 mg tablet, Per dose pack instructions, Disp: 1 Dose Pack, Rfl: 0    cyclobenzaprine (FLEXERIL) 10 mg tablet, Take 1 Tab by mouth nightly as needed for Muscle Spasm(s). , Disp: 30 Tab, Rfl: 2    cholecalciferol, VITAMIN D3, (VITAMIN D3) 5,000 unit tab tablet, Take  by mouth daily. , Disp: , Rfl:     COMPOUNDMAX BASE crea, 240 g by Does Not Apply route four (4) times daily. Anti-Inflam Diclofenac Sodium 3%Gabapentin 3% Lidocaine 2% Prilocaine 2%, Disp: 240 g, Rfl: 3    ammonium lactate (LAC-HYDRIN) 12 % topical cream, Apply  to affected area two (2) times a day. rub in to affected area well, Disp: 280 g, Rfl: 0    oxyCODONE IR (ROXICODONE) 15 mg immediate release tablet, Take 1-2 Tabs by mouth every eight (8) hours as needed. Max Daily Amount: 90 mg., Disp: 60 Tab, Rfl: 0    polyethylene glycol (MIRALAX) 17 gram packet, Take 1 Packet by mouth daily. Indications: Constipation, Disp: 30 Packet, Rfl: 1    gabapentin (NEURONTIN) 300 mg capsule, Take 1 Cap by mouth three (3) times daily. , Disp: 90 Cap, Rfl: 1    doxycycline (ADOXA) 100 mg tablet, Take 1 Tab by mouth two (2) times a day., Disp: 20 Tab, Rfl: 0    isoniazid (NYDRAZID) 300 mg tablet, Take 300 mg by mouth daily. , Disp: , Rfl:     nitroglycerin (NITROSTAT) 0.4 mg SL tablet, by SubLINGual route every five (5) minutes as needed. , Disp: , Rfl:     carvedilol (COREG) 25 mg tablet, Take 25 mg by mouth two (2) times daily (with meals). , Disp: , Rfl:     hydrochlorothiazide (HYDRODIURIL) 25 mg tablet, Take 25 mg by mouth daily. , Disp: , Rfl:     isosorbide mononitrate ER (IMDUR) 60 mg CR tablet, Take 1 Tab by mouth every morning., Disp: 30 Tab, Rfl: 6    lisinopril (PRINIVIL, ZESTRIL) 20 mg tablet, Take 40 mg by mouth daily. , Disp: , Rfl:     atorvastatin (LIPITOR) 80 mg tablet, Take 80 mg by mouth daily. , Disp: , Rfl:     Allergies   Allergen Reactions    Vicodin [Hydrocodone-Acetaminophen] Nausea and Vomiting       Social History     Socioeconomic History    Marital status: SINGLE     Spouse name: Not on file    Number of children: Not on file    Years of education: Not on file    Highest education level: Not on file   Occupational History    Not on file   Social Needs    Financial resource strain: Not on file    Food insecurity:     Worry: Not on file     Inability: Not on file   [address] [place] [address] Transportation needs:     Medical: Not on file     Non-medical: Not on file   Tobacco Use    Smoking status: Former Smoker     Packs/day: 0.10     Types: Cigarettes     Last attempt to quit: 2012     Years since quittin.9    Smokeless tobacco: Current User   Substance and Sexual Activity    Alcohol use: Yes     Comment: socially    Drug use: Yes     Types: Cocaine, Marijuana, Heroin     Comment: +Cocaine Screen + UDS in      Sexual activity: Not on file     Comment: stopped using   Lifestyle    Physical activity:     Days per week: Not on file     Minutes per session: Not on file    Stress: Not on file   Relationships    Social connections:     Talks on phone: Not on file     Gets together: Not on file     Attends Quaker service: Not on file     Active member of club or organization: Not on file     Attends meetings of clubs or organizations: Not on file     Relationship status: Not on file    Intimate partner violence:     Fear of current or ex partner: Not on file     Emotionally abused: Not on file     Physically abused: Not on file     Forced sexual activity: Not on file   Other Topics Concern    Not on file   Social History Narrative    Not on file       Past Surgical History:   Procedure Laterality Date    HAND/FINGER SURGERY UNLISTED      Carpal tunnel    HX CORONARY ARTERY BYPASS GRAFT      at [facility] HX HIP REPLACEMENT      HX [address]      HX ORTHOPAEDIC      lumbar spine x 5    HX ORTHOPAEDIC      Right hip replacement    HX ORTHOPAEDIC      right foot calous removed    HX PTCA             We did see Mr. Vel Costa for followup with regards to his left hip. The patient had bilateral hip replacements done. The left one was done about five months ago. The patient states he is still having a little bit of weakness in his leg. He does have a history of back problems. He does continue physical therapy.   He did have a fall, fortunately without injury. He has having laterally-based pain of his hip, as well as anteriorly. He has had no fever, chills, or night sweats and no weight loss. PHYSICAL EXAMINATION:  In general, the patient is alert and oriented x 3 in no acute distress. The patient is well-developed, well-nourished, with a normal affect. The patient is afebrile. HEENT:  Head is normocephalic and atraumatic. Pupils are equally round and reactive to light and accommodation. Extraocular eye movements are intact. Neck is supple. Trachea is midline. No JVD is present. Breathing is nonlabored. Examination of the lower extremities reveals pain-free range of motion of the hips. He does have discomfort to palpation of the greater trochanteric bursae on the left side, as well as anteriorly at the psoas tendon. There is some discomfort with resisted hip flexion on the left side. He is able to hold a straight leg raise. There is negative calf tenderness. There is negative Tena's. There is no evidence of DVT present. RADIOGRAPHS:  Radiographs in the office today, including AP of the pelvis and AP and cross table of the left hip, show the total hip components are well-fixed with no evidence of loosening or fracture noted. ASSESSMENT:      1. Status post left hip replacement. 2. Psoas tendinitis left hip. 3. Trochanteric bursitis left hip. 4. Lumbar radiculopathy. PLAN:  At this point, the patient will continue physical therapy. I am going to start him on Celebrex. He was instructed on use, as well as precautions and was given a one-week supply of Norco.  We are going to add iontophoresis, ultrasound, psoas stretching, and IT band stretching to physical therapy. We are going to give him a prescription for a rolling walker with a seat. We will plan on seeing him back in about six weeks' time after he finishes up his physical therapy. He will call with any questions or concerns that shall arise. JR Estrada MPAS, PAANKUR, ATC

## 2019-11-07 ENCOUNTER — OFFICE VISIT (OUTPATIENT)
Dept: ORTHOPEDIC SURGERY | Facility: CLINIC | Age: 64
End: 2019-11-07

## 2019-11-07 VITALS
HEIGHT: 73 IN | OXYGEN SATURATION: 97 % | RESPIRATION RATE: 18 BRPM | TEMPERATURE: 96.8 F | BODY MASS INDEX: 36.18 KG/M2 | HEART RATE: 55 BPM | WEIGHT: 273 LBS | DIASTOLIC BLOOD PRESSURE: 80 MMHG | SYSTOLIC BLOOD PRESSURE: 135 MMHG

## 2019-11-07 DIAGNOSIS — M54.16 LUMBAR RADICULOPATHY: ICD-10-CM

## 2019-11-07 DIAGNOSIS — Z91.81 HISTORY OF RECENT FALL: Primary | ICD-10-CM

## 2019-11-07 DIAGNOSIS — Z96.642 HISTORY OF LEFT HIP REPLACEMENT: ICD-10-CM

## 2019-11-07 RX ORDER — TRAMADOL HYDROCHLORIDE 50 MG/1
50 TABLET ORAL
Qty: 21 TAB | Refills: 0 | Status: SHIPPED | OUTPATIENT
Start: 2019-11-07 | End: 2019-11-14

## 2019-11-07 RX ORDER — TIMOLOL MALEATE 5 MG/ML
1 SOLUTION/ DROPS OPHTHALMIC 2 TIMES DAILY
COMMUNITY

## 2019-11-07 RX ORDER — LATANOPROST 50 UG/ML
SOLUTION/ DROPS OPHTHALMIC
COMMUNITY
Start: 2019-08-19

## 2019-11-07 NOTE — PROGRESS NOTES
Patient: Kathy Burris                MRN: [id number]       SSN: [id number]  YOB: 1955        AGE: 75 y.o. SEX: male  Body mass index is 36.02 kg/m². PCP: Caprice Sharma MD  11/07/19    HISTORY OF PRESENT ILLNESS:  I had the pleasure of reviewing Clarissa Bridges. As you know, we replaced his left hip in the springtime and he is doing well with it. The right hip he had a fall. He was moving into a new home and he has been having some problems radiculopathy running down the right leg and some weakness and fell. He went to [place]. They x-rayed his hip and back and negative for acute fracture. He reports some burning dysesthesias going all the way down the right leg and some weakness as well, which I think is attributed to the fall. PHYSICAL EXAMINATION:  On examination today, his quads are intact. He can flex his hip. He has a little bit of weakness involving the right ankle dorsiflexor. He has a fair bit of numbness on L4-5 on the right side compared to the left. No cyanosis, peripheral edema or clubbing. He is alert and oriented. He denies shortness of breath or calf pain. REVIEW OF SYSTEMS:  A 12-point review of systems performed today. Pertinent positives noted. All other systems reviewed and otherwise are negative. RADIOGRAPHS:  AP and lateral of the hip are negative for fracture. Good position and alignment, well fixed. I can see on the lumbar spine he has some pedicle screws and a fair bit of degenerative arthritis above the previous area of fusion. IMPRESSION:  Fall, contusion, and also right leg weakness with radiculopathy contributing to the fall. I therefore recommend an MRI with contrast and see what the back looks like. He would like to hold off on the Medrol Dosepak for the time being. CC:  Andre Yepez M.D.          REVIEW OF SYSTEMS:      CON: negative for weight loss, fever  EYE: negative for double vision  ENT: negative for hoarseness  RS:   negative for Tb  GI:    negative for blood in stool  :  negative for blood in urine  Other systems reviewed and noted below. Past Medical History:   Diagnosis Date    Arthritis     CAD (coronary artery disease)     Essential hypertension     Low back pain     Lumbar postlaminectomy syndrome     Lumbar spondylosis     Myocardial infarction ([facility] [address]     lad stent for stemi,12/2012    Pancreatic cyst        Family History   Problem Relation Age of Onset    Heart Attack Neg Hx     Heart Surgery Neg Hx        Current Outpatient Medications   Medication Sig Dispense Refill    timolol (TIMOPTIC) 0.5 % ophthalmic solution 1 Drop two (2) times a day.  aspirin delayed-release 325 mg tablet Take 1 Tab by mouth two (2) times a day. 60 Tab 1    ferrous sulfate 325 mg (65 mg iron) tablet Take 1 Tab by mouth two (2) times daily (with meals). 60 Tab 2    acetaminophen/diphenhydramine (TYLENOL PM PO) Take  by mouth.  amLODIPine (NORVASC) 10 mg tablet Take  by mouth daily.  cholecalciferol, VITAMIN D3, (VITAMIN D3) 5,000 unit tab tablet Take  by mouth daily.  oxyCODONE IR (ROXICODONE) 15 mg immediate release tablet Take 1-2 Tabs by mouth every eight (8) hours as needed. Max Daily Amount: 90 mg. 60 Tab 0    nitroglycerin (NITROSTAT) 0.4 mg SL tablet by SubLINGual route every five (5) minutes as needed.  carvedilol (COREG) 25 mg tablet Take 25 mg by mouth two (2) times daily (with meals).  hydrochlorothiazide (HYDRODIURIL) 25 mg tablet Take 25 mg by mouth daily.  isosorbide mononitrate ER (IMDUR) 60 mg CR tablet Take 1 Tab by mouth every morning. 30 Tab 6    lisinopril (PRINIVIL, ZESTRIL) 20 mg tablet Take 40 mg by mouth daily.  atorvastatin (LIPITOR) 80 mg tablet Take 80 mg by mouth daily.       latanoprost (XALATAN) 0.005 % ophthalmic solution       methylPREDNISolone (MEDROL DOSEPACK) 4 mg tablet Per dose pack instructions 1 Dose Pack 0    cyclobenzaprine (FLEXERIL) 10 mg tablet Take 1 Tab by mouth nightly as needed for Muscle Spasm(s). 30 Tab 2    COMPOUNDMAX BASE crea 240 g by Does Not Apply route four (4) times daily. Anti-Inflam Diclofenac Sodium 3%Gabapentin 3% Lidocaine 2% Prilocaine 2% 240 g 3    ammonium lactate (LAC-HYDRIN) 12 % topical cream Apply  to affected area two (2) times a day. rub in to affected area well 280 g 0    polyethylene glycol (MIRALAX) 17 gram packet Take 1 Packet by mouth daily. Indications: Constipation 30 Packet 1    gabapentin (NEURONTIN) 300 mg capsule Take 1 Cap by mouth three (3) times daily. 90 Cap 1    doxycycline (ADOXA) 100 mg tablet Take 1 Tab by mouth two (2) times a day. 20 Tab 0    isoniazid (NYDRAZID) 300 mg tablet Take 300 mg by mouth daily.          Allergies   Allergen Reactions    Vicodin [Hydrocodone-Acetaminophen] Nausea and Vomiting       Past Surgical History:   Procedure Laterality Date    HAND/FINGER SURGERY UNLISTED      Carpal tunnel    HX CORONARY ARTERY BYPASS GRAFT      [facility] HX HIP REPLACEMENT      HX LUMBAR FUSION      HX ORTHOPAEDIC      lumbar spine x 5    HX ORTHOPAEDIC      Right hip replacement    HX ORTHOPAEDIC      right foot calous removed    HX PTCA         Social History     Socioeconomic History    Marital status: SINGLE     Spouse name: Not on file    Number of children: Not on file    Years of education: Not on file    Highest education level: Not on file   Occupational History    Not on file   Social Needs    Financial resource strain: Not on file    Food insecurity:     Worry: Not on file     Inability: Not on file    Transportation needs:     Medical: Not on file     Non-medical: Not on file   Tobacco Use    Smoking status: Former Smoker     Packs/day: 0.10     Types: Cigarettes     Last attempt to quit: 2012     Years since quittin.0    Smokeless tobacco: Current User   Substance and Sexual Activity  Alcohol use: Yes     Comment: socially    Drug use: Yes     Types: Cocaine, Marijuana, Heroin     Comment: +Cocaine Screen + UDS in August, 2017     Sexual activity: Not on file     Comment: stopped using   Lifestyle    Physical activity:     Days per week: Not on file     Minutes per session: Not on file    Stress: Not on file   Relationships    Social connections:     Talks on phone: Not on file     Gets together: Not on file     Attends [employer] service: Not on file     Active member of club or organization: Not on file     Attends meetings of clubs or organizations: Not on file     Relationship status: Not on file    Intimate partner violence:     Fear of current or ex partner: Not on file     Emotionally abused: Not on file     Physically abused: Not on file     Forced sexual activity: Not on file   Other Topics Concern    Not on file   Social History Narrative    Not on file       Visit Vitals  /80   Pulse (!) 55   Temp 96.8 °F (36 °C) (Oral)   Resp 18   Ht 6' 1\" (1.854 m)   Wt 273 lb (123.8 kg)   SpO2 97%   BMI 36.02 kg/m²         PHYSICAL EXAMINATION:  GENERAL: Alert and oriented x3, in no acute distress, well-developed, well-nourished, afebrile. HEART: No JVD. EYES: No scleral icterus   NECK: No significant lymphadenopathy   LUNGS: No respiratory compromise or indrawing  ABDOMEN: Soft, non-tender, non-distended. Electronically signed by:  Elma Hernandez MD

## 2019-11-07 NOTE — PATIENT INSTRUCTIONS
You have been provided with an order for durable medical equipment that you may  at an outside facility as our office does not carry the equipment you need. You may pick it up at any medical supply company you like. Listed below are a few different locations for your convenience: [facility] [address] [place] [address] Street Phone: [phone] . [address] Phone: [phone] [address] [place] [address] Phone: [phone]

## 2019-11-18 ENCOUNTER — HOSPITAL ENCOUNTER (OUTPATIENT)
Age: 64
Discharge: HOME | End: 2019-11-18
Attending: ORTHOPAEDIC SURGERY
Payer: MEDICARE

## 2019-11-18 DIAGNOSIS — M54.16 LUMBAR RADICULOPATHY: ICD-10-CM

## 2019-11-18 PROCEDURE — A9575 INJ GADOTERATE MEGLUMI 0.1ML: HCPCS | Performed by: ORTHOPAEDIC SURGERY

## 2019-11-18 PROCEDURE — 72158 MRI LUMBAR SPINE W/O & W/DYE: CPT

## 2019-11-18 PROCEDURE — 82565 ASSAY OF CREATININE: CPT

## 2019-11-18 PROCEDURE — 74011636320 HC RX REV CODE- 636/320: Performed by: ORTHOPAEDIC SURGERY

## 2019-11-18 RX ADMIN — GADOTERATE MEGLUMINE 25 ML: 376.9 INJECTION INTRAVENOUS at 13:00

## 2019-11-19 LAB — CREAT UR-MCNC: 1.3 MG/DL (ref 0.6–1.3)

## 2019-11-27 ENCOUNTER — HOSPITAL ENCOUNTER (OUTPATIENT)
Age: 64
Discharge: HOME | End: 2019-11-27
Attending: ORTHOPAEDIC SURGERY
Payer: MEDICARE

## 2019-11-27 PROCEDURE — 72158 MRI LUMBAR SPINE W/O & W/DYE: CPT

## 2019-12-12 ENCOUNTER — OFFICE VISIT (OUTPATIENT)
Dept: ORTHOPEDIC SURGERY | Age: 64
End: 2019-12-12

## 2019-12-12 VITALS
HEART RATE: 62 BPM | BODY MASS INDEX: 36.02 KG/M2 | SYSTOLIC BLOOD PRESSURE: 167 MMHG | HEIGHT: 73 IN | DIASTOLIC BLOOD PRESSURE: 96 MMHG

## 2019-12-12 DIAGNOSIS — M54.50 LUMBAR PAIN: Primary | ICD-10-CM

## 2019-12-12 DIAGNOSIS — Z96.642 HISTORY OF LEFT HIP REPLACEMENT: ICD-10-CM

## 2019-12-12 DIAGNOSIS — M54.16 LUMBAR RADICULOPATHY: ICD-10-CM

## 2019-12-12 NOTE — PROGRESS NOTES
[address] [place] [address]  [phone]           Patient: Arlyn Duckworth                MRN: [id number]       SSN: [id number]  YOB: 1955        AGE: 75 y.o. SEX: male  Body mass index is 36.02 kg/m². PCP: Fatou Warren MD  12/12/19      This office note has been dictated. REVIEW OF SYSTEMS:  Constitutional: Negative for fever, chills, weight loss and malaise/fatigue. HENT: Negative. Eyes: Negative. Respiratory: Negative. Cardiovascular: Negative. Gastrointestinal: No bowel incontinence or constipation. Genitourinary: No bladder incontinence or saddle anesthesia. Skin: Negative. Neurological: Negative. Endo/Heme/Allergies: Negative. Psychiatric/Behavioral: Negative. Musculoskeletal: As per HPI above. Past Medical History:   Diagnosis Date    Arthritis     CAD (coronary artery disease)     Essential hypertension     Low back pain     Lumbar postlaminectomy syndrome     Lumbar spondylosis     Myocardial infarction ([facility])     lad stent for stemi,12/2012    Pancreatic cyst          Current Outpatient Medications:     latanoprost (XALATAN) 0.005 % ophthalmic solution, , Disp: , Rfl:     timolol (TIMOPTIC) 0.5 % ophthalmic solution, 1 Drop two (2) times a day., Disp: , Rfl:     aspirin delayed-release 325 mg tablet, Take 1 Tab by mouth two (2) times a day., Disp: 60 Tab, Rfl: 1    ferrous sulfate 325 mg (65 mg iron) tablet, Take 1 Tab by mouth two (2) times daily (with meals). , Disp: 60 Tab, Rfl: 2    acetaminophen/diphenhydramine (TYLENOL PM PO), Take  by mouth., Disp: , Rfl:     amLODIPine (NORVASC) 10 mg tablet, Take  by mouth daily. , Disp: , Rfl:     methylPREDNISolone (MEDROL DOSEPACK) 4 mg tablet, Per dose pack instructions, Disp: 1 Dose Pack, Rfl: 0    cyclobenzaprine (FLEXERIL) 10 mg tablet, Take 1 Tab by mouth nightly as needed for Muscle Spasm(s). , Disp: 30 Tab, Rfl: 2    cholecalciferol, VITAMIN D3, (VITAMIN D3) 5,000 unit tab tablet, Take  by mouth daily. , Disp: , Rfl:     COMPOUNDMAX BASE crea, 240 g by Does Not Apply route four (4) times daily. Anti-Inflam Diclofenac Sodium 3%Gabapentin 3% Lidocaine 2% Prilocaine 2%, Disp: 240 g, Rfl: 3    ammonium lactate (LAC-HYDRIN) 12 % topical cream, Apply  to affected area two (2) times a day. rub in to affected area well, Disp: 280 g, Rfl: 0    oxyCODONE IR (ROXICODONE) 15 mg immediate release tablet, Take 1-2 Tabs by mouth every eight (8) hours as needed. Max Daily Amount: 90 mg., Disp: 60 Tab, Rfl: 0    polyethylene glycol (MIRALAX) 17 gram packet, Take 1 Packet by mouth daily. Indications: Constipation, Disp: 30 Packet, Rfl: 1    gabapentin (NEURONTIN) 300 mg capsule, Take 1 Cap by mouth three (3) times daily. , Disp: 90 Cap, Rfl: 1    doxycycline (ADOXA) 100 mg tablet, Take 1 Tab by mouth two (2) times a day., Disp: 20 Tab, Rfl: 0    isoniazid (NYDRAZID) 300 mg tablet, Take 300 mg by mouth daily. , Disp: , Rfl:     nitroglycerin (NITROSTAT) 0.4 mg SL tablet, by SubLINGual route every five (5) minutes as needed. , Disp: , Rfl:     carvedilol (COREG) 25 mg tablet, Take 25 mg by mouth two (2) times daily (with meals). , Disp: , Rfl:     hydrochlorothiazide (HYDRODIURIL) 25 mg tablet, Take 25 mg by mouth daily. , Disp: , Rfl:     isosorbide mononitrate ER (IMDUR) 60 mg CR tablet, Take 1 Tab by mouth every morning., Disp: 30 Tab, Rfl: 6    lisinopril (PRINIVIL, ZESTRIL) 20 mg tablet, Take 40 mg by mouth daily. , Disp: , Rfl:     atorvastatin (LIPITOR) 80 mg tablet, Take 80 mg by mouth daily. , Disp: , Rfl:     Allergies   Allergen Reactions    Vicodin [Hydrocodone-Acetaminophen] Nausea and Vomiting       Social History     Socioeconomic History    Marital status: SINGLE     Spouse name: Not on file    Number of children: Not on file    Years of education: Not on file    Highest education level: Not on file Occupational History    Not on file   Social Needs    Financial resource strain: Not on file    Food insecurity:     Worry: Not on file     Inability: Not on file    Transportation needs:     Medical: Not on file     Non-medical: Not on file   Tobacco Use    Smoking status: Former Smoker     Packs/day: 0.10     Types: Cigarettes     Last attempt to quit: 2012     Years since quittin.1    Smokeless tobacco: Current User   Substance and Sexual Activity    Alcohol use: Yes     Comment: socially    Drug use: Yes     Types: Cocaine, Marijuana, Heroin     Comment: +Cocaine Screen + UDS in      Sexual activity: Not on file     Comment: stopped using   Lifestyle    Physical activity:     Days per week: Not on file     Minutes per session: Not on file    Stress: Not on file   Relationships    Social connections:     Talks on phone: Not on file     Gets together: Not on file     Attends [employer] service: Not on file     Active member of club or organization: Not on file     Attends meetings of clubs or organizations: Not on file     Relationship status: Not on file    Intimate partner violence:     Fear of current or ex partner: Not on file     Emotionally abused: Not on file     Physically abused: Not on file     Forced sexual activity: Not on file   Other Topics Concern    Not on file   Social History Narrative    Not on file       Past Surgical History:   Procedure Laterality Date    HAND/FINGER SURGERY UNLISTED      Carpal tunnel    HX CORONARY ARTERY BYPASS GRAFT  2014    [facility] HX HIP REPLACEMENT      HX [address]      HX ORTHOPAEDIC      lumbar spine x 5    HX ORTHOPAEDIC      Right hip replacement    HX ORTHOPAEDIC      right foot calous removed    HX PTCA                 We did see Mr. Alem Diaz for followup with regards to his hips, as well as his back. The patient was seen by Dr. Maddie Elizalde. He has had a couple of falls.   He is status post hip replacement surgery. He has done well with it. He is having some weakness of the leg, especially on the right side and paresthesias in the right lower extremity. He has had a history of some back surgery done by Dr. Lizandro Santos in the [place] area. He has had no change in his bowel or bladder habits and no fevers or chills to report. PHYSICAL EXAMINATION:  In general, the patient is alert and oriented x 3 in no acute distress. The patient is well-developed, well-nourished, with a normal affect. The patient is afebrile. HEENT:  Head is normocephalic and atraumatic. Pupils are equally round and reactive to light and accommodation. Extraocular eye movements are intact. Neck is supple. Trachea is midline. No JVD is present. Breathing is nonlabored. Examination of the lower extremities reveals good range of motion of the hips. There is no pain to palpation of the greater trochanteric bursae. There is negative straight leg raise. There is negative calf tenderness. There is negative Tena's. There is no evidence of DVT present. He does have slightly decreased sensation to the lower extremity to his thigh. RADIOGRAPHS:  Review of the MRI of the lumbar spine shows post surgical changes with some severe spinal stenosis at L2-3, potential impingement across the nerve roots, also significant foraminal stenosis right L2-3 and left L5-S1.    ASSESSMENT:      1. Lumbar degenerative disc disease and stenosis. 2. Lumbar radiculopathy. 3. Status post hip replacement surgery. PLAN:  At this point, the patient states he is going to try to catch up with the surgeon who did his back surgery, Dr. Lizandro Santos in [place]. I did offer him a referral over to our spine guys. He declines currently. We will see him back as needed.                 JR Sean COKER, PA-C, ATC

## 2020-01-20 ENCOUNTER — OFFICE VISIT (OUTPATIENT)
Dept: FAMILY MEDICINE CLINIC | Age: 65
End: 2020-01-20

## 2020-01-20 VITALS
TEMPERATURE: 97.7 F | RESPIRATION RATE: 16 BRPM | HEIGHT: 73 IN | WEIGHT: 284 LBS | HEART RATE: 66 BPM | SYSTOLIC BLOOD PRESSURE: 137 MMHG | DIASTOLIC BLOOD PRESSURE: 77 MMHG | BODY MASS INDEX: 37.64 KG/M2 | OXYGEN SATURATION: 97 %

## 2020-01-20 DIAGNOSIS — I10 ESSENTIAL HYPERTENSION, BENIGN: Primary | ICD-10-CM

## 2020-01-20 DIAGNOSIS — M54.16 LUMBAR NEURITIS: ICD-10-CM

## 2020-01-20 DIAGNOSIS — Z12.5 SCREENING FOR MALIGNANT NEOPLASM OF PROSTATE: ICD-10-CM

## 2020-01-20 DIAGNOSIS — I25.119 ATHEROSCLEROSIS OF NATIVE CORONARY ARTERY OF NATIVE HEART WITH ANGINA PECTORIS (HCC): ICD-10-CM

## 2020-01-20 DIAGNOSIS — E66.01 SEVERE OBESITY (HCC): ICD-10-CM

## 2020-01-20 DIAGNOSIS — E78.5 DYSLIPIDEMIA: ICD-10-CM

## 2020-01-20 DIAGNOSIS — E66.9 OBESITY (BMI 30-39.9): ICD-10-CM

## 2020-01-20 DIAGNOSIS — M96.1 POSTLAMINECTOMY SYNDROME, LUMBAR REGION: ICD-10-CM

## 2020-01-20 RX ORDER — POLYETHYLENE GLYCOL 3350 17 G/17G
17 POWDER, FOR SOLUTION ORAL DAILY
Qty: 30 PACKET | Refills: 1 | Status: SHIPPED | OUTPATIENT
Start: 2020-01-20 | End: 2021-07-26

## 2020-01-20 RX ORDER — AMLODIPINE BESYLATE AND ATORVASTATIN CALCIUM 10; 10 MG/1; MG/1
1 TABLET, FILM COATED ORAL DAILY
COMMUNITY
End: 2020-07-16

## 2020-01-20 RX ORDER — CLOPIDOGREL BISULFATE 75 MG/1
TABLET ORAL
COMMUNITY
End: 2020-06-02 | Stop reason: SDUPTHER

## 2020-01-20 NOTE — PROGRESS NOTES
HPI  Castro Salmon comes in to establish care. He has been seen through the [place] clinic. HTN: Patient has hypertension. He is on amlodipine and lisinopril. He denies headache or focal weakness. Glaucoma: he has pain and discomfort right eye due to glaucoma, he has been f/u by the ophthalmologist.  Chronic pain: he is seen by [facility] for chronic back pain, he has had surgery to his back with plates and screws placed. He is on oxycodone and gabapentin. He is also on tylenol. Dyslipidemia: he is on atorvastatin. He is also on injectable medication for cholesterol management. CAD: he has coronary stents x 3 placed 2013. He is on lisinoprol and plavix. He is on also on aspirin. He is seen by the cardiologist Dr Robb Lala. He has f/u appointment next month. Denies chest pain, SOB or diaphoresis. Obesity: he will intensify lifestyle and dietary modification. Constipation: he would like medication for constipation. I will give miralax. CKD: Patient has CKD stage 3. Will plan to recheck labs at next visit. Past Medical History  Past Medical History:   Diagnosis Date    Arthritis     CAD (coronary artery disease)     Essential hypertension     Low back pain     Lumbar postlaminectomy syndrome     Lumbar spondylosis     Myocardial infarction ([facility] [address]     lad stent for stemi,12/2012    Pancreatic cyst        Surgical History  Past Surgical History:   Procedure Laterality Date    HAND/FINGER SURGERY UNLISTED      Carpal tunnel    HX CORONARY ARTERY BYPASS GRAFT  2014    [facility] HX HIP REPLACEMENT      HX LUMBAR FUSION      HX ORTHOPAEDIC      lumbar spine x 5    HX ORTHOPAEDIC      Right hip replacement    HX ORTHOPAEDIC      right foot calous removed    HX PTCA          Medications  Current Outpatient Medications   Medication Sig Dispense Refill    amLODIPine-atorvastatin (CADUET) 10-10 mg per tablet Take 1 Tab by mouth daily.  clopidogreL (PLAVIX) 75 mg tab Take  by mouth.  latanoprost (XALATAN) 0.005 % ophthalmic solution       timolol (TIMOPTIC) 0.5 % ophthalmic solution 1 Drop two (2) times a day.  aspirin delayed-release 325 mg tablet Take 1 Tab by mouth two (2) times a day. 60 Tab 1    amLODIPine (NORVASC) 10 mg tablet Take  by mouth daily.  cyclobenzaprine (FLEXERIL) 10 mg tablet Take 1 Tab by mouth nightly as needed for Muscle Spasm(s). 30 Tab 2    cholecalciferol, VITAMIN D3, (VITAMIN D3) 5,000 unit tab tablet Take  by mouth daily.  oxyCODONE IR (ROXICODONE) 15 mg immediate release tablet Take 1-2 Tabs by mouth every eight (8) hours as needed. Max Daily Amount: 90 mg. 60 Tab 0    polyethylene glycol (MIRALAX) 17 gram packet Take 1 Packet by mouth daily. Indications: Constipation 30 Packet 1    gabapentin (NEURONTIN) 300 mg capsule Take 1 Cap by mouth three (3) times daily. 90 Cap 1    nitroglycerin (NITROSTAT) 0.4 mg SL tablet by SubLINGual route every five (5) minutes as needed.  lisinopril (PRINIVIL, ZESTRIL) 20 mg tablet Take 40 mg by mouth daily.  atorvastatin (LIPITOR) 80 mg tablet Take 80 mg by mouth daily.  ferrous sulfate 325 mg (65 mg iron) tablet Take 1 Tab by mouth two (2) times daily (with meals). 60 Tab 2    acetaminophen/diphenhydramine (TYLENOL PM PO) Take  by mouth.  methylPREDNISolone (MEDROL DOSEPACK) 4 mg tablet Per dose pack instructions 1 Dose Pack 0    COMPOUNDMAX BASE crea 240 g by Does Not Apply route four (4) times daily. Anti-Inflam Diclofenac Sodium 3%Gabapentin 3% Lidocaine 2% Prilocaine 2% 240 g 3    ammonium lactate (LAC-HYDRIN) 12 % topical cream Apply  to affected area two (2) times a day. rub in to affected area well 280 g 0    doxycycline (ADOXA) 100 mg tablet Take 1 Tab by mouth two (2) times a day. 20 Tab 0    isoniazid (NYDRAZID) 300 mg tablet Take 300 mg by mouth daily.  carvedilol (COREG) 25 mg tablet Take 25 mg by mouth two (2) times daily (with meals).       hydrochlorothiazide (HYDRODIURIL) 25 mg tablet Take 25 mg by mouth daily.  isosorbide mononitrate ER (IMDUR) 60 mg CR tablet Take 1 Tab by mouth every morning.  30 Tab 6       Allergies  Allergies   Allergen Reactions    Vicodin [Hydrocodone-Acetaminophen] Nausea and Vomiting       Family History  Family History   Problem Relation Age of Onset    Heart Attack Neg Hx     Heart Surgery Neg Hx        Social History  Social History     Socioeconomic History    Marital status: SINGLE     Spouse name: Not on file    Number of children: Not on file    Years of education: Not on file    Highest education level: Not on file   Occupational History    Not on file   Social Needs    Financial resource strain: Not on file    Food insecurity:     Worry: Not on file     Inability: Not on file    Transportation needs:     Medical: Not on file     Non-medical: Not on file   Tobacco Use    Smoking status: Former Smoker     Packs/day: 0.10     Types: Cigarettes     Last attempt to quit: 2012     Years since quittin.2    Smokeless tobacco: Current User     Types: Chew   Substance and Sexual Activity    Alcohol use: Yes     Comment: socially    Drug use: Not Currently     Types: Cocaine, Marijuana, Heroin     Comment: +Cocaine Screen + UDS in      Sexual activity: Not on file     Comment: stopped using   Lifestyle    Physical activity:     Days per week: Not on file     Minutes per session: Not on file    Stress: Not on file   Relationships    Social connections:     Talks on phone: Not on file     Gets together: Not on file     Attends Mandaen service: Not on file     Active member of club or organization: Not on file     Attends meetings of clubs or organizations: Not on file     Relationship status: Not on file    Intimate partner violence:     Fear of current or ex partner: Not on file     Emotionally abused: Not on file     Physically abused: Not on file     Forced sexual activity: Not on file   Other Topics Concern    Not on file   Social History Narrative    Not on file       Review of Systems  Review of Systems - Review of all systems is negative except as noted above in the HPI. Vital Signs  Visit Vitals  /77 (BP 1 Location: Left arm, BP Patient Position: Sitting)   Pulse 66   Temp 97.7 °F (36.5 °C) (Oral)   Resp 16   Ht 6' 1\" (1.854 m)   Wt 284 lb (128.8 kg)   SpO2 97%   BMI 37.47 kg/m²         Physical Exam  Physical Examination: General appearance - oriented to person, place, and time, overweight, acyanotic, in no respiratory distress and well hydrated  Mental status - alert, oriented to person, place, and time, patient is mostly not pleased due to chronic pain and previous healthcare experiences  Mouth - mucous membranes moist, pharynx normal without lesions  Lymphatics - no palpable lymphadenopathy, no hepatosplenomegaly  Chest - clear to auscultation, no wheezes, rales or rhonchi, symmetric air entry  Heart - S1 and S2 normal, systolic murmur 2/6 at lower left sternal border  Abdomen - no rebound tenderness noted  Back exam - limited range of motion  Neurological - alert, oriented, normal speech  Musculoskeletal - osteoarthritic changes noted in both hands  Extremities - intact peripheral pulses    Results  Results for orders placed or performed during the hospital encounter of 11/18/19   POC CREATININE   Result Value Ref Range    Creatinine, POC 1.3 0.6 - 1.3 MG/DL    GFRAA, POC >60 >60 ml/min/1.73m2    GFRNA, POC 55 (L) >60 ml/min/1.73m2       ASSESSMENT and PLAN      ICD-10-CM ICD-9-CM    1. Essential hypertension, benign I10 401.1 CBC WITH AUTOMATED DIFF      METABOLIC PANEL, COMPREHENSIVE   2. Postlaminectomy syndrome, lumbar region M96.1 722.83    3. Lumbar neuritis M54.16 724.4    4. Atherosclerosis of native coronary artery of native heart [facility] I25.119 414.01      413.9    5. Severe obesity ([facility] E66.01 278.01    6. Dyslipidemia E78.5 272.4 LIPID PANEL   7. Screening for malignant neoplasm of prostate Z12.5 V76.44 PSA SCREENING (SCREENING)   8. Obesity (BMI 30-39. 9) E66.9 278.00    Discussed the patient's BMI with him. The BMI follow up plan is as follows:     dietary management education, guidance, and counseling  encourage exercise  monitor weight  lab results and schedule of future lab studies reviewed with patient  reviewed diet, exercise and weight control  cardiovascular risk and specific lipid/LDL goals reviewed  reviewed medications and side effects in detail    I have discussed the diagnosis with the patient and the intended plan of care as seen in the above orders. The patient has received an after-visit summary and questions were answered concerning future plans. I have discussed medication, side effects, and warnings with the patient in detail. The patient verbalized understanding and is in agreement with the plan of care. The patient will contact the office with any additional concerns. Elaine Lala MD    PLEASE NOTE:   This document has been produced using voice recognition software.  Unrecognized errors in transcription may be present

## 2020-01-20 NOTE — PROGRESS NOTES
Natacha Rodriguez presents today for   Chief Complaint   Patient presents with    New Patient   [facility]       Is someone accompanying this pt? no    Is the patient using any DME equipment during OV? Yes-cane    Depression Screening:  3 most recent PHQ Screens 1/20/2020   PHQ Not Done -   Little interest or pleasure in doing things Not at all   Feeling down, depressed, irritable, or hopeless Not at all   Total Score PHQ 2 0   Trouble falling or staying asleep, or sleeping too much More than half the days   Feeling tired or having little energy Not at all   Poor appetite, weight loss, or overeating Not at all   Feeling bad about yourself - or that you are a failure or have let yourself or your family down Not at all   Trouble concentrating on things such as school, work, reading, or watching TV Not at all   Thoughts of being better off dead, or hurting yourself in some way Not at all   How difficult have these problems made it for you to do your work, take care of your home and get along with others Not difficult at all       Learning Assessment:  Learning Assessment 9/6/2019   PRIMARY LEARNER Patient   HIGHEST LEVEL OF EDUCATION - PRIMARY LEARNER  GRADUATED HIGH SCHOOL OR GED   BARRIERS PRIMARY LEARNER NONE   CO-LEARNER CAREGIVER No   PRIMARY LANGUAGE ENGLISH   LEARNER PREFERENCE PRIMARY DEMONSTRATION   ANSWERED BY patient   RELATIONSHIP SELF       Abuse Screening:  No flowsheet data found. Fall Risk  Fall Risk Assessment, last 12 mths 1/20/2020   Able to walk? Yes   Fall in past 12 months? Yes   Fall with injury? Yes   Number of falls in past 12 months 4   Fall Risk Score 5       Health Maintenance reviewed and discussed and ordered per Provider.     Health Maintenance Due   Topic Date Due    Hepatitis C Screening  1955    DTaP/Tdap/Td series (1 - Tdap) 05/19/1964    Shingrix Vaccine Age 50> (1 of 2) 05/19/2003    FOBT Q 1 YEAR AGE 50-75  05/19/2003    MEDICARE YEARLY EXAM  03/14/2018    GLAUCOMA SCREENING Q2Y  05/19/2018    Pneumococcal 65+ years (2 of 2 - PPSV23) 05/19/2018    Influenza Age 5 to Adult  08/01/2019   . Coordination of Care:  1. Have you been to the ER, urgent care clinic since your last visit? Hospitalized since your last visit? no    2. Have you seen or consulted any other health care providers outside of the [address] [place] [address] since your last visit? Include any pap smears or colon screening.  [facility] [place]

## 2020-01-20 NOTE — PATIENT INSTRUCTIONS
Body Mass Index: Care Instructions Your Care Instructions Body mass index (BMI) can help you see if your weight is raising your risk for health problems. It uses a formula to compare how much you weigh with how tall you are. · A BMI lower than 18.5 is considered underweight. · A BMI between 18.5 and 24.9 is considered healthy. · A BMI between 25 and 29.9 is considered overweight. A BMI of 30 or higher is considered obese. If your BMI is in the normal range, it means that you have a lower risk for weight-related health problems. If your BMI is in the overweight or obese range, you may be at increased risk for weight-related health problems, such as high blood pressure, heart disease, stroke, arthritis or joint pain, and diabetes. If your BMI is in the underweight range, you may be at increased risk for health problems such as fatigue, lower protection (immunity) against illness, muscle loss, bone loss, hair loss, and hormone problems. BMI is just one measure of your risk for weight-related health problems. You may be at higher risk for health problems if you are not active, you eat an unhealthy diet, or you drink too much alcohol or use tobacco products. Follow-up care is a key part of your treatment and safety. Be sure to make and go to all appointments, and call your doctor if you are having problems. It's also a good idea to know your test results and keep a list of the medicines you take. How can you care for yourself at home? · Practice healthy eating habits. This includes eating plenty of fruits, vegetables, whole grains, lean protein, and low-fat dairy. · If your doctor recommends it, get more exercise. Walking is a good choice. Bit by bit, increase the amount you walk every day. Try for at least 30 minutes on most days of the week. · Do not smoke. Smoking can increase your risk for health problems.  If you need help quitting, talk to your doctor about stop-smoking programs and medicines. These can increase your chances of quitting for good. · Limit alcohol to 2 drinks a day for men and 1 drink a day for women. Too much alcohol can cause health problems. If you have a BMI higher than 25 · Your doctor may do other tests to check your risk for weight-related health problems. This may include measuring the distance around your waist. A waist measurement of more than 40 inches in men or 35 inches in women can increase the risk of weight-related health problems. · Talk with your doctor about steps you can take to stay healthy or improve your health. You may need to make lifestyle changes to lose weight and stay healthy, such as changing your diet and getting regular exercise. If you have a BMI lower than 18.5 · Your doctor may do other tests to check your risk for health problems. · Talk with your doctor about steps you can take to stay healthy or improve your health. You may need to make lifestyle changes to gain or maintain weight and stay healthy, such as getting more healthy foods in your diet and doing exercises to build muscle. Where can you learn more? Go to http://suzanne-renetta.info/. Enter S176 in the search box to learn more about \"Body Mass Index: Care Instructions. \" Current as of: October 13, 2016 Content Version: 11.4 © 0437-5195 [employer]. Care instructions adapted under license by [employer] (which disclaims liability or warranty for this information). If you have questions about a medical condition or this instruction, always ask your healthcare professional. [address] any warranty or liability for your use of this information.

## 2020-01-26 PROBLEM — E66.9 OBESITY (BMI 30-39.9): Status: ACTIVE | Noted: 2020-01-26

## 2020-02-12 ENCOUNTER — LAB ONLY (OUTPATIENT)
Dept: FAMILY MEDICINE CLINIC | Age: 65
End: 2020-02-12

## 2020-02-12 DIAGNOSIS — Z01.89 ENCOUNTER FOR LABORATORY TEST: Primary | ICD-10-CM

## 2020-02-13 RX ORDER — LISINOPRIL 20 MG/1
40 TABLET ORAL DAILY
Qty: 180 TAB | Refills: 0 | Status: SHIPPED | OUTPATIENT
Start: 2020-02-13 | End: 2020-04-20

## 2020-02-14 LAB
ALBUMIN SERPL-MCNC: 3.9 G/DL (ref 3.8–4.8)
ALBUMIN/GLOB SERPL: 1.3 {RATIO} (ref 1.2–2.2)
ALP SERPL-CCNC: 56 IU/L (ref 39–117)
ALT SERPL-CCNC: 16 IU/L (ref 0–44)
AST SERPL-CCNC: 25 IU/L (ref 0–40)
BASOPHILS # BLD AUTO: 0 X10E3/UL (ref 0–0.2)
BASOPHILS NFR BLD AUTO: 0 %
BILIRUB SERPL-MCNC: 0.3 MG/DL (ref 0–1.2)
BUN SERPL-MCNC: 19 MG/DL (ref 8–27)
BUN/CREAT SERPL: 14 (ref 10–24)
CALCIUM SERPL-MCNC: 9.2 MG/DL (ref 8.6–10.2)
CHLORIDE SERPL-SCNC: 104 MMOL/L (ref 96–106)
CHOLEST SERPL-MCNC: 86 MG/DL (ref 100–199)
CO2 SERPL-SCNC: 23 MMOL/L (ref 20–29)
CREAT SERPL-MCNC: 1.32 MG/DL (ref 0.76–1.27)
EOSINOPHIL # BLD AUTO: 0.3 X10E3/UL (ref 0–0.4)
EOSINOPHIL NFR BLD AUTO: 6 %
ERYTHROCYTE [DISTWIDTH] IN BLOOD BY AUTOMATED COUNT: 11.7 % (ref 11.6–15.4)
GLOBULIN SER CALC-MCNC: 3.1 G/DL (ref 1.5–4.5)
GLUCOSE SERPL-MCNC: 88 MG/DL (ref 65–99)
HCT VFR BLD AUTO: 37.7 % (ref 37.5–51)
HDLC SERPL-MCNC: 34 MG/DL
HGB BLD-MCNC: 12.5 G/DL (ref 13–17.7)
IMM GRANULOCYTES # BLD AUTO: 0 X10E3/UL (ref 0–0.1)
IMM GRANULOCYTES NFR BLD AUTO: 0 %
INTERPRETATION, 910389: NORMAL
INTERPRETATION: NORMAL
LDLC SERPL CALC-MCNC: 32 MG/DL (ref 0–99)
LYMPHOCYTES # BLD AUTO: 2.6 X10E3/UL (ref 0.7–3.1)
LYMPHOCYTES NFR BLD AUTO: 46 %
MCH RBC QN AUTO: 31.6 PG (ref 26.6–33)
MCHC RBC AUTO-ENTMCNC: 33.2 G/DL (ref 31.5–35.7)
MCV RBC AUTO: 95 FL (ref 79–97)
MONOCYTES # BLD AUTO: 0.5 X10E3/UL (ref 0.1–0.9)
MONOCYTES NFR BLD AUTO: 9 %
MORPHOLOGY BLD-IMP: ABNORMAL
NEUTROPHILS # BLD AUTO: 2.2 X10E3/UL (ref 1.4–7)
NEUTROPHILS NFR BLD AUTO: 39 %
PDF IMAGE, 910387: NORMAL
PLATELET # BLD AUTO: 167 X10E3/UL (ref 150–450)
POTASSIUM SERPL-SCNC: 4 MMOL/L (ref 3.5–5.2)
PROT SERPL-MCNC: 7 G/DL (ref 6–8.5)
PSA SERPL-MCNC: 0.4 NG/ML (ref 0–4)
RBC # BLD AUTO: 3.96 X10E6/UL (ref 4.14–5.8)
SODIUM SERPL-SCNC: 145 MMOL/L (ref 134–144)
TRIGL SERPL-MCNC: 101 MG/DL (ref 0–149)
VLDLC SERPL CALC-MCNC: 20 MG/DL (ref 5–40)
WBC # BLD AUTO: 5.6 X10E3/UL (ref 3.4–10.8)

## 2020-03-06 DIAGNOSIS — Z12.5 SCREENING FOR MALIGNANT NEOPLASM OF PROSTATE: ICD-10-CM

## 2020-03-06 DIAGNOSIS — I10 ESSENTIAL HYPERTENSION, BENIGN: ICD-10-CM

## 2020-03-06 DIAGNOSIS — E78.5 DYSLIPIDEMIA: ICD-10-CM

## 2020-03-19 ENCOUNTER — OFFICE VISIT (OUTPATIENT)
Dept: FAMILY MEDICINE CLINIC | Age: 65
End: 2020-03-19

## 2020-03-19 VITALS
HEART RATE: 67 BPM | WEIGHT: 280 LBS | HEIGHT: 73 IN | TEMPERATURE: 97.5 F | OXYGEN SATURATION: 97 % | BODY MASS INDEX: 37.11 KG/M2 | RESPIRATION RATE: 20 BRPM | DIASTOLIC BLOOD PRESSURE: 76 MMHG | SYSTOLIC BLOOD PRESSURE: 132 MMHG

## 2020-03-19 DIAGNOSIS — I10 ESSENTIAL HYPERTENSION, BENIGN: Primary | ICD-10-CM

## 2020-03-19 DIAGNOSIS — N18.30 CKD (CHRONIC KIDNEY DISEASE) STAGE 3, GFR 30-59 ML/MIN (HCC): ICD-10-CM

## 2020-03-19 DIAGNOSIS — Z12.11 SCREEN FOR COLON CANCER: ICD-10-CM

## 2020-03-19 DIAGNOSIS — M54.16 LUMBAR NEURITIS: ICD-10-CM

## 2020-03-19 DIAGNOSIS — Z23 ENCOUNTER FOR IMMUNIZATION: ICD-10-CM

## 2020-03-19 DIAGNOSIS — E66.01 SEVERE OBESITY (HCC): ICD-10-CM

## 2020-03-19 DIAGNOSIS — M96.1 POSTLAMINECTOMY SYNDROME, LUMBAR REGION: ICD-10-CM

## 2020-03-19 DIAGNOSIS — E78.5 DYSLIPIDEMIA: ICD-10-CM

## 2020-03-19 DIAGNOSIS — I25.119 ATHEROSCLEROSIS OF NATIVE CORONARY ARTERY OF NATIVE HEART WITH ANGINA PECTORIS (HCC): ICD-10-CM

## 2020-03-19 PROBLEM — I25.118 CORONARY ARTERY DISEASE WITH STABLE ANGINA PECTORIS (HCC): Status: ACTIVE | Noted: 2020-03-19

## 2020-03-19 NOTE — PROGRESS NOTES
Chief Complaint   Patient presents with    Hypertension    Back Pain     1. Have you been to the ER, urgent care clinic since your last visit? Hospitalized since your last visit? No    2. Have you seen or consulted any other health care providers outside of the [address] [place] [address] since your last visit? Include any pap smears or colon screening. No     HPI  Castro Salmon comes in for f/u care. HTN: BP is stable, he is on lisinopril, caduet, HCTZ and coreg. Stable on medication. He denies headache, changes in vision or focal weakness. Dyslipidemia: Patient is on atorvastatin. We will continue with the current treatment plan. CAD: Patient has a history of coronary artery disease. He is on Imdur, lisinopril and Coreg. Also on atorvastatin, Plavix and aspirin. He denies chest pain, shortness of breath or diaphoresis. We will continue with the current treatment plan. Back pain: seen recently by the spine specialist. He has had spine injections given in the past.  This has helped with the pain control. He also takes Flexeril as a muscle relaxant. CKD: Patient has CKD stage 3. Will refer to the nephrologist.  Chronic pain: he is seen at [facility] and is on medication. He is on oxycodone and gabapentin. We will continue with the current treatment plan.       Past Medical History  Past Medical History:   Diagnosis Date    Arthritis     CAD (coronary artery disease)     Essential hypertension     Low back pain     Lumbar postlaminectomy syndrome     Lumbar spondylosis     Myocardial infarction ([facility]     lad stent for stemi,12/2012    Pancreatic cyst        Surgical History  Past Surgical History:   Procedure Laterality Date    HAND/FINGER SURGERY UNLISTED      Carpal tunnel    HX CORONARY ARTERY [facility]PASS GRAFT  [facility]    [facility] HX HIP REPLACEMENT      HX LUMBAR FUSION      HX ORTHOPAEDIC      lumbar spine x 5    HX ORTHOPAEDIC      Right hip replacement    HX ORTHOPAEDIC      right foot calous removed    HX PTCA          Medications  Current Outpatient Medications   Medication Sig Dispense Refill    lisinopril (PRINIVIL, ZESTRIL) 20 mg tablet Take 2 Tabs by mouth daily. Take 40 mg by mouth daily. 180 Tab 0    amLODIPine-atorvastatin (CADUET) 10-10 mg per tablet Take 1 Tab by mouth daily.  clopidogreL (PLAVIX) 75 mg tab Take  by mouth.  polyethylene glycol (MIRALAX) 17 gram packet Take 1 Packet by mouth daily. Indications: constipation 30 Packet 1    latanoprost (XALATAN) 0.005 % ophthalmic solution       timolol (TIMOPTIC) 0.5 % ophthalmic solution 1 Drop two (2) times a day.  aspirin delayed-release 325 mg tablet Take 1 Tab by mouth two (2) times a day. 60 Tab 1    amLODIPine (NORVASC) 10 mg tablet Take  by mouth daily.  cyclobenzaprine (FLEXERIL) 10 mg tablet Take 1 Tab by mouth nightly as needed for Muscle Spasm(s). 30 Tab 2    cholecalciferol, VITAMIN D3, (VITAMIN D3) 5,000 unit tab tablet Take  by mouth daily.  oxyCODONE IR (ROXICODONE) 15 mg immediate release tablet Take 1-2 Tabs by mouth every eight (8) hours as needed. Max Daily Amount: 90 mg. 60 Tab 0    gabapentin (NEURONTIN) 300 mg capsule Take 1 Cap by mouth three (3) times daily. 90 Cap 1    isoniazid (NYDRAZID) 300 mg tablet Take 300 mg by mouth daily.  nitroglycerin (NITROSTAT) 0.4 mg SL tablet by SubLINGual route every five (5) minutes as needed.  carvedilol (COREG) 25 mg tablet Take 25 mg by mouth two (2) times daily (with meals).  hydrochlorothiazide (HYDRODIURIL) 25 mg tablet Take 25 mg by mouth daily.  isosorbide mononitrate ER (IMDUR) 60 mg CR tablet Take 1 Tab by mouth every morning. 30 Tab 6    atorvastatin (LIPITOR) 80 mg tablet Take 80 mg by mouth daily.          Allergies  Allergies   Allergen Reactions    Vicodin [Hydrocodone-Acetaminophen] Nausea and Vomiting       Family History  Family History   Problem Relation Age of Onset    Heart Attack Neg Hx     Heart Surgery Neg Hx        Social History  Social History     Socioeconomic History    Marital status: SINGLE     Spouse name: Not on file    Number of children: Not on file    Years of education: Not on file    Highest education level: Not on file   Occupational History    Not on file   Social Needs    Financial resource strain: Not on file    Food insecurity     Worry: Not on file     Inability: Not on file    Transportation needs     Medical: Not on file     Non-medical: Not on file   Tobacco Use    Smoking status: Former Smoker     Packs/day: 0.10     Types: Cigarettes     Last attempt to quit: 2012     Years since quittin.3    Smokeless tobacco: Current User     Types: Chew   Substance and Sexual Activity    Alcohol use: Yes     Comment: socially    Drug use: Not Currently     Types: Cocaine, Marijuana, Heroin     Comment: +Cocaine Screen + UDS in      Sexual activity: Not on file     Comment: stopped using   Lifestyle    Physical activity     Days per week: Not on file     Minutes per session: Not on file    Stress: Not on file   Relationships    Social connections     Talks on phone: Not on file     Gets together: Not on file     Attends Jew service: Not on file     Active member of club or organization: Not on file     Attends meetings of clubs or organizations: Not on file     Relationship status: Not on file    Intimate partner violence     Fear of current or ex partner: Not on file     Emotionally abused: Not on file     Physically abused: Not on file     Forced sexual activity: Not on file   Other Topics Concern    Not on file   Social History Narrative    Not on file       Review of Systems  Review of Systems - Review of all systems is negative except as noted above in the HPI.     Vital Signs  Visit Vitals  /76 (BP 1 Location: Left arm, BP Patient Position: Sitting)   Pulse 67   Temp 97.5 °F (36.4 °C) (Oral)   Resp 20   Ht 6' 1\" (1.854 m)   Wt 280 lb (127 kg)   SpO2 97%   BMI 36.94 kg/m²         Physical Exam  Physical Examination: General appearance - alert, well appearing, and in no distress, oriented to person, place, and time, overweight, acyanotic, in no respiratory distress and well hydrated  Mental status - alert, oriented to person, place, and time, affect appropriate to mood  Mouth - mucous membranes moist, pharynx normal without lesions  Neck - supple, no significant adenopathy  Lymphatics - no palpable lymphadenopathy  Chest - no tachypnea, retractions or cyanosis, decreased air entry noted bilateral lung bases  Heart - S1 and S2 normal, systolic murmur 3/6 at lower left sternal border  Abdomen - no rebound tenderness noted  Back exam - limited range of motion  Neurological - abnormal neurological exam unchanged from prior examinations  Musculoskeletal - osteoarthritic changes noted in both hands  Extremities - intact peripheral pulses    Results  Results for orders placed or performed in visit on 01/20/20   CBC WITH AUTOMATED DIFF   Result Value Ref Range    WBC 5.6 3.4 - 10.8 x10E3/uL    RBC 3.96 (L) 4.14 - 5.80 x10E6/uL    HGB 12.5 (L) 13.0 - 17.7 g/dL    HCT 37.7 37.5 - 51.0 %    MCV 95 79 - 97 fL    MCH 31.6 26.6 - 33.0 pg    MCHC 33.2 31.5 - 35.7 g/dL    RDW 11.7 11.6 - 15.4 %    PLATELET 200 332 - 617 x10E3/uL    NEUTROPHILS 39 Not Estab. %    Lymphocytes 46 Not Estab. %    MONOCYTES 9 Not Estab. %    EOSINOPHILS 6 Not Estab. %    BASOPHILS 0 Not Estab. %    ABS. NEUTROPHILS 2.2 1.4 - 7.0 x10E3/uL    Abs Lymphocytes 2.6 0.7 - 3.1 x10E3/uL    ABS. MONOCYTES 0.5 0.1 - 0.9 x10E3/uL    ABS. EOSINOPHILS 0.3 0.0 - 0.4 x10E3/uL    ABS. BASOPHILS 0.0 0.0 - 0.2 x10E3/uL    IMMATURE GRANULOCYTES 0 Not Estab. %    ABS. IMM.  GRANS. 0.0 0.0 - 0.1 x10E3/uL    Hematology comments: Note:    METABOLIC PANEL, COMPREHENSIVE   Result Value Ref Range    Glucose 88 65 - 99 mg/dL    BUN 19 8 - 27 mg/dL    Creatinine 1.32 (H) 0.76 - 1.27 mg/dL GFR est non-AA 56 (L) >59 mL/min/1.73    GFR est AA 65 >59 mL/min/1.73    BUN/Creatinine ratio 14 10 - 24    Sodium 145 (H) 134 - 144 mmol/L    Potassium 4.0 3.5 - 5.2 mmol/L    Chloride 104 96 - 106 mmol/L    CO2 23 20 - 29 mmol/L    Calcium 9.2 8.6 - 10.2 mg/dL    Protein, total 7.0 6.0 - 8.5 g/dL    Albumin 3.9 3.8 - 4.8 g/dL    GLOBULIN, TOTAL 3.1 1.5 - 4.5 g/dL    A-G Ratio 1.3 1.2 - 2.2    Bilirubin, total 0.3 0.0 - 1.2 mg/dL    Alk. phosphatase 56 39 - 117 IU/L    AST (SGOT) 25 0 - 40 IU/L    ALT (SGPT) 16 0 - 44 IU/L   LIPID PANEL   Result Value Ref Range    Cholesterol, total 86 (L) 100 - 199 mg/dL    Triglyceride 101 0 - 149 mg/dL    HDL Cholesterol 34 (L) >39 mg/dL    VLDL, calculated 20 5 - 40 mg/dL    LDL, calculated 32 0 - 99 mg/dL   CVD REPORT   Result Value Ref Range    INTERPRETATION Note     PDF IMAGE Not applicable    CKD REPORT   Result Value Ref Range    Interpretation Note    PSA SCREENING (SCREENING)   Result Value Ref Range    Prostate Specific Ag 0.4 0.0 - 4.0 ng/mL       ASSESSMENT and PLAN    ICD-10-CM ICD-9-CM    1. Essential hypertension, benign I10 401.1    2. CKD (chronic kidney disease) stage 3, GFR 30-59 ml/min (HCC) N18.3 585.3 REFERRAL TO NEPHROLOGY   3. Screen for colon cancer Z12.11 V76.51 REFERRAL TO GASTROENTEROLOGY   4. Encounter for immunization Z23 V03.89 PNEUMOCOCCAL POLYSACCHARIDE VACCINE, 23-VALENT, ADULT OR IMMUNOSUPPRESSED PT DOSE,      DC IMMUNIZ ADMIN,1 SINGLE/COMB VAC/TOXOID   5. Postlaminectomy syndrome, lumbar region M96.1 722.83    6. Lumbar neuritis M54.16 724.4    7. Dyslipidemia E78.5 272.4    8. Severe obesity ([facility] E66.01 278.01    9.  Atherosclerosis of native coronary artery of native heart with angina pectoris ([facility] I25.119 414.01      413.9      lab results and schedule of future lab studies reviewed with patient  reviewed diet, exercise and weight control  cardiovascular risk and specific lipid/LDL goals reviewed  reviewed medications and side effects in detail    I have discussed the diagnosis with the patient and the intended plan of care as seen in the above orders. The patient has received an after-visit summary and questions were answered concerning future plans. I have discussed medication, side effects, and warnings with the patient in detail. The patient verbalized understanding and is in agreement with the plan of care. The patient will contact the office with any additional concerns. Alejandro De Leon MD    PLEASE NOTE:   This document has been produced using voice recognition software.  Unrecognized errors in transcription may be present

## 2020-03-24 NOTE — PROGRESS NOTES
Spoke with patient at this time.  Patient was recently seen in the office and results were discussed with patient

## 2020-04-20 RX ORDER — LISINOPRIL 20 MG/1
TABLET ORAL
Qty: 180 TAB | Refills: 0 | Status: SHIPPED | OUTPATIENT
Start: 2020-04-20

## 2020-06-02 ENCOUNTER — TELEPHONE (OUTPATIENT)
Dept: FAMILY MEDICINE CLINIC | Age: 65
End: 2020-06-02

## 2020-06-02 NOTE — TELEPHONE ENCOUNTER
Requested Prescriptions     Pending Prescriptions Disp Refills    clopidogreL (PLAVIX) 75 mg tab       Sig: Take  by mouth.    Pt stated that Dr Monroe Patel need to call [facility] for his refill request

## 2020-06-03 RX ORDER — CLOPIDOGREL BISULFATE 75 MG/1
75 TABLET ORAL DAILY
Qty: 90 TAB | Refills: 0 | Status: SHIPPED | OUTPATIENT
Start: 2020-06-03 | End: 2021-10-05 | Stop reason: SDUPTHER

## 2020-07-16 ENCOUNTER — OFFICE VISIT (OUTPATIENT)
Dept: FAMILY MEDICINE CLINIC | Age: 65
End: 2020-07-16

## 2020-07-16 VITALS
HEART RATE: 66 BPM | DIASTOLIC BLOOD PRESSURE: 64 MMHG | OXYGEN SATURATION: 97 % | SYSTOLIC BLOOD PRESSURE: 106 MMHG | HEIGHT: 73 IN | WEIGHT: 263 LBS | BODY MASS INDEX: 34.85 KG/M2 | RESPIRATION RATE: 16 BRPM | TEMPERATURE: 98.7 F

## 2020-07-16 DIAGNOSIS — I10 ESSENTIAL HYPERTENSION, BENIGN: Primary | ICD-10-CM

## 2020-07-16 DIAGNOSIS — E78.5 DYSLIPIDEMIA: ICD-10-CM

## 2020-07-16 DIAGNOSIS — N18.30 CKD (CHRONIC KIDNEY DISEASE) STAGE 3, GFR 30-59 ML/MIN (HCC): ICD-10-CM

## 2020-07-16 DIAGNOSIS — E66.01 SEVERE OBESITY (HCC): ICD-10-CM

## 2020-07-16 DIAGNOSIS — M96.1 POSTLAMINECTOMY SYNDROME, LUMBAR REGION: ICD-10-CM

## 2020-07-16 DIAGNOSIS — I25.119 ATHEROSCLEROSIS OF NATIVE CORONARY ARTERY OF NATIVE HEART WITH ANGINA PECTORIS (HCC): ICD-10-CM

## 2020-07-16 RX ORDER — AMLODIPINE BESYLATE 10 MG/1
10 TABLET ORAL DAILY
COMMUNITY
Start: 2020-07-16 | End: 2021-03-08 | Stop reason: SDUPTHER

## 2020-07-16 NOTE — PROGRESS NOTES
Lexus Bishop presents today for   Chief Complaint   Patient presents with    Follow-up       Is someone accompanying this pt? no    Is the patient using any DME equipment during OV? Yes   cane    Depression Screening:  3 most recent PHQ Screens 7/16/2020   PHQ Not Done -   Little interest or pleasure in doing things Not at all   Feeling down, depressed, irritable, or hopeless Not at all   Total Score PHQ 2 0   Trouble falling or staying asleep, or sleeping too much Not at all   Feeling tired or having little energy Not at all   Poor appetite, weight loss, or overeating Not at all   Feeling bad about yourself - or that you are a failure or have let yourself or your family down Not at all   Trouble concentrating on things such as school, work, reading, or watching TV Not at all   Moving or speaking so slowly that other people could have noticed; or the opposite being so fidgety that others notice Not at all   Thoughts of being better off dead, or hurting yourself in some way Not at all   PHQ 9 Score 0   How difficult have these problems made it for you to do your work, take care of your home and get along with others Not difficult at all       Learning Assessment:  Learning Assessment 9/6/2019   PRIMARY LEARNER Patient   HIGHEST LEVEL OF EDUCATION - PRIMARY LEARNER  GRADUATED HIGH SCHOOL OR GED   BARRIERS PRIMARY LEARNER NONE   CO-LEARNER CAREGIVER No   PRIMARY LANGUAGE ENGLISH   LEARNER PREFERENCE PRIMARY DEMONSTRATION   ANSWERED BY patient   RELATIONSHIP SELF       Abuse Screening:  No flowsheet data found. Fall Risk  Fall Risk Assessment, last 12 mths 7/16/2020   Able to walk? Yes   Fall in past 12 months? No   Fall with injury? No   Number of falls in past 12 months -   Fall Risk Score -       Health Maintenance reviewed and discussed and ordered per Provider.     Health Maintenance Due   Topic Date Due    DTaP/Tdap/Td series (1 - Tdap) 05/19/1974    Shingrix Vaccine Age 50> (1 of 2) 05/19/2003    FOBT Q1Y Age 54-65  05/19/2003    Medicare Yearly Exam  03/14/2018    GLAUCOMA SCREENING Q2Y  05/19/2018   . Coordination of Care:  1. Have you been to the ER, urgent care clinic since your last visit? Hospitalized since your last visit? no    2. Have you seen or consulted any other health care providers outside of the [address] [place] [address] since your last visit? Include any pap smears or colon screening.  no

## 2020-07-16 NOTE — PROGRESS NOTES
SUSHANT  Cynthia Soliz comes in for follow-up care. Hypertension: Patient has hypertension. He is on amlodipine, Coreg, HCTZ and lisinopril. He denies headache, changes in vision or focal weakness. We will continue with the current treatment plan. CAD: Patient has a history of CAD and has had CABG done and also coronary stenting. He denies chest pain, diaphoresis or shortness of breath. He is on Imdur, Coreg, lisinopril, Plavix, Lipitor and aspirin. We will continue with the medical management. He has nitroglycerin to use as needed. Obesity: Patient has a BMI of 34.70. He has lost 14 pounds since he was last year. He will continue to intensify lifestyle and dietary modification. Chronic kidney disease: Patient has chronic kidney disease. He is seen by the nephrologist.  Plan is to avoid any nephrotoxic medications. Currently stable. Chronic back pain: Patient has chronic back pain. He gets around using a cane. He is on chronic pain medication. Takes oxycodone and gabapentin. Dyslipidemia: Patient takes atorvastatin 80 mg daily. We will recheck lipid panel at next visit. Continue current management plan. Patient should exercise and take a diet low in polysaturated fats.       Past Medical History  Past Medical History:   Diagnosis Date    Arthritis     CAD (coronary artery disease)     Essential hypertension     Low back pain     Lumbar postlaminectomy syndrome     Lumbar spondylosis     Myocardial infarction ([facility] [address]     lad stent for stemi,12/2012    Pancreatic cyst        Surgical History  Past Surgical History:   Procedure Laterality Date    HAND/FINGER SURGERY UNLISTED      Carpal tunnel    HX CORONARY ARTERY [facility]PASS GRAFT  2014    at [facility] HX HIP REPLACEMENT      HX LUMBAR FUSION      HX ORTHOPAEDIC      lumbar spine x 5    HX ORTHOPAEDIC      Right hip replacement    HX ORTHOPAEDIC      right foot calous removed    HX PTCA Medications  Current Outpatient Medications   Medication Sig Dispense Refill    amLODIPine (NORVASC) 10 mg tablet Take 1 Tab by mouth daily.  clopidogreL (PLAVIX) 75 mg tab Take 1 Tab by mouth daily. Take  by mouth. 90 Tab 0    lisinopriL (PRINIVIL, ZESTRIL) 20 mg tablet TAKE 2 TABLETS (40MG) DAILY 180 Tab 0    polyethylene glycol (MIRALAX) 17 gram packet Take 1 Packet by mouth daily. Indications: constipation 30 Packet 1    latanoprost (XALATAN) 0.005 % ophthalmic solution       timolol (TIMOPTIC) 0.5 % ophthalmic solution 1 Drop two (2) times a day.  aspirin delayed-release 325 mg tablet Take 1 Tab by mouth two (2) times a day. 60 Tab 1    cyclobenzaprine (FLEXERIL) 10 mg tablet Take 1 Tab by mouth nightly as needed for Muscle Spasm(s). 30 Tab 2    cholecalciferol, VITAMIN D3, (VITAMIN D3) 5,000 unit tab tablet Take  by mouth daily.  oxyCODONE IR (ROXICODONE) 15 mg immediate release tablet Take 1-2 Tabs by mouth every eight (8) hours as needed. Max Daily Amount: 90 mg. 60 Tab 0    isoniazid (NYDRAZID) 300 mg tablet Take 300 mg by mouth daily.  nitroglycerin (NITROSTAT) 0.4 mg SL tablet by SubLINGual route every five (5) minutes as needed.  carvedilol (COREG) 25 mg tablet Take 25 mg by mouth two (2) times daily (with meals).  hydrochlorothiazide (HYDRODIURIL) 25 mg tablet Take 25 mg by mouth daily.  isosorbide mononitrate ER (IMDUR) 60 mg CR tablet Take 1 Tab by mouth every morning. 30 Tab 6    atorvastatin (LIPITOR) 80 mg tablet Take 80 mg by mouth daily.  gabapentin (NEURONTIN) 300 mg capsule Take 1 Cap by mouth three (3) times daily.  90 Cap 1       Allergies  Allergies   Allergen Reactions    Vicodin [Hydrocodone-Acetaminophen] Nausea and Vomiting       Family History  Family History   Problem Relation Age of Onset    Heart Attack Neg Hx     Heart Surgery Neg Hx        Social History  Social History     Socioeconomic History    Marital status: SINGLE Spouse name: Not on file    Number of children: Not on file    Years of education: Not on file    Highest education level: Not on file   Occupational History    Not on file   Social Needs    Financial resource strain: Not on file    Food insecurity     Worry: Not on file     Inability: Not on file    Transportation needs     Medical: Not on file     Non-medical: Not on file   Tobacco Use    Smoking status: Former Smoker     Packs/day: 0.10     Types: Cigarettes     Last attempt to quit: 2012     Years since quittin.7    Smokeless tobacco: Current User     Types: Chew   Substance and Sexual Activity    Alcohol use: Yes     Comment: socially    Drug use: Not Currently     Types: Cocaine, Marijuana, Heroin     Comment: +Cocaine Screen + UDS in      Sexual activity: Not on file     Comment: stopped using   Lifestyle    Physical activity     Days per week: Not on file     Minutes per session: Not on file    Stress: Not on file   Relationships    Social connections     Talks on phone: Not on file     Gets together: Not on file     Attends [employer] service: Not on file     Active member of club or organization: Not on file     Attends meetings of clubs or organizations: Not on file     Relationship status: Not on file    Intimate partner violence     Fear of current or ex partner: Not on file     Emotionally abused: Not on file     Physically abused: Not on file     Forced sexual activity: Not on file   Other Topics Concern    Not on file   Social History Narrative    Not on file       Review of Systems  Review of Systems - Review of all systems is negative except as noted above in the HPI.     Vital Signs  Visit Vitals  /64 (BP 1 Location: Left arm, BP Patient Position: Sitting)   Pulse 66   Temp 98.7 °F (37.1 °C) (Oral)   Resp 16   Ht 6' 1\" (1.854 m)   Wt 263 lb (119.3 kg)   SpO2 97%   BMI 34.70 kg/m²         Physical Exam  Physical Examination: General appearance - oriented to person, place, and time, overweight and acyanotic, in no respiratory distress  Mental status - alert, oriented to person, place, and time, affect appropriate to mood  Chest - clear to auscultation, no wheezes, rales or rhonchi, symmetric air entry  Heart - S1 and S2 normal, systolic murmur 2/6 at lower left sternal border  Abdomen - no rebound tenderness noted  Back exam - limited range of motion  Neurological - abnormal neurological exam unchanged from prior examinations  Musculoskeletal - osteoarthritic changes noted in both hands  Extremities - intact peripheral pulses      Results  Results for orders placed or performed in visit on 01/20/20   CBC WITH AUTOMATED DIFF   Result Value Ref Range    WBC 5.6 3.4 - 10.8 x10E3/uL    RBC 3.96 (L) 4.14 - 5.80 x10E6/uL    HGB 12.5 (L) 13.0 - 17.7 g/dL    HCT 37.7 37.5 - 51.0 %    MCV 95 79 - 97 fL    MCH 31.6 26.6 - 33.0 pg    MCHC 33.2 31.5 - 35.7 g/dL    RDW 11.7 11.6 - 15.4 %    PLATELET 724 819 - 375 x10E3/uL    NEUTROPHILS 39 Not Estab. %    Lymphocytes 46 Not Estab. %    MONOCYTES 9 Not Estab. %    EOSINOPHILS 6 Not Estab. %    BASOPHILS 0 Not Estab. %    ABS. NEUTROPHILS 2.2 1.4 - 7.0 x10E3/uL    Abs Lymphocytes 2.6 0.7 - 3.1 x10E3/uL    ABS. MONOCYTES 0.5 0.1 - 0.9 x10E3/uL    ABS. EOSINOPHILS 0.3 0.0 - 0.4 x10E3/uL    ABS. BASOPHILS 0.0 0.0 - 0.2 x10E3/uL    IMMATURE GRANULOCYTES 0 Not Estab. %    ABS. IMM.  GRANS. 0.0 0.0 - 0.1 x10E3/uL    Hematology comments: Note:    METABOLIC PANEL, COMPREHENSIVE   Result Value Ref Range    Glucose 88 65 - 99 mg/dL    BUN 19 8 - 27 mg/dL    Creatinine 1.32 (H) 0.76 - 1.27 mg/dL    GFR est non-AA 56 (L) >59 mL/min/1.73    GFR est AA 65 >59 mL/min/1.73    BUN/Creatinine ratio 14 10 - 24    Sodium 145 (H) 134 - 144 mmol/L    Potassium 4.0 3.5 - 5.2 mmol/L    Chloride 104 96 - 106 mmol/L    CO2 23 20 - 29 mmol/L    Calcium 9.2 8.6 - 10.2 mg/dL    Protein, total 7.0 6.0 - 8.5 g/dL    Albumin 3.9 3.8 - 4.8 g/dL    GLOBULIN, TOTAL 3.1 1.5 - 4.5 g/dL    A-G Ratio 1.3 1.2 - 2.2    Bilirubin, total 0.3 0.0 - 1.2 mg/dL    Alk. phosphatase 56 39 - 117 IU/L    AST (SGOT) 25 0 - 40 IU/L    ALT (SGPT) 16 0 - 44 IU/L   LIPID PANEL   Result Value Ref Range    Cholesterol, total 86 (L) 100 - 199 mg/dL    Triglyceride 101 0 - 149 mg/dL    HDL Cholesterol 34 (L) >39 mg/dL    VLDL, calculated 20 5 - 40 mg/dL    LDL, calculated 32 0 - 99 mg/dL   CVD REPORT   Result Value Ref Range    INTERPRETATION Note     PDF IMAGE Not applicable    CKD REPORT   Result Value Ref Range    Interpretation Note    PSA SCREENING (SCREENING)   Result Value Ref Range    Prostate Specific Ag 0.4 0.0 - 4.0 ng/mL       ASSESSMENT and PLAN    ICD-10-CM ICD-9-CM    1. Essential hypertension, benign  I10 401.1    2. CKD (chronic kidney disease) stage 3, GFR 30-59 ml/min ([facility])  N18.3 585.3    3. Atherosclerosis of native coronary artery of native heart with angina pectoris ([facility]  I25.119 414.01      413.9    4. Postlaminectomy syndrome, lumbar region  M96.1 722.83    5. Dyslipidemia  E78.5 272.4    6. Severe obesity ([facility])  E66.01 278.01      lab results and schedule of future lab studies reviewed with patient  reviewed diet, exercise and weight control  cardiovascular risk and specific lipid/LDL goals reviewed  reviewed medications and side effects in detail  use of aspirin to prevent MI and TIA's discussed      I have discussed the diagnosis with the patient and the intended plan of care as seen in the above orders. The patient has received an after-visit summary and questions were answered concerning future plans. I have discussed medication, side effects, and warnings with the patient in detail. The patient verbalized understanding and is in agreement with the plan of care. The patient will contact the office with any additional concerns. Sanjeev Renee MD    PLEASE NOTE:   This document has been produced using voice recognition software.  Unrecognized errors in transcription may be present

## 2021-02-08 ENCOUNTER — OFFICE VISIT (OUTPATIENT)
Dept: FAMILY MEDICINE CLINIC | Age: 66
End: 2021-02-08
Payer: MEDICARE

## 2021-02-08 VITALS
TEMPERATURE: 97.8 F | RESPIRATION RATE: 16 BRPM | DIASTOLIC BLOOD PRESSURE: 88 MMHG | BODY MASS INDEX: 33.13 KG/M2 | WEIGHT: 250 LBS | SYSTOLIC BLOOD PRESSURE: 138 MMHG | HEART RATE: 78 BPM | HEIGHT: 73 IN | OXYGEN SATURATION: 97 %

## 2021-02-08 DIAGNOSIS — Z23 ENCOUNTER FOR IMMUNIZATION: ICD-10-CM

## 2021-02-08 DIAGNOSIS — R31.9 HEMATURIA, UNSPECIFIED TYPE: Primary | ICD-10-CM

## 2021-02-08 DIAGNOSIS — E66.01 SEVERE OBESITY (HCC): ICD-10-CM

## 2021-02-08 DIAGNOSIS — I25.118 CORONARY ARTERY DISEASE WITH STABLE ANGINA PECTORIS, UNSPECIFIED VESSEL OR LESION TYPE, UNSPECIFIED WHETHER NATIVE OR TRANSPLANTED HEART (HCC): ICD-10-CM

## 2021-02-08 DIAGNOSIS — I10 ESSENTIAL HYPERTENSION, BENIGN: ICD-10-CM

## 2021-02-08 DIAGNOSIS — N18.30 STAGE 3 CHRONIC KIDNEY DISEASE, UNSPECIFIED WHETHER STAGE 3A OR 3B CKD (HCC): ICD-10-CM

## 2021-02-08 DIAGNOSIS — Z12.5 SCREENING FOR MALIGNANT NEOPLASM OF PROSTATE: ICD-10-CM

## 2021-02-08 DIAGNOSIS — E78.5 DYSLIPIDEMIA: ICD-10-CM

## 2021-02-08 PROCEDURE — G8427 DOCREV CUR MEDS BY ELIG CLIN: HCPCS | Performed by: FAMILY MEDICINE

## 2021-02-08 PROCEDURE — G8536 NO DOC ELDER MAL SCRN: HCPCS | Performed by: FAMILY MEDICINE

## 2021-02-08 PROCEDURE — G8752 SYS BP LESS 140: HCPCS | Performed by: FAMILY MEDICINE

## 2021-02-08 PROCEDURE — 99215 OFFICE O/P EST HI 40 MIN: CPT | Performed by: FAMILY MEDICINE

## 2021-02-08 PROCEDURE — G8754 DIAS BP LESS 90: HCPCS | Performed by: FAMILY MEDICINE

## 2021-02-08 PROCEDURE — 90694 VACC AIIV4 NO PRSRV 0.5ML IM: CPT | Performed by: FAMILY MEDICINE

## 2021-02-08 PROCEDURE — G0008 ADMIN INFLUENZA VIRUS VAC: HCPCS | Performed by: FAMILY MEDICINE

## 2021-02-08 PROCEDURE — G8417 CALC BMI ABV UP PARAM F/U: HCPCS | Performed by: FAMILY MEDICINE

## 2021-02-08 PROCEDURE — 3017F COLORECTAL CA SCREEN DOC REV: CPT | Performed by: FAMILY MEDICINE

## 2021-02-08 PROCEDURE — 1101F PT FALLS ASSESS-DOCD LE1/YR: CPT | Performed by: FAMILY MEDICINE

## 2021-02-08 PROCEDURE — G8510 SCR DEP NEG, NO PLAN REQD: HCPCS | Performed by: FAMILY MEDICINE

## 2021-02-08 NOTE — PROGRESS NOTES
Patient came into lab for venipuncture. PCP ordered. Patient used left arm. Patient used left arm. Patient tolerated well. Patient had no questions or concerns.

## 2021-02-08 NOTE — PROGRESS NOTES
Chief Complaint   Patient presents with    Groin Pain     1. Have you been to the ER, urgent care clinic since your last visit? Hospitalized since your last visit? No    2. Have you seen or consulted any other health care providers outside of the [address] [place] [address] since your last visit? Include any pap smears or colon screening. No     [facility]  Julia Kumari comes in for follow-up care. Hematuria: Patient has noticed prior hematuria since earlier today. He has been passing blood per urethra with clots. Initially it was painful but occasionally it is painless. He denies dizziness, chest pain, palpitations or shortness of breath. I had considered doing labs. Wanted to do a urinalysis and a CBC. However on examination patient continues to have blood trickling out of his urethra with clots. He will go to the emergency room for evaluation and stabilization. May need to have cystoscopy done. He is on aspirin and Plavix. CAD: Patient has a history of coronary artery disease. He denies chest pain, shortness of breath or diaphoresis. He has had myocardial infarction with an LAD stent placed. He is on Plavix, aspirin, lisinopril, Coreg and Imdur. He has been stable on the medication. He has nitroglycerin to use as needed. Hypertension: Patient has hypertension. Denies headache, changes in vision or focal weakness. He is on amlodipine, HCTZ, lisinopril, Coreg and takes Imdur. Blood pressure is stable. He is to have his labs checked. Back pain: Patient has lumbar spondylosis with lumbar postlaminectomy syndrome. He has been followed up by the spine specialist.  He is on chronic pain medication. He is on oxycodone. Dyslipidemia: Patient has dyslipidemia. He takes Lipitor 80 mg daily. We will continue this medication. Plan to recheck his lipid panel. Health maintenance: Patient will get the flu vaccine. Neuropathy: Patient has neuropathy with numbness and tingling of the lower extremities.   He is on gabapentin. Has been stable on the medication. Obesity: Patient has a BMI of 32.98. He will intensify lifestyle and dietary modification. Patient was referred to the emergency room for further evaluation and stabilization. He verbalized understanding and agreement with the plan. Past Medical History  Past Medical History:   Diagnosis Date    Arthritis     CAD (coronary artery disease)     Essential hypertension     Low back pain     Lumbar postlaminectomy syndrome     Lumbar spondylosis     Myocardial infarction ([address]     lad stent for stemi,12/2012    Pancreatic cyst        Surgical History  Past Surgical History:   Procedure Laterality Date    HAND/FINGER SURGERY UNLISTED      Carpal tunnel    HX CORONARY ARTERY BYPASS GRAFT  2014    at [address]      HX HIP REPLACEMENT      HX LUMBAR FUSION      HX ORTHOPAEDIC      lumbar spine x 5    HX ORTHOPAEDIC      Right hip replacement    HX ORTHOPAEDIC      right foot calous removed    HX PTCA          Medications  Current Outpatient Medications   Medication Sig Dispense Refill    amLODIPine (NORVASC) 10 mg tablet Take 1 Tab by mouth daily.  clopidogreL (PLAVIX) 75 mg tab Take 1 Tab by mouth daily. Take  by mouth. 90 Tab 0    lisinopriL (PRINIVIL, ZESTRIL) 20 mg tablet TAKE 2 TABLETS (40MG) DAILY 180 Tab 0    latanoprost (XALATAN) 0.005 % ophthalmic solution       timolol (TIMOPTIC) 0.5 % ophthalmic solution 1 Drop two (2) times a day.  aspirin delayed-release 325 mg tablet Take 1 Tab by mouth two (2) times a day. 60 Tab 1    cholecalciferol, VITAMIN D3, (VITAMIN D3) 5,000 unit tab tablet Take  by mouth daily.  oxyCODONE IR (ROXICODONE) 15 mg immediate release tablet Take 1-2 Tabs by mouth every eight (8) hours as needed. Max Daily Amount: 90 mg. 60 Tab 0    nitroglycerin (NITROSTAT) 0.4 mg SL tablet by SubLINGual route every five (5) minutes as needed.       hydrochlorothiazide (HYDRODIURIL) 25 mg tablet Take 25 mg by mouth daily.  atorvastatin (LIPITOR) 80 mg tablet Take 80 mg by mouth daily.  polyethylene glycol (MIRALAX) 17 gram packet Take 1 Packet by mouth daily. Indications: constipation 30 Packet 1    cyclobenzaprine (FLEXERIL) 10 mg tablet Take 1 Tab by mouth nightly as needed for Muscle Spasm(s). 30 Tab 2    gabapentin (NEURONTIN) 300 mg capsule Take 1 Cap by mouth three (3) times daily. 90 Cap 1    isoniazid (NYDRAZID) 300 mg tablet Take 300 mg by mouth daily.  carvedilol (COREG) 25 mg tablet Take 25 mg by mouth two (2) times daily (with meals).  isosorbide mononitrate ER (IMDUR) 60 mg CR tablet Take 1 Tab by mouth every morning. 30 Tab 6       Allergies  Allergies   Allergen Reactions    Vicodin [Hydrocodone-Acetaminophen] Nausea and Vomiting       Family History  Family History   Problem Relation Age of Onset    Heart Attack Neg Hx     Heart Surgery Neg Hx        Social History  Social History     Socioeconomic History    Marital status: SINGLE     Spouse name: Not on file    Number of children: Not on file    Years of education: Not on file    Highest education level: Not on file   Occupational History    Not on file   Social Needs    Financial resource strain: Not on file    Food insecurity     Worry: Not on file     Inability: Not on file    Transportation needs     Medical: Not on file     Non-medical: Not on file   Tobacco Use    Smoking status: Former Smoker     Packs/day: 0.10     Types: Cigarettes     Quit date: 2012     Years since quittin.2    Smokeless tobacco: Current User     Types: Chew   Substance and Sexual Activity    Alcohol use:  Yes     Alcohol/week: 2.0 standard drinks     Types: 1 Glasses of wine, 1 Shots of liquor per week     Frequency: Monthly or less     Drinks per session: 1 or 2     Binge frequency: Never     Comment: socially    Drug use: Not Currently     Types: Cocaine, Marijuana, Heroin     Comment: +Cocaine Screen + UDS in August, 2017     Sexual activity: Not on file     Comment: stopped using   Lifestyle    Physical activity     Days per week: Not on file     Minutes per session: Not on file    Stress: Not on file   Relationships    Social connections     Talks on phone: Not on file     Gets together: Not on file     Attends Congregational service: Not on file     Active member of club or organization: Not on file     Attends meetings of clubs or organizations: Not on file     Relationship status: Not on file    Intimate partner violence     Fear of current or ex partner: Not on file     Emotionally abused: Not on file     Physically abused: Not on file     Forced sexual activity: Not on file   Other Topics Concern    Not on file   Social History Narrative    Not on file       Review of Systems  Review of Systems - Review of all systems is negative except as noted above in the HPI. Vital Signs  Visit Vitals  /88   Pulse 78   Temp 97.8 °F (36.6 °C) (Temporal)   Resp 16   Ht 6' 1\" (1.854 m)   Wt 250 lb (113.4 kg)   SpO2 97%   BMI 32.98 kg/m²         Physical Exam  Physical Examination: General appearance - oriented to person, place, and time, overweight, acyanotic, in no respiratory distress and well hydrated  Mental status - alert, oriented to person, place, and time, affect appropriate to mood  Lymphatics - no palpable lymphadenopathy  Chest - clear to auscultation, no wheezes, rales or rhonchi, symmetric air entry  Heart - S1 and S2 normal  Abdomen - no rebound tenderness noted  bowel sounds normal   Male - PENIS: Bleeding noted urethral meatus and blood clots were also pulled out from the urethra.   Neurological - motor and sensory grossly normal bilaterally  Musculoskeletal - osteoarthritic changes noted in both hands  Extremities - no pedal edema noted, intact peripheral pulses      Results  Results for orders placed or performed in visit on 01/20/20   CBC WITH AUTOMATED DIFF   Result Value Ref Range    WBC 5.6 3.4 - 10.8 x10E3/uL    RBC 3.96 (L) 4.14 - 5.80 x10E6/uL    HGB 12.5 (L) 13.0 - 17.7 g/dL    HCT 37.7 37.5 - 51.0 %    MCV 95 79 - 97 fL    MCH 31.6 26.6 - 33.0 pg    MCHC 33.2 31.5 - 35.7 g/dL    RDW 11.7 11.6 - 15.4 %    PLATELET 778 964 - 316 x10E3/uL    NEUTROPHILS 39 Not Estab. %    Lymphocytes 46 Not Estab. %    MONOCYTES 9 Not Estab. %    EOSINOPHILS 6 Not Estab. %    BASOPHILS 0 Not Estab. %    ABS. NEUTROPHILS 2.2 1.4 - 7.0 x10E3/uL    Abs Lymphocytes 2.6 0.7 - 3.1 x10E3/uL    ABS. MONOCYTES 0.5 0.1 - 0.9 x10E3/uL    ABS. EOSINOPHILS 0.3 0.0 - 0.4 x10E3/uL    ABS. BASOPHILS 0.0 0.0 - 0.2 x10E3/uL    IMMATURE GRANULOCYTES 0 Not Estab. %    ABS. IMM. GRANS. 0.0 0.0 - 0.1 x10E3/uL    Hematology comments: Note:    METABOLIC PANEL, COMPREHENSIVE   Result Value Ref Range    Glucose 88 65 - 99 mg/dL    BUN 19 8 - 27 mg/dL    Creatinine 1.32 (H) 0.76 - 1.27 mg/dL    GFR est non-AA 56 (L) >59 mL/min/1.73    GFR est AA 65 >59 mL/min/1.73    BUN/Creatinine ratio 14 10 - 24    Sodium 145 (H) 134 - 144 mmol/L    Potassium 4.0 3.5 - 5.2 mmol/L    Chloride 104 96 - 106 mmol/L    CO2 23 20 - 29 mmol/L    Calcium 9.2 8.6 - 10.2 mg/dL    Protein, total 7.0 6.0 - 8.5 g/dL    Albumin 3.9 3.8 - 4.8 g/dL    GLOBULIN, TOTAL 3.1 1.5 - 4.5 g/dL    A-G Ratio 1.3 1.2 - 2.2    Bilirubin, total 0.3 0.0 - 1.2 mg/dL    Alk. phosphatase 56 39 - 117 IU/L    AST (SGOT) 25 0 - 40 IU/L    ALT (SGPT) 16 0 - 44 IU/L   LIPID PANEL   Result Value Ref Range    Cholesterol, total 86 (L) 100 - 199 mg/dL    Triglyceride 101 0 - 149 mg/dL    HDL Cholesterol 34 (L) >39 mg/dL    VLDL, calculated 20 5 - 40 mg/dL    LDL, calculated 32 0 - 99 mg/dL   CVD REPORT   Result Value Ref Range    INTERPRETATION Note     PDF IMAGE Not applicable    CKD REPORT   Result Value Ref Range    Interpretation Note    PSA SCREENING (SCREENING)   Result Value Ref Range    Prostate Specific Ag 0.4 0.0 - 4.0 ng/mL       ASSESSMENT and PLAN    ICD-10-CM ICD-9-CM    1. Hematuria, unspecified type  R31.9 599.70 AMB POC URINALYSIS DIP STICK AUTO W/O MICRO      CT ABD PELV WO CONT      REFERRAL TO UROLOGY   2. Essential hypertension, benign  I10 401.1 CBC WITH AUTOMATED DIFF      LIPID PANEL      METABOLIC PANEL, COMPREHENSIVE   3. Stage 3 chronic kidney disease, unspecified whether stage 3a or 3b CKD  N18.30 585.3    4. Screening for malignant neoplasm of prostate  Z12.5 V76.44 PSA SCREENING (SCREENING)   5. Encounter for immunization  Z23 V03.89 FLU (FLUAD QUAD INFLUENZA VACCINE,QUAD,ADJUVANTED)      ADMIN INFLUENZA VIRUS VAC   6. Dyslipidemia  E78.5 272.4    7. Coronary artery disease with stable angina pectoris, unspecified vessel or lesion type, unspecified whether native or transplanted heart ([facility]  I25.118 414.00      413.9    8. Severe obesity (HCC)  E66.01 278.01      lab results and schedule of future lab studies reviewed with patient  reviewed diet, exercise and weight control  cardiovascular risk and specific lipid/LDL goals reviewed  reviewed medications and side effects in detail      I have discussed the diagnosis with the patient and the intended plan of care as seen in the above orders. The patient has received an after-visit summary and questions were answered concerning future plans. I have discussed medication, side effects, and warnings with the patient in detail. The patient verbalized understanding and is in agreement with the plan of care. The patient will contact the office with any additional concerns. I spent at least 45 minutes on this visit with this established patient. Yecenia Norman MD    PLEASE NOTE:   This document has been produced using voice recognition software.  Unrecognized errors in transcription may be present

## 2021-02-08 NOTE — PATIENT INSTRUCTIONS
Patient has gross hematuria, has blood clots from urethra with bleeding. Referred to ER for evaluation and stabilization.

## 2021-02-19 NOTE — PROGRESS NOTES
;      [facility] Emergency Department  [address], [place] [address]  [phone]     Clinical Summary     PERSON INFORMATION   Name KHRIS SALEH Age  66 Years  1952 12:00 AM   Acct# [id number] Sex Female Phone [phone]   Dispo Type Home - (i.e. Home on oxygen, DME)-  Arrival 2018 12:37 PM Checkout 2018 3:15 PM    Address: [address] [place] [address]      Visit Reason FALL     ED Physician Note     Patient:   KHRIS SALEH            MRN: [id number]            FIN: [id number]               Age:   66 years     Sex:  Female     :  1952   Associated Diagnoses:   Chest wall contusion   Author:   Kaci TANG, Fifi Retana      Basic Information   Time seen: Date & time 2018 12:55:00.   History source: Patient.      History of Present Illness   66-year-old female not on a blood thinner ambulates without assistance elevated BMI.  Patient states she was cleaning today in the garage she lost her balance and fell sandwiching herself between the car and the wall hit her head no loss of consciousness was able to get to her phone and call for help is complaining of some right posterior chest wall pain where she was wedged against the car no prolonged downtime denies any preceding symptoms states just lost her balance no chest pain shortness of breath near syncope denies any head neck chest pain anteriorly shortness of breath denies any extremity numbness or weakness nothing prior to arrival.        Review of Systems             Additional review of systems information: ROS  Gen: no fevers/ chills  HEENT: no throat pain  CVS: no CP  RESP: no cough or SOB  GI: no n/v/d  : no dysuria  Neuro: no focal weakness or numbness  Lymph: no LAD  Skin: no rash.      Health Status   Allergies:    Allergic Reactions (All)  Severity Not Documented  Codeine- No reactions were documented.  Canceled/Inactive Reactions (All)  NKA.      Past Medical/  1. Have you been to the ER, urgent care clinic since your last visit? Hospitalized since your last visit? No    2. Have you seen or consulted any other health care providers outside of the [address] [place] [address] since your last visit? Include any pap smears or colon screening.  No Family/ Social History      Medical history   Reviewed as documented in chart.   Cardiovascular: hypertension, hyperlipidemia.   Musculoskeletal: osteoarthritis.   Surgical history: Reviewed as documented in chart.   Family history: Reviewed as documented in chart.   Social history: Alcohol use: Denies, Tobacco use: Denies, Drug use: Denies.      Physical Examination               Vital Signs   Vital Signs   7/28/2018 12:42          Temperature Tympanic      97.3 F  LOW                             Peripheral Pulse Rate     86 bpm                             Respiratory Rate          18 br/min                             Systolic Blood Pressure   106 mmHg                             Diastolic Blood Pressure  74 mmHg                             Mean Arterial Pressure    85 mmHg                             Oxygen Saturation         96 %  .   pulse ox on RA nl.   General:  No acute distress, speaking in full sentences.    Skin:  Warm, dry.    Head:  Normocephalic, atraumatic, no contusions, abrasions, or hematomas.    Neck:  Trachea midline, no JVD, no C-spine tenderness step-offs or deformities.    Eye:  Normal conjunctiva.   Ears, nose, mouth and throat:  Oral mucosa moist, no pharyngeal erythema or exudate.    Cardiovascular:  Regular rate and rhythm, No murmur, Normal peripheral perfusion.    Respiratory:  Lungs are clear to auscultation, respirations are non-labored, breath sounds are equal, Symmetrical chest wall expansion.    Chest wall:  no ant tenderness mild R sided posterior tenderness no contusion abrasion no deformity no creptius .   Back:  no TLS tenderness step-offs or deformities..   Musculoskeletal:  Normal ROM, normal strength, no tenderness, no swelling, pelvis stable to rocking.    Gastrointestinal:  Soft, Nontender, Non distended, Normal bowel sounds, No organomegaly.    Neurological:  Alert and oriented to person, place, time, and situation.   Psychiatric:  Cooperative.      Medical Decision Making    Cardiac monitor:  Normal sinus rhythm, no ectopy, rhythm strip, per my interpretations, no Premature Ventricular Contraction (PVC).    Results review:  Lab results : (Date Range: 7/27/2018 0:00 - 7/28/2018 13:16).   Radiology results:    IMPRESSION:    Moderate osteoarthritic changes are noted in the left knee.  I do not  see an acute injury.  I suspect mild chronic depression involving the  lateral tibial plateau.  Additional comments above.  ,   IMPRESSION: Patient has a lap band.  Otherwise normal AP chest and  right ribs examination.    .    Notes:  66-year-old female not on a blood thinner status post mechanical fall lost her balance while cleaning slid between the wall and the car no prolonged downtime did not hit her head no loss of consciousness patient ambulatory in the emergency room having some tenderness to right posterior chest wall no shortness of breath not hypoxic otherwise negative secondary survey rib series performed and given ultram will d/c home with ultram and pcp f/u  xr's negative .      Reexamination/ Reevaluation   Vital signs   Basic Oxygen Information   7/28/2018 12:42          Oxygen Saturation         96 %        Impression and Plan   Diagnosis   Chest wall contusion ([id number] S20.219A, Discharge, Medical)   Plan   Condition: Improved, Stable.    Disposition: Discharged: to home.    Prescriptions: Launch prescriptions   Pharmacy:  Ultram 50 mg oral tablet (Prescribe): 50 mg, 1 tab, PO, q12hr, PRN: for pain, 10 tab, 0 Refill(s).    Patient was given the following educational materials: Contusion, Fall Prevention and Home Safety.    Follow up with: Follow up with primary care provider Call for follow up appointment  Follow-Up As Directed  Return if symptoms worsen.    Counseled: Patient, Family.        ED Time Seen By Provider Entered On:  7/28/2018 12:55     Performed On:  7/28/2018 12:55  by Kaci TANG, Fifi Retana               Time Seen By Provider   Time Seen by Provider :    7/28/2018 12:55    Kaci TANG, Fifi Lainey - 7/28/2018 12:55           VITALS INFORMATION  Vitals/Ht/Wt  Temperature Tympanic:  97.3 F  Peripheral Pulse Rate:  86 bpm  Respiratory Rate:  18 br/min  Oxygen Saturation:  96 %  Systolic Blood Pressure:  106 mmHg  Diastolic Blood Pressure:  74 mmHg  Mean Arterial Pressure:  85 mmHg  Height:  158 cm  Weight:  133 kg       MEDICAL INFORMATION   Allergy Info:          codeine             Prescriptions:                  Prescription Display   traMADol (Ultram 50 mg oral tablet) 50 mg = 1 tab, PO, q12hr, PRN for pain, # 10 tab, 0 Refill(s)          DISCHARGE INFORMATION   Discharge Disposition: Home - (i.e. Home on oxygen, DME)- 01     PATIENT EDUCATION INFORMATION   Instructions:       Fall Prevention and Home Safety; Contusion   Follow up:                  With: Address: When:   Follow up with primary care provider     Comments:   Call for follow up appointment   Follow-Up As Directed   Return if symptoms worsen            DIAGNOSIS  Chest wall contusion

## 2021-03-08 NOTE — TELEPHONE ENCOUNTER
Requested Prescriptions     Pending Prescriptions Disp Refills    amLODIPine (NORVASC) 10 mg tablet        Sig: Take 1 Tab by mouth daily.  atorvastatin (Lipitor) 80 mg tablet        Sig: Take 1 Tab by mouth daily.

## 2021-03-10 RX ORDER — AMLODIPINE BESYLATE 10 MG/1
10 TABLET ORAL DAILY
Qty: 90 TAB | Refills: 1 | Status: SHIPPED | OUTPATIENT
Start: 2021-03-10 | End: 2021-10-05 | Stop reason: SDUPTHER

## 2021-03-10 RX ORDER — ATORVASTATIN CALCIUM 80 MG/1
80 TABLET, FILM COATED ORAL DAILY
Qty: 90 TAB | Refills: 1 | Status: SHIPPED | OUTPATIENT
Start: 2021-03-10

## 2021-05-05 ENCOUNTER — OFFICE VISIT (OUTPATIENT)
Dept: ORTHOPEDIC SURGERY | Age: 66
End: 2021-05-05
Payer: MEDICARE

## 2021-05-05 VITALS
HEART RATE: 90 BPM | RESPIRATION RATE: 15 BRPM | TEMPERATURE: 98.9 F | OXYGEN SATURATION: 98 % | WEIGHT: 255 LBS | BODY MASS INDEX: 33.8 KG/M2 | HEIGHT: 73 IN

## 2021-05-05 DIAGNOSIS — M79.601 RIGHT ARM PAIN: ICD-10-CM

## 2021-05-05 DIAGNOSIS — S46.811A TRAPEZIUS MUSCLE STRAIN, RIGHT, INITIAL ENCOUNTER: Primary | ICD-10-CM

## 2021-05-05 PROCEDURE — G8417 CALC BMI ABV UP PARAM F/U: HCPCS | Performed by: ORTHOPAEDIC SURGERY

## 2021-05-05 PROCEDURE — G8756 NO BP MEASURE DOC: HCPCS | Performed by: ORTHOPAEDIC SURGERY

## 2021-05-05 PROCEDURE — G8432 DEP SCR NOT DOC, RNG: HCPCS | Performed by: ORTHOPAEDIC SURGERY

## 2021-05-05 PROCEDURE — 99214 OFFICE O/P EST MOD 30 MIN: CPT | Performed by: ORTHOPAEDIC SURGERY

## 2021-05-05 PROCEDURE — G8427 DOCREV CUR MEDS BY ELIG CLIN: HCPCS | Performed by: ORTHOPAEDIC SURGERY

## 2021-05-05 PROCEDURE — 3017F COLORECTAL CA SCREEN DOC REV: CPT | Performed by: ORTHOPAEDIC SURGERY

## 2021-05-05 PROCEDURE — 1101F PT FALLS ASSESS-DOCD LE1/YR: CPT | Performed by: ORTHOPAEDIC SURGERY

## 2021-05-05 PROCEDURE — 73030 X-RAY EXAM OF SHOULDER: CPT | Performed by: ORTHOPAEDIC SURGERY

## 2021-05-05 PROCEDURE — G8536 NO DOC ELDER MAL SCRN: HCPCS | Performed by: ORTHOPAEDIC SURGERY

## 2021-05-05 RX ORDER — METHYLPREDNISOLONE 4 MG/1
TABLET ORAL
Qty: 1 DOSE PACK | Refills: 0 | Status: SHIPPED | OUTPATIENT
Start: 2021-05-05 | End: 2021-07-26

## 2021-05-05 NOTE — PROGRESS NOTES
Patient: Gabino August                MRN: [id number]       SSN: [id number]  YOB: 1955        AGE: 77 y.o. SEX: male  Body mass index is 33.64 kg/m². PCP: Isabel Rajput MD  05/05/21    Chief Complaint: Right shoulder/neck pain    HPI: Gabino August is a 77 y.o. male patient who reports several weeks of right trapezial pain. He says the pain began after he got his Covid shot into the right arm. Since that time is been having worsening pain in the right trapezius. He has difficulty with neck range of motion and shoulder range of motion due to this. He has tried over-the-counter medications without resolution of his symptoms. Past Medical History:   Diagnosis Date    Arthritis     CAD (coronary artery disease)     Essential hypertension     Low back pain     Lumbar postlaminectomy syndrome     Lumbar spondylosis     Myocardial infarction (HCC)     lad stent for stemi,12/2012    Pancreatic cyst        Family History   Problem Relation Age of Onset    Heart Attack Neg Hx     Heart Surgery Neg Hx        Current Outpatient Medications   Medication Sig Dispense Refill    methylPREDNISolone (MEDROL DOSEPACK) 4 mg tablet Per dose pack instructions 1 Dose Pack 0    amLODIPine (NORVASC) 10 mg tablet Take 1 Tab by mouth daily. 90 Tab 1    atorvastatin (Lipitor) 80 mg tablet Take 1 Tab by mouth daily. 90 Tab 1    clopidogreL (PLAVIX) 75 mg tab Take 1 Tab by mouth daily. Take  by mouth. 90 Tab 0    lisinopriL (PRINIVIL, ZESTRIL) 20 mg tablet TAKE 2 TABLETS (40MG) DAILY 180 Tab 0    polyethylene glycol (MIRALAX) 17 gram packet Take 1 Packet by mouth daily. Indications: constipation 30 Packet 1    latanoprost (XALATAN) 0.005 % ophthalmic solution       timolol (TIMOPTIC) 0.5 % ophthalmic solution 1 Drop two (2) times a day.  aspirin delayed-release 325 mg tablet Take 1 Tab by mouth two (2) times a day.  60 Tab 1    cyclobenzaprine (FLEXERIL) 10 mg tablet Take 1 Tab by mouth nightly as needed for Muscle Spasm(s). 30 Tab 2    cholecalciferol, VITAMIN D3, (VITAMIN D3) 5,000 unit tab tablet Take  by mouth daily.  oxyCODONE IR (ROXICODONE) 15 mg immediate release tablet Take 1-2 Tabs by mouth every eight (8) hours as needed. Max Daily Amount: 90 mg. 60 Tab 0    gabapentin (NEURONTIN) 300 mg capsule Take 1 Cap by mouth three (3) times daily. 90 Cap 1    isoniazid (NYDRAZID) 300 mg tablet Take 300 mg by mouth daily.  nitroglycerin (NITROSTAT) 0.4 mg SL tablet by SubLINGual route every five (5) minutes as needed.  carvedilol (COREG) 25 mg tablet Take 25 mg by mouth two (2) times daily (with meals).  hydrochlorothiazide (HYDRODIURIL) 25 mg tablet Take 25 mg by mouth daily.  isosorbide mononitrate ER (IMDUR) 60 mg CR tablet Take 1 Tab by mouth every morning.  30 Tab 6       Allergies   Allergen Reactions    Vicodin [Hydrocodone-Acetaminophen] Nausea and Vomiting       Past Surgical History:   Procedure Laterality Date    HAND/FINGER SURGERY UNLISTED      Carpal tunnel    HX CORONARY ARTERY BYPASS GRAFT      [facility] HX HIP REPLACEMENT      HX LUMBAR FUSION      HX ORTHOPAEDIC      lumbar spine x 5    HX ORTHOPAEDIC      Right hip replacement    HX ORTHOPAEDIC      right foot calous removed    HX PTCA         Social History     Socioeconomic History    Marital status: UNKNOWN     Spouse name: Not on file    Number of children: Not on file    Years of education: Not on file    Highest education level: Not on file   Occupational History    Not on file   Social Needs    Financial resource strain: Not on file    Food insecurity     Worry: Not on file     Inability: Not on file    Transportation needs     Medical: Not on file     Non-medical: Not on file   Tobacco Use    Smoking status: Former Smoker     Packs/day: 0.10     Types: Cigarettes     Quit date: 2012     Years since quittin.5    Smokeless tobacco: Current User     Types: Chew   Substance and Sexual Activity    Alcohol use: Yes     Alcohol/week: 2.0 standard drinks     Types: 1 Glasses of wine, 1 Shots of liquor per week     Frequency: Monthly or less     Drinks per session: 1 or 2     Binge frequency: Never     Comment: socially    Drug use: Not Currently     Types: Cocaine, Marijuana, Heroin     Comment: +Cocaine Screen + UDS in August, 2017     Sexual activity: Not on file     Comment: stopped using   Lifestyle    Physical activity     Days per week: Not on file     Minutes per session: Not on file    Stress: Not on file   Relationships    Social connections     Talks on phone: Not on file     Gets together: Not on file     Attends [employer] service: Not on file     Active member of club or organization: Not on file     Attends meetings of clubs or organizations: Not on file     Relationship status: Not on file    Intimate partner violence     Fear of current or ex partner: Not on file     Emotionally abused: Not on file     Physically abused: Not on file     Forced sexual activity: Not on file   Other Topics Concern    Not on file   Social History Narrative    Not on file       REVIEW OF SYSTEMS:      No changes from previous review of systems unless noted. PHYSICAL EXAMINATION:  Visit Vitals  Pulse 90   Temp 98.9 °F (37.2 °C)   Resp 15   Ht 6' 1\" (1.854 m)   Wt 255 lb (115.7 kg)   SpO2 98%   BMI 33.64 kg/m²     Body mass index is 33.64 kg/m². GENERAL: Alert and oriented x3, in no acute distress. HEENT: Normocephalic, atraumatic. RESP: Non labored breathing. SKIN: No rashes or lesions noted.    Cervical Spine Examination  Neck ROM   Flexion    Full   Extension   Full   Lateral Rotation  Full  Spurling's    Neg  Focal Neuro Deficits   None  Sensation C5-T1   Full  Strength C5-T1   5/5  Tenderness C Spine   None  Stepoff C Spine   None  Paraspinal Tenderness  + right trapezius   Other Findings   right trapezius spasm noted    Shoulder Examination     R   L  ROM   FF  Full   Full  ER  Full   Full   IR  Full   Full  Rotator Cuff Pain   Supra  -   -   Infra  -   -   Subscap -   -  Crepitus  -   -  Effusion  -   -  Warmth  -   -   Erythema  -   -  Instability  -   -  AC Joint TTP  -   -  Clavicle   Deformity -   -   TTP  -   -  Proximal Humerus   Deformity -   -   TTP  -   -  Deltoid Strength 5   5  Biceps Strength 5   5  Biceps Deformity -   -  Biceps Groove Pain -   -  Impingement Sign -   -         IMAGING:  X-rays of the right shoulder 4 views were taken the office today. These show no acute bony abnormalities. ASSESSMENT & PLAN  Diagnosis: Right trapezius spasm    I recommended a Medrol Dosepak for Tyron today. We discussed physical therapy and other treatment options but he declined that. I do not see anything surgical based on his examination or his x-rays today. He still seems to be having a very bad spasm of his trapezius. He has taken muscle relaxers and says they have not worked.       Electronically signed by: Shayla Milian MD

## 2021-07-26 ENCOUNTER — OFFICE VISIT (OUTPATIENT)
Dept: FAMILY MEDICINE CLINIC | Age: 66
End: 2021-07-26
Payer: MEDICARE

## 2021-07-26 VITALS
OXYGEN SATURATION: 99 % | HEIGHT: 73 IN | WEIGHT: 237 LBS | TEMPERATURE: 96.8 F | RESPIRATION RATE: 18 BRPM | HEART RATE: 64 BPM | BODY MASS INDEX: 31.41 KG/M2 | SYSTOLIC BLOOD PRESSURE: 129 MMHG | DIASTOLIC BLOOD PRESSURE: 71 MMHG

## 2021-07-26 DIAGNOSIS — G89.29 CHRONIC RIGHT SHOULDER PAIN: ICD-10-CM

## 2021-07-26 DIAGNOSIS — I25.118 CORONARY ARTERY DISEASE WITH STABLE ANGINA PECTORIS, UNSPECIFIED VESSEL OR LESION TYPE, UNSPECIFIED WHETHER NATIVE OR TRANSPLANTED HEART (HCC): ICD-10-CM

## 2021-07-26 DIAGNOSIS — E66.01 SEVERE OBESITY (HCC): ICD-10-CM

## 2021-07-26 DIAGNOSIS — Z12.5 SCREENING FOR MALIGNANT NEOPLASM OF PROSTATE: ICD-10-CM

## 2021-07-26 DIAGNOSIS — N18.30 STAGE 3 CHRONIC KIDNEY DISEASE, UNSPECIFIED WHETHER STAGE 3A OR 3B CKD (HCC): ICD-10-CM

## 2021-07-26 DIAGNOSIS — M54.16 LUMBAR NEURITIS: ICD-10-CM

## 2021-07-26 DIAGNOSIS — E07.9 THYROID DISORDER: ICD-10-CM

## 2021-07-26 DIAGNOSIS — Z71.89 ADVANCED DIRECTIVES, COUNSELING/DISCUSSION: ICD-10-CM

## 2021-07-26 DIAGNOSIS — I10 ESSENTIAL HYPERTENSION, BENIGN: Primary | ICD-10-CM

## 2021-07-26 DIAGNOSIS — M54.2 NECK PAIN: ICD-10-CM

## 2021-07-26 DIAGNOSIS — Z12.11 SCREEN FOR COLON CANCER: ICD-10-CM

## 2021-07-26 DIAGNOSIS — R60.0 PEDAL EDEMA: ICD-10-CM

## 2021-07-26 DIAGNOSIS — E78.5 DYSLIPIDEMIA: ICD-10-CM

## 2021-07-26 DIAGNOSIS — Z00.00 INITIAL MEDICARE ANNUAL WELLNESS VISIT: ICD-10-CM

## 2021-07-26 DIAGNOSIS — M25.511 CHRONIC RIGHT SHOULDER PAIN: ICD-10-CM

## 2021-07-26 PROCEDURE — G8427 DOCREV CUR MEDS BY ELIG CLIN: HCPCS | Performed by: FAMILY MEDICINE

## 2021-07-26 PROCEDURE — 99214 OFFICE O/P EST MOD 30 MIN: CPT | Performed by: FAMILY MEDICINE

## 2021-07-26 PROCEDURE — 1101F PT FALLS ASSESS-DOCD LE1/YR: CPT | Performed by: FAMILY MEDICINE

## 2021-07-26 PROCEDURE — G0438 PPPS, INITIAL VISIT: HCPCS | Performed by: FAMILY MEDICINE

## 2021-07-26 PROCEDURE — G8754 DIAS BP LESS 90: HCPCS | Performed by: FAMILY MEDICINE

## 2021-07-26 PROCEDURE — G8510 SCR DEP NEG, NO PLAN REQD: HCPCS | Performed by: FAMILY MEDICINE

## 2021-07-26 PROCEDURE — G8417 CALC BMI ABV UP PARAM F/U: HCPCS | Performed by: FAMILY MEDICINE

## 2021-07-26 PROCEDURE — G8536 NO DOC ELDER MAL SCRN: HCPCS | Performed by: FAMILY MEDICINE

## 2021-07-26 PROCEDURE — G8752 SYS BP LESS 140: HCPCS | Performed by: FAMILY MEDICINE

## 2021-07-26 PROCEDURE — 3017F COLORECTAL CA SCREEN DOC REV: CPT | Performed by: FAMILY MEDICINE

## 2021-07-26 RX ORDER — HYDROCHLOROTHIAZIDE 12.5 MG/1
12.5 TABLET ORAL DAILY
Qty: 30 TABLET | Refills: 3 | Status: SHIPPED | OUTPATIENT
Start: 2021-07-26 | End: 2021-10-12 | Stop reason: SDUPTHER

## 2021-07-26 RX ORDER — ACETAMINOPHEN 500 MG
1000 TABLET ORAL
Qty: 100 TABLET | Refills: 1 | Status: SHIPPED | OUTPATIENT
Start: 2021-07-26

## 2021-07-26 NOTE — PATIENT INSTRUCTIONS
Medicare Wellness Visit, Male    The best way to live healthy is to have a lifestyle where you eat a well-balanced diet, exercise regularly, limit alcohol use, and quit all forms of tobacco/nicotine, if applicable. Regular preventive services are another way to keep healthy. Preventive services (vaccines, screening tests, monitoring & exams) can help personalize your care plan, which helps you manage your own care. Screening tests can find health problems at the earliest stages, when they are easiest to treat. [facility] follows the current, evidence-based guidelines published by the [facility] ([facility]) when recommending preventive services for our patients. Because we follow these guidelines, sometimes recommendations change over time as research supports it. (For example, a prostate screening blood test is no longer routinely recommended for men with no symptoms). Of course, you and your doctor may decide to screen more often for some diseases, based on your risk and co-morbidities (chronic disease you are already diagnosed with). Preventive services for you include:  - Medicare offers their members a free annual wellness visit, which is time for you and your primary care provider to discuss and plan for your preventive service needs. Take advantage of this benefit every year!  -All adults over age 72 should receive the recommended pneumonia vaccines. Current USPSTF guidelines recommend a series of two vaccines for the best pneumonia protection.   -All adults should have a flu vaccine yearly and tetanus vaccine every 10 years.  -All adults age 48 and older should receive the shingles vaccines (series of two vaccines).        -All adults age 38-68 who are overweight should have a diabetes screening test once every three years.   -Other screening tests & preventive services for persons with diabetes include: an eye exam to screen for diabetic retinopathy, a kidney function test, a foot exam, and stricter control over your cholesterol.   -Cardiovascular screening for adults with routine risk involves an electrocardiogram (ECG) at intervals determined by the provider.   -Colorectal cancer screening should be done for adults age 54-65 with no increased risk factors for colorectal cancer. There are a number of acceptable methods of screening for this type of cancer. Each test has its own benefits and drawbacks. Discuss with your provider what is most appropriate for you during your annual wellness visit. The different tests include: colonoscopy (considered the best screening method), a fecal occult blood test, a fecal DNA test, and sigmoidoscopy.  -All adults born between [facility] should be screened once for Hepatitis C.  -An Abdominal Aortic Aneurysm (AAA) Screening is recommended for men age 73-68 who has ever smoked in their lifetime. Here is a list of your current Health Maintenance items (your personalized list of preventive services) with a due date:  Health Maintenance Due   Topic Date Due    Colorectal Screening  Never done    DTaP/Tdap/Td  (1 - Tdap) 04/20/2011    Annual Well Visit  Never done    Cholesterol Test   02/14/2021     Medicare Wellness Visit, Male    The best way to live healthy is to have a lifestyle where you eat a well-balanced diet, exercise regularly, limit alcohol use, and quit all forms of tobacco/nicotine, if applicable. Regular preventive services are another way to keep healthy. Preventive services (vaccines, screening tests, monitoring & exams) can help personalize your care plan, which helps you manage your own care. Screening tests can find health problems at the earliest stages, when they are easiest to treat.    [facility] follows the current, evidence-based guidelines published by the [facility] (USPSTF) when recommending preventive services for our patients. Because we follow these guidelines, sometimes recommendations change over time as research supports it. (For example, a prostate screening blood test is no longer routinely recommended for men with no symptoms). Of course, you and your doctor may decide to screen more often for some diseases, based on your risk and co-morbidities (chronic disease you are already diagnosed with). Preventive services for you include:  - Medicare offers their members a free annual wellness visit, which is time for you and your primary care provider to discuss and plan for your preventive service needs. Take advantage of this benefit every year!  -All adults over age 72 should receive the recommended pneumonia vaccines. Current USPSTF guidelines recommend a series of two vaccines for the best pneumonia protection.   -All adults should have a flu vaccine yearly and tetanus vaccine every 10 years.  -All adults age 48 and older should receive the shingles vaccines (series of two vaccines). -All adults age 38-68 who are overweight should have a diabetes screening test once every three years.   -Other screening tests & preventive services for persons with diabetes include: an eye exam to screen for diabetic retinopathy, a kidney function test, a foot exam, and stricter control over your cholesterol.   -Cardiovascular screening for adults with routine risk involves an electrocardiogram (ECG) at intervals determined by the provider.   -Colorectal cancer screening should be done for adults age 54-65 with no increased risk factors for colorectal cancer. There are a number of acceptable methods of screening for this type of cancer. Each test has its own benefits and drawbacks. Discuss with your provider what is most appropriate for you during your annual wellness visit.  The different tests include: colonoscopy (considered the best screening method), a fecal occult blood test, a fecal DNA test, and sigmoidoscopy.  -All adults born between [facility] should be screened once for Hepatitis C.  -An Abdominal Aortic Aneurysm (AAA) Screening is recommended for men age 73-68 who has ever smoked in their lifetime.      Here is a list of your current Health Maintenance items (your personalized list of preventive services) with a due date:  Health Maintenance Due   Topic Date Due    Colorectal Screening  Never done    DTaP/Tdap/Td  (1 - Tdap) 04/20/2011    Cholesterol Test   02/14/2021

## 2021-07-26 NOTE — PROGRESS NOTES
SUSHANT Enriquez Citizen comes in for f/un care. Pedal edema: Patient has pedal edema both lower extremities. Denies redness, swelling or pain. I suspect this is due to amlodipine. I will order labs. He denies shortness of breath or chest pain. Discussed supportive care measures including elevation of the lower extremities and compression stockings. I will follow-up with the lab results. May need to consider changing blood pressure medication. HTN: Patient has hypertension. Blood pressure is stable. He denies headache, changes in vision or focal weakness. He is on lisinopril, coreg, amlodipine. I will recheck labs. Neuropathy: Patient has numbness and tingling lower extremities. He has a history of lumbar neuritis. He has had lumbar laminectomy done in the past.  He has been followed up in the pain management clinic. Currently he is on Roxicodone will abdominal pain. This helps with his numbness and tingling. CAD: Patient has a history of coronary artery disease. Has had coronary stent placement. He is on Plavix, Coreg, Imdur, Lipitor and lisinopril. Denies chest pain, shortness of breath or diaphoresis. We will continue with the current treatment plan. Obesity: Patient has obesity. His BMI is 31.27. He will intensify lifestyle and dietary modification. Arm/shoulder pain: Patient has chronic pain right arm and shoulder. This limits overhead motions. Denies trauma or twisting of the arm. I will check an x-ray of the right shoulder. May need to be seen by the orthopedist.   CKD: Patient has chronic kidney disease stage III. We will recheck labs today. Plan is to avoid any nephrotoxic medications. Neck pain: Patient has neck pain. This has been chronic for him. Gets occasional numbness and tingling in the shoulder area. I will order an x-ray of the cervical spine. I will follow-up with the results. Dyslipidemia: Patient has dyslipidemia. He is on atorvastatin.   We will recheck a lipid panel. Past Medical History  Past Medical History:   Diagnosis Date    Arthritis     CAD (coronary artery disease)     Essential hypertension     Low back pain     Lumbar postlaminectomy syndrome     Lumbar spondylosis     Myocardial infarction ([facility] [address]     lad stent for stemi,12/2012    Pancreatic cyst        Surgical History  Past Surgical History:   Procedure Laterality Date    HAND/FINGER SURGERY UNLISTED      Carpal tunnel    HX CORONARY ARTERY BYPASS GRAFT  2014    at [address]      HX HIP REPLACEMENT      HX LUMBAR FUSION      HX ORTHOPAEDIC      lumbar spine x 5    HX ORTHOPAEDIC      Right hip replacement    HX ORTHOPAEDIC      right foot calous removed    HX PTCA          Medications  Current Outpatient Medications   Medication Sig Dispense Refill    amLODIPine (NORVASC) 10 mg tablet Take 1 Tab by mouth daily. 90 Tab 1    atorvastatin (Lipitor) 80 mg tablet Take 1 Tab by mouth daily. 90 Tab 1    clopidogreL (PLAVIX) 75 mg tab Take 1 Tab by mouth daily. Take  by mouth. 90 Tab 0    lisinopriL (PRINIVIL, ZESTRIL) 20 mg tablet TAKE 2 TABLETS (40MG) DAILY 180 Tab 0    latanoprost (XALATAN) 0.005 % ophthalmic solution       timolol (TIMOPTIC) 0.5 % ophthalmic solution 1 Drop two (2) times a day.  aspirin delayed-release 325 mg tablet Take 1 Tab by mouth two (2) times a day. 60 Tab 1    cholecalciferol, VITAMIN D3, (VITAMIN D3) 5,000 unit tab tablet Take  by mouth daily.  oxyCODONE IR (ROXICODONE) 15 mg immediate release tablet Take 1-2 Tabs by mouth every eight (8) hours as needed. Max Daily Amount: 90 mg. 60 Tab 0    nitroglycerin (NITROSTAT) 0.4 mg SL tablet by SubLINGual route every five (5) minutes as needed.  carvedilol (COREG) 25 mg tablet Take 25 mg by mouth two (2) times daily (with meals).  hydrochlorothiazide (HYDRODIURIL) 25 mg tablet Take 25 mg by mouth daily.       isosorbide mononitrate ER (IMDUR) 60 mg CR tablet Take 1 Tab by mouth every morning. 30 Tab 6       Allergies  Allergies   Allergen Reactions    Vicodin [Hydrocodone-Acetaminophen] Nausea and Vomiting       Family History  Family History   Problem Relation Age of Onset    Heart Attack Neg Hx     Heart Surgery Neg Hx        Social History  Social History     Socioeconomic History    Marital status: UNKNOWN     Spouse name: Not on file    Number of children: Not on file    Years of education: Not on file    Highest education level: Not on file   Occupational History    Not on file   Tobacco Use    Smoking status: Former Smoker     Packs/day: 0.10     Types: Cigarettes     Quit date: 2012     Years since quittin.7    Smokeless tobacco: Current User     Types: Chew   Substance and Sexual Activity    Alcohol use: Yes     Alcohol/week: 2.0 standard drinks     Types: 1 Glasses of wine, 1 Shots of liquor per week     Comment: socially    Drug use: Not Currently     Types: Cocaine, Marijuana, Heroin     Comment: +Cocaine Screen + UDS in      Sexual activity: Not on file     Comment: stopped using   Other Topics Concern    Not on file   Social History Narrative    Not on file     Social Determinants of Health     Financial Resource Strain:     Difficulty of Paying Living Expenses:    Food Insecurity:     Worried About Running Out of Food in the Last Year:     [address] in the Last Year:    Transportation Needs:     Lack of Transportation (Medical):      Lack of Transportation (Non-Medical):    Physical Activity:     Days of Exercise per Week:     Minutes of Exercise per Session:    Stress:     Feeling of Stress :    Social Connections:     Frequency of Communication with Friends and Family:     Frequency of Social Gatherings with Friends and Family:     Attends [employer] Services:     Active Member of Clubs or Organizations:     Attends Club or Organization Meetings:     Marital Status:    Intimate Partner Violence:     Fear of Current or Ex-Partner:     Emotionally Abused:     Physically Abused:     Sexually Abused:        Review of Systems  Review of Systems - Review of all systems is negative except as noted above in the HPI. Vital Signs  Visit Vitals  /71 (BP 1 Location: Left upper arm, BP Patient Position: Sitting, BP Cuff Size: Adult)   Pulse 64   Temp 96.8 °F (36 °C) (Oral)   Resp 18   Ht 6' 1\" (1.854 m)   Wt 237 lb (107.5 kg)   SpO2 99%   BMI 31.27 kg/m²         Physical Exam  Physical Examination: General appearance - oriented to person, place, and time, overweight, acyanotic, in no respiratory distress and well hydrated  Mental status - affect appropriate to mood  Neck -paracervical muscle tightness  Lymphatics - no palpable lymphadenopathy  Chest - no tachypnea, retractions or cyanosis  Heart - systolic murmur 2/6 at lower left sternal border  Abdomen - no rebound tenderness noted  Back exam - limited range of motion  Neurological - abnormal neurological exam unchanged from prior examinations  Musculoskeletal -limited range of overhead motions right upper extremity  Extremities - pedal edema 1 +, intact peripheral pulses      Results  Results for orders placed or performed in visit on 01/20/20   CBC WITH AUTOMATED DIFF   Result Value Ref Range    WBC 5.6 3.4 - 10.8 x10E3/uL    RBC 3.96 (L) 4.14 - 5.80 x10E6/uL    HGB 12.5 (L) 13.0 - 17.7 g/dL    HCT 37.7 37.5 - 51.0 %    MCV 95 79 - 97 fL    MCH 31.6 26.6 - 33.0 pg    MCHC 33.2 31.5 - 35.7 g/dL    RDW 11.7 11.6 - 15.4 %    PLATELET 848 757 - 082 x10E3/uL    NEUTROPHILS 39 Not Estab. %    Lymphocytes 46 Not Estab. %    MONOCYTES 9 Not Estab. %    EOSINOPHILS 6 Not Estab. %    BASOPHILS 0 Not Estab. %    ABS. NEUTROPHILS 2.2 1.4 - 7.0 x10E3/uL    Abs Lymphocytes 2.6 0.7 - 3.1 x10E3/uL    ABS. MONOCYTES 0.5 0.1 - 0.9 x10E3/uL    ABS. EOSINOPHILS 0.3 0.0 - 0.4 x10E3/uL    ABS. BASOPHILS 0.0 0.0 - 0.2 x10E3/uL    IMMATURE GRANULOCYTES 0 Not Estab. %    ABS. IMM. GRANS. 0.0 0.0 - 0.1 x10E3/uL    Hematology comments: Note:    METABOLIC PANEL, COMPREHENSIVE   Result Value Ref Range    Glucose 88 65 - 99 mg/dL    BUN 19 8 - 27 mg/dL    Creatinine 1.32 (H) 0.76 - 1.27 mg/dL    GFR est non-AA 56 (L) >59 mL/min/1.73    GFR est AA 65 >59 mL/min/1.73    BUN/Creatinine ratio 14 10 - 24    Sodium 145 (H) 134 - 144 mmol/L    Potassium 4.0 3.5 - 5.2 mmol/L    Chloride 104 96 - 106 mmol/L    CO2 23 20 - 29 mmol/L    Calcium 9.2 8.6 - 10.2 mg/dL    Protein, total 7.0 6.0 - 8.5 g/dL    Albumin 3.9 3.8 - 4.8 g/dL    GLOBULIN, TOTAL 3.1 1.5 - 4.5 g/dL    A-G Ratio 1.3 1.2 - 2.2    Bilirubin, total 0.3 0.0 - 1.2 mg/dL    Alk. phosphatase 56 39 - 117 IU/L    AST (SGOT) 25 0 - 40 IU/L    ALT (SGPT) 16 0 - 44 IU/L   LIPID PANEL   Result Value Ref Range    Cholesterol, total 86 (L) 100 - 199 mg/dL    Triglyceride 101 0 - 149 mg/dL    HDL Cholesterol 34 (L) >39 mg/dL    VLDL, calculated 20 5 - 40 mg/dL    LDL, calculated 32 0 - 99 mg/dL   CVD REPORT   Result Value Ref Range    INTERPRETATION Note     PDF IMAGE Not applicable    CKD REPORT   Result Value Ref Range    Interpretation Note    PSA SCREENING (SCREENING)   Result Value Ref Range    Prostate Specific Ag 0.4 0.0 - 4.0 ng/mL       ASSESSMENT and PLAN    ICD-10-CM ICD-9-CM    1. Essential hypertension, benign  H30 103.6 METABOLIC PANEL, COMPREHENSIVE      CBC WITH AUTOMATED DIFF      hydroCHLOROthiazide (HYDRODIURIL) 12.5 mg tablet   2. Stage 3 chronic kidney disease, unspecified whether stage 3a or 3b CKD (HCC)  N18.30 585.3    3. Dyslipidemia  E78.5 272.4 LIPID PANEL   4. Coronary artery disease with stable angina pectoris, unspecified vessel or lesion type, unspecified whether native or transplanted heart ([facility]  I25.118 414.00      413.9    5. Severe obesity ([address]  E66.01 278.01    6. Lumbar neuritis  M54.16 724.4    7. Pedal edema  R60.0 782.3 TSH 3RD GENERATION      hydroCHLOROthiazide (HYDRODIURIL) 12.5 mg tablet   8.  Thyroid disorder  E07.9 246.9 TSH 3RD GENERATION   9. Neck pain  M54.2 723.1 REFERRAL TO ORTHOPEDICS      REFERRAL TO PAIN MANAGEMENT      acetaminophen (Tylenol Extra Strength) 500 mg tablet      CANCELED: XR SPINE CERV 4 OR 5 V   10. Chronic right shoulder pain  M25.511 719.41 XR SHOULDER RT AP/LAT MIN 2 V    G89.29 338.29 REFERRAL TO ORTHOPEDICS      REFERRAL TO PAIN MANAGEMENT      acetaminophen (Tylenol Extra Strength) 500 mg tablet   11. Initial Medicare annual wellness visit  Z00.00 V70.0    12. Screen for colon cancer  Z12.11 V76.51 REFERRAL TO COLON AND RECTAL SURGERY   13. Screening for malignant neoplasm of prostate  Z12.5 V76.44 PSA SCREENING (SCREENING)   14. Advanced directives, counseling/discussion  Z71.89 V65.49 ADVANCE CARE PLANNING FIRST 30 MINS      FULL CODE     lab results and schedule of future lab studies reviewed with patient  reviewed diet, exercise and weight control  cardiovascular risk and specific lipid/LDL goals reviewed  reviewed medications and side effects in detail  radiology results and schedule of future radiology studies reviewed with patient      I have discussed the diagnosis with the patient and the intended plan of care as seen in the above orders. The patient has received an after-visit summary and questions were answered concerning future plans. I have discussed medication, side effects, and warnings with the patient in detail. The patient verbalized understanding and is in agreement with the plan of care. The patient will contact the office with any additional concerns. Darren Fletcher MD    PLEASE NOTE:   This document has been produced using voice recognition software.  Unrecognized errors in transcription may be present

## 2021-07-26 NOTE — PROGRESS NOTES
Chief Complaint   Patient presents with    Follow Up Chronic Condition    Annual Wellness Visit    Arm Pain     neck and head pain started after 2nd covid shot      1. Have you been to the ER, urgent care clinic since your last visit? Hospitalized since your last visit? Yes When: 04/2021 Where: [facility] Reason for visit: neck pain     2. Have you seen or consulted any other health care providers outside of the [address] [place] [address] since your last visit? Include any pap smears or colon screening. No  This is an Initial Medicare Annual Wellness Exam (AWV) (Performed 12 months after IPPE or effective date of Medicare Part B enrollment, Once in a lifetime)    I have reviewed the patient's medical history in detail and updated the computerized patient record. Assessment/Plan   Education and counseling provided:  Are appropriate based on today's review and evaluation    1. Initial Medicare annual wellness visit    2. Screen for colon cancer  - REFERRAL TO COLON AND RECTAL SURGERY    3. Screening for malignant neoplasm of prostate  - PSA SCREENING (SCREENING); Future    4.  Advanced directives, counseling/discussion  - ADVANCE CARE PLANNING FIRST 30 MINS  - FULL CODE       Depression Risk Factor Screening     3 most recent PHQ Screens 7/26/2021   PHQ Not Done -   Little interest or pleasure in doing things Not at all   Feeling down, depressed, irritable, or hopeless Not at all   Total Score PHQ 2 0   Trouble falling or staying asleep, or sleeping too much Not at all   Feeling tired or having little energy Not at all   Poor appetite, weight loss, or overeating Not at all   Feeling bad about yourself - or that you are a failure or have let yourself or your family down Not at all   Trouble concentrating on things such as school, work, reading, or watching TV Not at all   Moving or speaking so slowly that other people could have noticed; or the opposite being so fidgety that others notice Not at all   Thoughts of being better off dead, or hurting yourself in some way Not at all   PHQ 9 Score 0   How difficult have these problems made it for you to do your work, take care of your home and get along with others Not difficult at all       Alcohol Risk Screen    Do you average more than 1 drink per night or more than 7 drinks a week: No    In the past three months have you have had more than 4 drinks containing alcohol on one occasion: No         Functional Ability and Level of Safety     1. Was the patient's timed Up and GO test unsteady or longer than 30 seconds? No  2. Does the patient need help with the phone, transportation, shopping, preparing meals, housework, laundry, medications or managing money? Yes  3. Does the patients' home have rugs in the hallway, lack grab bars in the bathroom, lack handrails on the stairs or have poor lighting? No  4. Have you noticed any hearing difficulties? No  Hearing Evaluation:     Hearing: Hearing is good. Activities of Daily Living: The home contains: no safety equipment. Patient does total self care     Ambulation: with difficulty, uses a cane      Fall Risk:  Fall Risk Assessment, last 12 mths 7/26/2021   Able to walk? Yes   Fall in past 12 months? 0   Do you feel unsteady? 0   Are you worried about falling 0   Number of falls in past 12 months -   Fall with injury?  -      Abuse Screen:  Patient is not abused       Cognitive Screening    Has your family/caregiver stated any concerns about your memory: no     Cognitive Screening: Normal - Verbal Fluency Test    Health Maintenance Due     Health Maintenance Due   Topic Date Due    COVID-19 Vaccine (1) Never done    Colorectal Cancer Screening Combo  Never done    DTaP/Tdap/Td series (1 - Tdap) 04/20/2011    Medicare Yearly Exam  Never done    Lipid Screen  02/14/2021       Patient Care Team   Patient Care Team:  Aftab Goel MD as PCP - General (Family Medicine)  Aftab Goel MD as PCP - [facility] Leighann Sánchez Provider    History   Chad Head comes in for Medicare wellness exam.    Patient Active Problem List   Diagnosis Code    Coronary atherosclerosis of native coronary artery I25.10    Old myocardial infarction I25.2    Essential hypertension, benign I10    Other and unspecified hyperlipidemia E78.5    Lumbar spinal stenosis M48.061    Lumbar neuritis M54.16    Postlaminectomy syndrome, lumbar region M96.1    Hip arthritis M16.10    Hip pain, right M25.551    Severe obesity ([address] E66.01    Obesity (BMI 30-39. 9) E66.9    Coronary artery disease with stable angina pectoris ([facility] [address] I25.118     Past Medical History:   Diagnosis Date    Arthritis     CAD (coronary artery disease)     Essential hypertension     Low back pain     Lumbar postlaminectomy syndrome     Lumbar spondylosis     Myocardial infarction ([facility] [address]     lad stent for stemi,12/2012    Pancreatic cyst       Past Surgical History:   Procedure Laterality Date    HAND/FINGER SURGERY UNLISTED      Carpal tunnel    HX CORONARY ARTERY BYPASS GRAFT  2014    [facility] HX HIP REPLACEMENT      HX LUMBAR FUSION      HX ORTHOPAEDIC      lumbar spine x 5    HX ORTHOPAEDIC      Right hip replacement    HX ORTHOPAEDIC      right foot calous removed    HX PTCA       Current Outpatient Medications   Medication Sig Dispense Refill    amLODIPine (NORVASC) 10 mg tablet Take 1 Tab by mouth daily. 90 Tab 1    atorvastatin (Lipitor) 80 mg tablet Take 1 Tab by mouth daily. 90 Tab 1    clopidogreL (PLAVIX) 75 mg tab Take 1 Tab by mouth daily. Take  by mouth. 90 Tab 0    lisinopriL (PRINIVIL, ZESTRIL) 20 mg tablet TAKE 2 TABLETS (40MG) DAILY 180 Tab 0    latanoprost (XALATAN) 0.005 % ophthalmic solution       timolol (TIMOPTIC) 0.5 % ophthalmic solution 1 Drop two (2) times a day.  aspirin delayed-release 325 mg tablet Take 1 Tab by mouth two (2) times a day.  60 Tab 1    cholecalciferol, VITAMIN D3, (VITAMIN D3) 5,000 unit tab tablet Take  by mouth daily.  oxyCODONE IR (ROXICODONE) 15 mg immediate release tablet Take 1-2 Tabs by mouth every eight (8) hours as needed. Max Daily Amount: 90 mg. 60 Tab 0    nitroglycerin (NITROSTAT) 0.4 mg SL tablet by SubLINGual route every five (5) minutes as needed.  carvedilol (COREG) 25 mg tablet Take 25 mg by mouth two (2) times daily (with meals).  hydrochlorothiazide (HYDRODIURIL) 25 mg tablet Take 25 mg by mouth daily.  isosorbide mononitrate ER (IMDUR) 60 mg CR tablet Take 1 Tab by mouth every morning. 30 Tab 6    cyclobenzaprine (FLEXERIL) 10 mg tablet Take 1 Tab by mouth nightly as needed for Muscle Spasm(s). (Patient not taking: Reported on 2021) 30 Tab 2    gabapentin (NEURONTIN) 300 mg capsule Take 1 Cap by mouth three (3) times daily. (Patient not taking: Reported on 2021) 90 Cap 1    isoniazid (NYDRAZID) 300 mg tablet Take 300 mg by mouth daily. (Patient not taking: Reported on 2021)       Allergies   Allergen Reactions    Vicodin [Hydrocodone-Acetaminophen] Nausea and Vomiting       Family History   Problem Relation Age of Onset    Heart Attack Neg Hx     Heart Surgery Neg Hx      Social History     Tobacco Use    Smoking status: Former Smoker     Packs/day: 0.10     Types: Cigarettes     Quit date: 2012     Years since quittin.7    Smokeless tobacco: Current User     Types: Chew   Substance Use Topics    Alcohol use: Yes     Alcohol/week: 2.0 standard drinks     Types: 1 Glasses of wine, 1 Shots of liquor per week     Comment: socially   I have discussed the diagnosis with the patient and the intended plan of care as seen in the above orders. The patient has received an after-visit summary and questions were answered concerning future plans. I have discussed medication, side effects, and warnings with the patient in detail. The patient verbalized understanding and is in agreement with the plan of care.  The patient will contact the office with any additional concerns. Personalized preventative plan of care was discussed, printed and given to patient.     Thania Oliveira MD

## 2021-07-26 NOTE — ACP (ADVANCE CARE PLANNING)
Advance Care Planning     Advance Care Planning (ACP) Physician/NP/PA Conversation      Date of Conversation: 7/26/2021  Conducted with: Patient with Decision Making Capacity    Healthcare Decision Maker:     Primary Decision Maker (Active): Jessica Nicole - Daughter - [phone]  Click here to complete [address] including selection of the Healthcare Decision Maker Relationship (ie \"Primary\")  Today we documented Decision Maker(s). The patient will provide ACP documents. Care Preferences:    Hospitalization: \"If your health worsens and it becomes clear that your chance of recovery is unlikely, what would be your preference regarding hospitalization? \"  The patient would prefer hospitalization. Ventilation: \"If you were unable to breathe on your own and your chance of recovery was unlikely, what would be your preference about the use of a ventilator (breathing machine) if it was available to you? \"   The patient would desire the use of a ventilator. Resuscitation: \"In the event your heart stopped as a result of an underlying serious health condition, would you want attempts to be made to restart your heart, or would you prefer a natural death? \"   Yes, attempt to resuscitate.     Additional topics discussed: treatment goals, ventilation preferences, hospitalization preferences and resuscitation preferences    Conversation Outcomes / Follow-Up Plan:   ACP in process - completing/providing documents   Reviewed DNR/DNI and patient elects Full Code (Attempt Resuscitation)     Length of Voluntary ACP Conversation in minutes:  16 minutes    Eugene Escobar MD

## 2021-07-28 DIAGNOSIS — M54.2 CHRONIC NECK PAIN: Primary | ICD-10-CM

## 2021-07-28 DIAGNOSIS — G89.29 CHRONIC NECK PAIN: Primary | ICD-10-CM

## 2021-08-26 DIAGNOSIS — G89.29 CHRONIC NECK PAIN: Primary | ICD-10-CM

## 2021-08-26 DIAGNOSIS — M54.2 CHRONIC NECK PAIN: Primary | ICD-10-CM

## 2021-10-05 DIAGNOSIS — I10 ESSENTIAL HYPERTENSION, BENIGN: ICD-10-CM

## 2021-10-05 DIAGNOSIS — R60.0 PEDAL EDEMA: ICD-10-CM

## 2021-10-05 RX ORDER — HYDROCHLOROTHIAZIDE 12.5 MG/1
12.5 TABLET ORAL DAILY
Qty: 30 TABLET | Refills: 3 | Status: CANCELLED | OUTPATIENT
Start: 2021-10-05

## 2021-10-05 NOTE — TELEPHONE ENCOUNTER
Requested Prescriptions     Pending Prescriptions Disp Refills    amLODIPine (NORVASC) 10 mg tablet 90 Tablet 1     Sig: Take 1 Tablet by mouth daily.  clopidogreL (PLAVIX) 75 mg tab 90 Tablet 0     Sig: Take 1 Tablet by mouth daily. Take  by mouth. Iliana Morse called for their medication refill.     Last Office visit:  07-  Last labs:  02-  Follow up visit:  10-  Last date prescribe 03-, 06-  Please Advise

## 2021-10-05 NOTE — TELEPHONE ENCOUNTER
Requested Prescriptions     Pending Prescriptions Disp Refills    hydroCHLOROthiazide (HYDRODIURIL) 12.5 mg tablet 30 Tablet 3     Sig: Take 1 Tablet by mouth daily.  amLODIPine (NORVASC) 10 mg tablet 90 Tablet 1     Sig: Take 1 Tablet by mouth daily.  clopidogreL (PLAVIX) 75 mg tab 90 Tablet 0     Sig: Take 1 Tablet by mouth daily. Take  by mouth.

## 2021-10-06 RX ORDER — AMLODIPINE BESYLATE 10 MG/1
10 TABLET ORAL DAILY
Qty: 90 TABLET | Refills: 1 | Status: SHIPPED | OUTPATIENT
Start: 2021-10-06

## 2021-10-06 RX ORDER — CLOPIDOGREL BISULFATE 75 MG/1
75 TABLET ORAL DAILY
Qty: 90 TABLET | Refills: 1 | Status: SHIPPED | OUTPATIENT
Start: 2021-10-06

## 2021-10-12 DIAGNOSIS — R60.0 PEDAL EDEMA: ICD-10-CM

## 2021-10-12 DIAGNOSIS — I10 ESSENTIAL HYPERTENSION, BENIGN: ICD-10-CM

## 2021-10-12 NOTE — TELEPHONE ENCOUNTER
Requested Prescriptions     Pending Prescriptions Disp Refills    hydroCHLOROthiazide (HYDRODIURIL) 12.5 mg tablet 30 Tablet 3     Sig: Take 1 Tablet by mouth daily.

## 2021-10-13 RX ORDER — HYDROCHLOROTHIAZIDE 12.5 MG/1
12.5 TABLET ORAL DAILY
Qty: 30 TABLET | Refills: 3 | Status: SHIPPED | OUTPATIENT
Start: 2021-10-13

## 2021-10-13 NOTE — TELEPHONE ENCOUNTER
Requested Prescriptions     Pending Prescriptions Disp Refills    hydroCHLOROthiazide (HYDRODIURIL) 12.5 mg tablet 30 Tablet 3     Sig: Take 1 Tablet by mouth daily. Shirley Ordonez called for their medication refill.     Last Office visit:  07-  Last labs:  02=-082021  Follow up visit:  10-  Last date prescribe 07-    Please Advise

## 2021-10-15 ENCOUNTER — TELEPHONE (OUTPATIENT)
Dept: FAMILY MEDICINE CLINIC | Age: 66
End: 2021-10-15

## 2021-10-27 ENCOUNTER — OFFICE VISIT (OUTPATIENT)
Dept: FAMILY MEDICINE CLINIC | Age: 66
End: 2021-10-27
Payer: MEDICARE

## 2021-10-27 VITALS
OXYGEN SATURATION: 95 % | TEMPERATURE: 97.6 F | HEART RATE: 68 BPM | SYSTOLIC BLOOD PRESSURE: 138 MMHG | WEIGHT: 235 LBS | RESPIRATION RATE: 16 BRPM | DIASTOLIC BLOOD PRESSURE: 80 MMHG | HEIGHT: 73 IN | BODY MASS INDEX: 31.14 KG/M2

## 2021-10-27 DIAGNOSIS — I10 ESSENTIAL HYPERTENSION, BENIGN: Primary | ICD-10-CM

## 2021-10-27 DIAGNOSIS — M54.2 NECK PAIN: ICD-10-CM

## 2021-10-27 DIAGNOSIS — I50.32 CHRONIC DIASTOLIC HEART FAILURE (HCC): ICD-10-CM

## 2021-10-27 DIAGNOSIS — N18.30 STAGE 3 CHRONIC KIDNEY DISEASE, UNSPECIFIED WHETHER STAGE 3A OR 3B CKD (HCC): ICD-10-CM

## 2021-10-27 DIAGNOSIS — I25.118 CORONARY ARTERY DISEASE WITH STABLE ANGINA PECTORIS, UNSPECIFIED VESSEL OR LESION TYPE, UNSPECIFIED WHETHER NATIVE OR TRANSPLANTED HEART (HCC): ICD-10-CM

## 2021-10-27 DIAGNOSIS — E66.01 SEVERE OBESITY (HCC): ICD-10-CM

## 2021-10-27 DIAGNOSIS — E78.5 DYSLIPIDEMIA: ICD-10-CM

## 2021-10-27 PROBLEM — F11.20 UNCOMPLICATED OPIOID DEPENDENCE (HCC): Status: ACTIVE | Noted: 2019-01-16

## 2021-10-27 PROBLEM — G89.18 POSTOPERATIVE PAIN: Status: ACTIVE | Noted: 2019-05-16

## 2021-10-27 PROBLEM — Z91.81 RISK FOR FALLS: Status: ACTIVE | Noted: 2019-01-18

## 2021-10-27 PROBLEM — F11.20 UNCOMPLICATED OPIOID DEPENDENCE (HCC): Status: RESOLVED | Noted: 2019-01-16 | Resolved: 2021-10-27

## 2021-10-27 PROCEDURE — 1101F PT FALLS ASSESS-DOCD LE1/YR: CPT | Performed by: FAMILY MEDICINE

## 2021-10-27 PROCEDURE — 3017F COLORECTAL CA SCREEN DOC REV: CPT | Performed by: FAMILY MEDICINE

## 2021-10-27 PROCEDURE — 99214 OFFICE O/P EST MOD 30 MIN: CPT | Performed by: FAMILY MEDICINE

## 2021-10-27 PROCEDURE — G8510 SCR DEP NEG, NO PLAN REQD: HCPCS | Performed by: FAMILY MEDICINE

## 2021-10-27 PROCEDURE — G8417 CALC BMI ABV UP PARAM F/U: HCPCS | Performed by: FAMILY MEDICINE

## 2021-10-27 PROCEDURE — G8427 DOCREV CUR MEDS BY ELIG CLIN: HCPCS | Performed by: FAMILY MEDICINE

## 2021-10-27 PROCEDURE — G8754 DIAS BP LESS 90: HCPCS | Performed by: FAMILY MEDICINE

## 2021-10-27 PROCEDURE — G8536 NO DOC ELDER MAL SCRN: HCPCS | Performed by: FAMILY MEDICINE

## 2021-10-27 PROCEDURE — G8752 SYS BP LESS 140: HCPCS | Performed by: FAMILY MEDICINE

## 2021-10-27 NOTE — PROGRESS NOTES
Chief Complaint   Patient presents with    Follow Up Chronic Condition     still having neck pain     1. Have you been to the ER, urgent care clinic since your last visit? Hospitalized since your last visit? No    2. Have you seen or consulted any other health care providers outside of the [address] [place] [address] since your last visit? Include any pap smears or colon screening.  No

## 2021-10-27 NOTE — PROGRESS NOTES
SUSHANT  Sherry Nash comes in for follow-up care. Chronic neck pain: Patient has chronic neck pain. He is seen in pain management. Referral to physiatrist has been placed. I did give him a number to call to set up an appointment. He is on oxycodone for the pain. He denies numbness or tingling of the upper extremities. Patient also has Tylenol to use as needed for the pain. Hypertension: Patient has hypertension blood pressure is stable. He denies headache, changes in vision or focal weakness. He is on Coreg, lisinopril, amlodipine and HCTZ. We will continue with these medications. CHF: Patient has a history of CHF. He is not in clinical failure. Denies chest pain, shortness of breath or diaphoresis. He is on Imdur, lisinopril, Coreg, aspirin. Continue current management plan. His last echocardiogram done in 2018 was reported as:  LIMITED ECHOCARDIOGRAM TO ASSESS LEFT VENTRICULAR FUNCTION.    MILDLY DECREASED LEFT VENTRICULAR EJECTION FRACTION OF 48% BY    BIPLANE METHOD.    MILD LEFT VENTRICULAR HYPERTROPHY PRESENT.    THERE IS HYPOKINESIS OF THE INFERIOR WALL AND DYSKINESIS OF THE APEX.    MILD DIASTOLIC DYSFUNCTION.    NORMAL CHAMBER SIZES.    NORMAL RIGHT VENTRICULAR GLOBAL SYSTOLIC FUNCTION.    ESTIMATED RIGHT VENTRICULAR SYSTOLIC PRESSURE IS [id number]. CAD: Patient has coronary artery disease. Has had coronary stent placed. He is on Plavix, Imdur, lisinopril and Coreg. Has nitroglycerin to use as needed. He is also on aspirin. Denies chest pain, shortness of breath or diaphoresis. We will continue current medication management. Dyslipidemia: Patient has dyslipidemia. He takes Lipitor 80 mg daily. Stable on the medication. We will recheck lipid panel. Chronic kidney disease: Patient has chronic kidney disease stage III. We will recheck labs today. Obesity: Patient has a BMI of 31. He will intensify lifestyle and dietary modification.       Past Medical History  Past Medical History: Diagnosis Date    Arthritis     CAD (coronary artery disease)     Essential hypertension     Low back pain     Lumbar postlaminectomy syndrome     Lumbar spondylosis     Myocardial infarction ([facility] [address]     lad stent for stemi,12/2012    Pancreatic cyst        Surgical History  Past Surgical History:   Procedure Laterality Date    HAND/FINGER SURGERY UNLISTED      Carpal tunnel    HX CORONARY ARTERY [facility]PASS GRAFT  [facility]    [facility] HX HIP REPLACEMENT      HX LUMBAR FUSION      HX ORTHOPAEDIC      lumbar spine x 5    HX ORTHOPAEDIC      Right hip replacement    HX ORTHOPAEDIC      right foot calous removed    HX PTCA          Medications  Current Outpatient Medications   Medication Sig Dispense Refill    hydroCHLOROthiazide (HYDRODIURIL) 12.5 mg tablet Take 1 Tablet by mouth daily. 30 Tablet 3    amLODIPine (NORVASC) 10 mg tablet Take 1 Tablet by mouth daily. 90 Tablet 1    clopidogreL (PLAVIX) 75 mg tab Take 1 Tablet by mouth daily. Take  by mouth. 90 Tablet 1    acetaminophen (Tylenol Extra Strength) 500 mg tablet Take 2 Tablets by mouth every eight (8) hours as needed for Pain. 100 Tablet 1    atorvastatin (Lipitor) 80 mg tablet Take 1 Tab by mouth daily. 90 Tab 1    lisinopriL (PRINIVIL, ZESTRIL) 20 mg tablet TAKE 2 TABLETS (40MG) DAILY 180 Tab 0    latanoprost (XALATAN) 0.005 % ophthalmic solution       timolol (TIMOPTIC) 0.5 % ophthalmic solution 1 Drop two (2) times a day.  aspirin delayed-release 325 mg tablet Take 1 Tab by mouth two (2) times a day. 60 Tab 1    cholecalciferol, VITAMIN D3, (VITAMIN D3) 5,000 unit tab tablet Take  by mouth daily.  oxyCODONE IR (ROXICODONE) 15 mg immediate release tablet Take 1-2 Tabs by mouth every eight (8) hours as needed. Max Daily Amount: 90 mg. 60 Tab 0    nitroglycerin (NITROSTAT) 0.4 mg SL tablet by SubLINGual route every five (5) minutes as needed.       carvedilol (COREG) 25 mg tablet Take 25 mg by mouth two (2) times daily (with meals).  isosorbide mononitrate ER (IMDUR) 60 mg CR tablet Take 1 Tab by mouth every morning. 30 Tab 6       Allergies  Allergies   Allergen Reactions    Vicodin [Hydrocodone-Acetaminophen] Nausea and Vomiting       Family History  Family History   Problem Relation Age of Onset    Heart Attack Neg Hx     Heart Surgery Neg Hx        Social History  Social History     Socioeconomic History    Marital status: UNKNOWN     Spouse name: Not on file    Number of children: Not on file    Years of education: Not on file    Highest education level: Not on file   Occupational History    Not on file   Tobacco Use    Smoking status: Former Smoker     Packs/day: 0.10     Types: Cigarettes     Quit date: 2012     Years since quittin.9    Smokeless tobacco: Current User     Types: Chew   Vaping Use    Vaping Use: Never used   Substance and Sexual Activity    Alcohol use: Yes     Alcohol/week: 2.0 standard drinks     Types: 1 Glasses of wine, 1 Shots of liquor per week     Comment: socially    Drug use: Not Currently     Types: Cocaine, Marijuana, Heroin     Comment: +Cocaine Screen + UDS in      Sexual activity: Not Currently     Comment: stopped using   Other Topics Concern    Not on file   Social History Narrative    Not on file     Social Determinants of Health     Financial Resource Strain:     Difficulty of Paying Living Expenses:    Food Insecurity:     Worried About Running Out of Food in the Last Year:     [address] in the Last Year:    Transportation Needs:     Lack of Transportation (Medical):      Lack of Transportation (Non-Medical):    Physical Activity:     Days of Exercise per Week:     Minutes of Exercise per Session:    Stress:     Feeling of Stress :    Social Connections:     Frequency of Communication with Friends and Family:     Frequency of Social Gatherings with Friends and Family:     Attends Christianity Services:     Active Member of Clubs or Organizations:     Attends Club or Organization Meetings:     Marital Status:    Intimate Partner Violence:     Fear of Current or Ex-Partner:     Emotionally Abused:     Physically Abused:     Sexually Abused:        Review of Systems  Review of Systems - Review of all systems is negative except as noted above in the HPI. Vital Signs  Visit Vitals  /80   Pulse 68   Temp 97.6 °F (36.4 °C) (Oral)   Resp 16   Ht 6' 1\" (1.854 m)   Wt 235 lb (106.6 kg)   SpO2 95%   BMI 31.00 kg/m²         Physical Exam  Physical Examination: General appearance - acyanotic, in no respiratory distress  Mental status - affect appropriate to mood  Mouth - mucous membranes moist, pharynx normal without lesions  Neck -paracervical muscle tightness, discomfort to palpation midline cervical spine  Lymphatics - no palpable lymphadenopathy  Chest - no tachypnea, retractions or cyanosis  Heart - systolic murmur 2/6 at lower left sternal border  Abdomen - no rebound tenderness noted  Back exam - limited range of motion, pain with motion noted during exam, tenderness noted paravertebral muscles  Neurological - abnormal neurological exam unchanged from prior examinations  Musculoskeletal - osteoarthritic changes noted in both hands  Extremities - no pedal edema noted, intact peripheral pulses      Results  Results for orders placed or performed in visit on 01/20/20   CBC WITH AUTOMATED DIFF   Result Value Ref Range    WBC 5.6 3.4 - 10.8 x10E3/uL    RBC 3.96 (L) 4.14 - 5.80 x10E6/uL    HGB 12.5 (L) 13.0 - 17.7 g/dL    HCT 37.7 37.5 - 51.0 %    MCV 95 79 - 97 fL    MCH 31.6 26.6 - 33.0 pg    MCHC 33.2 31.5 - 35.7 g/dL    RDW 11.7 11.6 - 15.4 %    PLATELET 204 133 - 996 x10E3/uL    NEUTROPHILS 39 Not Estab. %    Lymphocytes 46 Not Estab. %    MONOCYTES 9 Not Estab. %    EOSINOPHILS 6 Not Estab. %    BASOPHILS 0 Not Estab. %    ABS. NEUTROPHILS 2.2 1.4 - 7.0 x10E3/uL    Abs Lymphocytes 2.6 0.7 - 3.1 x10E3/uL    ABS. MONOCYTES 0.5 0.1 - 0.9 x10E3/uL    ABS. EOSINOPHILS 0.3 0.0 - 0.4 x10E3/uL    ABS. BASOPHILS 0.0 0.0 - 0.2 x10E3/uL    IMMATURE GRANULOCYTES 0 Not Estab. %    ABS. IMM. GRANS. 0.0 0.0 - 0.1 x10E3/uL    Hematology comments: Note:    METABOLIC PANEL, COMPREHENSIVE   Result Value Ref Range    Glucose 88 65 - 99 mg/dL    BUN 19 8 - 27 mg/dL    Creatinine 1.32 (H) 0.76 - 1.27 mg/dL    GFR est non-AA 56 (L) >59 mL/min/1.73    GFR est AA 65 >59 mL/min/1.73    BUN/Creatinine ratio 14 10 - 24    Sodium 145 (H) 134 - 144 mmol/L    Potassium 4.0 3.5 - 5.2 mmol/L    Chloride 104 96 - 106 mmol/L    CO2 23 20 - 29 mmol/L    Calcium 9.2 8.6 - 10.2 mg/dL    Protein, total 7.0 6.0 - 8.5 g/dL    Albumin 3.9 3.8 - 4.8 g/dL    GLOBULIN, TOTAL 3.1 1.5 - 4.5 g/dL    A-G Ratio 1.3 1.2 - 2.2    Bilirubin, total 0.3 0.0 - 1.2 mg/dL    Alk. phosphatase 56 39 - 117 IU/L    AST (SGOT) 25 0 - 40 IU/L    ALT (SGPT) 16 0 - 44 IU/L   LIPID PANEL   Result Value Ref Range    Cholesterol, total 86 (L) 100 - 199 mg/dL    Triglyceride 101 0 - 149 mg/dL    HDL Cholesterol 34 (L) >39 mg/dL    VLDL, calculated 20 5 - 40 mg/dL    LDL, calculated 32 0 - 99 mg/dL   CVD REPORT   Result Value Ref Range    INTERPRETATION Note     PDF IMAGE Not applicable    CKD REPORT   Result Value Ref Range    Interpretation Note    PSA SCREENING (SCREENING)   Result Value Ref Range    Prostate Specific Ag 0.4 0.0 - 4.0 ng/mL       ASSESSMENT and PLAN    ICD-10-CM ICD-9-CM    1. Essential hypertension, benign  I10 401.1    2. Stage 3 chronic kidney disease, unspecified whether stage 3a or 3b CKD (HCC)  N18.30 585.3    3. Neck pain  M54.2 723.1    4. Coronary artery disease with stable angina pectoris, unspecified vessel or lesion type, unspecified whether [facility]  I25.118 414.00      413.9    5. Dyslipidemia  E78.5 272.4    6.  Severe obesity (HCC)  E66.01 278.01      lab results and schedule of future lab studies reviewed with patient  reviewed diet, exercise and weight control  cardiovascular risk and specific lipid/LDL goals reviewed  reviewed medications and side effects in detail  use of aspirin to prevent MI and TIA's discussed      I have discussed the diagnosis with the patient and the intended plan of care as seen in the above orders. The patient has received an after-visit summary and questions were answered concerning future plans. I have discussed medication, side effects, and warnings with the patient in detail. The patient verbalized understanding and is in agreement with the plan of care. The patient will contact the office with any additional concerns. Melida Barros MD    PLEASE NOTE:   This document has been produced using voice recognition software.  Unrecognized errors in transcription may be present Yes

## 2021-11-04 ENCOUNTER — APPOINTMENT (OUTPATIENT)
Dept: FAMILY MEDICINE CLINIC | Age: 66
End: 2021-11-04

## 2021-11-04 DIAGNOSIS — I10 ESSENTIAL HYPERTENSION, BENIGN: ICD-10-CM

## 2021-11-04 DIAGNOSIS — E78.5 DYSLIPIDEMIA: ICD-10-CM

## 2021-11-04 DIAGNOSIS — Z12.5 SCREENING FOR MALIGNANT NEOPLASM OF PROSTATE: ICD-10-CM

## 2021-11-04 DIAGNOSIS — R60.0 PEDAL EDEMA: ICD-10-CM

## 2021-11-04 DIAGNOSIS — E07.9 THYROID DISORDER: ICD-10-CM

## 2021-11-05 LAB
ALBUMIN SERPL-MCNC: 4.1 G/DL (ref 3.8–4.8)
ALBUMIN/GLOB SERPL: 1.2 {RATIO} (ref 1.2–2.2)
ALP SERPL-CCNC: 63 IU/L (ref 44–121)
ALT SERPL-CCNC: 13 IU/L (ref 0–44)
AST SERPL-CCNC: 22 IU/L (ref 0–40)
BASOPHILS # BLD AUTO: 0 X10E3/UL (ref 0–0.2)
BASOPHILS NFR BLD AUTO: 0 %
BILIRUB SERPL-MCNC: 0.3 MG/DL (ref 0–1.2)
BUN SERPL-MCNC: 27 MG/DL (ref 8–27)
BUN/CREAT SERPL: 16 (ref 10–24)
CALCIUM SERPL-MCNC: 9.5 MG/DL (ref 8.6–10.2)
CHLORIDE SERPL-SCNC: 101 MMOL/L (ref 96–106)
CHOLEST SERPL-MCNC: 102 MG/DL (ref 100–199)
CO2 SERPL-SCNC: 23 MMOL/L (ref 20–29)
CREAT SERPL-MCNC: 1.68 MG/DL (ref 0.76–1.27)
EOSINOPHIL # BLD AUTO: 0.1 X10E3/UL (ref 0–0.4)
EOSINOPHIL NFR BLD AUTO: 3 %
ERYTHROCYTE [DISTWIDTH] IN BLOOD BY AUTOMATED COUNT: 11.6 % (ref 11.6–15.4)
GLOBULIN SER CALC-MCNC: 3.4 G/DL (ref 1.5–4.5)
GLUCOSE SERPL-MCNC: 94 MG/DL (ref 65–99)
HCT VFR BLD AUTO: 42.1 % (ref 37.5–51)
HDLC SERPL-MCNC: 47 MG/DL
HGB BLD-MCNC: 14.2 G/DL (ref 13–17.7)
IMM GRANULOCYTES # BLD AUTO: 0 X10E3/UL (ref 0–0.1)
IMM GRANULOCYTES NFR BLD AUTO: 0 %
IMP & REVIEW OF LAB RESULTS: NORMAL
INTERPRETATION: NORMAL
LDLC SERPL CALC-MCNC: 40 MG/DL (ref 0–99)
LYMPHOCYTES # BLD AUTO: 1.7 X10E3/UL (ref 0.7–3.1)
LYMPHOCYTES NFR BLD AUTO: 34 %
MCH RBC QN AUTO: 31.8 PG (ref 26.6–33)
MCHC RBC AUTO-ENTMCNC: 33.7 G/DL (ref 31.5–35.7)
MCV RBC AUTO: 94 FL (ref 79–97)
MONOCYTES # BLD AUTO: 0.5 X10E3/UL (ref 0.1–0.9)
MONOCYTES NFR BLD AUTO: 11 %
NEUTROPHILS # BLD AUTO: 2.6 X10E3/UL (ref 1.4–7)
NEUTROPHILS NFR BLD AUTO: 52 %
PLATELET # BLD AUTO: 196 X10E3/UL (ref 150–450)
POTASSIUM SERPL-SCNC: 4 MMOL/L (ref 3.5–5.2)
PROT SERPL-MCNC: 7.5 G/DL (ref 6–8.5)
PSA SERPL-MCNC: 0.6 NG/ML (ref 0–4)
RBC # BLD AUTO: 4.47 X10E6/UL (ref 4.14–5.8)
SODIUM SERPL-SCNC: 139 MMOL/L (ref 134–144)
TRIGL SERPL-MCNC: 72 MG/DL (ref 0–149)
TSH SERPL DL<=0.005 MIU/L-ACNC: 1.22 UIU/ML (ref 0.45–4.5)
VLDLC SERPL CALC-MCNC: 15 MG/DL (ref 5–40)
WBC # BLD AUTO: 4.9 X10E3/UL (ref 3.4–10.8)

## 2021-11-08 DIAGNOSIS — N18.30 STAGE 3 CHRONIC KIDNEY DISEASE, UNSPECIFIED WHETHER STAGE 3A OR 3B CKD (HCC): Primary | ICD-10-CM

## 2021-11-08 NOTE — PROGRESS NOTES
Please let patient know lab results indicate elevation of creatinine due to his chronic kidney disease. He should follow-up with the nephrologist on this for further evaluation and management. Referral will be placed. Rest of his labs are stable.   Santa Carreno MD

## 2022-02-21 ENCOUNTER — TRANSCRIBE ORDER (OUTPATIENT)
Dept: SCHEDULING | Age: 67
End: 2022-02-21

## 2022-02-21 DIAGNOSIS — M96.1 POSTLAMINECTOMY SYNDROME, NOT ELSEWHERE CLASSIFIED: Primary | ICD-10-CM

## 2022-02-25 ENCOUNTER — HOSPITAL ENCOUNTER (OUTPATIENT)
Age: 67
Discharge: HOME | End: 2022-02-25
Attending: PHYSICAL MEDICINE & REHABILITATION
Payer: MEDICARE

## 2022-02-25 DIAGNOSIS — M96.1 POSTLAMINECTOMY SYNDROME, NOT ELSEWHERE CLASSIFIED: ICD-10-CM

## 2022-02-25 PROCEDURE — 72148 MRI LUMBAR SPINE W/O DYE: CPT

## 2022-02-28 NOTE — TELEPHONE ENCOUNTER
Lab orders added.    Zaid Jenkins MD  2/27/2022  9:20 PM     Patient given an appt at [facility] 6/6/19 8:30 am-patient aware.

## 2022-03-01 NOTE — TELEPHONE ENCOUNTER
Please see refill request    Patient was last seen on 2-8-2021    Amlodipine last prescribed 7-  unknown quantity    Atorvastin not showing as a current medication per pharmacy historic provider filled 7 years ago    Thank you [Breast Self Exam] : breast self exam

## 2022-03-18 PROBLEM — G89.18 POSTOPERATIVE PAIN: Status: ACTIVE | Noted: 2019-05-16

## 2022-03-18 PROBLEM — I50.32 CHRONIC DIASTOLIC HEART FAILURE (HCC): Status: ACTIVE | Noted: 2019-01-16

## 2022-03-18 PROBLEM — E66.01 SEVERE OBESITY (HCC): Status: ACTIVE | Noted: 2019-02-07

## 2022-03-19 PROBLEM — E66.9 OBESITY (BMI 30-39.9): Status: ACTIVE | Noted: 2020-01-26

## 2022-03-19 PROBLEM — I25.118 CORONARY ARTERY DISEASE WITH STABLE ANGINA PECTORIS (HCC): Status: ACTIVE | Noted: 2020-03-19

## 2022-03-19 PROBLEM — M25.551 HIP PAIN, RIGHT: Status: ACTIVE | Noted: 2018-02-16

## 2022-03-19 PROBLEM — M16.10 HIP ARTHRITIS: Status: ACTIVE | Noted: 2017-01-24

## 2022-03-20 PROBLEM — Z91.81 RISK FOR FALLS: Status: ACTIVE | Noted: 2019-01-18

## (undated) DEVICE — HANDPIECE SET WITH HIGH FLOW TIP AND SUCTION TUBE: Brand: INTERPULSE

## (undated) DEVICE — SOLUTION IRRIG 3000ML LAC R FLX CONT

## (undated) DEVICE — NEEDLE NRV STIM 20GA L6IN 30DEG BVL INSUL W/ EXTN SET

## (undated) DEVICE — 4-PORT MANIFOLD: Brand: NEPTUNE 2

## (undated) DEVICE — STRYKER PERFORMANCE SERIES SAGITTAL BLADE: Brand: STRYKER PERFORMANCE SERIES

## (undated) DEVICE — BIPOLAR SEALER 23-112-1 AQM 6.0: Brand: AQUAMANTYS ®

## (undated) DEVICE — NEEDLE HYPO 18GA L1.5IN PNK S STL HUB POLYPR SHLD REG BVL

## (undated) DEVICE — PACK PROCEDURE SURG TOT HIP BSHR LF

## (undated) DEVICE — SPIROMETER INCENT 2500ML W ONE W VLV

## (undated) DEVICE — Device

## (undated) DEVICE — 3M™ MEDIPORE™ H SOFT CLOTH SURGICAL TAPE 2864, 4 INCH X 10 YARD (10CM X 9,14M), 12 ROLLS/CASE: Brand: 3M™ MEDIPORE™

## (undated) DEVICE — SUTURE VCRL SZ 2 L27IN ABSRB VLT L65MM TP-1 1/2 CIR J649G

## (undated) DEVICE — Z DISCONTINUED BY MEDLINE USE 2711682 TRAY SKIN PREP DRY W/ PREM GLV

## (undated) DEVICE — NEEDLE SPNL 22GA L3.5IN BLK HUB S STL REG WALL FIT STYL W/

## (undated) DEVICE — 3M™ BAIR PAWS FLEX™ WARMING GOWN, STANDARD, 20 PER CASE 81003: Brand: BAIR PAWS™

## (undated) DEVICE — SKIN MARKER,REGULAR TIP WITH RULER AND LABELS: Brand: DEVON

## (undated) DEVICE — SUTURE VCRL SZ 1 L27IN ABSRB VLT CTX L48MM 1 2 CIR SGL ARMED J365H

## (undated) DEVICE — HIP PILLOW, ABDUCTION: Brand: DEROYAL

## (undated) DEVICE — Z DISCONTINUED USE 2744636  DRESSING AQUACEL 14 IN ALG W3.5XL14IN POLYUR FLM CVR W/ HYDRCOLL

## (undated) DEVICE — INTENDED FOR TISSUE SEPARATION, AND OTHER PROCEDURES THAT REQUIRE A SHARP SURGICAL BLADE TO PUNCTURE OR CUT.: Brand: BARD-PARKER SAFETY BLADES SIZE 10, STERILE

## (undated) DEVICE — HOOD, PEEL-AWAY: Brand: FLYTE

## (undated) DEVICE — STOCKING ANTIMBLSM KNEE XL REG --

## (undated) DEVICE — GOWN,REINF,POLY,ECL,PP SLV,3XL,XLONG: Brand: MEDLINE

## (undated) DEVICE — KIT CLN UP BON SECOURS MARYV

## (undated) DEVICE — 3M™ STERI-DRAPE™ INSTRUMENT POUCH 1018: Brand: STERI-DRAPE™

## (undated) DEVICE — SUTURE VCRL SZ 0 L27IN ABSRB UD L36MM CT-1 1/2 CIR J260H

## (undated) DEVICE — SYRINGE MED 3ML NDL 22GA L1 1/2IN REG BVL SFGLDE

## (undated) DEVICE — SYR LR LCK 1ML GRAD NSAF 30ML --

## (undated) DEVICE — X-RAY SPONGES,12 PLY: Brand: DERMACEA

## (undated) DEVICE — SYR 10ML LUER LOK 1/5ML GRAD --

## (undated) DEVICE — (D)SYR 10ML 1/5ML GRAD NSAF -- PKGING CHANGE USE ITEM 338027

## (undated) DEVICE — PREP SKN CHLRAPRP 26ML TNT -- CONVERT TO ITEM 373320

## (undated) DEVICE — SOLUTION IV 1000ML 0.9% SOD CHL

## (undated) DEVICE — NEEDLE HYPO 21GA L1.5IN INTRAMUSCULAR S STL LATCH BVL UP

## (undated) DEVICE — COVER LT HNDL BLU STRL -- MEDICHOICE

## (undated) DEVICE — TRAY MYEL SFTY +

## (undated) DEVICE — REM POLYHESIVE ADULT PATIENT RETURN ELECTRODE: Brand: VALLEYLAB

## (undated) DEVICE — GOWN,REINFORCED,POLY,AURORA,XXLARGE,STR: Brand: MEDLINE

## (undated) DEVICE — SLIM BODY SKIN STAPLER: Brand: APPOSE ULC

## (undated) DEVICE — GAUZE SPONGES,16 PLY: Brand: CURITY

## (undated) DEVICE — ABDOMINAL PAD: Brand: DERMACEA

## (undated) DEVICE — INTENDED FOR TISSUE SEPARATION, AND OTHER PROCEDURES THAT REQUIRE A SHARP SURGICAL BLADE TO PUNCTURE OR CUT.: Brand: BARD-PARKER SAFETY BLADES SIZE 15, STERILE

## (undated) DEVICE — HEX-LOCKING BLADE ELECTRODE: Brand: EDGE

## (undated) DEVICE — 3M™ STERI-DRAPE™ U-DRAPE 1015: Brand: STERI-DRAPE™

## (undated) DEVICE — SUTURE MCRYL SZ 2-0 L36IN ABSRB UD L36MM CT-1 1/2 CIR Y945H

## (undated) DEVICE — PENCIL ES L3M BTTN SWCH S STL HEX LOK BLDE ELECTRD HOLSTER

## (undated) DEVICE — MEDIA CONTRAST 10ML 200MG/ML 41%

## (undated) DEVICE — (D)BNDG ADHESIVE FABRIC 3/4X3 -- DISC BY MFR USE ITEM 357960